# Patient Record
Sex: FEMALE | Race: WHITE | NOT HISPANIC OR LATINO | Employment: UNEMPLOYED | URBAN - NONMETROPOLITAN AREA
[De-identification: names, ages, dates, MRNs, and addresses within clinical notes are randomized per-mention and may not be internally consistent; named-entity substitution may affect disease eponyms.]

---

## 2023-08-01 ENCOUNTER — HOSPITAL ENCOUNTER (OUTPATIENT)
Facility: HOSPITAL | Age: 78
LOS: 1 days | Discharge: HOME HEALTH | End: 2023-08-03
Attending: STUDENT IN AN ORGANIZED HEALTH CARE EDUCATION/TRAINING PROGRAM | Admitting: INTERNAL MEDICINE
Payer: MEDICARE

## 2023-08-01 DIAGNOSIS — R47.81 SLURRED SPEECH: ICD-10-CM

## 2023-08-01 DIAGNOSIS — G45.9 TIA (TRANSIENT ISCHEMIC ATTACK): Primary | ICD-10-CM

## 2023-08-01 DIAGNOSIS — N18.4 STAGE 4 CHRONIC KIDNEY DISEASE: ICD-10-CM

## 2023-08-01 DIAGNOSIS — R29.810 FACIAL WEAKNESS: ICD-10-CM

## 2023-08-01 DIAGNOSIS — I27.0 PRIMARY PULMONARY HTN: ICD-10-CM

## 2023-08-01 PROCEDURE — 93005 ELECTROCARDIOGRAM TRACING: CPT | Performed by: STUDENT IN AN ORGANIZED HEALTH CARE EDUCATION/TRAINING PROGRAM

## 2023-08-01 PROCEDURE — 99285 EMERGENCY DEPT VISIT HI MDM: CPT

## 2023-08-01 PROCEDURE — 93010 ELECTROCARDIOGRAM REPORT: CPT | Performed by: SPECIALIST

## 2023-08-02 ENCOUNTER — APPOINTMENT (OUTPATIENT)
Dept: NUCLEAR MEDICINE | Facility: HOSPITAL | Age: 78
End: 2023-08-02
Payer: MEDICARE

## 2023-08-02 ENCOUNTER — APPOINTMENT (OUTPATIENT)
Dept: CARDIOLOGY | Facility: HOSPITAL | Age: 78
End: 2023-08-02
Payer: MEDICARE

## 2023-08-02 ENCOUNTER — APPOINTMENT (OUTPATIENT)
Dept: CT IMAGING | Facility: HOSPITAL | Age: 78
End: 2023-08-02
Payer: MEDICARE

## 2023-08-02 ENCOUNTER — APPOINTMENT (OUTPATIENT)
Dept: GENERAL RADIOLOGY | Facility: HOSPITAL | Age: 78
End: 2023-08-02
Payer: MEDICARE

## 2023-08-02 PROBLEM — R29.90 STROKE-LIKE SYMPTOMS: Status: ACTIVE | Noted: 2023-08-02

## 2023-08-02 LAB
ABO GROUP BLD: NORMAL
ABO GROUP BLD: NORMAL
ALBUMIN SERPL-MCNC: 4.3 G/DL (ref 3.5–5.2)
ALBUMIN SERPL-MCNC: 4.4 G/DL (ref 3.5–5.2)
ALBUMIN/GLOB SERPL: 1.5 G/DL
ALBUMIN/GLOB SERPL: 1.6 G/DL
ALP SERPL-CCNC: 64 U/L (ref 39–117)
ALP SERPL-CCNC: 67 U/L (ref 39–117)
ALT SERPL W P-5'-P-CCNC: 10 U/L (ref 1–33)
ALT SERPL W P-5'-P-CCNC: 10 U/L (ref 1–33)
AMPHET+METHAMPHET UR QL: NEGATIVE
AMPHETAMINES UR QL: NEGATIVE
ANION GAP SERPL CALCULATED.3IONS-SCNC: 10.9 MMOL/L (ref 5–15)
ANION GAP SERPL CALCULATED.3IONS-SCNC: 12.2 MMOL/L (ref 5–15)
APTT PPP: 33.3 SECONDS (ref 26.5–34.5)
AST SERPL-CCNC: 22 U/L (ref 1–32)
AST SERPL-CCNC: 22 U/L (ref 1–32)
BARBITURATES UR QL SCN: NEGATIVE
BASOPHILS # BLD AUTO: 0.03 10*3/MM3 (ref 0–0.2)
BASOPHILS # BLD AUTO: 0.03 10*3/MM3 (ref 0–0.2)
BASOPHILS NFR BLD AUTO: 0.5 % (ref 0–1.5)
BASOPHILS NFR BLD AUTO: 0.7 % (ref 0–1.5)
BENZODIAZ UR QL SCN: POSITIVE
BH CV ECHO MEAS - ACS: 1.5 CM
BH CV ECHO MEAS - AI P1/2T: 449.4 MSEC
BH CV ECHO MEAS - AO MAX PG: 22.8 MMHG
BH CV ECHO MEAS - AO MEAN PG: 13 MMHG
BH CV ECHO MEAS - AO ROOT DIAM: 2.6 CM
BH CV ECHO MEAS - AO V2 MAX: 239 CM/SEC
BH CV ECHO MEAS - AO V2 VTI: 51.5 CM
BH CV ECHO MEAS - AVA(I,D): 1.67 CM2
BH CV ECHO MEAS - EDV(CUBED): 118 ML
BH CV ECHO MEAS - EDV(MOD-SP4): 106 ML
BH CV ECHO MEAS - EF(MOD-BP): 68 %
BH CV ECHO MEAS - EF(MOD-SP4): 68.6 %
BH CV ECHO MEAS - ESV(CUBED): 43.4 ML
BH CV ECHO MEAS - ESV(MOD-SP4): 33.3 ML
BH CV ECHO MEAS - FS: 28.3 %
BH CV ECHO MEAS - IVS/LVPW: 1.01 CM
BH CV ECHO MEAS - IVSD: 1.06 CM
BH CV ECHO MEAS - LA DIMENSION: 3.7 CM
BH CV ECHO MEAS - LAT PEAK E' VEL: 7.4 CM/SEC
BH CV ECHO MEAS - LV DIASTOLIC VOL/BSA (35-75): 60.9 CM2
BH CV ECHO MEAS - LV MASS(C)D: 189.6 GRAMS
BH CV ECHO MEAS - LV MAX PG: 8.6 MMHG
BH CV ECHO MEAS - LV MEAN PG: 4 MMHG
BH CV ECHO MEAS - LV SYSTOLIC VOL/BSA (12-30): 19.1 CM2
BH CV ECHO MEAS - LV V1 MAX: 147 CM/SEC
BH CV ECHO MEAS - LV V1 VTI: 30.4 CM
BH CV ECHO MEAS - LVIDD: 4.9 CM
BH CV ECHO MEAS - LVIDS: 3.5 CM
BH CV ECHO MEAS - LVOT AREA: 2.8 CM2
BH CV ECHO MEAS - LVOT DIAM: 1.9 CM
BH CV ECHO MEAS - LVPWD: 1.05 CM
BH CV ECHO MEAS - MED PEAK E' VEL: 4.2 CM/SEC
BH CV ECHO MEAS - MV A MAX VEL: 65.1 CM/SEC
BH CV ECHO MEAS - MV E MAX VEL: 78.4 CM/SEC
BH CV ECHO MEAS - MV E/A: 1.2
BH CV ECHO MEAS - PA ACC TIME: 0.11 SEC
BH CV ECHO MEAS - RAP SYSTOLE: 10 MMHG
BH CV ECHO MEAS - RVSP: 32.1 MMHG
BH CV ECHO MEAS - SI(MOD-SP4): 41.8 ML/M2
BH CV ECHO MEAS - SV(LVOT): 86.2 ML
BH CV ECHO MEAS - SV(MOD-SP4): 72.7 ML
BH CV ECHO MEAS - TR MAX PG: 22.1 MMHG
BH CV ECHO MEAS - TR MAX VEL: 235 CM/SEC
BH CV ECHO MEASUREMENTS AVERAGE E/E' RATIO: 13.52
BH CV NUCLEAR PRIOR STUDY: 3
BH CV REST NUCLEAR ISOTOPE DOSE: 9.5 MCI
BH CV STRESS BP STAGE 1: NORMAL
BH CV STRESS COMMENTS STAGE 1: NORMAL
BH CV STRESS DOSE REGADENOSON STAGE 1: 0.4
BH CV STRESS DURATION MIN STAGE 1: 0
BH CV STRESS DURATION SEC STAGE 1: 10
BH CV STRESS HR STAGE 1: 65
BH CV STRESS NUCLEAR ISOTOPE DOSE: 30.1 MCI
BH CV STRESS PROTOCOL 1: NORMAL
BH CV STRESS RECOVERY BP: NORMAL MMHG
BH CV STRESS RECOVERY HR: 65 BPM
BH CV STRESS STAGE 1: 1
BILIRUB SERPL-MCNC: 0.3 MG/DL (ref 0–1.2)
BILIRUB SERPL-MCNC: 0.3 MG/DL (ref 0–1.2)
BLD GP AB SCN SERPL QL: NEGATIVE
BUN SERPL-MCNC: 30 MG/DL (ref 8–23)
BUN SERPL-MCNC: 34 MG/DL (ref 8–23)
BUN/CREAT SERPL: 16.9 (ref 7–25)
BUN/CREAT SERPL: 17.3 (ref 7–25)
BUPRENORPHINE SERPL-MCNC: NEGATIVE NG/ML
CALCIUM SPEC-SCNC: 9.1 MG/DL (ref 8.6–10.5)
CALCIUM SPEC-SCNC: 9.3 MG/DL (ref 8.6–10.5)
CANNABINOIDS SERPL QL: NEGATIVE
CHLORIDE SERPL-SCNC: 104 MMOL/L (ref 98–107)
CHLORIDE SERPL-SCNC: 106 MMOL/L (ref 98–107)
CHOLEST SERPL-MCNC: 143 MG/DL (ref 0–200)
CO2 SERPL-SCNC: 24.8 MMOL/L (ref 22–29)
CO2 SERPL-SCNC: 27.1 MMOL/L (ref 22–29)
COCAINE UR QL: NEGATIVE
CREAT SERPL-MCNC: 1.78 MG/DL (ref 0.57–1)
CREAT SERPL-MCNC: 1.96 MG/DL (ref 0.57–1)
DEPRECATED RDW RBC AUTO: 41.4 FL (ref 37–54)
DEPRECATED RDW RBC AUTO: 41.4 FL (ref 37–54)
EGFRCR SERPLBLD CKD-EPI 2021: 25.9 ML/MIN/1.73
EGFRCR SERPLBLD CKD-EPI 2021: 29.1 ML/MIN/1.73
EOSINOPHIL # BLD AUTO: 0.09 10*3/MM3 (ref 0–0.4)
EOSINOPHIL # BLD AUTO: 0.1 10*3/MM3 (ref 0–0.4)
EOSINOPHIL NFR BLD AUTO: 1.6 % (ref 0.3–6.2)
EOSINOPHIL NFR BLD AUTO: 2 % (ref 0.3–6.2)
ERYTHROCYTE [DISTWIDTH] IN BLOOD BY AUTOMATED COUNT: 12.7 % (ref 12.3–15.4)
ERYTHROCYTE [DISTWIDTH] IN BLOOD BY AUTOMATED COUNT: 12.8 % (ref 12.3–15.4)
FENTANYL UR-MCNC: NEGATIVE NG/ML
GEN 5 2HR TROPONIN T REFLEX: 44 NG/L
GLOBULIN UR ELPH-MCNC: 2.8 GM/DL
GLOBULIN UR ELPH-MCNC: 2.9 GM/DL
GLUCOSE BLDC GLUCOMTR-MCNC: 89 MG/DL (ref 70–130)
GLUCOSE SERPL-MCNC: 82 MG/DL (ref 65–99)
GLUCOSE SERPL-MCNC: 92 MG/DL (ref 65–99)
HCT VFR BLD AUTO: 28.7 % (ref 34–46.6)
HCT VFR BLD AUTO: 31.4 % (ref 34–46.6)
HDLC SERPL-MCNC: 31 MG/DL (ref 40–60)
HGB BLD-MCNC: 10 G/DL (ref 12–15.9)
HGB BLD-MCNC: 9.1 G/DL (ref 12–15.9)
HOLD SPECIMEN: NORMAL
IMM GRANULOCYTES # BLD AUTO: 0.01 10*3/MM3 (ref 0–0.05)
IMM GRANULOCYTES # BLD AUTO: 0.02 10*3/MM3 (ref 0–0.05)
IMM GRANULOCYTES NFR BLD AUTO: 0.2 % (ref 0–0.5)
IMM GRANULOCYTES NFR BLD AUTO: 0.3 % (ref 0–0.5)
INR PPP: 1.03 (ref 0.9–1.1)
INR PPP: 1.08 (ref 0.9–1.1)
LDLC SERPL CALC-MCNC: 72 MG/DL (ref 0–100)
LDLC/HDLC SERPL: 2.06 {RATIO}
LEFT ATRIUM VOLUME INDEX: 32.5 ML/M2
LV EF NUC BP: 63 %
LYMPHOCYTES # BLD AUTO: 1.39 10*3/MM3 (ref 0.7–3.1)
LYMPHOCYTES # BLD AUTO: 1.86 10*3/MM3 (ref 0.7–3.1)
LYMPHOCYTES NFR BLD AUTO: 29.2 % (ref 19.6–45.3)
LYMPHOCYTES NFR BLD AUTO: 31 % (ref 19.6–45.3)
MAXIMAL PREDICTED HEART RATE: 143 BPM
MCH RBC QN AUTO: 28.3 PG (ref 26.6–33)
MCH RBC QN AUTO: 28.4 PG (ref 26.6–33)
MCHC RBC AUTO-ENTMCNC: 31.7 G/DL (ref 31.5–35.7)
MCHC RBC AUTO-ENTMCNC: 31.8 G/DL (ref 31.5–35.7)
MCV RBC AUTO: 89 FL (ref 79–97)
MCV RBC AUTO: 89.7 FL (ref 79–97)
METHADONE UR QL SCN: NEGATIVE
MONOCYTES # BLD AUTO: 0.36 10*3/MM3 (ref 0.1–0.9)
MONOCYTES # BLD AUTO: 0.42 10*3/MM3 (ref 0.1–0.9)
MONOCYTES NFR BLD AUTO: 6.6 % (ref 5–12)
MONOCYTES NFR BLD AUTO: 8 % (ref 5–12)
NEUTROPHILS NFR BLD AUTO: 2.6 10*3/MM3 (ref 1.7–7)
NEUTROPHILS NFR BLD AUTO: 3.93 10*3/MM3 (ref 1.7–7)
NEUTROPHILS NFR BLD AUTO: 58.1 % (ref 42.7–76)
NEUTROPHILS NFR BLD AUTO: 61.8 % (ref 42.7–76)
NRBC BLD AUTO-RTO: 0 /100 WBC (ref 0–0.2)
NRBC BLD AUTO-RTO: 0 /100 WBC (ref 0–0.2)
NT-PROBNP SERPL-MCNC: 1344 PG/ML (ref 0–1800)
OPIATES UR QL: NEGATIVE
OXYCODONE UR QL SCN: NEGATIVE
PCP UR QL SCN: NEGATIVE
PERCENT MAX PREDICTED HR: 45.45 %
PLATELET # BLD AUTO: 80 10*3/MM3 (ref 140–450)
PLATELET # BLD AUTO: 85 10*3/MM3 (ref 140–450)
PMV BLD AUTO: 10.2 FL (ref 6–12)
PMV BLD AUTO: 10.7 FL (ref 6–12)
POTASSIUM SERPL-SCNC: 3.8 MMOL/L (ref 3.5–5.2)
POTASSIUM SERPL-SCNC: 4.3 MMOL/L (ref 3.5–5.2)
PROPOXYPH UR QL: NEGATIVE
PROT SERPL-MCNC: 7.2 G/DL (ref 6–8.5)
PROT SERPL-MCNC: 7.2 G/DL (ref 6–8.5)
PROTHROMBIN TIME: 14 SECONDS (ref 12.1–14.7)
PROTHROMBIN TIME: 14.5 SECONDS (ref 12.1–14.7)
RBC # BLD AUTO: 3.2 10*6/MM3 (ref 3.77–5.28)
RBC # BLD AUTO: 3.53 10*6/MM3 (ref 3.77–5.28)
RH BLD: POSITIVE
RH BLD: POSITIVE
SODIUM SERPL-SCNC: 141 MMOL/L (ref 136–145)
SODIUM SERPL-SCNC: 144 MMOL/L (ref 136–145)
STRESS BASELINE BP: NORMAL MMHG
STRESS BASELINE HR: 65 BPM
STRESS PERCENT HR: 53 %
STRESS POST PEAK BP: NORMAL MMHG
STRESS POST PEAK HR: 65 BPM
STRESS TARGET HR: 122 BPM
T&S EXPIRATION DATE: NORMAL
TRICYCLICS UR QL SCN: NEGATIVE
TRIGL SERPL-MCNC: 241 MG/DL (ref 0–150)
TROPONIN T DELTA: -5 NG/L
TROPONIN T SERPL HS-MCNC: 49 NG/L
VLDLC SERPL-MCNC: 40 MG/DL (ref 5–40)
WBC NRBC COR # BLD: 4.48 10*3/MM3 (ref 3.4–10.8)
WBC NRBC COR # BLD: 6.36 10*3/MM3 (ref 3.4–10.8)
WHOLE BLOOD HOLD COAG: NORMAL
WHOLE BLOOD HOLD COAG: NORMAL
WHOLE BLOOD HOLD SPECIMEN: NORMAL
WHOLE BLOOD HOLD SPECIMEN: NORMAL

## 2023-08-02 PROCEDURE — 86901 BLOOD TYPING SEROLOGIC RH(D): CPT | Performed by: STUDENT IN AN ORGANIZED HEALTH CARE EDUCATION/TRAINING PROGRAM

## 2023-08-02 PROCEDURE — 85730 THROMBOPLASTIN TIME PARTIAL: CPT | Performed by: STUDENT IN AN ORGANIZED HEALTH CARE EDUCATION/TRAINING PROGRAM

## 2023-08-02 PROCEDURE — 97162 PT EVAL MOD COMPLEX 30 MIN: CPT

## 2023-08-02 PROCEDURE — 92610 EVALUATE SWALLOWING FUNCTION: CPT

## 2023-08-02 PROCEDURE — 78452 HT MUSCLE IMAGE SPECT MULT: CPT | Performed by: INTERNAL MEDICINE

## 2023-08-02 PROCEDURE — 84484 ASSAY OF TROPONIN QUANT: CPT | Performed by: STUDENT IN AN ORGANIZED HEALTH CARE EDUCATION/TRAINING PROGRAM

## 2023-08-02 PROCEDURE — 93017 CV STRESS TEST TRACING ONLY: CPT

## 2023-08-02 PROCEDURE — 80307 DRUG TEST PRSMV CHEM ANLYZR: CPT | Performed by: PHYSICIAN ASSISTANT

## 2023-08-02 PROCEDURE — 36415 COLL VENOUS BLD VENIPUNCTURE: CPT

## 2023-08-02 PROCEDURE — 63710000001 ONDANSETRON PER 8 MG: Performed by: INTERNAL MEDICINE

## 2023-08-02 PROCEDURE — 96372 THER/PROPH/DIAG INJ SC/IM: CPT

## 2023-08-02 PROCEDURE — A9500 TC99M SESTAMIBI: HCPCS | Performed by: INTERNAL MEDICINE

## 2023-08-02 PROCEDURE — 92523 SPEECH SOUND LANG COMPREHEN: CPT

## 2023-08-02 PROCEDURE — 83880 ASSAY OF NATRIURETIC PEPTIDE: CPT | Performed by: STUDENT IN AN ORGANIZED HEALTH CARE EDUCATION/TRAINING PROGRAM

## 2023-08-02 PROCEDURE — 93306 TTE W/DOPPLER COMPLETE: CPT | Performed by: INTERNAL MEDICINE

## 2023-08-02 PROCEDURE — 80053 COMPREHEN METABOLIC PANEL: CPT | Performed by: STUDENT IN AN ORGANIZED HEALTH CARE EDUCATION/TRAINING PROGRAM

## 2023-08-02 PROCEDURE — 0042T CT CEREBRAL PERFUSION W WO CONTRAST: CPT | Performed by: RADIOLOGY

## 2023-08-02 PROCEDURE — 0042T HC CT CEREBRAL PERFUSION W/WO CONTRAST: CPT

## 2023-08-02 PROCEDURE — 99204 OFFICE O/P NEW MOD 45 MIN: CPT | Performed by: STUDENT IN AN ORGANIZED HEALTH CARE EDUCATION/TRAINING PROGRAM

## 2023-08-02 PROCEDURE — 70450 CT HEAD/BRAIN W/O DYE: CPT

## 2023-08-02 PROCEDURE — 93018 CV STRESS TEST I&R ONLY: CPT | Performed by: INTERNAL MEDICINE

## 2023-08-02 PROCEDURE — 96361 HYDRATE IV INFUSION ADD-ON: CPT

## 2023-08-02 PROCEDURE — 86901 BLOOD TYPING SEROLOGIC RH(D): CPT

## 2023-08-02 PROCEDURE — 86850 RBC ANTIBODY SCREEN: CPT | Performed by: STUDENT IN AN ORGANIZED HEALTH CARE EDUCATION/TRAINING PROGRAM

## 2023-08-02 PROCEDURE — 86900 BLOOD TYPING SEROLOGIC ABO: CPT | Performed by: STUDENT IN AN ORGANIZED HEALTH CARE EDUCATION/TRAINING PROGRAM

## 2023-08-02 PROCEDURE — 97166 OT EVAL MOD COMPLEX 45 MIN: CPT

## 2023-08-02 PROCEDURE — 85610 PROTHROMBIN TIME: CPT | Performed by: STUDENT IN AN ORGANIZED HEALTH CARE EDUCATION/TRAINING PROGRAM

## 2023-08-02 PROCEDURE — 70498 CT ANGIOGRAPHY NECK: CPT

## 2023-08-02 PROCEDURE — 93306 TTE W/DOPPLER COMPLETE: CPT

## 2023-08-02 PROCEDURE — 80053 COMPREHEN METABOLIC PANEL: CPT | Performed by: HOSPITALIST

## 2023-08-02 PROCEDURE — 78452 HT MUSCLE IMAGE SPECT MULT: CPT

## 2023-08-02 PROCEDURE — 99222 1ST HOSP IP/OBS MODERATE 55: CPT | Performed by: INTERNAL MEDICINE

## 2023-08-02 PROCEDURE — 71045 X-RAY EXAM CHEST 1 VIEW: CPT | Performed by: RADIOLOGY

## 2023-08-02 PROCEDURE — 82948 REAGENT STRIP/BLOOD GLUCOSE: CPT

## 2023-08-02 PROCEDURE — 70496 CT ANGIOGRAPHY HEAD: CPT | Performed by: RADIOLOGY

## 2023-08-02 PROCEDURE — 85025 COMPLETE CBC W/AUTO DIFF WBC: CPT | Performed by: HOSPITALIST

## 2023-08-02 PROCEDURE — 25510000001 IOPAMIDOL PER 1 ML: Performed by: STUDENT IN AN ORGANIZED HEALTH CARE EDUCATION/TRAINING PROGRAM

## 2023-08-02 PROCEDURE — 0 TECHNETIUM SESTAMIBI: Performed by: INTERNAL MEDICINE

## 2023-08-02 PROCEDURE — 86900 BLOOD TYPING SEROLOGIC ABO: CPT

## 2023-08-02 PROCEDURE — 71045 X-RAY EXAM CHEST 1 VIEW: CPT

## 2023-08-02 PROCEDURE — 25010000002 REGADENOSON 0.4 MG/5ML SOLUTION: Performed by: INTERNAL MEDICINE

## 2023-08-02 PROCEDURE — 80061 LIPID PANEL: CPT | Performed by: HOSPITALIST

## 2023-08-02 PROCEDURE — 70498 CT ANGIOGRAPHY NECK: CPT | Performed by: RADIOLOGY

## 2023-08-02 PROCEDURE — 70496 CT ANGIOGRAPHY HEAD: CPT

## 2023-08-02 PROCEDURE — 25010000002 HEPARIN (PORCINE) PER 1000 UNITS: Performed by: HOSPITALIST

## 2023-08-02 PROCEDURE — 85025 COMPLETE CBC W/AUTO DIFF WBC: CPT | Performed by: STUDENT IN AN ORGANIZED HEALTH CARE EDUCATION/TRAINING PROGRAM

## 2023-08-02 RX ORDER — GABAPENTIN 300 MG/1
300 CAPSULE ORAL 2 TIMES DAILY
COMMUNITY

## 2023-08-02 RX ORDER — NITROGLYCERIN 0.4 MG/1
0.4 TABLET SUBLINGUAL
COMMUNITY

## 2023-08-02 RX ORDER — ONDANSETRON 4 MG/1
4 TABLET, FILM COATED ORAL EVERY 8 HOURS PRN
Status: DISCONTINUED | OUTPATIENT
Start: 2023-08-02 | End: 2023-08-03 | Stop reason: HOSPADM

## 2023-08-02 RX ORDER — HYDRALAZINE HYDROCHLORIDE 20 MG/ML
10 INJECTION INTRAMUSCULAR; INTRAVENOUS EVERY 6 HOURS PRN
Status: DISCONTINUED | OUTPATIENT
Start: 2023-08-02 | End: 2023-08-03 | Stop reason: HOSPADM

## 2023-08-02 RX ORDER — BISACODYL 5 MG/1
5 TABLET, DELAYED RELEASE ORAL DAILY PRN
Status: DISCONTINUED | OUTPATIENT
Start: 2023-08-02 | End: 2023-08-03 | Stop reason: HOSPADM

## 2023-08-02 RX ORDER — MODAFINIL 200 MG/1
200 TABLET ORAL DAILY
COMMUNITY

## 2023-08-02 RX ORDER — SODIUM CHLORIDE 9 MG/ML
100 INJECTION, SOLUTION INTRAVENOUS CONTINUOUS
Status: DISCONTINUED | OUTPATIENT
Start: 2023-08-02 | End: 2023-08-03

## 2023-08-02 RX ORDER — LANOLIN ALCOHOL/MO/W.PET/CERES
1000 CREAM (GRAM) TOPICAL NIGHTLY
Status: DISCONTINUED | OUTPATIENT
Start: 2023-08-02 | End: 2023-08-03 | Stop reason: HOSPADM

## 2023-08-02 RX ORDER — FLUTICASONE PROPIONATE 50 MCG
2 SPRAY, SUSPENSION (ML) NASAL NIGHTLY
COMMUNITY

## 2023-08-02 RX ORDER — CLOPIDOGREL BISULFATE 75 MG/1
300 TABLET ORAL ONCE
Status: COMPLETED | OUTPATIENT
Start: 2023-08-02 | End: 2023-08-02

## 2023-08-02 RX ORDER — MODAFINIL 100 MG/1
200 TABLET ORAL DAILY
Status: CANCELLED | OUTPATIENT
Start: 2023-08-02 | End: 2023-08-09

## 2023-08-02 RX ORDER — AMOXICILLIN 250 MG
2 CAPSULE ORAL 2 TIMES DAILY
Status: DISCONTINUED | OUTPATIENT
Start: 2023-08-02 | End: 2023-08-03 | Stop reason: HOSPADM

## 2023-08-02 RX ORDER — SODIUM CHLORIDE 0.9 % (FLUSH) 0.9 %
10 SYRINGE (ML) INJECTION AS NEEDED
Status: DISCONTINUED | OUTPATIENT
Start: 2023-08-02 | End: 2023-08-03 | Stop reason: HOSPADM

## 2023-08-02 RX ORDER — CLOPIDOGREL BISULFATE 75 MG/1
75 TABLET ORAL DAILY
Status: DISCONTINUED | OUTPATIENT
Start: 2023-08-02 | End: 2023-08-03 | Stop reason: HOSPADM

## 2023-08-02 RX ORDER — MAGNESIUM CHLORIDE 64 MG
64 TABLET, DELAYED RELEASE (ENTERIC COATED) ORAL 2 TIMES DAILY
COMMUNITY

## 2023-08-02 RX ORDER — ASPIRIN 81 MG/1
81 TABLET, CHEWABLE ORAL DAILY
Status: DISCONTINUED | OUTPATIENT
Start: 2023-08-02 | End: 2023-08-03 | Stop reason: HOSPADM

## 2023-08-02 RX ORDER — BISACODYL 10 MG
10 SUPPOSITORY, RECTAL RECTAL DAILY PRN
Status: DISCONTINUED | OUTPATIENT
Start: 2023-08-02 | End: 2023-08-03 | Stop reason: HOSPADM

## 2023-08-02 RX ORDER — FLUTICASONE PROPIONATE 50 MCG
2 SPRAY, SUSPENSION (ML) NASAL NIGHTLY
Status: DISCONTINUED | OUTPATIENT
Start: 2023-08-02 | End: 2023-08-03 | Stop reason: HOSPADM

## 2023-08-02 RX ORDER — TORSEMIDE 20 MG/1
50 TABLET ORAL DAILY
Status: CANCELLED | OUTPATIENT
Start: 2023-08-02

## 2023-08-02 RX ORDER — REGADENOSON 0.08 MG/ML
0.4 INJECTION, SOLUTION INTRAVENOUS
Status: COMPLETED | OUTPATIENT
Start: 2023-08-02 | End: 2023-08-02

## 2023-08-02 RX ORDER — DONEPEZIL HYDROCHLORIDE 5 MG/1
5 TABLET, FILM COATED ORAL NIGHTLY
Status: DISCONTINUED | OUTPATIENT
Start: 2023-08-02 | End: 2023-08-03 | Stop reason: HOSPADM

## 2023-08-02 RX ORDER — NITROGLYCERIN 0.4 MG/1
0.4 TABLET SUBLINGUAL
Status: DISCONTINUED | OUTPATIENT
Start: 2023-08-02 | End: 2023-08-03 | Stop reason: HOSPADM

## 2023-08-02 RX ORDER — PANTOPRAZOLE SODIUM 40 MG/1
40 TABLET, DELAYED RELEASE ORAL NIGHTLY
Status: DISCONTINUED | OUTPATIENT
Start: 2023-08-02 | End: 2023-08-03 | Stop reason: HOSPADM

## 2023-08-02 RX ORDER — ISOSORBIDE MONONITRATE 30 MG/1
30 TABLET, EXTENDED RELEASE ORAL DAILY
Status: DISCONTINUED | OUTPATIENT
Start: 2023-08-02 | End: 2023-08-03 | Stop reason: HOSPADM

## 2023-08-02 RX ORDER — HYDRALAZINE HYDROCHLORIDE 25 MG/1
25 TABLET, FILM COATED ORAL 2 TIMES DAILY
Status: DISCONTINUED | OUTPATIENT
Start: 2023-08-02 | End: 2023-08-03

## 2023-08-02 RX ORDER — MULTIPLE VITAMINS W/ MINERALS TAB 9MG-400MCG
1 TAB ORAL DAILY
Status: DISCONTINUED | OUTPATIENT
Start: 2023-08-02 | End: 2023-08-03 | Stop reason: HOSPADM

## 2023-08-02 RX ORDER — LORAZEPAM 0.5 MG/1
0.5 TABLET ORAL 2 TIMES DAILY
Status: DISCONTINUED | OUTPATIENT
Start: 2023-08-02 | End: 2023-08-03 | Stop reason: HOSPADM

## 2023-08-02 RX ORDER — FERROUS SULFATE 325(65) MG
325 TABLET ORAL
Status: DISCONTINUED | OUTPATIENT
Start: 2023-08-02 | End: 2023-08-03 | Stop reason: HOSPADM

## 2023-08-02 RX ORDER — BEMPEDOIC ACID AND EZETIMIBE 180; 10 MG/1; MG/1
1 TABLET, FILM COATED ORAL NIGHTLY
COMMUNITY

## 2023-08-02 RX ORDER — GABAPENTIN 100 MG/1
100 CAPSULE ORAL 2 TIMES DAILY
Status: DISCONTINUED | OUTPATIENT
Start: 2023-08-02 | End: 2023-08-03 | Stop reason: HOSPADM

## 2023-08-02 RX ORDER — TORSEMIDE 100 MG/1
50 TABLET ORAL DAILY
COMMUNITY

## 2023-08-02 RX ORDER — SODIUM CHLORIDE 0.9 % (FLUSH) 0.9 %
10 SYRINGE (ML) INJECTION EVERY 12 HOURS SCHEDULED
Status: DISCONTINUED | OUTPATIENT
Start: 2023-08-02 | End: 2023-08-03 | Stop reason: HOSPADM

## 2023-08-02 RX ORDER — DOCUSATE SODIUM 250 MG
250 CAPSULE ORAL NIGHTLY
COMMUNITY

## 2023-08-02 RX ORDER — POLYETHYLENE GLYCOL 3350 17 G/17G
17 POWDER, FOR SOLUTION ORAL DAILY PRN
Status: DISCONTINUED | OUTPATIENT
Start: 2023-08-02 | End: 2023-08-03 | Stop reason: HOSPADM

## 2023-08-02 RX ORDER — CYCLOBENZAPRINE HCL 10 MG
5 TABLET ORAL 3 TIMES DAILY PRN
COMMUNITY
End: 2023-08-03 | Stop reason: HOSPADM

## 2023-08-02 RX ORDER — NITROGLYCERIN 0.4 MG/1
0.4 TABLET SUBLINGUAL
Status: CANCELLED | OUTPATIENT
Start: 2023-08-02

## 2023-08-02 RX ORDER — HYDRALAZINE HYDROCHLORIDE 25 MG/1
25 TABLET, FILM COATED ORAL 2 TIMES DAILY
Status: ON HOLD | COMMUNITY
End: 2023-08-03 | Stop reason: SDUPTHER

## 2023-08-02 RX ORDER — SODIUM CHLORIDE 9 MG/ML
40 INJECTION, SOLUTION INTRAVENOUS AS NEEDED
Status: DISCONTINUED | OUTPATIENT
Start: 2023-08-02 | End: 2023-08-03 | Stop reason: HOSPADM

## 2023-08-02 RX ORDER — LANOLIN ALCOHOL/MO/W.PET/CERES
1000 CREAM (GRAM) TOPICAL NIGHTLY
COMMUNITY

## 2023-08-02 RX ORDER — DONEPEZIL HYDROCHLORIDE 5 MG/1
5 TABLET, FILM COATED ORAL NIGHTLY
COMMUNITY

## 2023-08-02 RX ORDER — ASPIRIN 81 MG/1
324 TABLET, CHEWABLE ORAL ONCE
Status: COMPLETED | OUTPATIENT
Start: 2023-08-02 | End: 2023-08-02

## 2023-08-02 RX ORDER — LORAZEPAM 0.5 MG/1
0.5 TABLET ORAL 2 TIMES DAILY
COMMUNITY

## 2023-08-02 RX ORDER — CYCLOBENZAPRINE HCL 10 MG
5 TABLET ORAL 3 TIMES DAILY PRN
Status: CANCELLED | OUTPATIENT
Start: 2023-08-02

## 2023-08-02 RX ORDER — DOCUSATE SODIUM 100 MG/1
200 CAPSULE, LIQUID FILLED ORAL NIGHTLY
Status: CANCELLED | OUTPATIENT
Start: 2023-08-02

## 2023-08-02 RX ORDER — PROCHLORPERAZINE EDISYLATE 5 MG/ML
2.5 INJECTION INTRAMUSCULAR; INTRAVENOUS EVERY 6 HOURS PRN
Status: DISCONTINUED | OUTPATIENT
Start: 2023-08-02 | End: 2023-08-03 | Stop reason: HOSPADM

## 2023-08-02 RX ORDER — FERROUS SULFATE 325(65) MG
325 TABLET ORAL
COMMUNITY

## 2023-08-02 RX ORDER — MECLIZINE HYDROCHLORIDE 25 MG/1
25 TABLET ORAL 3 TIMES DAILY PRN
Status: CANCELLED | OUTPATIENT
Start: 2023-08-02

## 2023-08-02 RX ORDER — ONDANSETRON 4 MG/1
4 TABLET, FILM COATED ORAL EVERY 8 HOURS PRN
COMMUNITY

## 2023-08-02 RX ORDER — ISOSORBIDE MONONITRATE 30 MG/1
30 TABLET, EXTENDED RELEASE ORAL DAILY
COMMUNITY

## 2023-08-02 RX ORDER — PANTOPRAZOLE SODIUM 40 MG/1
40 TABLET, DELAYED RELEASE ORAL NIGHTLY
COMMUNITY

## 2023-08-02 RX ORDER — SERTRALINE HYDROCHLORIDE 100 MG/1
200 TABLET, FILM COATED ORAL NIGHTLY
COMMUNITY

## 2023-08-02 RX ORDER — GINGER ROOT/GINGER ROOT EXT 262.5 MG
1 CAPSULE ORAL 2 TIMES DAILY
Status: DISCONTINUED | OUTPATIENT
Start: 2023-08-02 | End: 2023-08-03 | Stop reason: HOSPADM

## 2023-08-02 RX ORDER — MODAFINIL 100 MG/1
200 TABLET ORAL DAILY
Status: DISCONTINUED | OUTPATIENT
Start: 2023-08-02 | End: 2023-08-03 | Stop reason: HOSPADM

## 2023-08-02 RX ORDER — HEPARIN SODIUM 5000 [USP'U]/ML
5000 INJECTION, SOLUTION INTRAVENOUS; SUBCUTANEOUS EVERY 12 HOURS SCHEDULED
Status: DISCONTINUED | OUTPATIENT
Start: 2023-08-02 | End: 2023-08-03 | Stop reason: HOSPADM

## 2023-08-02 RX ORDER — MECLIZINE HYDROCHLORIDE 25 MG/1
25 TABLET ORAL 3 TIMES DAILY PRN
COMMUNITY
End: 2023-08-03 | Stop reason: HOSPADM

## 2023-08-02 RX ORDER — ACETAMINOPHEN 500 MG
1000 TABLET ORAL EVERY 8 HOURS PRN
Status: DISCONTINUED | OUTPATIENT
Start: 2023-08-02 | End: 2023-08-03 | Stop reason: HOSPADM

## 2023-08-02 RX ADMIN — HEPARIN SODIUM 5000 UNITS: 5000 INJECTION INTRAVENOUS; SUBCUTANEOUS at 09:53

## 2023-08-02 RX ADMIN — DOCUSATE SODIUM 50 MG AND SENNOSIDES 8.6 MG 2 TABLET: 8.6; 5 TABLET, FILM COATED ORAL at 20:44

## 2023-08-02 RX ADMIN — CLOPIDOGREL BISULFATE 75 MG: 75 TABLET, FILM COATED ORAL at 09:53

## 2023-08-02 RX ADMIN — Medication 1000 MCG: at 20:43

## 2023-08-02 RX ADMIN — SODIUM CHLORIDE 1000 ML: 9 INJECTION, SOLUTION INTRAVENOUS at 01:36

## 2023-08-02 RX ADMIN — CLOPIDOGREL BISULFATE 300 MG: 75 TABLET, FILM COATED ORAL at 01:36

## 2023-08-02 RX ADMIN — DONEPEZIL HYDROCHLORIDE 5 MG: 5 TABLET, FILM COATED ORAL at 20:44

## 2023-08-02 RX ADMIN — IOPAMIDOL 140 ML: 755 INJECTION, SOLUTION INTRAVENOUS at 01:02

## 2023-08-02 RX ADMIN — Medication 10 ML: at 09:54

## 2023-08-02 RX ADMIN — ACETAMINOPHEN 1000 MG: 500 TABLET ORAL at 12:09

## 2023-08-02 RX ADMIN — ACETAMINOPHEN 1000 MG: 500 TABLET ORAL at 21:34

## 2023-08-02 RX ADMIN — Medication 1 TABLET: at 20:44

## 2023-08-02 RX ADMIN — ASPIRIN 81 MG: 81 TABLET, CHEWABLE ORAL at 09:53

## 2023-08-02 RX ADMIN — PANTOPRAZOLE SODIUM 40 MG: 40 TABLET, DELAYED RELEASE ORAL at 20:44

## 2023-08-02 RX ADMIN — ASPIRIN 324 MG: 81 TABLET, CHEWABLE ORAL at 01:37

## 2023-08-02 RX ADMIN — ISOSORBIDE MONONITRATE 30 MG: 30 TABLET, EXTENDED RELEASE ORAL at 09:53

## 2023-08-02 RX ADMIN — GABAPENTIN 100 MG: 100 CAPSULE ORAL at 20:43

## 2023-08-02 RX ADMIN — TECHNETIUM TC 99M SESTAMIBI 1 DOSE: 1 INJECTION INTRAVENOUS at 10:50

## 2023-08-02 RX ADMIN — TECHNETIUM TC 99M SESTAMIBI 1 DOSE: 1 INJECTION INTRAVENOUS at 13:19

## 2023-08-02 RX ADMIN — Medication 10 ML: at 20:43

## 2023-08-02 RX ADMIN — HYDRALAZINE HYDROCHLORIDE 25 MG: 25 TABLET, FILM COATED ORAL at 09:53

## 2023-08-02 RX ADMIN — SODIUM CHLORIDE 100 ML/HR: 9 INJECTION, SOLUTION INTRAVENOUS at 07:30

## 2023-08-02 RX ADMIN — GABAPENTIN 100 MG: 100 CAPSULE ORAL at 09:53

## 2023-08-02 RX ADMIN — REGADENOSON 0.4 MG: 0.08 INJECTION, SOLUTION INTRAVENOUS at 13:19

## 2023-08-02 RX ADMIN — ONDANSETRON HYDROCHLORIDE 4 MG: 4 TABLET, FILM COATED ORAL at 21:31

## 2023-08-02 RX ADMIN — LORAZEPAM 0.5 MG: 0.5 TABLET ORAL at 09:53

## 2023-08-02 RX ADMIN — SERTRALINE 200 MG: 50 TABLET, FILM COATED ORAL at 20:43

## 2023-08-02 RX ADMIN — MODAFINIL 200 MG: 100 TABLET ORAL at 09:53

## 2023-08-02 RX ADMIN — HEPARIN SODIUM 5000 UNITS: 5000 INJECTION INTRAVENOUS; SUBCUTANEOUS at 20:43

## 2023-08-02 RX ADMIN — LORAZEPAM 0.5 MG: 0.5 TABLET ORAL at 20:43

## 2023-08-02 NOTE — PROGRESS NOTES
This is a 78-year-old female who initially presented with chest pain, subsequently was noted to have left facial weakness and slurred speech.  Patient was out of the window for medical thrombolysis when the code stroke was called .     CT head shows no acute abnormality.  CTA head and neck showed left PCA stenosis.  No target vessel for endovascular therapy.      Likely minor ischemic stroke.     Recommend admission.  Stroke  Workup.  Load the patient with 300 Plavix, aspirin 325.  Treat only the systolic blood pressure is greater than 180.  Initiate ischemic stroke quadriceps.  Telestroke will continue to follow the patient.      Benefits outweighs risk of giving oral Plavix   , even if patient fails dysphagia screen.

## 2023-08-02 NOTE — CASE MANAGEMENT/SOCIAL WORK
Discharge Planning Assessment  [facility]     Patient Name: Susan Us  MRN: [id number]  Today's Date: 8/2/2023    Admit Date: 8/1/2023    Plan: SS received consult per Case Management for discharge planning.  SS spoke with pt and daughter Leslie at bedside.  Pt resides at home alone at [address] and plans to return home at discharge.  Pt currently utilizes [facility] at [phone].  [facility] will need new referral with face to face at discharge.  Pt currently utilizes rolling walker, cane and shower chair via unknown provider.  Pt's PCP is Trevor Garay.  Pt utilizes [facility].  Pt family stated no POA or Living Will.  Pt's family transport via private auto.  SS will follow and assist with discharge needs.   Discharge Needs Assessment       Row Name 08/02/23 1550       Living Environment    People in Home alone    Current Living Arrangements home    Potentially Unsafe Housing Conditions none    Primary Care Provided by self    Provides Primary Care For no one    Family Caregiver if Needed child(zoraida), adult    Quality of Family Relationships helpful;involved;supportive    Able to Return to Prior Arrangements yes       Resource/Environmental Concerns    Resource/Environmental Concerns none       Transition Planning    Patient/Family Anticipates Transition to home    Patient/Family Anticipated Services at Transition none    Transportation Anticipated family or friend will provide       Discharge Needs Assessment    Equipment Currently Used at Home walker, rolling;shower chair;cane, quad                   Discharge Plan       Row Name 08/02/23 1553       Plan    Plan SS received consult per Case Management for discharge planning.  SS spoke with pt and daughter Leslie at bedside.  Pt resides at home alone at [address] and plans to return home at discharge.  Pt currently utilizes [facility] at [phone].  [facility] will need new referral with face  to face at discharge.  Pt currently utilizes rolling walker, cane and shower chair via unknown provider.  Pt's PCP is Trevor Garay.  Pt utilizes [facility].  Pt family stated no POA or Living Will.  Pt's family transport via private auto.  SS will follow and assist with discharge needs.                  Continued Care and Services - Admitted Since 8/1/2023    Coordination has not been started for this encounter.       Expected Discharge Date and Time       Expected Discharge Date Expected Discharge Time    Aug 4, 2023            Demographic Summary       Row Name 08/02/23 6762       General Information    Admission Type inpatient    Referral Source nursing    Reason for Consult discharge planning    Preferred Language English                  Janae Ugalde, [id number]

## 2023-08-02 NOTE — PROGRESS NOTES
Patient was admitted early this morning due to slurred speech and chest pain radiation to left shoulder.   CT head, CT perfusion and CTA head and neck did not show any signs of acute infarct or perfusion defect. Has bilateral moderate 50-55% ICA stenosis and mild Vertebral artery stenosis. Therefore, no benefit from vascular surgery at this time. Slurred speech has completely resolved by this morning. Therefore, likely TIA. Unable to obtain MRI due to PPM. Daughter reports similar episode a few months ago that resolved within a few hours. At that time patient and daughter thought to be related to anxiety. However, also likely from TIA. She was loaded with plavix 300 mg and  mg last night. Continuing plavix 75 mg and ASA 81 mg daily for now until further instructions from neurology. She is allergic to statins due to Hives but on Nexlizet (LDL is well controlled at 72). Cont neuro checks for now. TTE ordered and pending.     Chest pain is resolved. Troponin slightly elevated and unchanged on repeat. Cardiology was consulted and recommended stress test. Daughter reports she follows up with [facility] in [place] and had seen Dr. Delgado Galvez.  She had a stress test done about a year ago or so and reportedly was negative. No h/o MI or PCI. Will try to get records.     Also noted on multiple sedating medications in setting of Narcolepsy (on flexeril, Lorazepam, Meclizine, gabapentin). May need to consider decreasing sedative burden.     - h/o bradycardia- s/p PPM 2017  - CKD 4 vs MAEGAN- IVF, baseline creatinine is unknown  - HTN- on Imdur, Hydralazine at home. Continued  - narcolepsy- Modafinil  - depression/anxiety- Sertraline, lorazepam  - chronic pain- gabapentin  - BARBARA- po iron  - dementia- Donepezil  - GERD- PPI  - B12 deficiency- on supplement

## 2023-08-02 NOTE — H&P
"Hospitalist History and Physical        Patient Identification  Name: Susan Us  Age/Sex: 78 y.o. female  :  1945        MRN: [id number]  Visit Number: [id number]  Admit Date: 2023   PCP: Trevor Garay MD          Chief complaint chest pain, slurred/mumbled speech    History of Present Illness:  Patient is a 78 y.o. female with history of bradycardia s/p pacemaker placement, CKD (baseline unknown), dementia, thrombocytopenia, HTN, and HLD, who presents with complaints of chest pain and slurred/mumbled speech that started around 8pm tonight. Her daughter notes initially she complained of chest pain radiating to her left shoulder. The patient adds that she felt nauseated at this time but not short of breath or diaphoretic. A few minutes later, she started to slur her words and became very difficult to understand according to her daughter. She also became very weak. Her daughter felt like she exhibited generalized weakness, but the patient states she felt more weak on her left side. She was brought to the ED where vitals revealed mildly elevated BP (163/82). Labs showed elevated troponin 49 with downward trend to 44 (delta -5), creatinine 1.96 and BUN 34 (again, baseline unknown), hemoglobin 9.1 and platelets 80. CT head showed no acute process, while CT angiogram head showed eccentric calcific plaques with intradural portion of both internal carotid arteries causing multifocal areas of moderate to high-grade stenosis, along with mild stenosis of right vertebral artery. CT angiogram neck showed eccentric calcific plaques within both carotid bulbs causing moderate stenosis. MRI was not pursued due to the fact the patient had an \"MRI-conditional pacemaker\", which reportedly means all conditions have to be met in order for MRI to be safe for this patient.      Review of Systems  Review of Systems   Constitutional:  Positive for activity change. Negative for appetite change, chills, diaphoresis, fatigue, " fever and unexpected weight change.   HENT:  Negative for congestion, postnasal drip, rhinorrhea, sinus pressure, sinus pain and sore throat.    Eyes:  Negative for photophobia, pain, discharge, redness, itching and visual disturbance.   Respiratory:  Negative for cough, shortness of breath and wheezing.    Cardiovascular:  Positive for chest pain. Negative for palpitations and leg swelling.   Gastrointestinal:  Positive for nausea. Negative for abdominal distention, abdominal pain, constipation, diarrhea and vomiting.   Endocrine: Negative for cold intolerance, heat intolerance, polydipsia, polyphagia and polyuria.   Genitourinary:  Negative for difficulty urinating, dysuria, flank pain, frequency and hematuria.   Musculoskeletal:  Negative for arthralgias, back pain, joint swelling, myalgias, neck pain and neck stiffness.   Skin:  Negative for color change, pallor, rash and wound.   Neurological:  Positive for speech difficulty (now back to baseline). Negative for dizziness, tremors, seizures, syncope, weakness, light-headedness, numbness and headaches.   Hematological:  Negative for adenopathy. Does not bruise/bleed easily.   Psychiatric/Behavioral:  Negative for agitation, behavioral problems and confusion.      History  Past Medical History:   Diagnosis Date    Bradycardia     s/p pacemaker placement    CKD (chronic kidney disease)     baseline renal function unknown    Dementia     Hyperlipidemia     Hypertension     Thrombocytopenia      Past Surgical History:   Procedure Laterality Date    CARDIAC PACEMAKER PLACEMENT      CATARACT EXTRACTION      CERVICAL FUSION      CHOLECYSTECTOMY      HIATAL HERNIA REPAIR      HYSTERECTOMY      REPLACEMENT TOTAL KNEE Right      Family History   Problem Relation Age of Onset    Stroke Mother     Stroke Father      Social History     Tobacco Use    Smoking status: Never    Tobacco comments:     Second hand smoke exposure ()   Substance Use Topics    Alcohol use:  Never    Drug use: Never     (Not in a hospital admission)    Allergies:  Statins    Objective     Vital Signs  Temp:  [98.7 øF (37.1 øC)] 98.7 øF (37.1 øC)  Heart Rate:  [65] 65  Resp:  [18] 18  BP: (163)/(82) 163/82  Body mass index is 24.96 kg/mý.    Physical Exam:  Physical Exam  Constitutional:       General: She is not in acute distress.     Appearance: Normal appearance. She is not ill-appearing.   HENT:      Head: Normocephalic and atraumatic.      Right Ear: External ear normal.      Left Ear: External ear normal.      Nose: Nose normal.      Mouth/Throat:      Mouth: Mucous membranes are moist.      Pharynx: Oropharynx is clear.   Eyes:      Extraocular Movements: Extraocular movements intact.      Conjunctiva/sclera: Conjunctivae normal.      Pupils: Pupils are equal, round, and reactive to light.   Cardiovascular:      Rate and Rhythm: Normal rate and regular rhythm.      Pulses: Normal pulses.      Heart sounds: Normal heart sounds. No murmur heard.  Pulmonary:      Effort: Pulmonary effort is normal. No respiratory distress.      Breath sounds: Normal breath sounds. No wheezing or rales.   Abdominal:      General: Abdomen is flat. Bowel sounds are normal. There is no distension.      Palpations: Abdomen is soft.      Tenderness: There is no abdominal tenderness.   Musculoskeletal:         General: Normal range of motion.      Cervical back: Normal range of motion and neck supple. No tenderness.      Right lower leg: No edema.      Left lower leg: No edema.   Lymphadenopathy:      Cervical: No cervical adenopathy.   Skin:     General: Skin is warm and dry.      Capillary Refill: Capillary refill takes less than 2 seconds.      Coloration: Skin is not jaundiced.      Findings: No bruising or lesion.   Neurological:      Mental Status: She is alert.      Comments: Alert, verbal, oriented to self, place but not year. Hand  is symmetric. Left leg raise 3/5 compared to 4-5/5 strength on the right side.     Psychiatric:         Mood and Affect: Mood normal.         Behavior: Behavior normal.         Results Review:       Lab Results:  Results from last 7 days   Lab Units 08/02/23  0014   WBC 10*3/mm3 4.48   HEMOGLOBIN g/dL 9.1*   PLATELETS 10*3/mm3 80*         Results from last 7 days   Lab Units 08/02/23  0014   SODIUM mmol/L 144   POTASSIUM mmol/L 4.3   CHLORIDE mmol/L 106   CO2 mmol/L 27.1   BUN mg/dL 34*   CREATININE mg/dL 1.96*   CALCIUM mg/dL 9.3   GLUCOSE mg/dL 92         No results found for: HGBA1C  Results from last 7 days   Lab Units 08/02/23  0014   BILIRUBIN mg/dL 0.3   ALK PHOS U/L 64   AST (SGOT) U/L 22   ALT (SGPT) U/L 10     Results from last 7 days   Lab Units 08/02/23  0247 08/02/23 0014   HSTROP T ng/L 44* 49*         Results from last 7 days   Lab Units 08/02/23 0247 08/02/23 0014   INR  1.08 1.03           I have reviewed the patient's laboratory results.    Imaging:  Imaging Results (Last 72 Hours)       Procedure Component Value Units Date/Time    CT Angiogram Head w AI Analysis of LVO [[id number]] Collected: 08/02/23 0152     Updated: 08/02/23 0158    Narrative:      CLINICAL HISTORY: Weakness.     COMPARISON: None.     TECHNIQUE: Sequential trans-axial images were obtained with a  multi-detector helical CT after iodinated contrast administration.  Coronal and sagittal reconstructions and 3-D reconstructions were  obtained. Limited exposure control, adjustment of the MA and/or KV  according to patient's size or use of iterative reconstruction technique  was utilized.     FINDINGS:      Hypoplastic intradural right vertebral artery with a dominant left  vertebral artery-anatomical variation.  There is small eccentric  calcific plaque within the intradural portion of right vertebral artery  causing mild stenosis.  The posterior inferior cerebellar arteries are  unremarkable.     There are eccentric calcific plaques within the intradural portion of  both intracranial carotid arteries causing  multifocal areas of moderate  to high-grade stenosis. The A1 and A2 segments of the anterior cerebral  arteries, middle cerebral arteries and branches are unremarkable. The  anterior communicating artery is unremarkable. The posterior  communicating arteries are also unremarkable.     There are no aneurysms. There are no arteriovenous malformations seen.      If there is further concern, recommend conventional angiography for  complete assessment.       Impression:         1.  Eccentric calcific plaques within intradural portion of both  internal carotid arteries causing multifocal areas of moderate to  high-grade stenosis.  2.  Eccentric calcific plaque within intradural portion of right  vertebral artery causing mild stenosis.     This report was finalized on 8/2/2023 1:56 AM by Fuentes Galo MD.       CT Angiogram Neck [[id number]] Collected: 08/02/23 0145     Updated: 08/02/23 0154    Narrative:      CLINICAL HISTORY: Weakness.     COMPARISON: None available.     TECHNIQUE: Sequential transaxial images of the neck were obtained with a  multidetector helical CT after iodinated contrast administration.   Coronal, sagittal reconstructions as well as maximum intensity  projection images were generated. Limited exposure control, adjustment  of the MA and/or KV according to patient's size or use of iterative  reconstruction technique was utilized.     FINDINGS:     The visualized aortic arch shows mild atherosclerotic calcification.     RIGHT: The common carotid artery and cervical segment of internal  carotid artery is widely patent.  There eccentric calcific plaques  within right carotid bulb causing moderate stenosis (50-55% by NASCET  criteria).  The right external carotid artery is unremarkable.     LEFT: The common carotid artery and cervical segment of internal carotid  artery is widely patent.  There eccentric calcific plaques within right  carotid bulb causing moderate stenosis (50-55% by NASCET criteria).   The  left external carotid artery is unremarkable.     The origin and cervical segments of both vertebral arteries are widely  patent.  Mildly hypoplastic right vertebral artery with a dominant left  vertebral artery anatomical variation.  No evidence of aneurysm or  dissection.     If there is further clinical concern, recommend conventional angiography  for complete assessment. Any reported ICA stenosis directly references  from the distal internal carotid artery diameter as the denominator for  stenosis measurement.     NONVASCULAR STRUCTURES: Left chest wall pacing device with wires.   Partially calcified nodules within the posterior right upper lobe,  measuring up to 1.1 cm.  Multilevel cervical degenerative changes.   Anterior cervical fusion from C3-T1.       Impression:         1.  Eccentric calcific plaques within both carotid bulbs causing  moderate stenosis (50-55% by NASCET criteria).     This report was finalized on 8/2/2023 1:52 AM by Fuentes Galo MD.       CT CEREBRAL PERFUSION WITH & WITHOUT CONTRAST [[id number]] Collected: 08/02/23 0143     Updated: 08/02/23 0147    Narrative:      CLINICAL HISTORY: Weakness.     COMPARISON: None available.     TECHNIQUE: CT perfusion imaging of brain was performed.  Color maps were  generated.   Limited exposure control, adjustment of the MA and/or KV  according to patient's size or use of iterative reconstruction technique  was utilized.     FINDINGS:     The visualized brain parenchyma shows normal MTT, CBV and CBF values.     CBF < 30% volume: 0 mL  Tmax >6s volume: 0 mL  Tmax > 10s volume: 0 mL  Mismatch volume: 0 mL  Mismatch ratio: None.       Impression:         1. No evidence of acute infarction/ischemia on present perfusion study.     Please note that small infarcts are difficult to appreciate on perfusion  imaging.     This report was finalized on 8/2/2023 1:45 AM by Fuentes Galo MD.       CT Head Without Contrast Stroke Protocol [[id number]] Collected:  "08/02/23 0119     Updated: 08/02/23 0124    Narrative:      INDICATION: Weakness.     COMPARISON: No relevant priors available     TECHNIQUE: Axial CT images of the head were obtained without IV  contrast. Coronal and sagittal reformations were reviewed.     FINDINGS:  Gray-white differentiation is relatively preserved. No mass, mass effect  or midline shift. No evidence of acute large territorial infarction or  acute intracranial hemorrhage. Ventricles appear normal in size. Basal  cisterns are patent. No depressed calvarial fracture.       Impression:      No acute intracranial process.     Report called to Dr. Bustamante at 120AM EST 8/2/2023.     This report was finalized on 8/2/2023 1:22 AM by Alex Pallas, DO.       XR Chest 1 View [[id number]] Collected: 08/02/23 0052     Updated: 08/02/23 0054    Narrative:      INDICATION: Chest pain.     TECHNIQUE: Frontal radiograph of the chest.     COMPARISON: None.     FINDINGS:   Cardiomegaly. Left-sided pacemaker. Pulmonary vasculature appears within  normal limits. No infiltrate, pleural effusion or pneumothorax. No acute  osseous abnormality evident.       Impression:      No acute cardiopulmonary process.     This report was finalized on 8/2/2023 12:52 AM by Alex Pallas, DO.               I have personally reviewed the patient's radiologic imaging.        EKG:   Atrial-paced rhythm with prolonged AV conduction, HR 65, QTc 436  Left axis deviation  Cannot rule out Anterior infarct , age undetermined  Abnormal ECG  No previous ECGs available    I have personally reviewed the patient's EKG. No overt ST changes appreciated.         Assessment & Plan     - Stroke-like symptoms: CT angiograms showed multifocal areas of moderate to high grade stenosis in both internal carotid arteries. CT perfusion study and CT head without contrast unremarkable. Unfortunately, cannot pursue MRI brain at this time due to her \"MRI-conditional\" pacemaker. Will need to speak with radiology in the " morning whether we can meet all conditions here to allow patient to undergo MRI brain. In the mean time, neurology recommends loading with ASA and plavix. Will continue low dose ASA and plavix starting tomorrow. Obtain ECHO in the morning for further workup. Consult PT, OT and SLP to evaluate further.   - Chest pain with troponin elevation concerning for NSTEMI: troponin trending down on repeat. Suspect possibly type II from above stroke and renal insufficiency. Receiving ASA and plavix as noted above and getting ECHO in the morning. Consult cardiology for further assistance in workup and management.   - CKD, baseline unknown: not clear whether patient's renal function is at baseline or worsen than baseline. Monitor response to IV fluid hydration mixed with contrast received for contrasted CT scans this morning.   - Thrombocytopenia: also chronic per daughter. Continue to monitor.  - Dementia: supportive care.  - Hypertension: home med list unknown. Make IV hydralazine available PRN for SBP>180.     DVT Prophylaxis: SQ heparin    Code Status: DNR/DNI (confirmed with patient and daughter at bedside)    Estimated Length of Stay >2 midnights    I discussed the patient's findings, assessment and plan with the patient, her daughter and granddaughter at bedside, and nursing staff in the ED.    Patient is high risk due to stroke-like symptoms, NSTEMI type 1 vs 2, CKD, baseline unknown    Albert Rangel MD  08/02/23  03:30 EDT

## 2023-08-02 NOTE — CONSULTS
[facility]   Teleneurology Note    Patient Name: Susan Us  : 1945  MRN: [id number]  Primary Care Physician: Trevor Garay MD  Referring Site: [place]  Location of Neurologist: [place]    Subjective   Teleneurology Initial Data     Arrival Date Telestroke Site: 23 Arrival Time Telestroke Site: 231   Neurologist Evaluation Date: 23 Neurologist Evaluation Time: 122   Date Last Known Well: 23 Time Last Known Well:      History     Chief Complaint: left sided weakness  HPI: This is a 78-year-old female with vascular risk factors who initially presented with chest pain, subsequently was noted to have left facial weakness and slurred speech and left sided weakness . Patient was out of the window for medical thrombolysis when the code stroke was called .    Stroke Risk Factors/ Pertinent Data         HTN    Scoring Scales       Intracerebral Hemmorhage (ICH) Score  Age>=80: no     NIH Stroke Scale           Interval: baseline  1a. Level of Consciousness: 0-->Alert, keenly responsive  1b. LOC Questions: 0-->Answers both questions correctly  1c. LOC Commands: 0-->Performs both tasks correctly  2. Best Gaze: 0-->Normal  3. Visual: 0-->No visual loss  4. Facial Palsy: 1-->Minor paralysis (flattened nasolabial fold, asymmetry on smiling)  5a. Motor Arm, Left: 1-->Drift, limb holds 90 (or 45) degrees, but drifts down before full 10 seconds, does not hit bed or other support  5b. Motor Arm, Right: 0-->No drift, limb holds 90 (or 45) degrees for full 10 secs  6a. Motor Leg, Left: 0-->No drift, leg holds 30 degree position for full 5 secs  6b. Motor Leg, Right: 0-->No drift, leg holds 30 degree position for full 5 secs  7. Limb Ataxia: 0-->Absent  8. Sensory: 0-->Normal, no sensory loss  9. Best Language: 0-->No aphasia, normal  10. Dysarthria: 1-->Mild-to-moderate dysarthria, patient slurs at least some words and, at worst, can be understood with some difficulty  11. Extinction and  Inattention (formerly Neglect): 0-->No abnormality  Total (NIH Stroke Scale): 3     Review of Systems     Review of Systems  Chest pain   Objective            Personal review of CNS imaging:(Official report by radiologist pending)  Imaging  CT Imaging Review: CT Imaging reviewed, NO acute infarct/ hemorrhage seen  CTA Imaging Review: CTA Imaging reviewed, NO large vessel occlusion or severe stenosis seen  CT Perfusion Review: CT perfusion reviewed, NORMAL    Thrombolytic   Thrombolytics: thrombolytic not given     Assessment & Plan   Assessment/ Plan     Assessment:  Acute Stroke Evaluation: Suspected ACUTE ischemic stroke     This is a 78-year-old female who initially presented with chest pain, subsequently was noted to have left facial weakness and slurred speech and left side weakness.  Patient was out of the window for medical thrombolysis when the code stroke was called .      CT head shows no acute abnormality.  CTA head and neck showed  mild - moderate intra and extracranial atherosclerosis.  No target vessel for endovascular therapy.       Likely minor ischemic stroke      Recommend admission.  Stroke  Workup.  Load the patient with 300 Plavix, aspirin 325.  Treat only the systolic blood pressure is greater than 180.  Initiate ischemic stroke order set.  Telestroke will continue to follow the patient. Give 1 lit normal saline bolus. Patient cannot get an MRI brain due to incompatible pacemaker.Repeat CT head after 24 hours. PT/OT/Speech can work with the patient. Continue DAPT and high dose statin for now.         Benefits outweighs risk of giving oral Plavix   , even if patient fails dysphagia screen.                      Disposition     Disposition: The patient will remain at the referring institution for further evaluation and management    Medical Decision Making  Medical Data Reviewed: Data reviewed including: clinical labs, radiology and/or medical tests, Independent review of CNS images    Trevon WEEMS  MD Noel, saw the patient on 08/02/23 at 0122 for an initial in-patient or emergency room telememedicine face to face consult using interactive technology for  . The location of the patient was [facility] was located at [place].                        Trevon Cohen MD

## 2023-08-02 NOTE — THERAPY EVALUATION
Acute Care - Physical Therapy Initial Evaluation   [place]     Patient Name: Susan Us  : 1945  MRN: [id number]  Today's Date: 2023      Visit Dx:     ICD-10-CM ICD-9-CM   1. TIA (transient ischemic attack)  G45.9 435.9   2. Slurred speech  R47.81 784.59   3. Facial weakness  R29.810 781.94   4. Stage 4 chronic kidney disease  N18.4 585.4   5. Primary pulmonary HTN  I27.0 416.0     Patient Active Problem List   Diagnosis    Stroke-like symptoms     Past Medical History:   Diagnosis Date    Bradycardia     s/p pacemaker placement    CKD (chronic kidney disease)     baseline renal function unknown    Dementia     Hyperlipidemia     Hypertension     Thrombocytopenia      Past Surgical History:   Procedure Laterality Date    CARDIAC PACEMAKER PLACEMENT      CATARACT EXTRACTION      CERVICAL FUSION      CHOLECYSTECTOMY      HIATAL HERNIA REPAIR      HYSTERECTOMY      REPLACEMENT TOTAL KNEE Right      PT Assessment (last 12 hours)       PT Evaluation and Treatment       Row Name 23 1437          Physical Therapy Time and Intention    Subjective Information complains of;weakness;fatigue  -KM     Document Type evaluation  -KM     Mode of Treatment individual therapy;physical therapy  -KM     Patient Effort adequate  -KM     Symptoms Noted During/After Treatment fatigue  -KM       Row Name 23 1437          General Information    Patient Profile Reviewed yes  -KM     Patient Observations alert;cooperative;agree to therapy  -KM     Prior Level of Function independent:;all household mobility;ADL's  -KM     Existing Precautions/Restrictions fall  -KM     Risks Reviewed patient:;LOB;nausea/vomiting;dizziness;increased discomfort  -KM     Benefits Reviewed patient:;improve function;increase independence;increase strength;increase balance  -KM       Row Name 23 1437          Living Environment    Current Living Arrangements home  -KM     People in Home alone  -KM     Primary Care Provided by self   -[facility] Name 08/02/23 1437          Home Use of Assistive/Adaptive Equipment    Equipment Currently Used at Home walker, rolling;shower chair;cane, quad;bp cuff  -[facility] Name 08/02/23 1437          Cognition    Affect/Mental Status (Cognition) [facility]     Orientation Status (Cognition) oriented x 4  -KM     Follows Commands (Cognition) [facility] Name 08/02/23 1437          Range of Motion (ROM)    Range of Motion bilateral lower extremities;ROM is WFL  -KM       Row Name 08/02/23 1437          Strength (Manual Muscle Testing)    Strength (Manual Muscle Testing) bilateral lower extremities;strength is WFL  -KM       Row Name 08/02/23 1437          Bed Mobility    Bed Mobility supine-sit  -     Supine-Sit Dewey (Bed Mobility) minimum assist (75% patient effort);verbal cues  [facility]     Bed Mobility, Safety Issues decreased use of arms for pushing/pulling  -     Assistive Device (Bed Mobility) bed rails;draw sheet;head of bed elevated  -KM       Row Name 08/02/23 1437          Transfers    Transfers sit-stand transfer;stand-sit transfer;bed-chair transfer  -KM       Row Name 08/02/23 1437          Bed-Chair Transfer    Bed-Chair Dewey (Transfers) minimum assist (75% patient effort);verbal cues  -KM       Row Name 08/02/23 1437          Sit-Stand Transfer    Sit-Stand Dewey (Transfers) contact guard;minimum assist (75% patient effort)  -KM       Row Name 08/02/23 1437          Stand-Sit Transfer    Stand-Sit Dewey (Transfers) contact guard;minimum assist (75% patient effort);verbal cues  -KM       Row Name 08/02/23 1437          Gait/Stairs (Locomotion)    Comment, (Gait/Stairs) not attempted d/t fatigue  -KM       Row Name 08/02/23 1437          Safety Issues, Functional Mobility    Safety Issues Affecting Function (Mobility) awareness of need for assistance;friction/shear risk;insight into deficits/self-awareness  [facility]     Impairments Affecting Function (Mobility)  balance;endurance/activity tolerance;postural/trunk control  [facility]       Row Name 08/02/23 1437          Balance    Balance Assessment sitting static balance;standing dynamic balance  -     Static Sitting Balance supervision  -     Position, Sitting Balance sitting edge of bed;sitting in chair  -     Dynamic Standing Balance contact guard;minimal assist;verbal cues  [facility]       Row Name 08/02/23 1437          Plan of Care Review    Plan of Care Reviewed With patient;daughter  -     Outcome Evaluation Pt. evaluation completed during PT session. She was able to perform functional mobility skills w/ Anat. She deferred ambulation at time of evaluation d/t increased fatigue. Pt. tolerated session well. Pt. would benefit from skilled PT services.  [facility]       Row Name 08/02/23 1437          Therapy Assessment/Plan (PT)    Patient/Family Therapy Goals Statement (PT) return to OF  -KM     Functional Level at Time of Evaluation (PT) Anat  -KM     PT Diagnosis (PT) decreased mobility  -     Rehab Potential (PT) good, to achieve stated therapy goals  -     Criteria for Skilled Interventions Met (PT) yes;skilled treatment is necessary  [facility]     Therapy Frequency (PT) 2 times/wk  2-5x/wk  -     Predicted Duration of Therapy Intervention (PT) until discharge  [facility]     Problem List (PT) problems related to;balance;mobility  -     Activity Limitations Related to Problem List (PT) unable to ambulate safely;unable to transfer safely  [facility]       Row Name 08/02/23 1437          Therapy Plan Review/Discharge Plan (PT)    Therapy Plan Review (PT) evaluation/treatment results reviewed;care plan/treatment goals reviewed;risks/benefits reviewed;patient;daughter  -       Row Name 08/02/23 1437          Physical Therapy Goals    Bed Mobility Goal Selection (PT) bed mobility, PT goal 1  -KM     Transfer Goal Selection (PT) transfer, PT goal 1  -KM     Gait Training Goal Selection (PT) gait training, PT goal 1  -KM       Row Name  08/02/23 1437          Bed Mobility Goal 1 (PT)    Activity/Assistive Device (Bed Mobility Goal 1, PT) bed mobility activities, all  -KM     Cobb Level/Cues Needed (Bed Mobility Goal 1, PT) modified independence  -KM     Time Frame (Bed Mobility Goal 1, PT) by discharge  -       Row Name 08/02/23 1437          Transfer Goal 1 (PT)    Activity/Assistive Device (Transfer Goal 1, PT) sit-to-stand/stand-to-sit;bed-to-chair/chair-to-bed;walker, rolling  -KM     Cobb Level/Cues Needed (Transfer Goal 1, PT) modified independence  -KM     Time Frame (Transfer Goal 1, PT) by discharge  -       Row Name 08/02/23 1437          Gait Training Goal 1 (PT)    Activity/Assistive Device (Gait Training Goal 1, PT) gait (walking locomotion);assistive device use;walker, rolling  -KM     Cobb Level (Gait Training Goal 1, PT) modified independence  -KM     Distance (Gait Training Goal 1, PT) 50'  -KM     Time Frame (Gait Training Goal 1, PT) by discharge  -               User Key  (r) = Recorded By, (t) = Taken By, (c) = Cosigned By      Initials Name Provider Type    Mil Yang, PT Physical Therapist                    Physical Therapy Education       Title: PT OT SLP Therapies (Done)       Topic: Physical Therapy (Done)       Point: Mobility training (Done)       Learning Progress Summary             Patient Acceptance, E,TB, VU by KM at 8/2/2023 1505                         Point: Home exercise program (Done)       Learning Progress Summary             Patient Acceptance, E,TB, VU by KM at 8/2/2023 1505                         Point: Body mechanics (Done)       Learning Progress Summary             Patient Acceptance, E,TB, VU by KM at 8/2/2023 1505                         Point: Precautions (Done)       Learning Progress Summary             Patient Acceptance, E,TB, VU by  at 8/2/2023 1505                                         User Key       Initials Effective Dates Name Provider Type  Discipline     05/24/22 -  Mil Costa, PT Physical Therapist PT                  PT Recommendation and Plan  Anticipated Discharge Disposition (PT): home with assist, home with home health  Planned Therapy Interventions (PT): balance training, bed mobility training, gait training, home exercise program, patient/family education, postural re-education, ROM (range of motion), stair training, strengthening, stretching, transfer training  Therapy Frequency (PT): 2 times/wk (2-5x/wk)  Plan of Care Reviewed With: patient, daughter  Outcome Evaluation: Pt. evaluation completed during PT session. She was able to perform functional mobility skills w/ Anat. She deferred ambulation at time of evaluation d/t increased fatigue. Pt. tolerated session well. Pt. would benefit from skilled PT services.       Time Calculation:    PT Charges       Row Name 08/02/23 1436             Time Calculation    PT Received On 08/02/23  -KM      PT Goal Re-Cert Due Date 08/16/23  -KM                User Key  (r) = Recorded By, (t) = Taken By, (c) = Cosigned By      Initials Name Provider Type    Mil Yang, PT Physical Therapist                  Therapy Charges for Today       Code Description Service Date Service Provider Modifiers Qty    38709522463 HC PT EVAL MOD COMPLEXITY 4 8/2/2023 Mil Costa, PT GP 1            PT G-Codes  AM-PAC 6 Clicks Score (PT): 16    Mil Costa PT  8/2/2023

## 2023-08-02 NOTE — THERAPY EVALUATION
Acute Care - Speech Language Pathology Initial Evaluation  [facility]  SPEECH LANGUAGE COGNITIVE ASSESSMENT     Patient Name: Susan Us  : 1945  MRN: [id number]  Today's Date: 2023             Admit Date: 2023     Visit Dx:    ICD-10-CM ICD-9-CM   1. TIA (transient ischemic attack)  G45.9 435.9   2. Slurred speech  R47.81 784.59   3. Facial weakness  R29.810 781.94   4. Stage 4 chronic kidney disease  N18.4 585.4   5. Primary pulmonary HTN  I27.0 416.0     Patient Active Problem List   Diagnosis    Stroke-like symptoms     Past Medical History:   Diagnosis Date    Bradycardia     s/p pacemaker placement    CKD (chronic kidney disease)     baseline renal function unknown    Dementia     Hyperlipidemia     Hypertension     Thrombocytopenia      Past Surgical History:   Procedure Laterality Date    CARDIAC PACEMAKER PLACEMENT      CATARACT EXTRACTION      CERVICAL FUSION      CHOLECYSTECTOMY      HIATAL HERNIA REPAIR      HYSTERECTOMY      REPLACEMENT TOTAL KNEE Right      Susan Us is seen at bedside this am on 3S to participate in a formal s/l and cognitive evaluation to assess safety/function in adls. Pt is positioned upright and centered in bed to cooperatively participate. All results and observations of this evaluation are felt to be representative of pt's current functional status. Her daughter, and her RN, are present during part of this assessment.     Ms. Us presented to [facility] ED w/ chest pain, L arm pain, episode of drooling and slurred speech. She is referred per stroke protocol. Ms. Us reports premorbid baseline mild STM deficits, otherwise wfl s/l cognitive skills. She states she is currently at her premorbid baseline level of fnx for s/l cognitive status.     Social History     Socioeconomic History    Marital status:    Tobacco Use    Smoking status: Never    Smokeless tobacco: Never    Tobacco comments:     Second hand smoke exposure ()   Vaping Use     "Vaping Use: Never used   Substance and Sexual Activity    Alcohol use: Never    Drug use: Never    Sexual activity: Defer      INDICATION: Weakness.     COMPARISON: No relevant priors available     TECHNIQUE: Axial CT images of the head were obtained without IV  contrast. Coronal and sagittal reformations were reviewed.     FINDINGS:  Gray-white differentiation is relatively preserved. No mass, mass effect  or midline shift. No evidence of acute large territorial infarction or  acute intracranial hemorrhage. Ventricles appear normal in size. Basal  cisterns are patent. No depressed calvarial fracture.     IMPRESSION:  No acute intracranial process.     Report called to Dr. Bustamante at 120AM EST 8/2/2023.     This report was finalized on 8/2/2023 1:22 AM by Alex Pallas, DO.    Diet Orders (active) (From admission, onward)       Start     Ordered    08/02/23 0803  NPO Diet NPO Type: Sips with Meds  Diet Effective Now         08/02/23 0802                  She is observed on ra w/o complications.     Receptive language skills are intact. Pt is able to id common objects and personal body parts, follow simple and multi-step directives, understand complex \"wh\" questions and participate in conversational exchange w/o difficulties.    Expressive language skills are intact. Pt is able to complete automatic speech tasks, confrontation naming tasks, complete complex oe sentences, respond to complex oe \"wh\" questions, repeat at word/sentence and multiple digit levels, as well as participate in complex conversational exchange. No aphasia, anomia or verbal apraxia evidenced.    Pt is independently oriented to person and place. She does require extra time to respond to current year. Immediate, STM and LTM appear intact for assessment stimuli as she is able to recall recent adls and provide personal information w/o significant delays. She does report premorbid baseline STME deficits in adls. Thought organization, processing and problem " solving are wfl w/ pt demonstrating appropriate comparing/contrasting, understanding of idiomatic language, convergent and divergent thinking skills.    Facial/oral structures are symmetrical upon observation. Oral mucosa are moist, pink and clean. Secretions are clear, thin and well controlled. OROM/MINA is wfl w/o lingual deviation upon protrusion. She is edentulous. Speech intensity, clarity and intelligibility are wfl w/o dysarthria or oral apraxia noted.    Graphic and reading skills are intact, premorbid baseline status. Pt is able to id isolated letters, simple and moderate words, as well as sentences.    Pragmatic skills are wfl w/ appropriate eye gaze patterns and visual tracking. Language is appropriate in context w/ topic initiation and maintenance independently. No left neglect evidenced. Humor response is intact w/ appropriate affect.    Impression: Per this assessment, Ms. Us is felt to present at her premorbid baseline s/l cognitive status, WFL.     SLP Recommendation and Plan  No further formal SLP f/u recommended for s/l and cognitive skills. Pt will participate in further dysphagia assessment. Please see full dysphagia note below for details.    D/w pt results and recommendations w/ verbal understanding and agreement.    D/w RN results and recommendations w/ verbal understanding and agreement.     Thank you for allowing me to participate in the care of your patient-  Leah Schroeder M.A., CCC-SLP    EDUCATION  The patient has been educated in the following areas:   Cognitive Impairment Communication Impairment.    Time Calculation:     Therapy Charges for Today       Code Description Service Date Service Provider Modifiers Qty    55289893517 HC ST EVAL SPEECH AND PROD W LANG  4 8/2/2023 Leah Schroeder MA,CCC-SLP GN 1    38966031289 HC ST EVAL ORAL PHARYNG SWALLOW 4 8/2/2023 Leah Schroeder MA,CCC-SLP GN 1          Leah Schroeder MA,CCC-SLP  8/2/2023   and Acute Care -  Speech Language Pathology   Swallow Initial Evaluation [facility]  CLINICAL DYSPHAGIA ASSESSMENT     Patient Name: Susan Us  : 1945  MRN: [id number]  Today's Date: 2023             Admit Date: 2023    Visit Dx:     ICD-10-CM ICD-9-CM   1. TIA (transient ischemic attack)  G45.9 435.9   2. Slurred speech  R47.81 784.59   3. Facial weakness  R29.810 781.94   4. Stage 4 chronic kidney disease  N18.4 585.4   5. Primary pulmonary HTN  I27.0 416.0     Patient Active Problem List   Diagnosis    Stroke-like symptoms     Past Medical History:   Diagnosis Date    Bradycardia     s/p pacemaker placement    CKD (chronic kidney disease)     baseline renal function unknown    Dementia     Hyperlipidemia     Hypertension     Thrombocytopenia      Past Surgical History:   Procedure Laterality Date    CARDIAC PACEMAKER PLACEMENT      CATARACT EXTRACTION      CERVICAL FUSION      CHOLECYSTECTOMY      HIATAL HERNIA REPAIR      HYSTERECTOMY      REPLACEMENT TOTAL KNEE Right      Susan Us is seen at bedside this am on 3S to assess safety/efficacy of swallowing fnx, determine safest/least restrictive diet tolerance. Her daughter, and her RN, are present during part of this assessment.     Ms. Us presented to [facility] ED w/ chest pain, L arm pain, episode of drooling and slurred speech. She is referred per stroke protocol. She denies any overt s/s aspiration or dysphagia w/ po intake. She denies any recent h/o PNA or bronchitis. She does report she is lactose intolerant.     Social History     Socioeconomic History    Marital status:    Tobacco Use    Smoking status: Never    Smokeless tobacco: Never    Tobacco comments:     Second hand smoke exposure ()   Vaping Use    Vaping Use: Never used   Substance and Sexual Activity    Alcohol use: Never    Drug use: Never    Sexual activity: Defer      INDICATION: Chest pain.     TECHNIQUE: Frontal radiograph of the chest.     COMPARISON: None.      FINDINGS:   Cardiomegaly. Left-sided pacemaker. Pulmonary vasculature appears within  normal limits. No infiltrate, pleural effusion or pneumothorax. No acute  osseous abnormality evident.     IMPRESSION:  No acute cardiopulmonary process.     This report was finalized on 8/2/2023 12:52 AM by Alex Pallas, DO.    Diet Orders (active) (From admission, onward)       Start     Ordered    08/02/23 0803  NPO Diet NPO Type: Sips with Meds  Diet Effective Now         08/02/23 0802                  She is observed on ra w/o complications.     Pt is positioned upright and centered in bed to accept multiple po presentations of ice chips, solid cracker, puree and thin liquids via spoon, cup and straw. RN provides po meds whole w/ thin liquids. She is able to self feed.     Facial/oral structures are symmetrical upon observation. Lingual protrusion reveals no deviation. Oral mucosa are moist, pink, and clean. Secretions are clear, thin, and well controlled. OROM/MINA is wfl to imitate oral postures. She is edentulous. Gag is not assessed. Volitional cough is intact w/ adequate  intensity, clear in quality, non-productive. Voice is adequate in intensity, clear in quality w/ intelligible speech.    Upon po presentations, adequate bolus anticipation and acceptance w/ good labial seal for bolus clearance via spoon bowl, cup rim stability and suction via straw. Bolus formation, manipulation and control are wfl w/ rotary mastication pattern. A-p transit is timely w/o oral residue. No overt s/s aspiration evidenced before the swallow.     Pharyngeal swallow is timely w/ adequate hyolaryngeal elevation per palpation. No overt s/s aspiration evidenced across this assessment. No silent aspiration suspected as pt is w/o changes in vocal quality, respirations or secretions post po presentations. Pt denies odynophagia.    Impression: Per this assessment, Ms. Us presents w/ wfl oropharyngeal swallow w/o s/s aspiration. No s/s indicative  of silent aspiration. No odynophagia reported.  Per RN, pt to remain NPO at this time pending stress test today.     SLP Recommendation and Plan  1. Regular consistency, thin liquids (Lactose free per pt report).   2. Meds whole in puree/thins.   3. Upright and centered for all po intake.  4. CARLOS precautions.  5. Oral care protocol.  No further formal SLP f/u warranted at this time.    D/w pt results and recommendations w/ verbal agreement.    D/w RN results and recommendations w/ verbal agreement.    Thank you for allowing me to participate in the care of your patient-  Leah Schroeder M.A., CCC-SLP      EDUCATION  The patient has been educated in the following areas:   Dysphagia (Swallowing Impairment).      Time Calculation:     Therapy Charges for Today       Code Description Service Date Service Provider Modifiers Qty    [id number]  ST EVAL SPEECH AND PROD W LANG  4 8/2/2023 Leah Schroeder MA,CCC-SLP GN 1    [id number]  ST EVAL ORAL PHARYNG SWALLOW 4 8/2/2023 Leah Schroeder MA,CCC-SLP GN 1               Leah Schroeder MA,CCC-SLP  8/2/2023

## 2023-08-02 NOTE — ED PROVIDER NOTES
Subjective   History of Present Illness  78-year-old female that presents by POV accompanied by her daughter for evaluation of reported chest pain and left arm pain that started around 8:00.  Daughter states that she prepare to dinner for the patient and at the time of beginning to eat she started having some drooling and slurring of speech but as well as complaining of chest pain and arm pain.  Daughter gave her a nitroglycerin and symptoms started to resolve.  Over the next several hours symptoms have waxed and waned and a total of 4 nitroglycerin have been provided to the patient prior to coming to the ER.  Last dose of nitroglycerin was roughly 25 minutes prior to ER arrival.    Review of Systems   Cardiovascular:  Positive for chest pain.   Neurological:  Positive for speech difficulty.     Past Medical History:   Diagnosis Date    Dementia     Hypertension        Allergies   Allergen Reactions    Statins Hives       No past surgical history on file.    No family history on file.    Social History     Socioeconomic History    Marital status:            Objective   Physical Exam  Vitals and nursing note reviewed.   Constitutional:       General: She is not in acute distress.     Appearance: She is well-developed. She is not diaphoretic.   HENT:      Head: Normocephalic and atraumatic.      Right Ear: External ear normal.      Left Ear: External ear normal.      Nose: Nose normal.   Eyes:      Conjunctiva/sclera: Conjunctivae normal.      Pupils: Pupils are equal, round, and reactive to light.   Neck:      Vascular: No JVD.      Trachea: No tracheal deviation.   Cardiovascular:      Rate and Rhythm: Normal rate and regular rhythm.      Heart sounds: Normal heart sounds. No murmur heard.  Pulmonary:      Effort: Pulmonary effort is normal. No respiratory distress.      Breath sounds: Normal breath sounds. No wheezing.   Abdominal:      General: Bowel sounds are normal.      Palpations: Abdomen is soft.       Tenderness: There is no abdominal tenderness.   Musculoskeletal:         General: No deformity. Normal range of motion.      Cervical back: Normal range of motion and neck supple.   Skin:     General: Skin is warm and dry.      Coloration: Skin is not pale.      Findings: No erythema or rash.   Neurological:      Mental Status: She is alert and oriented to person, place, and time.      Cranial Nerves: Dysarthria and facial asymmetry present. No cranial nerve deficit.      Motor: Weakness present.   Psychiatric:         Thought Content: Thought content normal.       Procedures  Results for orders placed or performed during the hospital encounter of 08/01/23   Comprehensive Metabolic Panel    Specimen: Arm, Right; Blood   Result Value Ref Range    Glucose 92 65 - 99 mg/dL    BUN 34 (H) 8 - 23 mg/dL    Creatinine 1.96 (H) 0.57 - 1.00 mg/dL    Sodium 144 136 - 145 mmol/L    Potassium 4.3 3.5 - 5.2 mmol/L    Chloride 106 98 - 107 mmol/L    CO2 27.1 22.0 - 29.0 mmol/L    Calcium 9.3 8.6 - 10.5 mg/dL    Total Protein 7.2 6.0 - 8.5 g/dL    Albumin 4.4 3.5 - 5.2 g/dL    ALT (SGPT) 10 1 - 33 U/L    AST (SGOT) 22 1 - 32 U/L    Alkaline Phosphatase 64 39 - 117 U/L    Total Bilirubin 0.3 0.0 - 1.2 mg/dL    Globulin 2.8 gm/dL    A/G Ratio 1.6 g/dL    BUN/Creatinine Ratio 17.3 7.0 - 25.0    Anion Gap 10.9 5.0 - 15.0 mmol/L    eGFR 25.9 (L) >60.0 mL/min/1.73   High Sensitivity Troponin T    Specimen: Arm, Right; Blood   Result Value Ref Range    HS Troponin T 49 (H) <10 ng/L   Protime-INR    Specimen: Arm, Right; Blood   Result Value Ref Range    Protime 14.0 12.1 - 14.7 Seconds    INR 1.03 0.90 - 1.10   CBC Auto Differential    Specimen: Arm, Right; Blood   Result Value Ref Range    WBC 4.48 3.40 - 10.80 10*3/mm3    RBC 3.20 (L) 3.77 - 5.28 10*6/mm3    Hemoglobin 9.1 (L) 12.0 - 15.9 g/dL    Hematocrit 28.7 (L) 34.0 - 46.6 %    MCV 89.7 79.0 - 97.0 fL    MCH 28.4 26.6 - 33.0 pg    MCHC 31.7 31.5 - 35.7 g/dL    RDW 12.7 12.3 - 15.4  %    RDW-SD 41.4 37.0 - 54.0 fl    MPV 10.2 6.0 - 12.0 fL    Platelets 80 (L) 140 - 450 10*3/mm3    Neutrophil % 58.1 42.7 - 76.0 %    Lymphocyte % 31.0 19.6 - 45.3 %    Monocyte % 8.0 5.0 - 12.0 %    Eosinophil % 2.0 0.3 - 6.2 %    Basophil % 0.7 0.0 - 1.5 %    Immature Grans % 0.2 0.0 - 0.5 %    Neutrophils, Absolute 2.60 1.70 - 7.00 10*3/mm3    Lymphocytes, Absolute 1.39 0.70 - 3.10 10*3/mm3    Monocytes, Absolute 0.36 0.10 - 0.90 10*3/mm3    Eosinophils, Absolute 0.09 0.00 - 0.40 10*3/mm3    Basophils, Absolute 0.03 0.00 - 0.20 10*3/mm3    Immature Grans, Absolute 0.01 0.00 - 0.05 10*3/mm3    nRBC 0.0 0.0 - 0.2 /100 WBC   BNP    Specimen: Arm, Right; Blood   Result Value Ref Range    proBNP 1,344.0 0.0 - 1,800.0 pg/mL   Protime-INR    Specimen: Arm, Right; Blood   Result Value Ref Range    Protime 14.5 12.1 - 14.7 Seconds    INR 1.08 0.90 - 1.10   aPTT    Specimen: Arm, Right; Blood   Result Value Ref Range    PTT 33.3 26.5 - 34.5 seconds   Urine Drug Screen - Urine, Clean Catch    Specimen: Urine, Clean Catch   Result Value Ref Range    THC, Screen, Urine Negative Negative    Phencyclidine (PCP), Urine Negative Negative    Cocaine Screen, Urine Negative Negative    Methamphetamine, Ur Negative Negative    Opiate Screen Negative Negative    Amphetamine Screen, Urine Negative Negative    Benzodiazepine Screen, Urine Positive (A) Negative    Tricyclic Antidepressants Screen Negative Negative    Methadone Screen, Urine Negative Negative    Barbiturates Screen, Urine Negative Negative    Oxycodone Screen, Urine Negative Negative    Propoxyphene Screen Negative Negative    Buprenorphine, Screen, Urine Negative Negative   Fentanyl, Urine - Urine, Clean Catch    Specimen: Urine, Clean Catch   Result Value Ref Range    Fentanyl, Urine Negative Negative   POC Glucose Once    Specimen: Blood   Result Value Ref Range    Glucose 89 70 - 130 mg/dL   ECG 12 Lead Chest Pain   Result Value Ref Range    QT Interval 420 ms    QTC  Interval 436 ms   Type & Screen    Specimen: Arm, Left; Blood   Result Value Ref Range    ABO Type O     RH type Positive     Antibody Screen Negative     T&S Expiration Date 8/5/2023 11:59:59 PM    ABO RH Specimen Verification    Specimen: Blood   Result Value Ref Range    ABO Type O     RH type Positive    Green Top (Gel)   Result Value Ref Range    Extra Tube Hold for add-ons.    Lavender Top   Result Value Ref Range    Extra Tube hold for add-on    Gold Top - SST   Result Value Ref Range    Extra Tube Hold for add-ons.    Light Blue Top   Result Value Ref Range    Extra Tube Hold for add-ons.               ED Course  ED Course as of 08/02/23 0304   Tue Aug 01, 2023   2324 ECG 12 Lead Chest Pain  Atrial paced rhythm with left axis deviation.  No acute ST elevation.  Rate 65  QRS 88 QTc 436.  Electronically signed by Joan Bustamante DO, 08/02/23, 2324   [LK]   Wed Aug 02, 2023   0040 Evaluation of patient at bedside now presents concerned with patient potentially have having stroke like symptoms that began around 8:00.  Daughter is at bedside and states that she was extremely mumbled was having difficulty in handling her oral secretions.     [LK]   0044 Spoke with Dr. Davis-   At the time of assessment bedside patient is not a tPA candidate.  Patient expressed no signs of slurred speech or difficulty with her words during triage assessment. [LK]   0052 Patient's calculated creatinine is below the threshold for contrast at this time a GFR is 25 with a creatinine of 1.9.     [LK]   0054 Spoke with Dr. Davis regarding concerning for persistent symptoms.  If patient having a blood pressure of 184/96 despite 4 tablets of nitroglycerin prior to arrival in the ER.  Recommended proceeding with CT angio and perfusion and bolusing patient fluid.  Patient has a pacemaker and MRI is contraindicated [LK]   0116 Dr. Davis, reviewed perfusion which reveals possible area of stenosis but no evidence of thrombus at  this time.  Recommended loading with Plavix and oral aspirin and admitting to hospital service for work-up and he will see the patient in the morning.     Patient being evaluated at bedside patient further elaborating on her symptoms she was outside of tPA window.    Pacemaker present-not a candidate for MRI [LK]   0131 Radha Johnston in [place], [place]  -Patient does have evidence of chronic kidney disease and family has been told that her kidneys are progressively worsening    Blood pressure regiment is hydralazine 25 mg twice daily    Torsemide 25 mg in the morning    Isosorbide mononitrate 30 mg in the morning    Patient was accepted for admission by Dr. Rangel [LK]   0138 At this time we will allow transient hypertension.  Neurology recommended to treat blood pressure greater than 180/110.     [LK]   0152 Patient had a card in the wallet that said MRI conditional device, [facility]    Placed in [facility], [place]  Physician: [phone]  Dr Hand  [LK]      ED Course User Index  [LK] Joan Bustamante,                                            Medical Decision Making  Problems Addressed:  Facial weakness: complicated acute illness or injury  Primary pulmonary HTN: complicated acute illness or injury  Slurred speech: complicated acute illness or injury  Stage 4 chronic kidney disease: complicated acute illness or injury  TIA (transient ischemic attack): complicated acute illness or injury    Amount and/or Complexity of Data Reviewed  Labs: ordered.  Radiology: ordered.  ECG/medicine tests: ordered. Decision-making details documented in ED Course.    Risk  OTC drugs.  Prescription drug management.  Decision regarding hospitalization.        Final diagnoses:   TIA (transient ischemic attack)   Slurred speech   Facial weakness   Stage 4 chronic kidney disease   Primary pulmonary HTN       ED Disposition  ED Disposition       ED Disposition   Decision to Admit    Condition   --    Comment   Level  of Care: Telemetry [5]   Diagnosis: Stroke-like symptoms [099606]   Admitting Physician: JASON LONG [[id number]]   Attending Physician: JASON LONG [[id number]]   Certification: I Certify That Inpatient Hospital Services Are Medically Necessary For Greater Than 2 Midnights                 No follow-up provider specified.       Medication List      No changes were made to your prescriptions during this visit.            Joan Bustamante DO  08/02/23 0304

## 2023-08-02 NOTE — PLAN OF CARE
Goal Outcome Evaluation:      NI[facility]SS completed this shift. Patient had stress test this shift, see results. Patient is lying in bed with no S&S of distress. No complaints at this time. Will continue to follow plan of care.

## 2023-08-02 NOTE — CONSULTS
Inpatient Cardiology Consult  Consult performed by: Kathia Canchola APRN  Consult ordered by: Albert Rangel MD      Date of Admit: 8/1/2023  Date of Consult: 08/02/23  No ref. provider found  Susan Us  1945    Consulting Physician: Dl Zacarias MD    Cardiology consultation    Reason for consultation:  chest pain, troponin elevation        History of Present Illness    Subjective     Chief Complaint   Patient presents with    Chest Pain       Susan Us is a 78 y.o. female with past medical history significant for bradycardia status post pacemaker placement, hypertension, hyperlipidemia, chronic kidney disease, and thrombocytopenia.  Patient presented to the ED on 8/1/2023 with complaint of chest pain radiating to her shoulder and slurred/mumbled speech.  Patient also reportedly became very weak.  In the ED patient had a mildly elevated blood pressure of 163/82, her high-sensitivity troponin was initially 49 and trended down to 44, creatinine was 1.96, hemoglobin 9.1 and platelets 80.  Initial EKG showed atrial paced rhythm with no acute ischemic changes.  CT of the head showed no acute process and CT angiogram of the head showed eccentric calcific plaques with intradural portion of both internal carotid arteries causing multifocal areas of moderate to high-grade stenosis, along with mild stenosis of the right vertebral artery.  CT angiogram of the neck showed eccentric calcific plaques within both carotid bulbs causing moderate stenosis.  MRI was not pursued due to patient's pacemaker.  Patient was admitted for further evaluation and management.  Cardiology was consulted due to patient's initial complaint of chest pain and troponin elevation.    The patient was seen and examined.  Dr. Behbahani at bedside when we entered the room.  Patient reported chest pain at home which radiated to her left shoulder with associated slurred speech.  She denied shortness of breath.  She denied any  associated nausea or vomiting.  She denied diaphoresis.  She reported that she was not in any chest pain currently.      Cardiac risk factors:diabetes mellitus, hypercholesterolemia, and hypertension    Last Echo: Echo pending    Last Stress: N/A    Last Cath: N/A      Past Medical History:   Diagnosis Date    Bradycardia     s/p pacemaker placement    CKD (chronic kidney disease)     baseline renal function unknown    Dementia     Hyperlipidemia     Hypertension     Thrombocytopenia      Past Surgical History:   Procedure Laterality Date    CARDIAC PACEMAKER PLACEMENT      CATARACT EXTRACTION      CERVICAL FUSION      CHOLECYSTECTOMY      HIATAL HERNIA REPAIR      HYSTERECTOMY      REPLACEMENT TOTAL KNEE Right      Family History   Problem Relation Age of Onset    Stroke Mother     Stroke Father      Social History     Tobacco Use    Smoking status: Never    Smokeless tobacco: Never    Tobacco comments:     Second hand smoke exposure ()   Vaping Use    Vaping Use: Never used   Substance Use Topics    Alcohol use: Never    Drug use: Never     Medications Prior to Admission   Medication Sig Dispense Refill Last Dose    Bempedoic Acid-Ezetimibe (Nexlizet) 180-10 MG tablet Take 1 tablet by mouth Every Night.   7/31/2023    Calcium Carbonate-Vitamin D (calcium 500 mg vitamin D 5 mcg, 200 UT,) 500-5 MG-MCG tablet per tablet Take 1 tablet by mouth 2 (Two) Times a Day.   8/1/2023 at 0800    cyclobenzaprine (FLEXERIL) 10 MG tablet Take 0.5 tablets by mouth 3 (Three) Times a Day As Needed for Muscle Spasms.   Past Month    Diclofenac Sodium (VOLTAREN) 1 % gel gel Apply 4 g topically to the appropriate area as directed 4 (Four) Times a Day As Needed (joint pain).   Past Week    donepezil (ARICEPT) 5 MG tablet Take 1 tablet by mouth Every Night.   8/1/2023 at 2100    ferrous sulfate 325 (65 FE) MG tablet Take 1 tablet by mouth Daily With Breakfast.   8/1/2023 at 0800    gabapentin (NEURONTIN) 300 MG capsule Take 1  capsule by mouth 2 (Two) Times a Day.   8/1/2023 at 0800    hydrALAZINE (APRESOLINE) 25 MG tablet Take 1 tablet by mouth 2 (Two) Times a Day.   8/1/2023 at 2100    isosorbide mononitrate (IMDUR) 30 MG 24 hr tablet Take 1 tablet by mouth Daily.   8/1/2023 at 0800    LORazepam (ATIVAN) 0.5 MG tablet Take 1 tablet by mouth 2 (Two) Times a Day.   8/1/2023 at 2100    magnesium chloride ER 64 MG DR tablet Take 1 tablet by mouth 2 (Two) Times a Day.   8/1/2023 at 0800    meclizine (ANTIVERT) 25 MG tablet Take 1 tablet by mouth 3 (Three) Times a Day As Needed for Dizziness or Nausea.   Past Week    modafinil (PROVIGIL) 200 MG tablet Take 1 tablet by mouth Daily.   8/1/2023 at 0800    Multiple Vitamins-Minerals (ICAPS AREDS 2 PO) Take 1 tablet by mouth 2 (Two) Times a Day.   8/1/2023 at 0800    ondansetron (ZOFRAN) 4 MG tablet Take 1 tablet by mouth Every 8 (Eight) Hours As Needed for Nausea or Vomiting.   Past Week    pantoprazole (PROTONIX) 40 MG EC tablet Take 1 tablet by mouth Every Night.   8/1/2023 at 2100    torsemide (DEMADEX) 100 MG tablet Take 0.5 tablets by mouth Daily.   8/1/2023 at 0800    docusate sodium (COLACE) 250 MG capsule Take 1 capsule by mouth Every Night.   7/31/2023    fluticasone (FLONASE) 50 MCG/ACT nasal spray 2 sprays into the nostril(s) as directed by provider Every Night.   7/31/2023    nitroglycerin (NITROSTAT) 0.4 MG SL tablet Place 1 tablet under the tongue Every 5 (Five) Minutes As Needed for Chest Pain. Take no more than 3 doses in 15 minutes.   Unknown    sertraline (ZOLOFT) 100 MG tablet Take 2 tablets by mouth Every Night.   7/31/2023    vitamin B-12 (CYANOCOBALAMIN) 1000 MCG tablet Take 1 tablet by mouth Every Night.   7/31/2023     Allergies:  Statins    Review of Systems   Constitutional:  Negative for fatigue.   Respiratory:  Negative for shortness of breath.    Cardiovascular:  Positive for chest pain. Negative for palpitations and leg swelling.   Gastrointestinal:  Negative for  blood in stool.   Neurological:  Positive for speech difficulty. Negative for dizziness, syncope, weakness and light-headedness.   Hematological:  Does not bruise/bleed easily.   All other systems reviewed and are negative.      Objective      Vital Signs  Temp:  [98 øF (36.7 øC)-98.7 øF (37.1 øC)] 98 øF (36.7 øC)  Heart Rate:  [64-66] 65  Resp:  [18] 18  BP: (138-186)/(69-98) 173/92  Body mass index is 24.65 kg/mý.    Intake/Output Summary (Last 24 hours) at 8/2/2023 1135  Last data filed at 8/2/2023 0350  Gross per 24 hour   Intake 1000 ml   Output --   Net 1000 ml       Physical Exam  Vitals reviewed.   Constitutional:       Appearance: Normal appearance. She is well-developed.   HENT:      Head: Normocephalic and atraumatic.   Eyes:      Pupils: Pupils are equal, round, and reactive to light.   Neck:      Vascular: No JVD.   Cardiovascular:      Rate and Rhythm: Normal rate and regular rhythm.      Heart sounds: No murmur heard.    No friction rub. No gallop.   Pulmonary:      Effort: Pulmonary effort is normal. No respiratory distress.      Breath sounds: Normal breath sounds. No wheezing or rales.   Abdominal:      Palpations: Abdomen is soft. There is no mass.      Tenderness: There is no abdominal tenderness.      Hernia: No hernia is present.   Skin:     General: Skin is warm and dry.   Neurological:      Mental Status: She is alert and oriented to person, place, and time.   Psychiatric:         Mood and Affect: Mood normal.         Behavior: Behavior normal.   Paced    Telemetry:  Paced 65    Results Review:   I reviewed the patient's new clinical results.  Results from last 7 days   Lab Units 08/02/23  0247 08/02/23  0014   HSTROP T ng/L 44* 49*     Results from last 7 days   Lab Units 08/02/23  0546 08/02/23  0014   WBC 10*3/mm3 6.36 4.48   HEMOGLOBIN g/dL 10.0* 9.1*   PLATELETS 10*3/mm3 85* 80*     Results from last 7 days   Lab Units 08/02/23  0545 08/02/23  0014   SODIUM mmol/L 141 144   POTASSIUM  mmol/L 3.8 4.3   CHLORIDE mmol/L 104 106   CO2 mmol/L 24.8 27.1   BUN mg/dL 30* 34*   CREATININE mg/dL 1.78* 1.96*   CALCIUM mg/dL 9.1 9.3   GLUCOSE mg/dL 82 92   ALT (SGPT) U/L 10 10   AST (SGOT) U/L 22 22     Lab Results   Component Value Date    INR 1.08 08/02/2023    INR 1.03 08/02/2023     No results found for: MG  Lab Results   Component Value Date    TRIG 241 (H) 08/02/2023    HDL 31 (L) 08/02/2023    LDL 72 08/02/2023      No results found for: BNP     EKG:       Imaging Results (Last 72 Hours)       Procedure Component Value Units Date/Time    CT Angiogram Head w AI Analysis of LVO [[id number]] Collected: 08/02/23 0152     Updated: 08/02/23 0158    Narrative:      CLINICAL HISTORY: Weakness.     COMPARISON: None.     TECHNIQUE: Sequential trans-axial images were obtained with a  multi-detector helical CT after iodinated contrast administration.  Coronal and sagittal reconstructions and 3-D reconstructions were  obtained. Limited exposure control, adjustment of the MA and/or KV  according to patient's size or use of iterative reconstruction technique  was utilized.     FINDINGS:      Hypoplastic intradural right vertebral artery with a dominant left  vertebral artery-anatomical variation.  There is small eccentric  calcific plaque within the intradural portion of right vertebral artery  causing mild stenosis.  The posterior inferior cerebellar arteries are  unremarkable.     There are eccentric calcific plaques within the intradural portion of  both intracranial carotid arteries causing multifocal areas of moderate  to high-grade stenosis. The A1 and A2 segments of the anterior cerebral  arteries, middle cerebral arteries and branches are unremarkable. The  anterior communicating artery is unremarkable. The posterior  communicating arteries are also unremarkable.     There are no aneurysms. There are no arteriovenous malformations seen.      If there is further concern, recommend conventional angiography  for  complete assessment.       Impression:         1.  Eccentric calcific plaques within intradural portion of both  internal carotid arteries causing multifocal areas of moderate to  high-grade stenosis.  2.  Eccentric calcific plaque within intradural portion of right  vertebral artery causing mild stenosis.     This report was finalized on 8/2/2023 1:56 AM by Fuentes Galo MD.       CT Angiogram Neck [[id number]] Collected: 08/02/23 0145     Updated: 08/02/23 0154    Narrative:      CLINICAL HISTORY: Weakness.     COMPARISON: None available.     TECHNIQUE: Sequential transaxial images of the neck were obtained with a  multidetector helical CT after iodinated contrast administration.   Coronal, sagittal reconstructions as well as maximum intensity  projection images were generated. Limited exposure control, adjustment  of the MA and/or KV according to patient's size or use of iterative  reconstruction technique was utilized.     FINDINGS:     The visualized aortic arch shows mild atherosclerotic calcification.     RIGHT: The common carotid artery and cervical segment of internal  carotid artery is widely patent.  There eccentric calcific plaques  within right carotid bulb causing moderate stenosis (50-55% by NASCET  criteria).  The right external carotid artery is unremarkable.     LEFT: The common carotid artery and cervical segment of internal carotid  artery is widely patent.  There eccentric calcific plaques within right  carotid bulb causing moderate stenosis (50-55% by NASCET criteria).  The  left external carotid artery is unremarkable.     The origin and cervical segments of both vertebral arteries are widely  patent.  Mildly hypoplastic right vertebral artery with a dominant left  vertebral artery anatomical variation.  No evidence of aneurysm or  dissection.     If there is further clinical concern, recommend conventional angiography  for complete assessment. Any reported ICA stenosis directly  references  from the distal internal carotid artery diameter as the denominator for  stenosis measurement.     NONVASCULAR STRUCTURES: Left chest wall pacing device with wires.   Partially calcified nodules within the posterior right upper lobe,  measuring up to 1.1 cm.  Multilevel cervical degenerative changes.   Anterior cervical fusion from C3-T1.       Impression:         1.  Eccentric calcific plaques within both carotid bulbs causing  moderate stenosis (50-55% by NASCET criteria).     This report was finalized on 8/2/2023 1:52 AM by Fuentes Galo MD.       CT CEREBRAL PERFUSION WITH & WITHOUT CONTRAST [[id number]] Collected: 08/02/23 0143     Updated: 08/02/23 0147    Narrative:      CLINICAL HISTORY: Weakness.     COMPARISON: None available.     TECHNIQUE: CT perfusion imaging of brain was performed.  Color maps were  generated.   Limited exposure control, adjustment of the MA and/or KV  according to patient's size or use of iterative reconstruction technique  was utilized.     FINDINGS:     The visualized brain parenchyma shows normal MTT, CBV and CBF values.     CBF < 30% volume: 0 mL  Tmax >6s volume: 0 mL  Tmax > 10s volume: 0 mL  Mismatch volume: 0 mL  Mismatch ratio: None.       Impression:         1. No evidence of acute infarction/ischemia on present perfusion study.     Please note that small infarcts are difficult to appreciate on perfusion  imaging.     This report was finalized on 8/2/2023 1:45 AM by Fuentes Galo MD.       CT Head Without Contrast Stroke Protocol [[id number]] Collected: 08/02/23 0119     Updated: 08/02/23 0124    Narrative:      INDICATION: Weakness.     COMPARISON: No relevant priors available     TECHNIQUE: Axial CT images of the head were obtained without IV  contrast. Coronal and sagittal reformations were reviewed.     FINDINGS:  Gray-white differentiation is relatively preserved. No mass, mass effect  or midline shift. No evidence of acute large territorial infarction  or  acute intracranial hemorrhage. Ventricles appear normal in size. Basal  cisterns are patent. No depressed calvarial fracture.       Impression:      No acute intracranial process.     Report called to Dr. Bustamante at 120AM EST 8/2/2023.     This report was finalized on 8/2/2023 1:22 AM by Alex Pallas, DO.       XR Chest 1 View [[id number]] Collected: 08/02/23 0052     Updated: 08/02/23 0054    Narrative:      INDICATION: Chest pain.     TECHNIQUE: Frontal radiograph of the chest.     COMPARISON: None.     FINDINGS:   Cardiomegaly. Left-sided pacemaker. Pulmonary vasculature appears within  normal limits. No infiltrate, pleural effusion or pneumothorax. No acute  osseous abnormality evident.       Impression:      No acute cardiopulmonary process.     This report was finalized on 8/2/2023 12:52 AM by Alex Pallas, DO.                Assessment:  Elevated high-sensitivity troponin  Chronic renal disease  Strokelike symptoms  Still permanent pacemaker in place  Hypertension      Recommendations:  Cardiac patient with history of hypertension hyperlipidemia, thrombocytopenia came in with chest pains and had slurred speech.  Patient had high blood pressure on admission she did bump her cardiac enzymes higher than normal range.  Type I versus type II MI.  Patient does have a pacemaker in place.  CTA scan did not show acute stroke and MRI could not be done due to pacemaker not being compatible.  We will get a pharmacological stress test in a.m.  Echo cardiogram will be requested.    Thank you very much for asking us to be involved in this patient's care.  We will follow along with you.    This note was scribed by TRACE Huerta   Electronically signed by TRACE Man, 08/02/23, 11:53 AM EDT.  .Electronically signed by Dl Zacarias MD, 08/02/23, 5:48 PM EDT.

## 2023-08-02 NOTE — PLAN OF CARE
Goal Outcome Evaluation:  Patient is a new admit from the ED with daughter Leslie at bedside. A&Ox4. VSS. No visible signs symptoms of acute distress noted at this time. No complaints or requests at this time. Will continue with the plan of care.

## 2023-08-02 NOTE — THERAPY EVALUATION
Acute Care - Occupational Therapy Initial Evaluation   Chris     Patient Name: Susan Us  : 1945  MRN: [id number]  Today's Date: 2023             Admit Date: 2023       ICD-10-CM ICD-9-CM   1. TIA (transient ischemic attack)  G45.9 435.9   2. Slurred speech  R47.81 784.59   3. Facial weakness  R29.810 781.94   4. Stage 4 chronic kidney disease  N18.4 585.4   5. Primary pulmonary HTN  I27.0 416.0     Patient Active Problem List   Diagnosis    Stroke-like symptoms     Past Medical History:   Diagnosis Date    Bradycardia     s/p pacemaker placement    CKD (chronic kidney disease)     baseline renal function unknown    Dementia     Hyperlipidemia     Hypertension     Thrombocytopenia      Past Surgical History:   Procedure Laterality Date    CARDIAC PACEMAKER PLACEMENT      CATARACT EXTRACTION      CERVICAL FUSION      CHOLECYSTECTOMY      HIATAL HERNIA REPAIR      HYSTERECTOMY      REPLACEMENT TOTAL KNEE Right          OT ASSESSMENT FLOWSHEET (last 12 hours)       OT Evaluation and Treatment       Row Name 23 1651                   OT Time and Intention    Document Type evaluation  -KR        Mode of Treatment occupational therapy  -KR        Patient Effort good  -KR           Living Environment    Current Living Arrangements home  -KR        People in Home alone  -KR        Primary Care Provided by self  -KR           Cognition    Affect/Mental Status (Cognition) WFL  -KR        Orientation Status (Cognition) oriented x 3  -KR        Follows Commands (Cognition) WFL  -KR        Cognitive Function memory deficit  unable to recall events surrounding onset of symptoms  -KR           Range of Motion Comprehensive    Comment, General Range of Motion BUE WFL/equal  -KR           Strength Comprehensive (MMT)    Comment, General Manual Muscle Testing (MMT) Assessment BUE WFL/equal  -KR           Sensory    Additional Documentation --  mild numbness noted/reported distal LUE. Blurred vision  reported initially, since resolved, per pt report  -KR           Activities of Daily Living    BADL Assessment/Intervention bathing;upper body dressing;lower body dressing;grooming;toileting  -KR           Bathing Assessment/Intervention    [place] Level (Bathing) bathing skills;minimum assist (75% patient effort)  -KR           Upper Body Dressing Assessment/Training    [place] Level (Upper Body Dressing) upper body dressing skills;set up  -KR           Lower Body Dressing Assessment/Training    [place] Level (Lower Body Dressing) lower body dressing skills;minimum assist (75% patient effort)  -KR           Grooming Assessment/Training    [place] Level (Grooming) grooming skills;set up  -KR           Toileting Assessment/Training    [place] Level (Toileting) toileting skills;minimum assist (75% patient effort)  -KR           Motor Skills    Motor Skills coordination  -KR        Coordination --  mild FMC deficit LUE, but remains functional for self care performance  -KR           Balance    Balance Assessment sitting static balance;standing static balance  -KR        Static Sitting Balance standby assist  -KR        Position, Sitting Balance sitting edge of bed  -KR        Static Standing Balance contact guard  -KR           Plan of Care Review    Plan of Care Reviewed With patient;daughter;grandchild(zoraida)  -KR           Therapy Assessment/Plan (OT)    Rehab Potential (OT) good, to achieve stated therapy goals  -KR        Planned Therapy Interventions (OT) neuromuscular control/coordination retraining;BADL retraining;adaptive equipment training  -KR           Therapy Plan Review/Discharge Plan (OT)    Anticipated Discharge Disposition (OT) home  -KR           OT Goals    Dressing Goal Selection (OT) dressing, OT goal 1  -KR        Coordination Goal Selection (OT) coordination, OT goal 1  -KR           Dressing Goal 1 (OT)    Activity/Device (Dressing Goal 1, OT) dressing skills, all  -KR         [place]/Cues Needed (Dressing Goal 1, OT) independent  -KR        Time Frame (Dressing Goal 1, OT) by discharge  -KR           Coordination Goal 1 (OT)    Activity/Assistive Device (Coordination Goal 1, OT) FM task  -KR        [place] Level/Cues Needed (Coordination Goal 1, OT) independent  -KR        Time Frame (Coordination Goal 1, OT) by discharge  -KR           Patient Education Goal (OT)    Activity (Patient Education Goal, OT) AE/DME training as needed to enhance safety/independence in home environment  -KR        [place]/Cues/Accuracy (Memory Goal 2, OT) verbalizes understanding  -KR        Time Frame (Patient Education Goal, OT) by discharge  -KR                  User Key  (r) = Recorded By, (t) = Taken By, (c) = Cosigned By      Initials Name Effective Dates    Gordo Hector OT 06/16/21 -                            OT Recommendation and Plan  Planned Therapy Interventions (OT): neuromuscular control/coordination retraining, BADL retraining, adaptive equipment training  Plan of Care Review  Plan of Care Reviewed With: patient, daughter, grandchild(zoraida)  Plan of Care Reviewed With: patient, daughter, grandchild(zoraida)     Outcome Measures       Row Name 08/02/23 1703 08/02/23 1700          Modified [place] Scale    Pre-Stroke Modified [place] Scale 0 - No Symptoms at all.  -KR --     Modified [place] Scale 4 - Moderately severe disability.  Unable to walk without assistance, and unable to attend to own bodily needs without assistance.  -KR --        Functional Assessment    Outcome Measure Options -- Modified [place]  -KR               User Key  (r) = Recorded By, (t) = Taken By, (c) = Cosigned By      Initials Name Provider Type    Gordo Hector OT Occupational Therapist                    Time Calculation:     Therapy Charges for Today       Code Description Service Date Service Provider Modifiers Qty    [id number]  OT EVAL MOD COMPLEXITY 4 8/2/2023 Gordo Kilpatrick OT GO 1                  Gordo Kilpatrick, OT  8/2/2023

## 2023-08-03 ENCOUNTER — TELEPHONE (OUTPATIENT)
Dept: NEUROLOGY | Facility: CLINIC | Age: 78
End: 2023-08-03
Payer: MEDICARE

## 2023-08-03 ENCOUNTER — READMISSION MANAGEMENT (OUTPATIENT)
Dept: CALL CENTER | Facility: HOSPITAL | Age: 78
End: 2023-08-03
Payer: MEDICARE

## 2023-08-03 ENCOUNTER — APPOINTMENT (OUTPATIENT)
Dept: CT IMAGING | Facility: HOSPITAL | Age: 78
End: 2023-08-03
Payer: MEDICARE

## 2023-08-03 VITALS
RESPIRATION RATE: 18 BRPM | OXYGEN SATURATION: 98 % | BODY MASS INDEX: 25.37 KG/M2 | SYSTOLIC BLOOD PRESSURE: 161 MMHG | DIASTOLIC BLOOD PRESSURE: 89 MMHG | HEART RATE: 65 BPM | WEIGHT: 152.3 LBS | TEMPERATURE: 98.2 F | HEIGHT: 65 IN

## 2023-08-03 PROBLEM — G45.9 TIA (TRANSIENT ISCHEMIC ATTACK): Status: ACTIVE | Noted: 2023-08-03

## 2023-08-03 LAB
QT INTERVAL: 420 MS
QTC INTERVAL: 436 MS

## 2023-08-03 PROCEDURE — 25010000002 HEPARIN (PORCINE) PER 1000 UNITS: Performed by: HOSPITALIST

## 2023-08-03 PROCEDURE — 70450 CT HEAD/BRAIN W/O DYE: CPT

## 2023-08-03 PROCEDURE — 99232 SBSQ HOSP IP/OBS MODERATE 35: CPT | Performed by: NURSE PRACTITIONER

## 2023-08-03 PROCEDURE — 70450 CT HEAD/BRAIN W/O DYE: CPT | Performed by: RADIOLOGY

## 2023-08-03 PROCEDURE — G0378 HOSPITAL OBSERVATION PER HR: HCPCS

## 2023-08-03 PROCEDURE — 96374 THER/PROPH/DIAG INJ IV PUSH: CPT

## 2023-08-03 PROCEDURE — 25010000002 PROCHLORPERAZINE 10 MG/2ML SOLUTION

## 2023-08-03 PROCEDURE — 96372 THER/PROPH/DIAG INJ SC/IM: CPT

## 2023-08-03 RX ORDER — HYDRALAZINE HYDROCHLORIDE 25 MG/1
50 TABLET, FILM COATED ORAL 2 TIMES DAILY
Qty: 120 TABLET | Refills: 0 | Status: SHIPPED | OUTPATIENT
Start: 2023-08-03 | End: 2023-09-02

## 2023-08-03 RX ORDER — AMLODIPINE BESYLATE 5 MG/1
5 TABLET ORAL
Status: DISCONTINUED | OUTPATIENT
Start: 2023-08-03 | End: 2023-08-03

## 2023-08-03 RX ORDER — HYDRALAZINE HYDROCHLORIDE 25 MG/1
50 TABLET, FILM COATED ORAL 2 TIMES DAILY
Qty: 120 TABLET | Refills: 0 | Status: SHIPPED | OUTPATIENT
Start: 2023-08-03 | End: 2023-08-03 | Stop reason: SDUPTHER

## 2023-08-03 RX ORDER — HYDRALAZINE HYDROCHLORIDE 50 MG/1
50 TABLET, FILM COATED ORAL 2 TIMES DAILY
Status: DISCONTINUED | OUTPATIENT
Start: 2023-08-03 | End: 2023-08-03 | Stop reason: HOSPADM

## 2023-08-03 RX ORDER — CLOPIDOGREL BISULFATE 75 MG/1
75 TABLET ORAL DAILY
Qty: 30 TABLET | Refills: 0 | Status: SHIPPED | OUTPATIENT
Start: 2023-08-04 | End: 2023-09-02

## 2023-08-03 RX ORDER — ASPIRIN 81 MG/1
81 TABLET, CHEWABLE ORAL DAILY
Qty: 30 TABLET | Refills: 0 | Status: SHIPPED | OUTPATIENT
Start: 2023-08-04 | End: 2023-09-03

## 2023-08-03 RX ADMIN — CLOPIDOGREL BISULFATE 75 MG: 75 TABLET, FILM COATED ORAL at 10:07

## 2023-08-03 RX ADMIN — SODIUM CHLORIDE 100 ML/HR: 9 INJECTION, SOLUTION INTRAVENOUS at 00:12

## 2023-08-03 RX ADMIN — FERROUS SULFATE TAB 325 MG (65 MG ELEMENTAL FE) 325 MG: 325 (65 FE) TAB at 10:07

## 2023-08-03 RX ADMIN — Medication 10 ML: at 10:08

## 2023-08-03 RX ADMIN — ISOSORBIDE MONONITRATE 30 MG: 30 TABLET, EXTENDED RELEASE ORAL at 10:07

## 2023-08-03 RX ADMIN — DOCUSATE SODIUM 50 MG AND SENNOSIDES 8.6 MG 2 TABLET: 8.6; 5 TABLET, FILM COATED ORAL at 10:08

## 2023-08-03 RX ADMIN — HYDRALAZINE HYDROCHLORIDE 50 MG: 25 TABLET, FILM COATED ORAL at 10:07

## 2023-08-03 RX ADMIN — MODAFINIL 200 MG: 100 TABLET ORAL at 10:07

## 2023-08-03 RX ADMIN — ASPIRIN 81 MG: 81 TABLET, CHEWABLE ORAL at 10:07

## 2023-08-03 RX ADMIN — GABAPENTIN 100 MG: 100 CAPSULE ORAL at 10:07

## 2023-08-03 RX ADMIN — HEPARIN SODIUM 5000 UNITS: 5000 INJECTION INTRAVENOUS; SUBCUTANEOUS at 10:08

## 2023-08-03 RX ADMIN — LORAZEPAM 0.5 MG: 0.5 TABLET ORAL at 10:08

## 2023-08-03 RX ADMIN — PROCHLORPERAZINE EDISYLATE 2.5 MG: 5 INJECTION INTRAMUSCULAR; INTRAVENOUS at 00:12

## 2023-08-03 RX ADMIN — Medication 1 TABLET: at 10:08

## 2023-08-03 NOTE — DISCHARGE PLACEMENT REQUEST
"Rose Mary Us (78 y.o. Female)       Date of Birth   1945    Social Security Number       Address   [address] [facility] [address]    Home Phone   [phone]    MRN   [id number]       Congregational   None    Marital Status                               Admission Date   8/1/23    Admission Type   Emergency    Admitting Provider   Albert Rangel MD    Attending Provider   Behbahani, Katayoun, MD    Department, Room/Bed   [address], 3315/1S       Discharge Date       [place] [facility] [place]                                 Attending Provider: Behbahani, Katayoun, MD    Allergies: Statins    Isolation: None   Infection: None   Code Status: No CPR    Ht: 165.1 cm (65\")   Wt: 69.1 kg (152 lb 4.8 oz)    Admission Cmt: None   Principal Problem: Stroke-like symptoms [R29.90]                   Active Insurance as of 8/1/2023       Primary Coverage       Payor Plan Insurance Group Employer/Plan Group    [facility] MEDICARE REPLACEMENT [facility] MEDICARE REPLACEMENT [id number]       Payor Plan Address Payor Plan Phone Number Payor Plan Fax Number Effective Dates    [address] [address]   3/1/2023 - None Entered    [facility] [id number]         Subscriber Name Subscriber Birth Date Member ID       ROSE MARY US 1945 [id number]               Secondary Coverage       Payor Plan Insurance Group Employer/Plan Group    [employer]        Payor Plan Address Payor Plan Phone Number Payor Plan Fax Number Effective Dates    [address] [phone]  8/1/2023 - None Entered    [facility] [id number]         Subscriber Name Subscriber Birth Date Member ID       ROSE MARY US 1945 [id number]                     Emergency Contacts        (Rel.) Home Phone Work Phone Mobile Phone    SCOTT HINDS (Daughter) [phone] -- --          [address]  [address] [place] [address]  Phone:  [phone]  Fax:  [phone] Date: Aug 3, 2023      Ambulatory Referral to Home Health     Patient:  Susan Us MRN:  [id number]   [address] [place] [address] :  1945  SSN:    Phone: [phone] Sex:  F      INSURANCE PAYOR PLAN GROUP # SUBSCRIBER ID   Primary:  Secondary:    [employer] MEDICARE REPLACEMENT  [facility] [id number]  [id number]    [id number]  [id number]      Referring Provider Information:  BEHBAHANI, KATAYOUN Phone: [phone] Fax: [phone]       Referral Information:   # Visits:  999 Referral Type: Home Health [42]   Urgency:  Routine Referral Reason: Specialty Services Required   Start Date: Aug 3, 2023 End Date:  To be determined by Insurer   Diagnosis: TIA (transient ischemic attack) (G45.9 [ICD-10-CM] 435.9 [ICD-9-CM])      Refer to Dept:   Refer to Provider:   Refer to Provider Phone:   Refer to Facility:       Face to Face Visit Date: 8/3/2023  Follow-up provider for Plan of Care? I treated the patient in an acute care facility and will not continue treatment after discharge.  Follow-up provider: NADINE DE LEON [[id number]]  Reason/Clinical Findings: slight weakness due to TIA  Describe mobility limitations that make leaving home difficult: unsteady gait  Nursing/Therapeutic Services Requested: Physical Therapy  Nursing/Therapeutic Services Requested: Occupational Therapy  PT orders: Therapeutic exercise  PT orders: Gait Training  PT orders: Transfer training  PT orders: Strengthening  PT orders: Home safety assessment  Weight Bearing Status: As Tolerated  Occupational orders: Activities of daily living  Occupational orders: Strengthening  Occupational orders: Cognition  Occupational orders: Fine motor  Occupational orders: Home safety assessment  Frequency: 1 Week 1     This document serves as a request of services and does not constitute Insurance authorization or approval of services.  To determine  eligibility, please contact the members Insurance carrier to verify and review coverage.     If you have medical questions regarding this request for services. Please contact [address] at [phone] during normal business hours.        Authorizing Provider:Behbahani, Katayoun, MD  Authorizing Provider's NPI: [id number]  Order Entered By: Behbahani, Katayoun, MD 8/3/2023 11:03 AM     Electronically signed by: Behbahani, Katayoun, MD 8/3/2023 11:03 AM          History & Physical        Albert Rangel MD at 23 0302          Hospitalist History and Physical        Patient Identification  Name: Susan Us  Age/Sex: 78 y.o. female  :  1945        MRN: [id number]  Visit Number: [id number]  Admit Date: 2023   PCP: Trevor Garay MD          Chief complaint chest pain, slurred/mumbled speech    History of Present Illness:  Patient is a 78 y.o. female with history of bradycardia s/p pacemaker placement, CKD (baseline unknown), dementia, thrombocytopenia, HTN, and HLD, who presents with complaints of chest pain and slurred/mumbled speech that started around 8pm tonight. Her daughter notes initially she complained of chest pain radiating to her left shoulder. The patient adds that she felt nauseated at this time but not short of breath or diaphoretic. A few minutes later, she started to slur her words and became very difficult to understand according to her daughter. She also became very weak. Her daughter felt like she exhibited generalized weakness, but the patient states she felt more weak on her left side. She was brought to the ED where vitals revealed mildly elevated BP (163/82). Labs showed elevated troponin 49 with downward trend to 44 (delta -5), creatinine 1.96 and BUN 34 (again, baseline unknown), hemoglobin 9.1 and platelets 80. CT head showed no acute process, while CT angiogram head showed eccentric calcific plaques with intradural portion of both internal  "carotid arteries causing multifocal areas of moderate to high-grade stenosis, along with mild stenosis of right vertebral artery. CT angiogram neck showed eccentric calcific plaques within both carotid bulbs causing moderate stenosis. MRI was not pursued due to the fact the patient had an \"MRI-conditional pacemaker\", which reportedly means all conditions have to be met in order for MRI to be safe for this patient.      Review of Systems  Review of Systems   Constitutional:  Positive for activity change. Negative for appetite change, chills, diaphoresis, fatigue, fever and unexpected weight change.   HENT:  Negative for congestion, postnasal drip, rhinorrhea, sinus pressure, sinus pain and sore throat.    Eyes:  Negative for photophobia, pain, discharge, redness, itching and visual disturbance.   Respiratory:  Negative for cough, shortness of breath and wheezing.    Cardiovascular:  Positive for chest pain. Negative for palpitations and leg swelling.   Gastrointestinal:  Positive for nausea. Negative for abdominal distention, abdominal pain, constipation, diarrhea and vomiting.   Endocrine: Negative for cold intolerance, heat intolerance, polydipsia, polyphagia and polyuria.   Genitourinary:  Negative for difficulty urinating, dysuria, flank pain, frequency and hematuria.   Musculoskeletal:  Negative for arthralgias, back pain, joint swelling, myalgias, neck pain and neck stiffness.   Skin:  Negative for color change, pallor, rash and wound.   Neurological:  Positive for speech difficulty (now back to baseline). Negative for dizziness, tremors, seizures, syncope, weakness, light-headedness, numbness and headaches.   Hematological:  Negative for adenopathy. Does not bruise/bleed easily.   Psychiatric/Behavioral:  Negative for agitation, behavioral problems and confusion.      History  Past Medical History:   Diagnosis Date    Bradycardia     s/p pacemaker placement    CKD (chronic kidney disease)     baseline renal " function unknown    Dementia     Hyperlipidemia     Hypertension     Thrombocytopenia      Past Surgical History:   Procedure Laterality Date    CARDIAC PACEMAKER PLACEMENT      CATARACT EXTRACTION      CERVICAL FUSION      CHOLECYSTECTOMY      HIATAL HERNIA REPAIR      HYSTERECTOMY      REPLACEMENT TOTAL KNEE Right      Family History   Problem Relation Age of Onset    Stroke Mother     Stroke Father      Social History     Tobacco Use    Smoking status: Never    Tobacco comments:     Second hand smoke exposure ()   Substance Use Topics    Alcohol use: Never    Drug use: Never     (Not in a hospital admission)    Allergies:  Statins    Objective    Vital Signs  Temp:  [98.7 øF (37.1 øC)] 98.7 øF (37.1 øC)  Heart Rate:  [65] 65  Resp:  [18] 18  BP: (163)/(82) 163/82  Body mass index is 24.96 kg/mý.    Physical Exam:  Physical Exam  Constitutional:       General: She is not in acute distress.     Appearance: Normal appearance. She is not ill-appearing.   HENT:      Head: Normocephalic and atraumatic.      Right Ear: External ear normal.      Left Ear: External ear normal.      Nose: Nose normal.      Mouth/Throat:      Mouth: Mucous membranes are moist.      Pharynx: Oropharynx is clear.   Eyes:      Extraocular Movements: Extraocular movements intact.      Conjunctiva/sclera: Conjunctivae normal.      Pupils: Pupils are equal, round, and reactive to light.   Cardiovascular:      Rate and Rhythm: Normal rate and regular rhythm.      Pulses: Normal pulses.      Heart sounds: Normal heart sounds. No murmur heard.  Pulmonary:      Effort: Pulmonary effort is normal. No respiratory distress.      Breath sounds: Normal breath sounds. No wheezing or rales.   Abdominal:      General: Abdomen is flat. Bowel sounds are normal. There is no distension.      Palpations: Abdomen is soft.      Tenderness: There is no abdominal tenderness.   Musculoskeletal:         General: Normal range of motion.      Cervical back: Normal  range of motion and neck supple. No tenderness.      Right lower leg: No edema.      Left lower leg: No edema.   Lymphadenopathy:      Cervical: No cervical adenopathy.   Skin:     General: Skin is warm and dry.      Capillary Refill: Capillary refill takes less than 2 seconds.      Coloration: Skin is not jaundiced.      Findings: No bruising or lesion.   Neurological:      Mental Status: She is alert.      Comments: Alert, verbal, oriented to self, place but not year. Hand  is symmetric. Left leg raise 3/5 compared to 4-5/5 strength on the right side.    Psychiatric:         Mood and Affect: Mood normal.         Behavior: Behavior normal.         Results Review:       Lab Results:  Results from last 7 days   Lab Units 08/02/23  0014   WBC 10*3/mm3 4.48   HEMOGLOBIN g/dL 9.1*   PLATELETS 10*3/mm3 80*         Results from last 7 days   Lab Units 08/02/23  0014   SODIUM mmol/L 144   POTASSIUM mmol/L 4.3   CHLORIDE mmol/L 106   CO2 mmol/L 27.1   BUN mg/dL 34*   CREATININE mg/dL 1.96*   CALCIUM mg/dL 9.3   GLUCOSE mg/dL 92         No results found for: HGBA1C  Results from last 7 days   Lab Units 08/02/23  0014   BILIRUBIN mg/dL 0.3   ALK PHOS U/L 64   AST (SGOT) U/L 22   ALT (SGPT) U/L 10     Results from last 7 days   Lab Units 08/02/23  0247 08/02/23  0014   HSTROP T ng/L 44* 49*         Results from last 7 days   Lab Units 08/02/23  0247 08/02/23  0014   INR  1.08 1.03           I have reviewed the patient's laboratory results.    Imaging:  Imaging Results (Last 72 Hours)       Procedure Component Value Units Date/Time    CT Angiogram Head w AI Analysis of LVO [[id number]] Collected: 08/02/23 0152     Updated: 08/02/23 0158    Narrative:      CLINICAL HISTORY: Weakness.     COMPARISON: None.     TECHNIQUE: Sequential trans-axial images were obtained with a  multi-detector helical CT after iodinated contrast administration.  Coronal and sagittal reconstructions and 3-D reconstructions were  obtained. Limited  exposure control, adjustment of the MA and/or KV  according to patient's size or use of iterative reconstruction technique  was utilized.     FINDINGS:      Hypoplastic intradural right vertebral artery with a dominant left  vertebral artery-anatomical variation.  There is small eccentric  calcific plaque within the intradural portion of right vertebral artery  causing mild stenosis.  The posterior inferior cerebellar arteries are  unremarkable.     There are eccentric calcific plaques within the intradural portion of  both intracranial carotid arteries causing multifocal areas of moderate  to high-grade stenosis. The A1 and A2 segments of the anterior cerebral  arteries, middle cerebral arteries and branches are unremarkable. The  anterior communicating artery is unremarkable. The posterior  communicating arteries are also unremarkable.     There are no aneurysms. There are no arteriovenous malformations seen.      If there is further concern, recommend conventional angiography for  complete assessment.       Impression:         1.  Eccentric calcific plaques within intradural portion of both  internal carotid arteries causing multifocal areas of moderate to  high-grade stenosis.  2.  Eccentric calcific plaque within intradural portion of right  vertebral artery causing mild stenosis.     This report was finalized on 8/2/2023 1:56 AM by Fuentes Galo MD.       CT Angiogram Neck [[id number]] Collected: 08/02/23 0145     Updated: 08/02/23 0154    Narrative:      CLINICAL HISTORY: Weakness.     COMPARISON: None available.     TECHNIQUE: Sequential transaxial images of the neck were obtained with a  multidetector helical CT after iodinated contrast administration.   Coronal, sagittal reconstructions as well as maximum intensity  projection images were generated. Limited exposure control, adjustment  of the MA and/or KV according to patient's size or use of iterative  reconstruction technique was utilized.      FINDINGS:     The visualized aortic arch shows mild atherosclerotic calcification.     RIGHT: The common carotid artery and cervical segment of internal  carotid artery is widely patent.  There eccentric calcific plaques  within right carotid bulb causing moderate stenosis (50-55% by NASCET  criteria).  The right external carotid artery is unremarkable.     LEFT: The common carotid artery and cervical segment of internal carotid  artery is widely patent.  There eccentric calcific plaques within right  carotid bulb causing moderate stenosis (50-55% by NASCET criteria).  The  left external carotid artery is unremarkable.     The origin and cervical segments of both vertebral arteries are widely  patent.  Mildly hypoplastic right vertebral artery with a dominant left  vertebral artery anatomical variation.  No evidence of aneurysm or  dissection.     If there is further clinical concern, recommend conventional angiography  for complete assessment. Any reported ICA stenosis directly references  from the distal internal carotid artery diameter as the denominator for  stenosis measurement.     NONVASCULAR STRUCTURES: Left chest wall pacing device with wires.   Partially calcified nodules within the posterior right upper lobe,  measuring up to 1.1 cm.  Multilevel cervical degenerative changes.   Anterior cervical fusion from C3-T1.       Impression:         1.  Eccentric calcific plaques within both carotid bulbs causing  moderate stenosis (50-55% by NASCET criteria).     This report was finalized on 8/2/2023 1:52 AM by Fuentes Galo MD.       CT CEREBRAL PERFUSION WITH & WITHOUT CONTRAST [[id number]] Collected: 08/02/23 0143     Updated: 08/02/23 0147    Narrative:      CLINICAL HISTORY: Weakness.     COMPARISON: None available.     TECHNIQUE: CT perfusion imaging of brain was performed.  Color maps were  generated.   Limited exposure control, adjustment of the MA and/or KV  according to patient's size or use of  iterative reconstruction technique  was utilized.     FINDINGS:     The visualized brain parenchyma shows normal MTT, CBV and CBF values.     CBF < 30% volume: 0 mL  Tmax >6s volume: 0 mL  Tmax > 10s volume: 0 mL  Mismatch volume: 0 mL  Mismatch ratio: None.       Impression:         1. No evidence of acute infarction/ischemia on present perfusion study.     Please note that small infarcts are difficult to appreciate on perfusion  imaging.     This report was finalized on 8/2/2023 1:45 AM by Fuentes Galo MD.       CT Head Without Contrast Stroke Protocol [[id number]] Collected: 08/02/23 0119     Updated: 08/02/23 0124    Narrative:      INDICATION: Weakness.     COMPARISON: No relevant priors available     TECHNIQUE: Axial CT images of the head were obtained without IV  contrast. Coronal and sagittal reformations were reviewed.     FINDINGS:  Gray-white differentiation is relatively preserved. No mass, mass effect  or midline shift. No evidence of acute large territorial infarction or  acute intracranial hemorrhage. Ventricles appear normal in size. Basal  cisterns are patent. No depressed calvarial fracture.       Impression:      No acute intracranial process.     Report called to Dr. Bustamante at 120AM EST 8/2/2023.     This report was finalized on 8/2/2023 1:22 AM by Alex Pallas, DO.       XR Chest 1 View [[id number]] Collected: 08/02/23 0052     Updated: 08/02/23 0054    Narrative:      INDICATION: Chest pain.     TECHNIQUE: Frontal radiograph of the chest.     COMPARISON: None.     FINDINGS:   Cardiomegaly. Left-sided pacemaker. Pulmonary vasculature appears within  normal limits. No infiltrate, pleural effusion or pneumothorax. No acute  osseous abnormality evident.       Impression:      No acute cardiopulmonary process.     This report was finalized on 8/2/2023 12:52 AM by Alex Pallas, DO.               I have personally reviewed the patient's radiologic imaging.        EKG:   Atrial-paced rhythm with prolonged  "AV conduction, HR 65, QTc 436  Left axis deviation  Cannot rule out Anterior infarct , age undetermined  Abnormal ECG  No previous ECGs available    I have personally reviewed the patient's EKG. No overt ST changes appreciated.         Assessment & Plan    - Stroke-like symptoms: CT angiograms showed multifocal areas of moderate to high grade stenosis in both internal carotid arteries. CT perfusion study and CT head without contrast unremarkable. Unfortunately, cannot pursue MRI brain at this time due to her \"MRI-conditional\" pacemaker. Will need to speak with radiology in the morning whether we can meet all conditions here to allow patient to undergo MRI brain. In the mean time, neurology recommends loading with ASA and plavix. Will continue low dose ASA and plavix starting tomorrow. Obtain ECHO in the morning for further workup. Consult PT, OT and SLP to evaluate further.   - Chest pain with troponin elevation concerning for NSTEMI: troponin trending down on repeat. Suspect possibly type II from above stroke and renal insufficiency. Receiving ASA and plavix as noted above and getting ECHO in the morning. Consult cardiology for further assistance in workup and management.   - CKD, baseline unknown: not clear whether patient's renal function is at baseline or worsen than baseline. Monitor response to IV fluid hydration mixed with contrast received for contrasted CT scans this morning.   - Thrombocytopenia: also chronic per daughter. Continue to monitor.  - Dementia: supportive care.  - Hypertension: home med list unknown. Make IV hydralazine available PRN for SBP>180.     DVT Prophylaxis: SQ heparin    Code Status: DNR/DNI (confirmed with patient and daughter at bedside)    Estimated Length of Stay >2 midnights    I discussed the patient's findings, assessment and plan with the patient, her daughter and granddaughter at bedside, and nursing staff in the ED.    Patient is high risk due to stroke-like symptoms, NSTEMI " type 1 vs 2, CKD, baseline unknown    Albert Rangel MD  08/02/23  03:30 EDT      Electronically signed by Albert Rangel MD at 08/02/23 0332       Vital Signs (last day)       Date/Time Temp Temp src Pulse Resp BP Patient Position SpO2    08/03/23 1037 98.2 (36.8) Oral 65 18 161/89 Sitting 98    08/03/23 0700 98 (36.7) Oral 65 18 160/79 Lying 96    08/03/23 0319 98.5 (36.9) Oral 65 18 153/71 Lying 98    08/02/23 2319 97.8 (36.6) Oral 65 18 133/56 Lying 99    08/02/23 1959 97.5 (36.4) Oral 68 18 107/54 Lying 98    08/02/23 1657 97.4 (36.3) Oral 65 18 177/88 Lying 94    08/02/23 1144 98.2 (36.8) Oral 65 18 117/57 Lying 92    08/02/23 0624 98 (36.7) Oral 65 18 173/92 Lying 96    08/02/23 0530 98.3 (36.8) Oral 65 18 185/92 Lying 97    08/02/23 0501 -- -- 65 -- 160/89 -- 95    08/02/23 0456 -- -- 66 -- -- -- 98    08/02/23 0441 -- -- 65 -- -- -- 94    08/02/23 0431 -- -- 65 -- 138/76 -- 94    08/02/23 0411 -- -- 65 -- -- -- 95    08/02/23 0401 -- -- 65 -- 149/69 -- 95    08/02/23 0341 -- -- 65 -- -- -- 92    08/02/23 0331 -- -- 65 -- 175/93 -- 94    08/02/23 0301 -- -- 65 -- 179/90 -- 96    08/02/23 0256 -- -- 65 -- -- -- 94    08/02/23 0231 -- -- 65 -- 175/88 -- 91    08/02/23 0201 -- -- 64 -- 179/94 -- 98    08/02/23 0131 -- -- 65 -- 186/98 -- 97          Intake & Output (last day)         08/02 0701 08/03 0700 08/03 0701 08/04 0700    P.O. 360 480    IV Piggyback      Total Intake(mL/kg) 360 (5.2) 480 (6.9)    Urine (mL/kg/hr) 1150 (0.7) 550 (1.6)    Total Output 1150 550    Net -790 -70          Urine Unmeasured Occurrence 3 x           Lines, Drains & Airways       Active LDAs       Name Placement date Placement time Site Days    Peripheral IV 08/02/23 0035 Right Antecubital 08/02/23  0035  Antecubital  1    External Urinary Catheter 08/02/23  1900  --  less than 1                  Current Facility-Administered Medications   Medication Dose Route Frequency Provider Last Rate Last Admin     acetaminophen (TYLENOL) tablet 1,000 mg  1,000 mg Oral Q8H PRN Behbahani, Katayoun, MD   1,000 mg at 08/02/23 2134    aspirin chewable tablet 81 mg  81 mg Oral Daily Albert Rangel MD   81 mg at 08/03/23 1007    Bempedoic Acid-Ezetimibe 180-10 MG tablet 1 tablet  1 tablet Oral Nightly Behbahani, Katayoun, MD        sennosides-docusate (PERICOLACE) 8.6-50 MG per tablet 2 tablet  2 tablet Oral BID Albert Rangel MD   2 tablet at 08/03/23 1008    And    polyethylene glycol (MIRALAX) packet 17 g  17 g Oral Daily PRN Albert Rangel MD        And    bisacodyl (DULCOLAX) EC tablet 5 mg  5 mg Oral Daily PRN Albert Rangel MD        And    bisacodyl (DULCOLAX) suppository 10 mg  10 mg Rectal Daily PRN Albert Rangel MD        Calcium Carb-Cholecalciferol 600-20 MG-MCG tablet 1 tablet  1 tablet Oral BID Behbahani, Katayoun, MD   1 tablet at 08/03/23 1008    clopidogrel (PLAVIX) tablet 75 mg  75 mg Oral Daily Albert Rangel MD   75 mg at 08/03/23 1007    Diclofenac Sodium (VOLTAREN) 1 % gel 4 g  4 g Topical 4x Daily PRN Behbahani, Katayoun, MD        donepezil (ARICEPT) tablet 5 mg  5 mg Oral Nightly Behbahani, Katayoun, MD   5 mg at 08/02/23 2044    ferrous sulfate tablet 325 mg  325 mg Oral Daily With Breakfast Behbahani, Katayoun, MD   325 mg at 08/03/23 1007    fluticasone (FLONASE) 50 MCG/ACT nasal spray 2 spray  2 spray Nasal Nightly Behbahani, Katayoun, MD        gabapentin (NEURONTIN) capsule 100 mg  100 mg Oral BID Behbahani, Katayoun, MD   100 mg at 08/03/23 1007    heparin (porcine) 5000 UNIT/ML injection 5,000 Units  5,000 Units Subcutaneous Q12H Albert Rangel MD   5,000 Units at 08/03/23 1008    hydrALAZINE (APRESOLINE) injection 10 mg  10 mg Intravenous Q6H PRN Albert Rangel MD        hydrALAZINE (APRESOLINE) tablet 50 mg  50 mg Oral BID Behbahani, Katayoun, MD   50 mg at 08/03/23 1007    isosorbide mononitrate (IMDUR) 24 hr tablet 30 mg  30 mg  Oral Daily Behbahani, Katayoun, MD   30 mg at 08/03/23 1007    LORazepam (ATIVAN) tablet 0.5 mg  0.5 mg Oral BID Behbahani, Katayoun, MD   0.5 mg at 08/03/23 1008    modafinil (PROVIGIL) tablet 200 mg  200 mg Oral Daily Behbahani, Katayoun, MD   200 mg at 08/03/23 1007    multivitamin with minerals 1 tablet  1 tablet Oral Daily Behbahani, Katayoun, MD   1 tablet at 08/03/23 1008    nitroglycerin (NITROSTAT) SL tablet 0.4 mg  0.4 mg Sublingual Q5 Min PRN Albert Rangel MD        ondansetron (ZOFRAN) tablet 4 mg  4 mg Oral Q8H PRN Behbahani, Katayoun, MD   4 mg at 08/02/23 2131    pantoprazole (PROTONIX) EC tablet 40 mg  40 mg Oral Nightly Behbahani, Katayoun, MD   40 mg at 08/02/23 2044    prochlorperazine (COMPAZINE) injection 2.5 mg  2.5 mg Intravenous Q6H PRN Felicia Pope PA-C   2.5 mg at 08/03/23 0012    sertraline (ZOLOFT) tablet 200 mg  200 mg Oral Nightly Behbahani, Katayoun, MD   200 mg at 08/02/23 2043    sodium chloride 0.9 % flush 10 mL  10 mL Intravenous PRN Albert Rangel MD        sodium chloride 0.9 % flush 10 mL  10 mL Intravenous PRN Albert Rangel MD        sodium chloride 0.9 % flush 10 mL  10 mL Intravenous Q12H Albert Rangel MD   10 mL at 08/03/23 1008    sodium chloride 0.9 % flush 10 mL  10 mL Intravenous PRN Albert Rangel MD        sodium chloride 0.9 % infusion 40 mL  40 mL Intravenous PRN Albert Rangel MD        vitamin B-12 (CYANOCOBALAMIN) tablet 1,000 mcg  1,000 mcg Oral Nightly Behbahani, Katayoun, MD   1,000 mcg at 08/02/23 2043     Operative/Procedure Notes (most recent note)    No notes of this type exist for this encounter.          Physician Progress Notes (most recent note)        Gordo Vela APRN at 08/03/23 1020          Stroke Progress Note       Chief Complaint: Left facial weakness/slurred speech, left-sided weakness    Subjective    Subjective     Subjective:  No acute events overnight.  Patient reports feeling  "significantly better today.  She is awake and alert, oriented to person place and situation, after moment she was eventually able to tell me the month.  She tells us that her memory is \"shot\".    Review of Systems   Constitutional:  Negative for chills and fever.   Respiratory: Negative.     Musculoskeletal:  Positive for arthralgias.   Skin: Negative.    Neurological:  Positive for weakness. Negative for facial asymmetry and numbness.   Psychiatric/Behavioral:  Positive for confusion.          Objective    Objective      Temp:  [97.4 øF (36.3 øC)-98.5 øF (36.9 øC)] 98.2 øF (36.8 øC)  Heart Rate:  [65-68] 65  Resp:  [18] 18  BP: (107-177)/(54-89) 161/89        Neurological Exam  Mental Status  Awake and alert. Oriented to person, place and time. Speech is normal. Language is fluent with no aphasia. Attention and concentration are normal.    Cranial Nerves  CN III, IV, VI: Extraocular movements intact bilaterally. Normal lids and orbits bilaterally.  CN VII: Full and symmetric facial movement.  CN IX, X: Palate elevates symmetrically  CN XI: Shoulder shrug strength is normal.  CN XII: Tongue midline without atrophy or fasciculations.    Motor  Normal muscle bulk throughout. Normal muscle tone. No abnormal involuntary movements.  Mild generalized weakness, no focal motor deficit.    Coordination  Right: Finger-to-nose normal.Left: Finger-to-nose normal.    Gait    Patient was able to assist stand from bed unassisted.    Physical Exam  Vitals and nursing note reviewed.   Constitutional:       General: She is awake. She is not in acute distress.     Appearance: Normal appearance.   HENT:      Head: Normocephalic.      Mouth/Throat:      Mouth: Mucous membranes are moist.      Pharynx: Oropharynx is clear.   Eyes:      General: Lids are normal.      Extraocular Movements: Extraocular movements intact.   Cardiovascular:      Rate and Rhythm: Normal rate and regular rhythm.   Pulmonary:      Effort: Pulmonary effort is " normal. No respiratory distress.   Skin:     General: Skin is warm and dry.   Neurological:      Mental Status: She is alert.   Psychiatric:         Mood and Affect: Mood normal.         Speech: Speech normal.         Behavior: Behavior normal.       Hospital Meds:  Scheduled- aspirin, 81 mg, Oral, Daily  Bempedoic Acid-Ezetimibe, 1 tablet, Oral, Nightly  Calcium Carb-Cholecalciferol, 1 tablet, Oral, BID  clopidogrel, 75 mg, Oral, Daily  donepezil, 5 mg, Oral, Nightly  ferrous sulfate, 325 mg, Oral, Daily With Breakfast  fluticasone, 2 spray, Nasal, Nightly  gabapentin, 100 mg, Oral, BID  heparin (porcine), 5,000 Units, Subcutaneous, Q12H  hydrALAZINE, 50 mg, Oral, BID  isosorbide mononitrate, 30 mg, Oral, Daily  LORazepam, 0.5 mg, Oral, BID  modafinil, 200 mg, Oral, Daily  multivitamin with minerals, 1 tablet, Oral, Daily  pantoprazole, 40 mg, Oral, Nightly  senna-docusate sodium, 2 tablet, Oral, BID  sertraline, 200 mg, Oral, Nightly  sodium chloride, 10 mL, Intravenous, Q12H  vitamin B-12, 1,000 mcg, Oral, Nightly      Infusions-     PRNs-   acetaminophen    senna-docusate sodium **AND** polyethylene glycol **AND** bisacodyl **AND** bisacodyl    Diclofenac Sodium    hydrALAZINE    nitroglycerin    ondansetron    prochlorperazine    sodium chloride    sodium chloride    sodium chloride    sodium chloride    Results Review:    I reviewed the patient's new clinical results.    Imaging Results (Last 24 Hours)       Procedure Component Value Units Date/Time    CT Head Without Contrast [[id number]] Collected: 08/03/23 0950     Updated: 08/03/23 0953    Narrative:      CT HEAD WO CONTRAST-     CLINICAL INDICATION: Stroke, follow up; G45.9-Transient cerebral  ischemic attack, unspecified; R47.81-Slurred speech; R29.810-Facial  weakness; N18.4-Chronic kidney disease, stage 4 (severe); I27.0-Primary  pulmonary hypertension        COMPARISON: 08/02/2023      TECHNIQUE: Axial images of the brain were obtained with out  intravenous  contrast.  Reformatted images were created in the sagittal and coronal  planes.     DOSE:     Radiation dose reduction techniques were utilized per ALARA protocol.  Automated exposure control was initiated through either or [employer] or  D[employer] software packages by  protocol.        FINDINGS:    BRAIN:  Unremarkable.  No hemorrhage.  No significant white matter  disease.  No edema.       VENTRICLES:  Unremarkable.  No ventriculomegaly.       BONES/JOINTS:  Unremarkable.  No acute fracture.       SOFT TISSUES:  Unremarkable.       SINUSES:  no air fluid levels       MASTOID AIR CELLS:  Unremarkable as visualized.  No mastoid effusion.          Impression:          1. No evidence of an acute ischemic event  2. No parenchymal mass, hemorrhage, or midline shift     This report was finalized on 8/3/2023 9:51 AM by Dr. Nghia Sorenson MD.             Results for orders placed during the hospital encounter of 08/01/23    Adult Transthoracic Echo Complete w/ Color, Spectral and Contrast if necessary per protocol    Interpretation Summary    Left ventricular systolic function is normal. Calculated left ventricular EF = 68% Left ventricular ejection fraction appears to be 66 - 70%.    Left ventricular diastolic function was normal.    Estimated right ventricular systolic pressure from tricuspid regurgitation is normal (<35 mmHg).    CT Head Without Contrast    Result Date: 8/3/2023    1. No evidence of an acute ischemic event 2. No parenchymal mass, hemorrhage, or midline shift  This report was finalized on 8/3/2023 9:51 AM by Dr. Nghia Sorenson MD.      CT Angiogram Neck    Result Date: 8/2/2023   1.  Eccentric calcific plaques within both carotid bulbs causing moderate stenosis (50-55% by NASCET criteria).  This report was finalized on 8/2/2023 1:52 AM by Fuentes Galo MD.      XR Chest 1 View    Result Date: 8/2/2023  No acute cardiopulmonary process.  This report was finalized on 8/2/2023 12:52 AM  by Alex Pallas, DO.      CT Head Without Contrast Stroke Protocol    Result Date: 8/2/2023  No acute intracranial process.  Report called to Dr. Bustamante at 120AM EST 8/2/2023.  This report was finalized on 8/2/2023 1:22 AM by Alex Pallas, DO.      CT Angiogram Head w AI Analysis of LVO    Result Date: 8/2/2023   1.  Eccentric calcific plaques within intradural portion of both internal carotid arteries causing multifocal areas of moderate to high-grade stenosis. 2.  Eccentric calcific plaque within intradural portion of right vertebral artery causing mild stenosis.  This report was finalized on 8/2/2023 1:56 AM by Fuentes Galo MD.      CT CEREBRAL PERFUSION WITH & WITHOUT CONTRAST    Result Date: 8/2/2023   1. No evidence of acute infarction/ischemia on present perfusion study.  Please note that small infarcts are difficult to appreciate on perfusion imaging.  This report was finalized on 8/2/2023 1:45 AM by Fuentes Galo MD.        No results found for: HGBA1C   Lab Results   Component Value Date    CHOL 143 08/02/2023    TRIG 241 (H) 08/02/2023    HDL 31 (L) 08/02/2023    LDL 72 08/02/2023      Lab Results   Component Value Date    WBC 6.36 08/02/2023    HGB 10.0 (L) 08/02/2023    HCT 31.4 (L) 08/02/2023    MCV 89.0 08/02/2023    PLT 85 (L) 08/02/2023      Lab Results   Component Value Date    GLUCOSE 82 08/02/2023    BUN 30 (H) 08/02/2023    CREATININE 1.78 (H) 08/02/2023    BCR 16.9 08/02/2023    K 3.8 08/02/2023    CO2 24.8 08/02/2023    CALCIUM 9.1 08/02/2023    ALBUMIN 4.3 08/02/2023    AST 22 08/02/2023    ALT 10 08/02/2023      No results found for: TSH  No results found for: MTBFQXNB15  No results found for: FOLATE         Assessment/Plan     Assessment/Plan:  This is a 78-year-old female who initially presented with chest pain, subsequently was noted to have left facial weakness and slurred speech and left side weakness.  Patient was out of the window for medical thrombolysis when the code stroke was  called .  Symptoms slowly improved.  She received a loading dose of Plavix in the ED.     CT head shows no acute abnormality.  CTA head and neck showed  mild - moderate intra and extracranial atherosclerosis.  No target vessel for endovascular therapy.       Likely TIA.,  Possibly minor ischemic stroke not seen on head CT.  Unable to have MRI due to presence of PPM.  Repeat CT head after 24 hours was negative for any acute changes.      -DAPT with Plavix 75 mg daily and aspirin 81 mg daily x21 days, to be followed by aspirin monotherapy  -Patient has allergic reaction to statins, continue home Nexlizet  -Normal blood pressure goals  -TTE with EF 66 to 70%  -We will consider outpatient MRI in [place] if pacemaker is found to be compatible, PPM info not available at present  -Follow-up outpatient Stroke-like in 4 to 6 weeks  -Okay to discharge home from neurology standpoint  -Stroke education to patient and family      The case was discussed with the patient, and Dr. Behbahani.  Tele-Neurology will sign off for now, please feel free to call with any questions/concerns.  Thank you again for the consult.    TRACE Sharp  08/03/23  11:37 EDT    This was an audio and video enabled telemedicine encounter.  Dr. Florentino was present for encounter via telemedicine.  Verbal consent taken.      Electronically signed by Gordo Vela APRN at 08/03/23 1146          Consult Notes (most recent note)        Dl Zacarias MD at 08/02/23 1134        Consult Orders    1. Inpatient Cardiology Consult [[id number]] ordered by Albert Rangel MD                 Inpatient Cardiology Consult  Consult performed by: Kathia Canchola APRN  Consult ordered by: Albert Rangel MD      Date of Admit: 8/1/2023  Date of Consult: 08/02/23  No ref. provider found  Susan Us  1945    Consulting Physician: Dl Zacarias MD    Cardiology consultation    Reason for consultation:  chest pain, troponin  elevation        History of Present Illness    Subjective     Chief Complaint   Patient presents with    Chest Pain       Susan Us is a 78 y.o. female with past medical history significant for bradycardia status post pacemaker placement, hypertension, hyperlipidemia, chronic kidney disease, and thrombocytopenia.  Patient presented to the ED on 8/1/2023 with complaint of chest pain radiating to her shoulder and slurred/mumbled speech.  Patient also reportedly became very weak.  In the ED patient had a mildly elevated blood pressure of 163/82, her high-sensitivity troponin was initially 49 and trended down to 44, creatinine was 1.96, hemoglobin 9.1 and platelets 80.  Initial EKG showed atrial paced rhythm with no acute ischemic changes.  CT of the head showed no acute process and CT angiogram of the head showed eccentric calcific plaques with intradural portion of both internal carotid arteries causing multifocal areas of moderate to high-grade stenosis, along with mild stenosis of the right vertebral artery.  CT angiogram of the neck showed eccentric calcific plaques within both carotid bulbs causing moderate stenosis.  MRI was not pursued due to patient's pacemaker.  Patient was admitted for further evaluation and management.  Cardiology was consulted due to patient's initial complaint of chest pain and troponin elevation.    The patient was seen and examined.  Dr. Behbahani at bedside when we entered the room.  Patient reported chest pain at home which radiated to her left shoulder with associated slurred speech.  She denied shortness of breath.  She denied any associated nausea or vomiting.  She denied diaphoresis.  She reported that she was not in any chest pain currently.      Cardiac risk factors:diabetes mellitus, hypercholesterolemia, and hypertension    Last Echo: Echo pending    Last Stress: N/A    Last Cath: N/A      Past Medical History:   Diagnosis Date    Bradycardia     s/p pacemaker placement     CKD (chronic kidney disease)     baseline renal function unknown    Dementia     Hyperlipidemia     Hypertension     Thrombocytopenia      Past Surgical History:   Procedure Laterality Date    CARDIAC PACEMAKER PLACEMENT      CATARACT EXTRACTION      CERVICAL FUSION      CHOLECYSTECTOMY      HIATAL HERNIA REPAIR      HYSTERECTOMY      REPLACEMENT TOTAL KNEE Right      Family History   Problem Relation Age of Onset    Stroke Mother     Stroke Father      Social History     Tobacco Use    Smoking status: Never    Smokeless tobacco: Never    Tobacco comments:     Second hand smoke exposure ()   Vaping Use    Vaping Use: Never used   Substance Use Topics    Alcohol use: Never    Drug use: Never     Medications Prior to Admission   Medication Sig Dispense Refill Last Dose    Bempedoic Acid-Ezetimibe (Nexlizet) 180-10 MG tablet Take 1 tablet by mouth Every Night.   7/31/2023    Calcium Carbonate-Vitamin D (calcium 500 mg vitamin D 5 mcg, 200 UT,) 500-5 MG-MCG tablet per tablet Take 1 tablet by mouth 2 (Two) Times a Day.   8/1/2023 at 0800    cyclobenzaprine (FLEXERIL) 10 MG tablet Take 0.5 tablets by mouth 3 (Three) Times a Day As Needed for Muscle Spasms.   Past Month    Diclofenac Sodium (VOLTAREN) 1 % gel gel Apply 4 g topically to the appropriate area as directed 4 (Four) Times a Day As Needed (joint pain).   Past Week    donepezil (ARICEPT) 5 MG tablet Take 1 tablet by mouth Every Night.   8/1/2023 at 2100    ferrous sulfate 325 (65 FE) MG tablet Take 1 tablet by mouth Daily With Breakfast.   8/1/2023 at 0800    gabapentin (NEURONTIN) 300 MG capsule Take 1 capsule by mouth 2 (Two) Times a Day.   8/1/2023 at 0800    hydrALAZINE (APRESOLINE) 25 MG tablet Take 1 tablet by mouth 2 (Two) Times a Day.   8/1/2023 at 2100    isosorbide mononitrate (IMDUR) 30 MG 24 hr tablet Take 1 tablet by mouth Daily.   8/1/2023 at 0800    LORazepam (ATIVAN) 0.5 MG tablet Take 1 tablet by mouth 2 (Two) Times a Day.   8/1/2023 at  2100    magnesium chloride ER 64 MG DR tablet Take 1 tablet by mouth 2 (Two) Times a Day.   8/1/2023 at 0800    meclizine (ANTIVERT) 25 MG tablet Take 1 tablet by mouth 3 (Three) Times a Day As Needed for Dizziness or Nausea.   Past Week    modafinil (PROVIGIL) 200 MG tablet Take 1 tablet by mouth Daily.   8/1/2023 at 0800    Multiple Vitamins-Minerals (ICAPS AREDS 2 PO) Take 1 tablet by mouth 2 (Two) Times a Day.   8/1/2023 at 0800    ondansetron (ZOFRAN) 4 MG tablet Take 1 tablet by mouth Every 8 (Eight) Hours As Needed for Nausea or Vomiting.   Past Week    pantoprazole (PROTONIX) 40 MG EC tablet Take 1 tablet by mouth Every Night.   8/1/2023 at 2100    torsemide (DEMADEX) 100 MG tablet Take 0.5 tablets by mouth Daily.   8/1/2023 at 0800    docusate sodium (COLACE) 250 MG capsule Take 1 capsule by mouth Every Night.   7/31/2023    fluticasone (FLONASE) 50 MCG/ACT nasal spray 2 sprays into the nostril(s) as directed by provider Every Night.   7/31/2023    nitroglycerin (NITROSTAT) 0.4 MG SL tablet Place 1 tablet under the tongue Every 5 (Five) Minutes As Needed for Chest Pain. Take no more than 3 doses in 15 minutes.   Unknown    sertraline (ZOLOFT) 100 MG tablet Take 2 tablets by mouth Every Night.   7/31/2023    vitamin B-12 (CYANOCOBALAMIN) 1000 MCG tablet Take 1 tablet by mouth Every Night.   7/31/2023     Allergies:  Statins    Review of Systems   Constitutional:  Negative for fatigue.   Respiratory:  Negative for shortness of breath.    Cardiovascular:  Positive for chest pain. Negative for palpitations and leg swelling.   Gastrointestinal:  Negative for blood in stool.   Neurological:  Positive for speech difficulty. Negative for dizziness, syncope, weakness and light-headedness.   Hematological:  Does not bruise/bleed easily.   All other systems reviewed and are negative.      Objective      Vital Signs  Temp:  [98 øF (36.7 øC)-98.7 øF (37.1 øC)] 98 øF (36.7 øC)  Heart Rate:  [64-66] 65  Resp:  [18]  18  BP: (138-186)/(69-98) 173/92  Body mass index is 24.65 kg/mý.    Intake/Output Summary (Last 24 hours) at 8/2/2023 1135  Last data filed at 8/2/2023 0350  Gross per 24 hour   Intake 1000 ml   Output --   Net 1000 ml       Physical Exam  Vitals reviewed.   Constitutional:       Appearance: Normal appearance. She is well-developed.   HENT:      Head: Normocephalic and atraumatic.   Eyes:      Pupils: Pupils are equal, round, and reactive to light.   Neck:      Vascular: No JVD.   Cardiovascular:      Rate and Rhythm: Normal rate and regular rhythm.      Heart sounds: No murmur heard.    No friction rub. No gallop.   Pulmonary:      Effort: Pulmonary effort is normal. No respiratory distress.      Breath sounds: Normal breath sounds. No wheezing or rales.   Abdominal:      Palpations: Abdomen is soft. There is no mass.      Tenderness: There is no abdominal tenderness.      Hernia: No hernia is present.   Skin:     General: Skin is warm and dry.   Neurological:      Mental Status: She is alert and oriented to person, place, and time.   Psychiatric:         Mood and Affect: Mood normal.         Behavior: Behavior normal.   Paced    Telemetry:  Paced 65    Results Review:   I reviewed the patient's new clinical results.  Results from last 7 days   Lab Units 08/02/23  0247 08/02/23  0014   HSTROP T ng/L 44* 49*     Results from last 7 days   Lab Units 08/02/23  0546 08/02/23  0014   WBC 10*3/mm3 6.36 4.48   HEMOGLOBIN g/dL 10.0* 9.1*   PLATELETS 10*3/mm3 85* 80*     Results from last 7 days   Lab Units 08/02/23  0545 08/02/23  0014   SODIUM mmol/L 141 144   POTASSIUM mmol/L 3.8 4.3   CHLORIDE mmol/L 104 106   CO2 mmol/L 24.8 27.1   BUN mg/dL 30* 34*   CREATININE mg/dL 1.78* 1.96*   CALCIUM mg/dL 9.1 9.3   GLUCOSE mg/dL 82 92   ALT (SGPT) U/L 10 10   AST (SGOT) U/L 22 22     Lab Results   Component Value Date    INR 1.08 08/02/2023    INR 1.03 08/02/2023     No results found for: MG  Lab Results   Component Value Date     TRIG 241 (H) 08/02/2023    HDL 31 (L) 08/02/2023    LDL 72 08/02/2023      No results found for: BNP     EKG:       Imaging Results (Last 72 Hours)       Procedure Component Value Units Date/Time    CT Angiogram Head w AI Analysis of LVO [[id number]] Collected: 08/02/23 0152     Updated: 08/02/23 0158    Narrative:      CLINICAL HISTORY: Weakness.     COMPARISON: None.     TECHNIQUE: Sequential trans-axial images were obtained with a  multi-detector helical CT after iodinated contrast administration.  Coronal and sagittal reconstructions and 3-D reconstructions were  obtained. Limited exposure control, adjustment of the MA and/or KV  according to patient's size or use of iterative reconstruction technique  was utilized.     FINDINGS:      Hypoplastic intradural right vertebral artery with a dominant left  vertebral artery-anatomical variation.  There is small eccentric  calcific plaque within the intradural portion of right vertebral artery  causing mild stenosis.  The posterior inferior cerebellar arteries are  unremarkable.     There are eccentric calcific plaques within the intradural portion of  both intracranial carotid arteries causing multifocal areas of moderate  to high-grade stenosis. The A1 and A2 segments of the anterior cerebral  arteries, middle cerebral arteries and branches are unremarkable. The  anterior communicating artery is unremarkable. The posterior  communicating arteries are also unremarkable.     There are no aneurysms. There are no arteriovenous malformations seen.      If there is further concern, recommend conventional angiography for  complete assessment.       Impression:         1.  Eccentric calcific plaques within intradural portion of both  internal carotid arteries causing multifocal areas of moderate to  high-grade stenosis.  2.  Eccentric calcific plaque within intradural portion of right  vertebral artery causing mild stenosis.     This report was finalized on 8/2/2023 1:56  AM by Fuentes Galo MD.       CT Angiogram Neck [[id number]] Collected: 08/02/23 0145     Updated: 08/02/23 0154    Narrative:      CLINICAL HISTORY: Weakness.     COMPARISON: None available.     TECHNIQUE: Sequential transaxial images of the neck were obtained with a  multidetector helical CT after iodinated contrast administration.   Coronal, sagittal reconstructions as well as maximum intensity  projection images were generated. Limited exposure control, adjustment  of the MA and/or KV according to patient's size or use of iterative  reconstruction technique was utilized.     FINDINGS:     The visualized aortic arch shows mild atherosclerotic calcification.     RIGHT: The common carotid artery and cervical segment of internal  carotid artery is widely patent.  There eccentric calcific plaques  within right carotid bulb causing moderate stenosis (50-55% by NASCET  criteria).  The right external carotid artery is unremarkable.     LEFT: The common carotid artery and cervical segment of internal carotid  artery is widely patent.  There eccentric calcific plaques within right  carotid bulb causing moderate stenosis (50-55% by NASCET criteria).  The  left external carotid artery is unremarkable.     The origin and cervical segments of both vertebral arteries are widely  patent.  Mildly hypoplastic right vertebral artery with a dominant left  vertebral artery anatomical variation.  No evidence of aneurysm or  dissection.     If there is further clinical concern, recommend conventional angiography  for complete assessment. Any reported ICA stenosis directly references  from the distal internal carotid artery diameter as the denominator for  stenosis measurement.     NONVASCULAR STRUCTURES: Left chest wall pacing device with wires.   Partially calcified nodules within the posterior right upper lobe,  measuring up to 1.1 cm.  Multilevel cervical degenerative changes.   Anterior cervical fusion from C3-T1.        Impression:         1.  Eccentric calcific plaques within both carotid bulbs causing  moderate stenosis (50-55% by NASCET criteria).     This report was finalized on 8/2/2023 1:52 AM by Fuentes Galo MD.       CT CEREBRAL PERFUSION WITH & WITHOUT CONTRAST [[id number]] Collected: 08/02/23 0143     Updated: 08/02/23 0147    Narrative:      CLINICAL HISTORY: Weakness.     COMPARISON: None available.     TECHNIQUE: CT perfusion imaging of brain was performed.  Color maps were  generated.   Limited exposure control, adjustment of the MA and/or KV  according to patient's size or use of iterative reconstruction technique  was utilized.     FINDINGS:     The visualized brain parenchyma shows normal MTT, CBV and CBF values.     CBF < 30% volume: 0 mL  Tmax >6s volume: 0 mL  Tmax > 10s volume: 0 mL  Mismatch volume: 0 mL  Mismatch ratio: None.       Impression:         1. No evidence of acute infarction/ischemia on present perfusion study.     Please note that small infarcts are difficult to appreciate on perfusion  imaging.     This report was finalized on 8/2/2023 1:45 AM by Fuentes Galo MD.       CT Head Without Contrast Stroke Protocol [[id number]] Collected: 08/02/23 0119     Updated: 08/02/23 0124    Narrative:      INDICATION: Weakness.     COMPARISON: No relevant priors available     TECHNIQUE: Axial CT images of the head were obtained without IV  contrast. Coronal and sagittal reformations were reviewed.     FINDINGS:  Gray-white differentiation is relatively preserved. No mass, mass effect  or midline shift. No evidence of acute large territorial infarction or  acute intracranial hemorrhage. Ventricles appear normal in size. Basal  cisterns are patent. No depressed calvarial fracture.       Impression:      No acute intracranial process.     Report called to Dr. Bustamante at 120AM EST 8/2/2023.     This report was finalized on 8/2/2023 1:22 AM by Alex Pallas, DO.       XR Chest 1 View [[id number]] Collected: 08/02/23  0052     Updated: 234    Narrative:      INDICATION: Chest pain.     TECHNIQUE: Frontal radiograph of the chest.     COMPARISON: None.     FINDINGS:   Cardiomegaly. Left-sided pacemaker. Pulmonary vasculature appears within  normal limits. No infiltrate, pleural effusion or pneumothorax. No acute  osseous abnormality evident.       Impression:      No acute cardiopulmonary process.     This report was finalized on 2023 12:52 AM by Alex Pallas, DO.                Assessment:  Elevated high-sensitivity troponin  Chronic renal disease  Strokelike symptoms  Still permanent pacemaker in place  Hypertension      Recommendations:  Cardiac patient with history of hypertension hyperlipidemia, thrombocytopenia came in with chest pains and had slurred speech.  Patient had high blood pressure on admission she did bump her cardiac enzymes higher than normal range.  Type I versus type II MI.  Patient does have a pacemaker in place.  CTA scan did not show acute stroke and MRI could not be done due to pacemaker not being compatible.  We will get a pharmacological stress test in a.m.  Echo cardiogram will be requested.    Thank you very much for asking us to be involved in this patient's care.  We will follow along with you.    This note was scribed by TRACE Huerta   Electronically signed by TRACE Man, 23, 11:53 AM EDT.  .Electronically signed by Dl Zacarias MD, 23, 5:48 PM EDT.          Electronically signed by Dl Zacarias MD at 23 [phone]       Nutrition Notes (most recent note)    No notes exist for this encounter.          Physical Therapy Notes (most recent note)        Mil Costa, PT at 23 1506  Version 1 of 1         Acute Care - Physical Therapy Initial Evaluation  LINDA Perez     Patient Name: Susan Us  : 1945  MRN: [id number]  Today's Date: 2023      Visit Dx:     ICD-10-CM ICD-9-CM   1. TIA (transient ischemic attack)  G45.9 435.9   2. Slurred  speech  R47.81 784.59   3. Facial weakness  R29.810 781.94   4. Stage 4 chronic kidney disease  N18.4 585.4   5. Primary pulmonary HTN  I27.0 416.0     Patient Active Problem List   Diagnosis    Stroke-like symptoms     Past Medical History:   Diagnosis Date    Bradycardia     s/p pacemaker placement    CKD (chronic kidney disease)     baseline renal function unknown    Dementia     Hyperlipidemia     Hypertension     Thrombocytopenia      Past Surgical History:   Procedure Laterality Date    CARDIAC PACEMAKER PLACEMENT      CATARACT EXTRACTION      CERVICAL FUSION      CHOLECYSTECTOMY      HIATAL HERNIA REPAIR      HYSTERECTOMY      REPLACEMENT TOTAL KNEE Right      PT Assessment (last 12 hours)       PT Evaluation and Treatment       Row Name 08/02/23 1437          Physical Therapy Time and Intention    Subjective Information complains of;weakness;fatigue  -KM     Document Type evaluation  -KM     Mode of Treatment individual therapy;physical therapy  -KM     Patient Effort adequate  -KM     Symptoms Noted During/After Treatment fatigue  -KM       Row Name 08/02/23 1437          General Information    Patient Profile Reviewed yes  -KM     Patient Observations alert;cooperative;agree to therapy  -KM     Prior Level of Function independent:;all household mobility;ADL's  -KM     Existing Precautions/Restrictions fall  -KM     Risks Reviewed patient:;LOB;nausea/vomiting;dizziness;increased discomfort  -KM     Benefits Reviewed patient:;improve function;increase independence;increase strength;increase balance  -KM       Row Name 08/02/23 1437          Living Environment    Current Living Arrangements home  -KM     People in Home alone  -KM     Primary Care Provided by self  -KM       Row Name 08/02/23 1437          Home Use of Assistive/Adaptive Equipment    Equipment Currently Used at Home walker, rolling;shower chair;cane, quad;bp cuff  -KM       Row Name 08/02/23 1437          Cognition    Affect/Mental Status  (Cognition) [facility]     Orientation Status (Cognition) oriented x 4  -KM     Follows Commands (Cognition) [facility]       Row Name 08/02/23 1437          Range of Motion (ROM)    Range of Motion bilateral lower extremities;ROM is [facility] Name 08/02/23 1437          Strength (Manual Muscle Testing)    Strength (Manual Muscle Testing) bilateral lower extremities;strength is [facility] Name 08/02/23 1437          Bed Mobility    Bed Mobility supine-sit  -     Supine-Sit [place] (Bed Mobility) minimum assist (75% patient effort);verbal cues  -     Bed Mobility, Safety Issues decreased use of arms for pushing/pulling  -     Assistive Device (Bed Mobility) bed rails;draw sheet;head of bed elevated  -[facility] Name 08/02/23 1437          Transfers    Transfers sit-stand transfer;stand-sit transfer;bed-chair transfer  -[facility] Name 08/02/23 1437          Bed-Chair Transfer    Bed-Chair [place] (Transfers) minimum assist (75% patient effort);verbal cues  -[facility] Name 08/02/23 1437          Sit-Stand Transfer    Sit-Stand [place] (Transfers) contact guard;minimum assist (75% patient effort)  -[facility] Name 08/02/23 1437          Stand-Sit Transfer    Stand-Sit [place] (Transfers) contact guard;minimum assist (75% patient effort);verbal cues  -[facility] Name 08/02/23 1437          Gait/Stairs (Locomotion)    Comment, (Gait/Stairs) not attempted d/t fatigue  -[facility] Name 08/02/23 1437          Safety Issues, Functional Mobility    Safety Issues Affecting Function (Mobility) awareness of need for assistance;friction/shear risk;insight into deficits/self-awareness  [facility]     Impairments Affecting Function (Mobility) balance;endurance/activity tolerance;postural/trunk control  -       Row Name 08/02/23 1437          Balance    Balance Assessment sitting static balance;standing dynamic balance  -     Static Sitting Balance supervision  -     Position,  Sitting Balance sitting edge of bed;sitting in chair  -KM     Dynamic Standing Balance contact guard;minimal assist;verbal cues  [facility]       Row Name 08/02/23 1437          Plan of Care Review    Plan of Care Reviewed With patient;daughter  -     Outcome Evaluation Pt. evaluation completed during PT session. She was able to perform functional mobility skills w/ Anat. She deferred ambulation at time of evaluation d/t increased fatigue. Pt. tolerated session well. Pt. would benefit from skilled PT services.  -       Row Name 08/02/23 1437          Therapy Assessment/Plan (PT)    Patient/Family Therapy Goals Statement (PT) return to PLOF  -KM     Functional Level at Time of Evaluation (PT) Anat  -KM     PT Diagnosis (PT) decreased mobility  -KM     Rehab Potential (PT) good, to achieve stated therapy goals  -     Criteria for Skilled Interventions Met (PT) yes;skilled treatment is necessary  [facility]     Therapy Frequency (PT) 2 times/wk  2-5x/wk  -     Predicted Duration of Therapy Intervention (PT) until discharge  [facility]     Problem List (PT) problems related to;balance;mobility  -KM     Activity Limitations Related to Problem List (PT) unable to ambulate safely;unable to transfer safely  -       Row Name 08/02/23 1437          Therapy Plan Review/Discharge Plan (PT)    Therapy Plan Review (PT) evaluation/treatment results reviewed;care plan/treatment goals reviewed;risks/benefits reviewed;patient;daughter  -       Row Name 08/02/23 1437          Physical Therapy Goals    Bed Mobility Goal Selection (PT) bed mobility, PT goal 1  -KM     Transfer Goal Selection (PT) transfer, PT goal 1  -KM     Gait Training Goal Selection (PT) gait training, PT goal 1  -KM       [facility] Name 08/02/23 1437          Bed Mobility Goal 1 (PT)    Activity/Assistive Device (Bed Mobility Goal 1, PT) bed mobility activities, all  -KM     Price Level/Cues Needed (Bed Mobility Goal 1, PT) modified independence  -KM     Time Frame (Bed  Mobility Goal 1, PT) by discharge  -       Row Name 08/02/23 1437          Transfer Goal 1 (PT)    Activity/Assistive Device (Transfer Goal 1, PT) sit-to-stand/stand-to-sit;bed-to-chair/chair-to-bed;walker, rolling  -KM     [place] Level/Cues Needed (Transfer Goal 1, PT) modified independence  -KM     Time Frame (Transfer Goal 1, PT) by discharge  -       Row Name 08/02/23 1437          Gait Training Goal 1 (PT)    Activity/Assistive Device (Gait Training Goal 1, PT) gait (walking locomotion);assistive device use;walker, rolling  -KM     [place] Level (Gait Training Goal 1, PT) modified independence  -KM     Distance (Gait Training Goal 1, PT) 50'  -KM     Time Frame (Gait Training Goal 1, PT) by discharge  -               User Key  (r) = Recorded By, (t) = Taken By, (c) = Cosigned By      Initials Name Provider Type    Mil Yang, JENNI Physical Therapist                    Physical Therapy Education       Title: PT OT SLP Therapies (Done)       Topic: Physical Therapy (Done)       Point: Mobility training (Done)       Learning Progress Summary             Patient Acceptance, E,TB, VU by  at 8/2/2023 1505                         Point: Home exercise program (Done)       Learning Progress Summary             Patient Acceptance, E,TB, VU by  at 8/2/2023 1505                         Point: Body mechanics (Done)       Learning Progress Summary             Patient Acceptance, E,TB, VU by  at 8/2/2023 1505                         Point: Precautions (Done)       Learning Progress Summary             Patient Acceptance, E,TB, VU by  at 8/2/2023 1505                                         User Key       Initials Effective Dates Name Provider Type [facility] 05/24/22 -  Mil Costa, JENNI Physical Therapist PT                  PT Recommendation and Plan  Anticipated Discharge Disposition (PT): home with assist, home with home health  Planned Therapy Interventions (PT): balance training, bed  mobility training, gait training, home exercise program, patient/family education, postural re-education, ROM (range of motion), stair training, strengthening, stretching, transfer training  Therapy Frequency (PT): 2 times/wk (2-5x/wk)  Plan of Care Reviewed With: patient, daughter  Outcome Evaluation: Pt. evaluation completed during PT session. She was able to perform functional mobility skills w/ Anat. She deferred ambulation at time of evaluation d/t increased fatigue. Pt. tolerated session well. Pt. would benefit from skilled PT services.       Time Calculation:    PT Charges       Row Name 23 1436             Time Calculation    PT Received On 23  -KM      PT Goal Re-Cert Due Date 23  -KM                User Key  (r) = Recorded By, (t) = Taken By, (c) = Cosigned By      Initials Name Provider Type    Mil Yang, PT Physical Therapist                  Therapy Charges for Today       Code Description Service Date Service Provider Modifiers Qty    54755498512 HC PT EVAL MOD COMPLEXITY 4 2023 Mil Costa, PT GP 1            PT G-Codes  AM-PAC 6 Clicks Score (PT): 16    Mil Costa PT  2023      Electronically signed by Mil Costa, PT at 23 1506          Occupational Therapy Notes (most recent note)        Gordo Kilpatrick, OT at 23 1704          Acute Care - Occupational Therapy Initial Evaluation   Chris     Patient Name: Susan Us  : 1945  MRN: [id number]  Today's Date: 2023             Admit Date: 2023       ICD-10-CM ICD-9-CM   1. TIA (transient ischemic attack)  G45.9 435.9   2. Slurred speech  R47.81 784.59   3. Facial weakness  R29.810 781.94   4. Stage 4 chronic kidney disease  N18.4 585.4   5. Primary pulmonary HTN  I27.0 416.0     Patient Active Problem List   Diagnosis    Stroke-like symptoms     Past Medical History:   Diagnosis Date    Bradycardia     s/p pacemaker placement    CKD (chronic kidney disease)     baseline renal  function unknown    Dementia     Hyperlipidemia     Hypertension     Thrombocytopenia      Past Surgical History:   Procedure Laterality Date    CARDIAC PACEMAKER PLACEMENT      CATARACT EXTRACTION      CERVICAL FUSION      CHOLECYSTECTOMY      HIATAL HERNIA REPAIR      HYSTERECTOMY      REPLACEMENT TOTAL KNEE Right          OT ASSESSMENT FLOWSHEET (last 12 hours)       OT Evaluation and Treatment       Row Name 08/02/23 1254                   OT Time and Intention    Document Type evaluation  -KR        Mode of Treatment occupational therapy  -KR        Patient Effort good  -KR           Living Environment    Current Living Arrangements home  -KR        People in Home alone  -KR        Primary Care Provided by self  -KR           Cognition    Affect/Mental Status (Cognition) WFL  -KR        Orientation Status (Cognition) oriented x 3  -KR        Follows Commands (Cognition) WFL  -KR        Cognitive Function memory deficit  unable to recall events surrounding onset of symptoms  -KR           Range of Motion Comprehensive    Comment, General Range of Motion BUE WFL/equal  -KR           Strength Comprehensive (MMT)    Comment, General Manual Muscle Testing (MMT) Assessment BUE WFL/equal  -KR           Sensory    Additional Documentation --  mild numbness noted/reported distal LUE. Blurred vision reported initially, since resolved, per pt report  -KR           Activities of Daily Living    BADL Assessment/Intervention bathing;upper body dressing;lower body dressing;grooming;toileting  -KR           Bathing Assessment/Intervention    [place] Level (Bathing) bathing skills;minimum assist (75% patient effort)  -KR           Upper Body Dressing Assessment/Training    [place] Level (Upper Body Dressing) upper body dressing skills;set up  -KR           Lower Body Dressing Assessment/Training    [place] Level (Lower Body Dressing) lower body dressing skills;minimum assist (75% patient effort)  -KR            Grooming Assessment/Training    [place] Level (Grooming) grooming skills;set up  -KR           Toileting Assessment/Training    [place] Level (Toileting) toileting skills;minimum assist (75% patient effort)  -KR           Motor Skills    Motor Skills coordination  -KR        Coordination --  mild FMC deficit LUE, but remains functional for self care performance  -KR           Balance    Balance Assessment sitting static balance;standing static balance  -KR        Static Sitting Balance standby assist  -KR        Position, Sitting Balance sitting edge of bed  -KR        Static Standing Balance contact guard  -KR           Plan of Care Review    Plan of Care Reviewed With patient;daughter;grandchild(zoraida)  -KR           Therapy Assessment/Plan (OT)    Rehab Potential (OT) good, to achieve stated therapy goals  -KR        Planned Therapy Interventions (OT) neuromuscular control/coordination retraining;BADL retraining;adaptive equipment training  -KR           Therapy Plan Review/Discharge Plan (OT)    Anticipated Discharge Disposition (OT) home  -KR           OT Goals    Dressing Goal Selection (OT) dressing, OT goal 1  -KR        Coordination Goal Selection (OT) coordination, OT goal 1  -KR           Dressing Goal 1 (OT)    Activity/Device (Dressing Goal 1, OT) dressing skills, all  -KR        [place]/Cues Needed (Dressing Goal 1, OT) independent  -KR        Time Frame (Dressing Goal 1, OT) by discharge  -KR           Coordination Goal 1 (OT)    Activity/Assistive Device (Coordination Goal 1, OT) FM task  -KR        [place] Level/Cues Needed (Coordination Goal 1, OT) independent  -KR        Time Frame (Coordination Goal 1, OT) by discharge  -KR           Patient Education Goal (OT)    Activity (Patient Education Goal, OT) AE/DME training as needed to enhance safety/independence in home environment  -KR        [place]/Cues/Accuracy (Memory Goal 2, OT) verbalizes understanding  -KR        Time  Frame (Patient Education Goal, OT) by discharge  -[id number]                  User Key  (r) = Recorded By, (t) = Taken By, (c) = Cosigned By      Initials Name Effective Dates    Gordo Hector OT 21 -                            OT Recommendation and Plan  Planned Therapy Interventions (OT): neuromuscular control/coordination retraining, BADL retraining, adaptive equipment training  Plan of Care Review  Plan of Care Reviewed With: patient, daughter, grandchild(zoraida)  Plan of Care Reviewed With: patient, daughter, grandchild(zoraida)     Outcome Measures       Row Name 233 23 170          Modified [place] Scale    Pre-Stroke Modified [place] Scale 0 - No Symptoms at all.  -KR --     Modified [place] Scale 4 - Moderately severe disability.  Unable to walk without assistance, and unable to attend to own bodily needs without assistance.  -KR --        Functional Assessment    Outcome Measure Options -- Modified [place]  -KR               User Key  (r) = Recorded By, (t) = Taken By, (c) = Cosigned By      Initials Name Provider Type    Gordo Hector OT Occupational Therapist                    Time Calculation:     Therapy Charges for Today       Code Description Service Date Service Provider Modifiers Qty    05486449806  OT EVAL MOD COMPLEXITY 4 2023 Gordo Kilpatrick OT GO 1                 Gordo Kilpatrick OT  2023    Electronically signed by Gordo Kilpatrick OT at 23 1704          Speech Language Pathology Notes (most recent note)        Leah Schroeder MA,CCC-SLP at 23 1029          Acute Care - Speech Language Pathology Initial Evaluation  [facility]  SPEECH LANGUAGE COGNITIVE ASSESSMENT     Patient Name: Susan Us  : 1945  MRN: [id number]  Today's Date: 2023             Admit Date: 2023     Visit Dx:    ICD-10-CM ICD-9-CM   1. TIA (transient ischemic attack)  G45.9 435.9   2. Slurred speech  R47.81 784.59   3. Facial weakness  R29.810 781.94   4. Stage 4  chronic kidney disease  N18.4 585.4   5. Primary pulmonary HTN  I27.0 416.0     Patient Active Problem List   Diagnosis    Stroke-like symptoms     Past Medical History:   Diagnosis Date    Bradycardia     s/p pacemaker placement    CKD (chronic kidney disease)     baseline renal function unknown    Dementia     Hyperlipidemia     Hypertension     Thrombocytopenia      Past Surgical History:   Procedure Laterality Date    CARDIAC PACEMAKER PLACEMENT      CATARACT EXTRACTION      CERVICAL FUSION      CHOLECYSTECTOMY      HIATAL HERNIA REPAIR      HYSTERECTOMY      REPLACEMENT TOTAL KNEE Right      Susan Us is seen at bedside this am on 3S to participate in a formal s/l and cognitive evaluation to assess safety/function in adls. Pt is positioned upright and centered in bed to cooperatively participate. All results and observations of this evaluation are felt to be representative of pt's current functional status. Her daughter, and her RN, are present during part of this assessment.     Ms. Us presented to [facility] ED w/ chest pain, L arm pain, episode of drooling and slurred speech. She is referred per stroke protocol. Ms. Us reports premorbid baseline mild STM deficits, otherwise wfl s/l cognitive skills. She states she is currently at her premorbid baseline level of fnx for s/l cognitive status.     Social History     Socioeconomic History    Marital status:    Tobacco Use    Smoking status: Never    Smokeless tobacco: Never    Tobacco comments:     Second hand smoke exposure ()   Vaping Use    Vaping Use: Never used   Substance and Sexual Activity    Alcohol use: Never    Drug use: Never    Sexual activity: Defer      INDICATION: Weakness.     COMPARISON: No relevant priors available     TECHNIQUE: Axial CT images of the head were obtained without IV  contrast. Coronal and sagittal reformations were reviewed.     FINDINGS:  Gray-white differentiation is relatively preserved. No mass, mass  "effect  or midline shift. No evidence of acute large territorial infarction or  acute intracranial hemorrhage. Ventricles appear normal in size. Basal  cisterns are patent. No depressed calvarial fracture.     IMPRESSION:  No acute intracranial process.     Report called to Dr. Bustamante at 120AM EST 8/2/2023.     This report was finalized on 8/2/2023 1:22 AM by Alex Pallas, DO.    Diet Orders (active) (From admission, onward)       Start     Ordered    08/02/23 0803  NPO Diet NPO Type: Sips with Meds  Diet Effective Now         08/02/23 0802                  She is observed on ra w/o complications.     Receptive language skills are intact. Pt is able to id common objects and personal body parts, follow simple and multi-step directives, understand complex \"wh\" questions and participate in conversational exchange w/o difficulties.    Expressive language skills are intact. Pt is able to complete automatic speech tasks, confrontation naming tasks, complete complex oe sentences, respond to complex oe \"wh\" questions, repeat at word/sentence and multiple digit levels, as well as participate in complex conversational exchange. No aphasia, anomia or verbal apraxia evidenced.    Pt is independently oriented to person and place. She does require extra time to respond to current year. Immediate, STM and LTM appear intact for assessment stimuli as she is able to recall recent adls and provide personal information w/o significant delays. She does report premorbid baseline STME deficits in adls. Thought organization, processing and problem solving are wfl w/ pt demonstrating appropriate comparing/contrasting, understanding of idiomatic language, convergent and divergent thinking skills.    Facial/oral structures are symmetrical upon observation. Oral mucosa are moist, pink and clean. Secretions are clear, thin and well controlled. OROM/MINA is wfl w/o lingual deviation upon protrusion. She is edentulous. Speech intensity, clarity and " intelligibility are wfl w/o dysarthria or oral apraxia noted.    Graphic and reading skills are intact, premorbid baseline status. Pt is able to id isolated letters, simple and moderate words, as well as sentences.    Pragmatic skills are wfl w/ appropriate eye gaze patterns and visual tracking. Language is appropriate in context w/ topic initiation and maintenance independently. No left neglect evidenced. Humor response is intact w/ appropriate affect.    Impression: Per this assessment, Ms. Us is felt to present at her premorbid baseline s/l cognitive status, WFL.     SLP Recommendation and Plan  No further formal SLP f/u recommended for s/l and cognitive skills. Pt will participate in further dysphagia assessment. Please see full dysphagia note below for details.    D/w pt results and recommendations w/ verbal understanding and agreement.    D/w RN results and recommendations w/ verbal understanding and agreement.     Thank you for allowing me to participate in the care of your patient-  Leah Schroeder M.A., CCC-SLP    EDUCATION  The patient has been educated in the following areas:   Cognitive Impairment Communication Impairment.    Time Calculation:     Therapy Charges for Today       Code Description Service Date Service Provider Modifiers Qty    47491382622 HC ST EVAL SPEECH AND PROD W LANG  4 2023 Leah Schroeder MA,CCC-SLP GN 1    [id number] HC ST EVAL ORAL PHARYNG SWALLOW 4 2023 Leah Schroeder MA,CCC-SLP GN 1          Leah Schroeder MA,CCC-SLP  2023   and Acute Care - Speech Language Pathology   [place] Initial Evaluation  Chris  CLINICAL DYSPHAGIA ASSESSMENT     Patient Name: Susan Us  : 1945  MRN: [id number]  Today's Date: 2023             Admit Date: 2023    Visit Dx:     ICD-10-CM ICD-9-CM   1. TIA (transient ischemic attack)  G45.9 435.9   2. Slurred speech  R47.81 784.59   3. Facial weakness  R29.810 781.94   4. Stage 4 chronic  kidney disease  N18.4 585.4   5. Primary pulmonary HTN  I27.0 416.0     Patient Active Problem List   Diagnosis    Stroke-like symptoms     Past Medical History:   Diagnosis Date    Bradycardia     s/p pacemaker placement    CKD (chronic kidney disease)     baseline renal function unknown    Dementia     Hyperlipidemia     Hypertension     Thrombocytopenia      Past Surgical History:   Procedure Laterality Date    CARDIAC PACEMAKER PLACEMENT      CATARACT EXTRACTION      CERVICAL FUSION      CHOLECYSTECTOMY      HIATAL HERNIA REPAIR      HYSTERECTOMY      REPLACEMENT TOTAL KNEE Right      Susan Us is seen at bedside this am on 3S to assess safety/efficacy of swallowing fnx, determine safest/least restrictive diet tolerance. Her daughter, and her RN, are present during part of this assessment.     Ms. Us presented to [facility] ED w/ chest pain, L arm pain, episode of drooling and slurred speech. She is referred per stroke protocol. She denies any overt s/s aspiration or dysphagia w/ po intake. She denies any recent h/o PNA or bronchitis. She does report she is lactose intolerant.     Social History     Socioeconomic History    Marital status:    Tobacco Use    Smoking status: Never    Smokeless tobacco: Never    Tobacco comments:     Second hand smoke exposure ()   Vaping Use    Vaping Use: Never used   Substance and Sexual Activity    Alcohol use: Never    Drug use: Never    Sexual activity: Defer      INDICATION: Chest pain.     TECHNIQUE: Frontal radiograph of the chest.     COMPARISON: None.     FINDINGS:   Cardiomegaly. Left-sided pacemaker. Pulmonary vasculature appears within  normal limits. No infiltrate, pleural effusion or pneumothorax. No acute  osseous abnormality evident.     IMPRESSION:  No acute cardiopulmonary process.     This report was finalized on 8/2/2023 12:52 AM by Alex Pallas, DO.    Diet Orders (active) (From admission, onward)       Start     Ordered    08/02/23 0803  NPO  Diet NPO Type: Sips with Meds  Diet Effective Now         08/02/23 0802                  She is observed on ra w/o complications.     Pt is positioned upright and centered in bed to accept multiple po presentations of ice chips, solid cracker, puree and thin liquids via spoon, cup and straw. RN provides po meds whole w/ thin liquids. She is able to self feed.     Facial/oral structures are symmetrical upon observation. Lingual protrusion reveals no deviation. Oral mucosa are moist, pink, and clean. Secretions are clear, thin, and well controlled. OROM/MINA is wfl to imitate oral postures. She is edentulous. Gag is not assessed. Volitional cough is intact w/ adequate  intensity, clear in quality, non-productive. Voice is adequate in intensity, clear in quality w/ intelligible speech.    Upon po presentations, adequate bolus anticipation and acceptance w/ good labial seal for bolus clearance via spoon bowl, cup rim stability and suction via straw. Bolus formation, manipulation and control are wfl w/ rotary mastication pattern. A-p transit is timely w/o oral residue. No overt s/s aspiration evidenced before the swallow.     Pharyngeal swallow is timely w/ adequate hyolaryngeal elevation per palpation. No overt s/s aspiration evidenced across this assessment. No silent aspiration suspected as pt is w/o changes in vocal quality, respirations or secretions post po presentations. Pt denies odynophagia.    Impression: Per this assessment, Ms. Us presents w/ wfl oropharyngeal swallow w/o s/s aspiration. No s/s indicative of silent aspiration. No odynophagia reported.  Per RN, pt to remain NPO at this time pending stress test today.     SLP Recommendation and Plan  1. Regular consistency, thin liquids (Lactose free per pt report).   2. Meds whole in puree/thins.   3. Upright and centered for all po intake.  4. CARLOS precautions.  5. Oral care protocol.  No further formal SLP f/u warranted at this time.    D/w pt results and  recommendations w/ verbal agreement.    D/w RN results and recommendations w/ verbal agreement.    Thank you for allowing me to participate in the care of your patient-  Leah Schroeder M.A., CCC-SLP      EDUCATION  The patient has been educated in the following areas:   Dysphagia (Swallowing Impairment).      Time Calculation:     Therapy Charges for Today       Code Description Service Date Service Provider Modifiers Qty    26464010680 HC ST EVAL SPEECH AND PROD W LANG  4 2023 Leah Schroeder MA,CCC-SLP GN 1    71624445819 HC ST EVAL ORAL PHARYNG SWALLOW 4 2023 Leah Schroeder MA,CCC-SLP GN 1               Leah Schroeder MA,CCC-SLP  2023    Electronically signed by Leah Schroeder MA,CCC-SLP at 23 1055       Respiratory Therapy Notes (most recent note)    No notes exist for this encounter.       ADL Documentation (most recent)      Flowsheet Row Most Recent Value   Transferring 2 - assistive person   Toileting 2 - assistive person   Bathing 2 - assistive person   Dressing 0 - independent   Eating 0 - independent   Communication 0 - understands/communicates without difficulty   Swallowing 2 - difficulty swallowing liquids   Equipment Currently Used at Home walker, rolling, shower chair, cane, quad             Discharge Summary        Behbahani, Katayoun, MD at 23 0904              [facility] Medicine Services  DISCHARGE SUMMARY    Patient Identification:  Name:  Susan Us  Age:  78 y.o.  Sex:  female  :  1945  MRN:  [id number]  Visit Number:  [id number]    Date of Admission: 2023  Date of Discharge:  8/3/2023    PCP: Trevor Garay MD      Admission/Discharge Diagnoses     Discharge Diagnoses:  TIA  HTN  Peripheral arterial disease with moderate ICA stenosis 50-55% bilaterally  Atypical chest pain  CKD 3b (Baseline cr around 1.7 per OSH records)    Consults/Procedures     Consults:   Consults       Date and Time Order  "Name Status Description    8/2/2023  5:39 AM Inpatient Cardiology Consult Completed     8/2/2023  5:39 AM Inpatient Neurology Consult Stroke Completed     8/2/2023 12:41 AM Inpatient Neurology Consult Stroke Completed     8/2/2023 12:41 AM Inpatient Neurology Consult Stroke Completed             Procedures Performed:  Stress test:   Nuclear Perfusion Findings    Study Impression Myocardial perfusion imaging indicates a normal myocardial perfusion study with no evidence of ischemia. TID 1.03.   Rest Perfusion Defect 1 No rest myocardial perfusion defect noted.   Stress Perfusion Defect 1 No stress myocardial perfusion defect noted.   Ventricle Size / Description Left ventricular ejection fraction is normal (Calculated EF = 63%). Normal LV wall motion noted.       History of Presenting Illness   Per admitting provider:  \"Patient is a 78 y.o. female with history of bradycardia s/p pacemaker placement, CKD (baseline unknown), dementia, thrombocytopenia, HTN, and HLD, who presents with complaints of chest pain and slurred/mumbled speech that started around 8pm tonight. Her daughter notes initially she complained of chest pain radiating to her left shoulder. The patient adds that she felt nauseated at this time but not short of breath or diaphoretic. A few minutes later, she started to slur her words and became very difficult to understand according to her daughter. She also became very weak. Her daughter felt like she exhibited generalized weakness, but the patient states she felt more weak on her left side. She was brought to the ED where vitals revealed mildly elevated BP (163/82). Labs showed elevated troponin 49 with downward trend to 44 (delta -5), creatinine 1.96 and BUN 34 (again, baseline unknown), hemoglobin 9.1 and platelets 80. CT head showed no acute process, while CT angiogram head showed eccentric calcific plaques with intradural portion of both internal carotid arteries causing multifocal areas of moderate " "to high-grade stenosis, along with mild stenosis of right vertebral artery. CT angiogram neck showed eccentric calcific plaques within both carotid bulbs causing moderate stenosis. MRI was not pursued due to the fact the patient had an \"MRI-conditional pacemaker\", which reportedly means all conditions have to be met in order for MRI to be safe for this patient. \"    Hospital Course     Susan Us was admitted with concern for CVA due to slurred speech as well as chest pain with radiation to left shoulder. She was seen by neurology and not a tPA candidate.  She was loaded with ASA and plavix and admitted to [facility] for monitoring. CTA head and neck, CT head and perfusion were all negative for acute occlusion or infarct. She could not get MRI due to MRI conditional PPM and not compatible with MRI here.  Her slurred speech completely resolved by next morning and passed swallow evaluation.  Per daughter she had similar episode a few months ago with slurred speech that spontaneously resolved.  TTE here did not show evidence of thrombus. She had HLD and allergic to Statins and could not tolerate PCSK9 per her daughter. Therefore has been on Nexlizet with great improvement per report. Repeat CT head 2 days after admission is also negative for CVA/infarct. she will be going home with DAPT x 21 days then ASA 81 mg alone for life (30 day supply provided here. Will refills at PCP f/u). Will need f/u with Neurology as op 4-6 weeks. PT/OT have recommended HH. Ordered placed.      Her chest pain also resolved after arrival tot he ED. Cardiac enzymes were slightly elevated but no delta on repeat noted. Cardiology recommended Nuclear stress test which was normal w/o signs of ischemia. Daughter also reports stress test last year with her cardiologist that was also reportedly negative.     She was noted to be hypertensive on he home medications. Therefore, Hydralazine was increased to 50 mg BID. She will need close f/u with PCP " after discharge for tighter control of blood pressure (BP cuff provided and instructed in correct usage/timing). TIA symptoms can also be induced by sedating medications and sleep disorders. Therefore, recommend PCP discussion to decrease burden of sedating meds. Can also benefit from sleep study as op.     She also appears to have advanced CKD with BL cr around 1.7 per OSH records obtained today. She believes she has a nephrologist but not sure. She is also on Torsemide likely for CKD. Therefore, will cont at d/c and recommend to f/u with nephrologist soon if does not have one otherwise can f/u as previously scheduled. Defer to PCP to arrange.         Discharge Vitals/Physical Examination     Vital Signs:  Temp:  [97.4 øF (36.3 øC)-98.5 øF (36.9 øC)] 98.2 øF (36.8 øC)  Heart Rate:  [65-68] 65  Resp:  [18] 18  BP: (107-177)/(54-89) 161/89  Mean Arterial Pressure (Non-Invasive) for the past 24 hrs (Last 3 readings):   Noninvasive MAP (mmHg)   08/03/23 1037 126   08/03/23 0700 115   08/03/23 0319 127     SpO2 Percentage    08/03/23 0319 08/03/23 0700 08/03/23 1037   SpO2: 98% 96% 98%     SpO2:  [92 %-99 %] 98 %  on   ;   Device (Oxygen Therapy): room air    Body mass index is 25.34 kg/mý.  Wt Readings from Last 3 Encounters:   08/03/23 69.1 kg (152 lb 4.8 oz)         Physical Exam:  GEN: NAD  CV: RRR, no murmur, distant HS  PULM: CTA, no wheeze or crackles  GI:+bs, soft and NDNT  NEURO: no facial droop, normal speech, strength equal bilaterally  PSYCH: alert and oriented x 4, no confusion    Pertinent Laboratory/Radiology Results     Pertinent Laboratory Results:  Results from last 7 days   Lab Units 08/02/23  0247 08/02/23  0014   HSTROP T ng/L 44* 49*     Results from last 7 days   Lab Units 08/02/23  0014   PROBNP pg/mL 1,344.0     Results from last 7 days   Lab Units 08/02/23  0545   CHOLESTEROL mg/dL 143   TRIGLYCERIDES mg/dL 241*   HDL CHOL mg/dL 31*   LDL CHOL mg/dL 72       Results from last 7 days   Lab Units  08/02/23  0546 08/02/23  0247 08/02/23  0014   WBC 10*3/mm3 6.36  --  4.48   HEMOGLOBIN g/dL 10.0*  --  9.1*   HEMATOCRIT % 31.4*  --  28.7*   MCV fL 89.0  --  89.7   MCHC g/dL 31.8  --  31.7   PLATELETS 10*3/mm3 85*  --  80*   INR   --  1.08 1.03     Results from last 7 days   Lab Units 08/02/23  0545 08/02/23  0014   SODIUM mmol/L 141 144   POTASSIUM mmol/L 3.8 4.3   CHLORIDE mmol/L 104 106   CO2 mmol/L 24.8 27.1   BUN mg/dL 30* 34*   CREATININE mg/dL 1.78* 1.96*   CALCIUM mg/dL 9.1 9.3   GLUCOSE mg/dL 82 92   ALBUMIN g/dL 4.3 4.4   BILIRUBIN mg/dL 0.3 0.3   ALK PHOS U/L 67 64   AST (SGOT) U/L 22 22   ALT (SGPT) U/L 10 10   Estimated Creatinine Clearance: 25.8 mL/min (A) (by C-G formula based on SCr of 1.78 mg/dL (H)).  No results found for: AMMONIA    Glucose   Date/Time Value Ref Range Status   08/02/2023 0043 89 70 - 130 mg/dL Final     Comment:     Meter: [id number] : [id number] latonia ferrer       Pertinent Radiology Results:  Imaging Results (All)       Procedure Component Value Units Date/Time    CT Angiogram Head w AI Analysis of LVO [[id number]] Collected: 08/02/23 0152     Updated: 08/02/23 0158    Narrative:      CLINICAL HISTORY: Weakness.     COMPARISON: None.     TECHNIQUE: Sequential trans-axial images were obtained with a  multi-detector helical CT after iodinated contrast administration.  Coronal and sagittal reconstructions and 3-D reconstructions were  obtained. Limited exposure control, adjustment of the MA and/or KV  according to patient's size or use of iterative reconstruction technique  was utilized.     FINDINGS:      Hypoplastic intradural right vertebral artery with a dominant left  vertebral artery-anatomical variation.  There is small eccentric  calcific plaque within the intradural portion of right vertebral artery  causing mild stenosis.  The posterior inferior cerebellar arteries are  unremarkable.     There are eccentric calcific plaques within the intradural portion of  both  intracranial carotid arteries causing multifocal areas of moderate  to high-grade stenosis. The A1 and A2 segments of the anterior cerebral  arteries, middle cerebral arteries and branches are unremarkable. The  anterior communicating artery is unremarkable. The posterior  communicating arteries are also unremarkable.     There are no aneurysms. There are no arteriovenous malformations seen.      If there is further concern, recommend conventional angiography for  complete assessment.       Impression:         1.  Eccentric calcific plaques within intradural portion of both  internal carotid arteries causing multifocal areas of moderate to  high-grade stenosis.  2.  Eccentric calcific plaque within intradural portion of right  vertebral artery causing mild stenosis.     This report was finalized on 8/2/2023 1:56 AM by Fuentes Galo MD.       CT Angiogram Neck [[id number]] Collected: 08/02/23 0145     Updated: 08/02/23 0154    Narrative:      CLINICAL HISTORY: Weakness.     COMPARISON: None available.     TECHNIQUE: Sequential transaxial images of the neck were obtained with a  multidetector helical CT after iodinated contrast administration.   Coronal, sagittal reconstructions as well as maximum intensity  projection images were generated. Limited exposure control, adjustment  of the MA and/or KV according to patient's size or use of iterative  reconstruction technique was utilized.     FINDINGS:     The visualized aortic arch shows mild atherosclerotic calcification.     RIGHT: The common carotid artery and cervical segment of internal  carotid artery is widely patent.  There eccentric calcific plaques  within right carotid bulb causing moderate stenosis (50-55% by NASCET  criteria).  The right external carotid artery is unremarkable.     LEFT: The common carotid artery and cervical segment of internal carotid  artery is widely patent.  There eccentric calcific plaques within right  carotid bulb causing moderate  stenosis (50-55% by NASCET criteria).  The  left external carotid artery is unremarkable.     The origin and cervical segments of both vertebral arteries are widely  patent.  Mildly hypoplastic right vertebral artery with a dominant left  vertebral artery anatomical variation.  No evidence of aneurysm or  dissection.     If there is further clinical concern, recommend conventional angiography  for complete assessment. Any reported ICA stenosis directly references  from the distal internal carotid artery diameter as the denominator for  stenosis measurement.     NONVASCULAR STRUCTURES: Left chest wall pacing device with wires.   Partially calcified nodules within the posterior right upper lobe,  measuring up to 1.1 cm.  Multilevel cervical degenerative changes.   Anterior cervical fusion from C3-T1.       Impression:         1.  Eccentric calcific plaques within both carotid bulbs causing  moderate stenosis (50-55% by NASCET criteria).     This report was finalized on 8/2/2023 1:52 AM by Fuentes Galo MD.       CT CEREBRAL PERFUSION WITH & WITHOUT CONTRAST [[id number]] Collected: 08/02/23 0143     Updated: 08/02/23 0147    Narrative:      CLINICAL HISTORY: Weakness.     COMPARISON: None available.     TECHNIQUE: CT perfusion imaging of brain was performed.  Color maps were  generated.   Limited exposure control, adjustment of the MA and/or KV  according to patient's size or use of iterative reconstruction technique  was utilized.     FINDINGS:     The visualized brain parenchyma shows normal MTT, CBV and CBF values.     CBF < 30% volume: 0 mL  Tmax >6s volume: 0 mL  Tmax > 10s volume: 0 mL  Mismatch volume: 0 mL  Mismatch ratio: None.       Impression:         1. No evidence of acute infarction/ischemia on present perfusion study.     Please note that small infarcts are difficult to appreciate on perfusion  imaging.     This report was finalized on 8/2/2023 1:45 AM by Fuentes Galo MD.       CT Head Without Contrast  Stroke Protocol [[id number]] Collected: 08/02/23 0119     Updated: 08/02/23 0124    Narrative:      INDICATION: Weakness.     COMPARISON: No relevant priors available     TECHNIQUE: Axial CT images of the head were obtained without IV  contrast. Coronal and sagittal reformations were reviewed.     FINDINGS:  Gray-white differentiation is relatively preserved. No mass, mass effect  or midline shift. No evidence of acute large territorial infarction or  acute intracranial hemorrhage. Ventricles appear normal in size. Basal  cisterns are patent. No depressed calvarial fracture.       Impression:      No acute intracranial process.     Report called to Dr. Bustamante at 120AM EST 8/2/2023.     This report was finalized on 8/2/2023 1:22 AM by Alex Pallas, DO.       XR Chest 1 View [[id number]] Collected: 08/02/23 0052     Updated: 08/02/23 0054    Narrative:      INDICATION: Chest pain.     TECHNIQUE: Frontal radiograph of the chest.     COMPARISON: None.     FINDINGS:   Cardiomegaly. Left-sided pacemaker. Pulmonary vasculature appears within  normal limits. No infiltrate, pleural effusion or pneumothorax. No acute  osseous abnormality evident.       Impression:      No acute cardiopulmonary process.     This report was finalized on 8/2/2023 12:52 AM by Alex Pallas, DO.               Test Results Pending at Discharge:      Discharge Disposition/Discharge Medications/Discharge Appointments     Discharge Disposition:   Home-Health Care Svc    Condition at Discharge:  Stable     DME Prescribed at Discharge:  BP cuff    Discharge Diet:  Diet Instructions       Diet: Regular/House Diet; Regular Texture (IDDSI 7); Thin (IDDSI 0)      Discharge Diet: Regular/House Diet    Texture: Regular Texture (IDDSI 7)    Fluid Consistency: Thin (IDDSI 0)            Discharge Activity:  Activity Instructions       Activity as Tolerated              Code Status While Inpatient:  Code Status and Medical Interventions:   Ordered at: 08/02/23 0451      Medical Intervention Limits:    NO intubation (DNI)     Level Of Support Discussed With:    Patient    Health Care Surrogate     Code Status (Patient has no pulse and is not breathing):    No CPR (Do Not Attempt to Resuscitate)     Medical Interventions (Patient has pulse or is breathing):    Limited Support     Comments:    confirmed with patient and daughter at bedside in ED     Release to patient:    Routine Release       Discharge Medications:     Discharge Medications        New Medications        Instructions Start Date   aspirin 81 MG chewable tablet   81 mg, Oral, Daily   Start Date: August 4, 2023     clopidogrel 75 MG tablet  Commonly known as: PLAVIX   75 mg, Oral, Daily   Start Date: August 4, 2023            Changes to Medications        Instructions Start Date   hydrALAZINE 25 MG tablet  Commonly known as: APRESOLINE  What changed: how much to take   50 mg, Oral, 2 Times Daily             Continue These Medications        Instructions Start Date   calcium 500 mg vitamin D 5 mcg (200 UT) 500-5 MG-MCG tablet per tablet   1 tablet, Oral, 2 Times Daily      Diclofenac Sodium 1 % gel gel  Commonly known as: VOLTAREN   4 g, Topical, 4 Times Daily PRN      docusate sodium 250 MG capsule  Commonly known as: COLACE   250 mg, Oral, Nightly      donepezil 5 MG tablet  Commonly known as: ARICEPT   5 mg, Oral, Nightly      ferrous sulfate 325 (65 FE) MG tablet   325 mg, Oral, Daily With Breakfast      fluticasone 50 MCG/ACT nasal spray  Commonly known as: FLONASE   2 sprays, Nasal, Nightly      gabapentin 300 MG capsule  Commonly known as: NEURONTIN   300 mg, Oral, 2 Times Daily      ICAPS AREDS 2 PO   1 tablet, Oral, 2 Times Daily      isosorbide mononitrate 30 MG 24 hr tablet  Commonly known as: IMDUR   30 mg, Oral, Daily      LORazepam 0.5 MG tablet  Commonly known as: ATIVAN   0.5 mg, Oral, 2 Times Daily      magnesium chloride ER 64 MG DR tablet   64 mg, Oral, 2 Times Daily      modafinil 200 MG  tablet  Commonly known as: PROVIGIL   200 mg, Oral, Daily      Nexlizet 180-10 MG tablet  Generic drug: Bempedoic Acid-Ezetimibe   1 tablet, Oral, Nightly      nitroglycerin 0.4 MG SL tablet  Commonly known as: NITROSTAT   0.4 mg, Sublingual, Every 5 Minutes PRN, Take no more than 3 doses in 15 minutes.      ondansetron 4 MG tablet  Commonly known as: ZOFRAN   4 mg, Oral, Every 8 Hours PRN      pantoprazole 40 MG EC tablet  Commonly known as: PROTONIX   40 mg, Oral, Nightly      sertraline 100 MG tablet  Commonly known as: ZOLOFT   200 mg, Oral, Nightly      torsemide 100 MG tablet  Commonly known as: DEMADEX   50 mg, Oral, Daily      vitamin B-12 1000 MCG tablet  Commonly known as: CYANOCOBALAMIN   1,000 mcg, Oral, Nightly             Stop These Medications      cyclobenzaprine 10 MG tablet  Commonly known as: FLEXERIL     meclizine 25 MG tablet  Commonly known as: ANTIVERT              Discharge Appointments:      Additional Instructions for the Follow-ups that You Need to Schedule       Ambulatory Referral to Home Health   As directed      Face to Face Visit Date: 8/3/2023   Follow-up provider for Plan of Care?: I treated the patient in an acute care facility and will not continue treatment after discharge.   Follow-up provider: NADINE GARAY [[id number]]   Reason/Clinical Findings: slight weakness due to TIA   Describe mobility limitations that make leaving home difficult: unsteady gait   Nursing/Therapeutic Services Requested: Physical Therapy Occupational Therapy   PT orders: Therapeutic exercise Gait Training Transfer training Strengthening Home safety assessment   Weight Bearing Status: As Tolerated   Occupational orders: Activities of daily living Strengthening Cognition Fine motor Home safety assessment   Frequency: 1 Week 1        Discharge Follow-up with PCP   As directed       Currently Documented PCP:    Nadine Garay MD    PCP Phone Number:    [phone]     Follow Up Details: 1 week for BP check and  order sleep study        Discharge Follow-up with Specified Provider: Neurology 4-6 weeks   As directed      To: Neurology 4-6 weeks   Follow Up Details: recurrent TIA                .Pending Labs at Discharge: none       The 10-year ASCVD risk score (Krupa DURBIN, et al., 2019) is: 27.6%    Values used to calculate the score:      Age: 78 years      Sex: Female      Is Non- : No      Diabetic: No      Tobacco smoker: No      Systolic Blood Pressure: 161 mmHg      Is BP treated: No      HDL Cholesterol: 31 mg/dL      Total Cholesterol: 143 mg/dL       Katayoun Behbahani, MD  Hospitalist Service -- [facility]       08/03/23  11:09 EDT    Discharge Time: <30 minutes        Electronically signed by Behbahani, Katayoun, MD at 08/03/23 1110       Discharge Order (From admission, onward)       Start     Ordered    08/03/23 1059  Discharge patient  Once        Expected Discharge Date: 08/03/23   Expected Discharge Time: Afternoon   Discharge Disposition: Home-Health Care [place] [facility]   Physician of Record for Attribution - Please select from Treatment Team: BEHBAHANI, KATAYOUN [[id number]]   Review needed by CMO to determine Physician of Record: No   Please choose which facility the patient is currently admitted if they are being discharged to another facility or unit.:  [facility]   Mode: Family      Question Answer Comment   Physician of Record for Attribution - Please select from Treatment Team BEHBAHANI, KATAYOUN    Review needed by CMO to determine Physician of Record No    Please choose which facility the patient is currently admitted if they are being discharged to another facility or unit.  [facility]    Mode: Family        08/03/23 8231

## 2023-08-03 NOTE — DISCHARGE SUMMARY
"    [facility] Medicine Services  DISCHARGE SUMMARY    Patient Identification:  Name:  Susan Us  Age:  78 y.o.  Sex:  female  :  1945  MRN:  [id number]  Visit Number:  [id number]    Date of Admission: 2023  Date of Discharge:  8/3/2023    PCP: Trevor Garay MD      Admission/Discharge Diagnoses     Discharge Diagnoses:  TIA  HTN  Peripheral arterial disease with moderate ICA stenosis 50-55% bilaterally  Atypical chest pain  CKD 3b (Baseline cr around 1.7 per OSH records)    Consults/Procedures     Consults:   Consults       Date and Time Order Name Status Description    2023  5:39 AM Inpatient Cardiology Consult Completed     2023  5:39 AM Inpatient Neurology Consult Stroke Completed     2023 12:41 AM Inpatient Neurology Consult Stroke Completed     2023 12:41 AM Inpatient Neurology Consult Stroke Completed             Procedures Performed:  Stress test:   Nuclear Perfusion Findings    Study Impression Myocardial perfusion imaging indicates a normal myocardial perfusion study with no evidence of ischemia. TID 1.03.   Rest Perfusion Defect 1 No rest myocardial perfusion defect noted.   Stress Perfusion Defect 1 No stress myocardial perfusion defect noted.   Ventricle Size / Description Left ventricular ejection fraction is normal (Calculated EF = 63%). Normal LV wall motion noted.       History of Presenting Illness   Per admitting provider:  \"Patient is a 78 y.o. female with history of bradycardia s/p pacemaker placement, CKD (baseline unknown), dementia, thrombocytopenia, HTN, and HLD, who presents with complaints of chest pain and slurred/mumbled speech that started around 8pm tonight. Her daughter notes initially she complained of chest pain radiating to her left shoulder. The patient adds that she felt nauseated at this time but not short of breath or diaphoretic. A few minutes later, she started to slur her words and became very difficult to understand " "according to her daughter. She also became very weak. Her daughter felt like she exhibited generalized weakness, but the patient states she felt more weak on her left side. She was brought to the ED where vitals revealed mildly elevated BP (163/82). Labs showed elevated troponin 49 with downward trend to 44 (delta -5), creatinine 1.96 and BUN 34 (again, baseline unknown), hemoglobin 9.1 and platelets 80. CT head showed no acute process, while CT angiogram head showed eccentric calcific plaques with intradural portion of both internal carotid arteries causing multifocal areas of moderate to high-grade stenosis, along with mild stenosis of right vertebral artery. CT angiogram neck showed eccentric calcific plaques within both carotid bulbs causing moderate stenosis. MRI was not pursued due to the fact the patient had an \"MRI-conditional pacemaker\", which reportedly means all conditions have to be met in order for MRI to be safe for this patient. \"    Hospital Course     Susan Us was admitted with concern for CVA due to slurred speech as well as chest pain with radiation to left shoulder. She was seen by neurology and not a tPA candidate.  She was loaded with ASA and plavix and admitted to [facility] for monitoring. CTA head and neck, CT head and perfusion were all negative for acute occlusion or infarct. She could not get MRI due to MRI conditional PPM and not compatible with MRI here.  Her slurred speech completely resolved by next morning and passed swallow evaluation.  Per daughter she had similar episode a few months ago with slurred speech that spontaneously resolved.  TTE here did not show evidence of thrombus. She had HLD and allergic to Statins and could not tolerate PCSK9/ Repatha. Therefore has been on Nexlizet with great improvement per report. Repeat CT head 2 days after admission is also negative for CVA/infarct. she will be going home with DAPT x 21 days then ASA 81 mg alone for life (30 day supply " provided here. Will refills at PCP f/u). Will need f/u with Neurology as op 4-6 weeks. PT/OT have recommended HH. Ordered placed.      Her chest pain also resolved after arrival tot he ED. Cardiac enzymes were slightly elevated but no delta on repeat noted. Cardiology recommended Nuclear stress test which was normal w/o signs of ischemia. Daughter also reports stress test last year with her cardiologist that was also reportedly negative.     She was noted to be hypertensive on he home medications. Therefore, Hydralazine was increased to 50 mg BID. She will need close f/u with PCP after discharge for tighter control of blood pressure (BP cuff provided and instructed in correct usage/timing). TIA symptoms can also be induced by sedating medications and sleep disorders. Therefore, recommend PCP discussion to decrease burden of sedating meds. Can also benefit from sleep study as op.     She also appears to have advanced CKD with BL cr around 1.7 per OSH records obtained today. She believes she has a nephrologist but not sure. She is also on Torsemide likely for CKD. Therefore, will cont at d/c and recommend to f/u with nephrologist soon if does not have one otherwise can f/u as previously scheduled. Defer to PCP to arrange.     PER ANNIKA [facility] records:  -No h/o ischemic heart disease but has had reports of chest pain. CTA chest showed evidence of CAD, perfusion images from June 2020 showed moderate sized anterior wall fixed defect but stress images are consistent with breast artifact and shadowing.   -st sophie PPM 5/25/17 for ventricular escape rhythm w symptomatic sinus bradycardia      Discharge Vitals/Physical Examination     Vital Signs:  Temp:  [97.4 øF (36.3 øC)-98.5 øF (36.9 øC)] 98.2 øF (36.8 øC)  Heart Rate:  [65-68] 65  Resp:  [18] 18  BP: (107-177)/(54-89) 161/89  Mean Arterial Pressure (Non-Invasive) for the past 24 hrs (Last 3 readings):   Noninvasive MAP (mmHg)   08/03/23 1037 126    08/03/23 0700 115   08/03/23 0319 127     SpO2 Percentage    08/03/23 0319 08/03/23 0700 08/03/23 1037   SpO2: 98% 96% 98%     SpO2:  [92 %-99 %] 98 %  on   ;   Device (Oxygen Therapy): room air    Body mass index is 25.34 kg/mý.  Wt Readings from Last 3 Encounters:   08/03/23 69.1 kg (152 lb 4.8 oz)         Physical Exam:  GEN: NAD  CV: RRR, no murmur, distant HS  PULM: CTA, no wheeze or crackles  GI:+bs, soft and NDNT  NEURO: no facial droop, normal speech, strength equal bilaterally  PSYCH: alert and oriented x 4, no confusion    Pertinent Laboratory/Radiology Results     Pertinent Laboratory Results:  Results from last 7 days   Lab Units 08/02/23  0247 08/02/23  0014   HSTROP T ng/L 44* 49*     Results from last 7 days   Lab Units 08/02/23  0014   PROBNP pg/mL 1,344.0     Results from last 7 days   Lab Units 08/02/23  0545   CHOLESTEROL mg/dL 143   TRIGLYCERIDES mg/dL 241*   HDL CHOL mg/dL 31*   LDL CHOL mg/dL 72       Results from last 7 days   Lab Units 08/02/23  0546 08/02/23  0247 08/02/23  0014   WBC 10*3/mm3 6.36  --  4.48   HEMOGLOBIN g/dL 10.0*  --  9.1*   HEMATOCRIT % 31.4*  --  28.7*   MCV fL 89.0  --  89.7   MCHC g/dL 31.8  --  31.7   PLATELETS 10*3/mm3 85*  --  80*   INR   --  1.08 1.03     Results from last 7 days   Lab Units 08/02/23  0545 08/02/23  0014   SODIUM mmol/L 141 144   POTASSIUM mmol/L 3.8 4.3   CHLORIDE mmol/L 104 106   CO2 mmol/L 24.8 27.1   BUN mg/dL 30* 34*   CREATININE mg/dL 1.78* 1.96*   CALCIUM mg/dL 9.1 9.3   GLUCOSE mg/dL 82 92   ALBUMIN g/dL 4.3 4.4   BILIRUBIN mg/dL 0.3 0.3   ALK PHOS U/L 67 64   AST (SGOT) U/L 22 22   ALT (SGPT) U/L 10 10   Estimated Creatinine Clearance: 25.8 mL/min (A) (by C-G formula based on SCr of 1.78 mg/dL (H)).  No results found for: AMMONIA    Glucose   Date/Time Value Ref Range Status   08/02/2023 0043 89 70 - 130 mg/dL Final     Comment:     Meter: [id number] : [id number] latonia ferrer       Pertinent Radiology Results:  Imaging Results (All)        Procedure Component Value Units Date/Time    CT Angiogram Head w AI Analysis of LVO [[id number]] Collected: 08/02/23 0152     Updated: 08/02/23 0158    Narrative:      CLINICAL HISTORY: Weakness.     COMPARISON: None.     TECHNIQUE: Sequential trans-axial images were obtained with a  multi-detector helical CT after iodinated contrast administration.  Coronal and sagittal reconstructions and 3-D reconstructions were  obtained. Limited exposure control, adjustment of the MA and/or KV  according to patient's size or use of iterative reconstruction technique  was utilized.     FINDINGS:      Hypoplastic intradural right vertebral artery with a dominant left  vertebral artery-anatomical variation.  There is small eccentric  calcific plaque within the intradural portion of right vertebral artery  causing mild stenosis.  The posterior inferior cerebellar arteries are  unremarkable.     There are eccentric calcific plaques within the intradural portion of  both intracranial carotid arteries causing multifocal areas of moderate  to high-grade stenosis. The A1 and A2 segments of the anterior cerebral  arteries, middle cerebral arteries and branches are unremarkable. The  anterior communicating artery is unremarkable. The posterior  communicating arteries are also unremarkable.     There are no aneurysms. There are no arteriovenous malformations seen.      If there is further concern, recommend conventional angiography for  complete assessment.       Impression:         1.  Eccentric calcific plaques within intradural portion of both  internal carotid arteries causing multifocal areas of moderate to  high-grade stenosis.  2.  Eccentric calcific plaque within intradural portion of right  vertebral artery causing mild stenosis.     This report was finalized on 8/2/2023 1:56 AM by Fuentes Galo MD.       CT Angiogram Neck [[id number]] Collected: 08/02/23 0145     Updated: 08/02/23 0154    Narrative:      CLINICAL HISTORY:  Weakness.     COMPARISON: None available.     TECHNIQUE: Sequential transaxial images of the neck were obtained with a  multidetector helical CT after iodinated contrast administration.   Coronal, sagittal reconstructions as well as maximum intensity  projection images were generated. Limited exposure control, adjustment  of the MA and/or KV according to patient's size or use of iterative  reconstruction technique was utilized.     FINDINGS:     The visualized aortic arch shows mild atherosclerotic calcification.     RIGHT: The common carotid artery and cervical segment of internal  carotid artery is widely patent.  There eccentric calcific plaques  within right carotid bulb causing moderate stenosis (50-55% by NASCET  criteria).  The right external carotid artery is unremarkable.     LEFT: The common carotid artery and cervical segment of internal carotid  artery is widely patent.  There eccentric calcific plaques within right  carotid bulb causing moderate stenosis (50-55% by NASCET criteria).  The  left external carotid artery is unremarkable.     The origin and cervical segments of both vertebral arteries are widely  patent.  Mildly hypoplastic right vertebral artery with a dominant left  vertebral artery anatomical variation.  No evidence of aneurysm or  dissection.     If there is further clinical concern, recommend conventional angiography  for complete assessment. Any reported ICA stenosis directly references  from the distal internal carotid artery diameter as the denominator for  stenosis measurement.     NONVASCULAR STRUCTURES: Left chest wall pacing device with wires.   Partially calcified nodules within the posterior right upper lobe,  measuring up to 1.1 cm.  Multilevel cervical degenerative changes.   Anterior cervical fusion from C3-T1.       Impression:         1.  Eccentric calcific plaques within both carotid bulbs causing  moderate stenosis (50-55% by NASCET criteria).     This report was  finalized on 8/2/2023 1:52 AM by Fuentes Galo MD.       CT CEREBRAL PERFUSION WITH & WITHOUT CONTRAST [[id number]] Collected: 08/02/23 0143     Updated: 08/02/23 0147    Narrative:      CLINICAL HISTORY: Weakness.     COMPARISON: None available.     TECHNIQUE: CT perfusion imaging of brain was performed.  Color maps were  generated.   Limited exposure control, adjustment of the MA and/or KV  according to patient's size or use of iterative reconstruction technique  was utilized.     FINDINGS:     The visualized brain parenchyma shows normal MTT, CBV and CBF values.     CBF < 30% volume: 0 mL  Tmax >6s volume: 0 mL  Tmax > 10s volume: 0 mL  Mismatch volume: 0 mL  Mismatch ratio: None.       Impression:         1. No evidence of acute infarction/ischemia on present perfusion study.     Please note that small infarcts are difficult to appreciate on perfusion  imaging.     This report was finalized on 8/2/2023 1:45 AM by Fuentes Galo MD.       CT Head Without Contrast Stroke Protocol [[id number]] Collected: 08/02/23 0119     Updated: 08/02/23 0124    Narrative:      INDICATION: Weakness.     COMPARISON: No relevant priors available     TECHNIQUE: Axial CT images of the head were obtained without IV  contrast. Coronal and sagittal reformations were reviewed.     FINDINGS:  Gray-white differentiation is relatively preserved. No mass, mass effect  or midline shift. No evidence of acute large territorial infarction or  acute intracranial hemorrhage. Ventricles appear normal in size. Basal  cisterns are patent. No depressed calvarial fracture.       Impression:      No acute intracranial process.     Report called to Dr. Bustamante at 120AM EST 8/2/2023.     This report was finalized on 8/2/2023 1:22 AM by Alex Pallas, DO.       XR Chest 1 View [[id number]] Collected: 08/02/23 0052     Updated: 08/02/23 0054    Narrative:      INDICATION: Chest pain.     TECHNIQUE: Frontal radiograph of the chest.     COMPARISON: None.      FINDINGS:   Cardiomegaly. Left-sided pacemaker. Pulmonary vasculature appears within  normal limits. No infiltrate, pleural effusion or pneumothorax. No acute  osseous abnormality evident.       Impression:      No acute cardiopulmonary process.     This report was finalized on 8/2/2023 12:52 AM by Alex Pallas, DO.               Test Results Pending at Discharge:      Discharge Disposition/Discharge Medications/Discharge Appointments     Discharge Disposition:   Home-Health Care Svc    Condition at Discharge:  Stable     DME Prescribed at Discharge:  BP cuff    Discharge Diet:  Diet Instructions       Diet: Regular/House Diet; Regular Texture (IDDSI 7); Thin (IDDSI 0)      Discharge Diet: Regular/House Diet    Texture: Regular Texture (IDDSI 7)    Fluid Consistency: Thin (IDDSI 0)            Discharge Activity:  Activity Instructions       Activity as Tolerated              Code Status While Inpatient:  Code Status and Medical Interventions:   Ordered at: 08/02/23 0450     Medical Intervention Limits:    NO intubation (DNI)     Level Of Support Discussed With:    Patient    Health Care Surrogate     Code Status (Patient has no pulse and is not breathing):    No CPR (Do Not Attempt to Resuscitate)     Medical Interventions (Patient has pulse or is breathing):    Limited Support     Comments:    confirmed with patient and daughter at bedside in ED     Release to patient:    Routine Release       Discharge Medications:     Discharge Medications        New Medications        Instructions Start Date   aspirin 81 MG chewable tablet   81 mg, Oral, Daily   Start Date: August 4, 2023     clopidogrel 75 MG tablet  Commonly known as: PLAVIX   75 mg, Oral, Daily   Start Date: August 4, 2023            Changes to Medications        Instructions Start Date   hydrALAZINE 25 MG tablet  Commonly known as: APRESOLINE  What changed: how much to take   50 mg, Oral, 2 Times Daily             Continue These Medications         Instructions Start Date   calcium 500 mg vitamin D 5 mcg (200 UT) 500-5 MG-MCG tablet per tablet   1 tablet, Oral, 2 Times Daily      Diclofenac Sodium 1 % gel gel  Commonly known as: VOLTAREN   4 g, Topical, 4 Times Daily PRN      docusate sodium 250 MG capsule  Commonly known as: COLACE   250 mg, Oral, Nightly      donepezil 5 MG tablet  Commonly known as: ARICEPT   5 mg, Oral, Nightly      ferrous sulfate 325 (65 FE) MG tablet   325 mg, Oral, Daily With Breakfast      fluticasone 50 MCG/ACT nasal spray  Commonly known as: FLONASE   2 sprays, Nasal, Nightly      gabapentin 300 MG capsule  Commonly known as: NEURONTIN   300 mg, Oral, 2 Times Daily      ICAPS AREDS 2 PO   1 tablet, Oral, 2 Times Daily      isosorbide mononitrate 30 MG 24 hr tablet  Commonly known as: IMDUR   30 mg, Oral, Daily      LORazepam 0.5 MG tablet  Commonly known as: ATIVAN   0.5 mg, Oral, 2 Times Daily      magnesium chloride ER 64 MG DR tablet   64 mg, Oral, 2 Times Daily      modafinil 200 MG tablet  Commonly known as: PROVIGIL   200 mg, Oral, Daily      Nexlizet 180-10 MG tablet  Generic drug: Bempedoic Acid-Ezetimibe   1 tablet, Oral, Nightly      nitroglycerin 0.4 MG SL tablet  Commonly known as: NITROSTAT   0.4 mg, Sublingual, Every 5 Minutes PRN, Take no more than 3 doses in 15 minutes.      ondansetron 4 MG tablet  Commonly known as: ZOFRAN   4 mg, Oral, Every 8 Hours PRN      pantoprazole 40 MG EC tablet  Commonly known as: PROTONIX   40 mg, Oral, Nightly      sertraline 100 MG tablet  Commonly known as: ZOLOFT   200 mg, Oral, Nightly      torsemide 100 MG tablet  Commonly known as: DEMADEX   50 mg, Oral, Daily      vitamin B-12 1000 MCG tablet  Commonly known as: CYANOCOBALAMIN   1,000 mcg, Oral, Nightly             Stop These Medications      cyclobenzaprine 10 MG tablet  Commonly known as: FLEXERIL     meclizine 25 MG tablet  Commonly known as: ANTIVERT              Discharge Appointments:      Additional Instructions for the  Follow-ups that You Need to Schedule       Ambulatory Referral to Home Health   As directed      Face to Face Visit Date: 8/3/2023   Follow-up provider for Plan of Care?: I treated the patient in an acute care facility and will not continue treatment after discharge.   Follow-up provider: NADINE GARAY [[id number]]   Reason/Clinical Findings: slight weakness due to TIA   Describe mobility limitations that make leaving home difficult: unsteady gait   Nursing/Therapeutic Services Requested: Physical Therapy Occupational Therapy   PT orders: Therapeutic exercise Gait Training Transfer training Strengthening Home safety assessment   Weight Bearing Status: As Tolerated   Occupational orders: Activities of daily living Strengthening Cognition Fine motor Home safety assessment   Frequency: 1 Week 1        Discharge Follow-up with PCP   As directed       Currently Documented PCP:    Nadine Garay MD    PCP Phone Number:    [phone]     Follow Up Details: 1 week for BP check and order sleep study        Discharge Follow-up with Specified Provider: Neurology 4-6 weeks   As directed      To: Neurology 4-6 weeks   Follow Up Details: recurrent TIA                .Pending Labs at Discharge: none       The 10-year ASCVD risk score (Ramona DK, et al., 2019) is: 27.6%    Values used to calculate the score:      Age: 78 years      Sex: Female      Is Non- : No      Diabetic: No      Tobacco smoker: No      Systolic Blood Pressure: 161 mmHg      Is BP treated: No      HDL Cholesterol: 31 mg/dL      Total Cholesterol: 143 mg/dL       Katayoun Behbahani, MD  Hospitalist Service -- [facility]       08/03/23  11:09 EDT    Discharge Time: <30 minutes

## 2023-08-03 NOTE — PLAN OF CARE
Goal Outcome Evaluation:   Pt disoriented to time and place. VSS on RA, paced rhythm on the monitor. No indicators of acute distress noted. PRN antiemetics given for nausea.

## 2023-08-03 NOTE — PAYOR COMM NOTE
"CONTACT: JOSE VALDES RN  [employer]IL[employer]ATION [place] [address]  PHONE: [phone]  FAX: [phone]    Ref# [id number]   Mrn# [id number]    THIS PT HAS BEEN CHANGED FROM INPATIENT BACK TO OBSERVATION STATUS ON 8/3/23, PRIOR TO DC.     Susan Chang (78 y.o. Female)       Date of Birth   1945    Social Security Number       Address   [address] [address]    Home Phone   [phone]    MRN   [id number]       Denominational   None    Marital Status                               Admission Date   8/1/23    Admission Type   Emergency    Admitting Provider   Behbahani, Katayoun, MD    Attending Provider   Behbahani, Katayoun, MD    Department, Room/Bed   [facility], 3315/1S       Discharge Date       Discharge Disposition   Home-Health Care c    Discharge Destination                                 Attending Provider: Behbahani, Katayoun, MD    Allergies: Statins    Isolation: None   Infection: None   Code Status: No CPR    Ht: 165.1 cm (65\")   Wt: 69.1 kg (152 lb 4.8 oz)    Admission Cmt: None   Principal Problem: Stroke-like symptoms [R29.90]                   Active Insurance as of 8/1/2023       Primary Coverage       Payor Plan Insurance Group Employer/Plan Group    [facility] MEDICARE REPLACEMENT [facility] MEDICARE REPLACEMENT [id number]       Payor Plan Address Payor Plan Phone Number Payor Plan Fax Number Effective Dates    [address] [address]   3/1/2023 - None Entered    [facility] [id number]         Subscriber Name Subscriber Birth Date Member ID       SUSAN CHANG 1945 [id number]               Secondary Coverage       Payor Plan Insurance Group Employer/Plan Group    [employer]        Payor Plan Address Payor Plan Phone Number Payor Plan Fax Number Effective Dates    [address] [phone]  8/1/2023 - None Entered    [facility] [id number]         " Subscriber Name Subscriber Birth Date Member ID       ROSE MARY CHANG 1945 [id number]                     Emergency Contacts        (Rel.) Home Phone Work Phone Mobile Phone    SCOTT HINDS (Daughter) [phone] -- --              Discharge Order (From admission, onward)       Start     Ordered    08/03/23 1059  Discharge patient  Once        Expected Discharge Date: 08/03/23   Expected Discharge Time: Afternoon   [place] [facility]   Physician of Record for Attribution - Please select from Treatment Team: BEHBAHANI, KATAYOUN [[id number]]   Review needed by CMO to determine Physician of Record: No   Please choose which facility the patient is currently admitted if they are being discharged to another facility or unit.:  [facility]   Mode: Family      Question Answer Comment   Physician of Record for Attribution - Please select from Treatment Team BEHBAHANI, KATAYOUN    Review needed by CMO to determine Physician of Record No    Please choose which facility the patient is currently admitted if they are being discharged to another facility or unit. LINDA Perez    Mode: Family        08/03/23 1104

## 2023-08-03 NOTE — CASE MANAGEMENT/SOCIAL WORK
Discharge Planning Assessment   [facility]     Patient Name: Susan Us  MRN: [id number]  Today's Date: 8/3/2023    Admit Date: 8/1/2023       Discharge Plan       Row Name 08/03/23 1159       Plan    Final Discharge Disposition Code 06 - home with home health care    Final Note Pt has discharge orders. Pt utilized [facility] prior to admit. SS notified [facility] [phone] per Shamir and faxed clinicals to [phone]. No other needs identified.                 Continued Care and Services - Admitted Since 8/1/2023       Home Medical Care       Service Provider Request Status Selected Services Address Phone Fax Patient Preferred    [facility]  Selected Home Health Services [address] , [facility] [address] [phone] --                  Expected Discharge Date and Time       Expected Discharge Date Expected Discharge Time    Aug 3, 2023         AMADO Guevara

## 2023-08-07 ENCOUNTER — READMISSION MANAGEMENT (OUTPATIENT)
Dept: CALL CENTER | Facility: HOSPITAL | Age: 78
End: 2023-08-07
Payer: MEDICARE

## 2023-08-07 NOTE — OUTREACH NOTE
Stroke Week 1 Survey      Flowsheet Row Responses   [facility] patient discharged from? [facility]   Does the patient have one of the following disease processes/diagnoses(primary or secondary)? Stroke   Week 1 attempt successful? Yes   Call start time 1052   Call end time 1054   Discharge diagnosis Stroke-like symptoms   Is patient permission given to speak with other caregiver? Yes   List who call center can speak with Leslie Colorado reviewed with patient/caregiver? Yes   Is the patient having any side effects they believe may be caused by any medication additions or changes? No   Does the patient have all medications ordered at discharge? Yes   Is the patient taking all medications as directed (includes completed medication regime)? Yes   Does the patient have a primary care provider?  Yes   Does the patient have an appointment with their PCP within 7 days of discharge? Yes   Has the patient kept scheduled appointments due by today? Yes   What DME was ordered? [facility] - BP cuff   Psychosocial issues? No   Does the patient require any assistance with activities of daily living such as eating, bathing, dressing, walking, etc.? No   Does the patient have any residual symptoms from stroke/TIA? No   Does the patient understand the diet ordered at discharge? No   Did the patient receive a copy of their discharge instructions? Yes   Nursing interventions Reviewed instructions with patient   What is the patient's perception of their health status since discharge? Improving   Nursing interventions Nurse provided patient education   Is the patient/caregiver able to teach back signs and symptoms related to disease process for when to call PCP? Yes   Is the patient/caregiver able to teach back signs and symptoms related to disease process for when to call 911? Yes   If the patient is a current smoker, are they able to teach back resources for cessation? Not a smoker   Is the  patient/caregiver able to teach back the hierarchy of who to call/visit for symptoms/problems? PCP, Specialist, Home health nurse, Urgent Care, ED, 911 Yes   Is the patient able to teach back FAST for Stroke? B alance: Watch for sudden loss of balance, E yes: Check for vision loss, F ace: Look for an uneven smile, A rm: Check if one arm is weak, S peech: Listen for slurred speech, T mariam: Call 9-1-1 right away   Week 1 call completed? Yes   Wrap up additional comments pt doing better today per daughter. Pt does have HH coming to see her   Call end time 1054            ANDRA BALDERAS - Registered Nurse

## 2023-08-16 ENCOUNTER — READMISSION MANAGEMENT (OUTPATIENT)
Dept: CALL CENTER | Facility: HOSPITAL | Age: 78
End: 2023-08-16
Payer: MEDICARE

## 2023-08-16 NOTE — OUTREACH NOTE
Stroke Week 2 Survey      Flowsheet Row Responses   [facility] patient discharged from? [facility]   Does the patient have one of the following disease processes/diagnoses(primary or secondary)? Stroke   Week 2 attempt successful? Yes   Call start time 1059   Call end time 1101   Discharge diagnosis Stroke-like symptoms   Person spoke with today (if not patient) and relationship Leslie Colorado reviewed with patient/caregiver? Yes   Is the patient having any side effects they believe may be caused by any medication additions or changes? No   Does the patient have all medications ordered at discharge? Yes   Is the patient taking all medications as directed (includes completed medication regime)? Yes   Does the patient have a primary care provider?  Yes   Does the patient have an appointment with their PCP within 7 days of discharge? Yes   Has the patient kept scheduled appointments due by today? Yes   What DME was ordered? [facility] - BP cuff   Psychosocial issues? No   Does the patient require any assistance with activities of daily living such as eating, bathing, dressing, walking, etc.? No   Does the patient have any residual symptoms from stroke/TIA? No   Does the patient understand the diet ordered at discharge? Yes   Did the patient receive a copy of their discharge instructions? Yes   Nursing interventions Reviewed instructions with patient   What is the patient's perception of their health status since discharge? Improving   Nursing interventions Nurse provided patient education   Is the patient/caregiver able to teach back signs and symptoms related to disease process for when to call PCP? Yes   Is the patient/caregiver able to teach back signs and symptoms related to disease process for when to call 911? Yes   If the patient is a current smoker, are they able to teach back resources for cessation? Not a smoker   Is the patient/caregiver able to teach back the hierarchy of who  to call/visit for symptoms/problems? PCP, Specialist, Home health nurse, Urgent Care, ED, 911 Yes   Week 2 call completed? Yes   Revoked No further contact(revokes)-requires comment   Is the patient interested in additional calls from an ambulatory ? No   Would this patient benefit from a Referral to [facility] Social Work? No   Wrap up additional comments Pt doing better today per daughter. Still weak, but better.  Pt does have HH coming to see her and has no needs a t this time.   Call end time 1101            Jonathan YOST - Registered Nurse

## 2023-08-28 ENCOUNTER — OFFICE VISIT (OUTPATIENT)
Dept: CARDIOLOGY | Facility: CLINIC | Age: 78
End: 2023-08-28
Payer: MEDICARE

## 2023-08-28 VITALS
HEIGHT: 65 IN | OXYGEN SATURATION: 97 % | BODY MASS INDEX: 24.86 KG/M2 | SYSTOLIC BLOOD PRESSURE: 136 MMHG | DIASTOLIC BLOOD PRESSURE: 76 MMHG | HEART RATE: 65 BPM | WEIGHT: 149.2 LBS

## 2023-08-28 DIAGNOSIS — E78.5 HYPERLIPIDEMIA LDL GOAL <70: Chronic | ICD-10-CM

## 2023-08-28 DIAGNOSIS — I10 ESSENTIAL HYPERTENSION: Primary | Chronic | ICD-10-CM

## 2023-08-28 DIAGNOSIS — Z95.0 ARTIFICIAL CARDIAC PACEMAKER: ICD-10-CM

## 2023-08-28 PROCEDURE — 1159F MED LIST DOCD IN RCRD: CPT | Performed by: NURSE PRACTITIONER

## 2023-08-28 PROCEDURE — 1160F RVW MEDS BY RX/DR IN RCRD: CPT | Performed by: NURSE PRACTITIONER

## 2023-08-28 PROCEDURE — 3078F DIAST BP <80 MM HG: CPT | Performed by: NURSE PRACTITIONER

## 2023-08-28 PROCEDURE — 99214 OFFICE O/P EST MOD 30 MIN: CPT | Performed by: NURSE PRACTITIONER

## 2023-08-28 PROCEDURE — 3075F SYST BP GE 130 - 139MM HG: CPT | Performed by: NURSE PRACTITIONER

## 2023-08-28 RX ORDER — HYDRALAZINE HYDROCHLORIDE 25 MG/1
37.5 TABLET, FILM COATED ORAL 2 TIMES DAILY
Qty: 90 TABLET | Refills: 0 | Status: SHIPPED | OUTPATIENT
Start: 2023-08-28 | End: 2023-09-27

## 2023-08-28 NOTE — PROGRESS NOTES
"Chief Complaint  Hypertension (Patient denies chest pain or shortness of breath today, also denies palpitations, patient has pacemaker )    Subjective          Susan Us presents to [facility] for follow up.    History of Present Illness  Patient presents for follow-up of recent hospitalization due to TIA symptoms.  She has completed the 21-day course that was prescribed with Plavix and has returned to 81 mg of aspirin daily.  She lives alone.  Her daughter lives 1/2 mile away and visits her daily to check on her condition.  The daughter, who accompanies her, reports that she has noticed additional confusion to her baseline dementia since the TIA symptoms in early August.  The patient reports occasional chest pain that does not cause shortness of breath or palpitations.  The daughter reports that her mother has not complained of this in the last month.    She had been seen Dr. Aguiar in [place] and they would like to transfer her cardiology care here.  She has a [employer] pacemaker.  They are unsure of her last pacemaker interrogation.  Tobacco Use: Low Risk     Smoking Tobacco Use: Never    Smokeless Tobacco Use: Never    Passive Exposure: Not on file       Objective     Vital Signs:   /76   Pulse 65   Ht 165.1 cm (65\")   Wt 67.7 kg (149 lb 3.2 oz)   SpO2 97%   BMI 24.83 kg/mý     BP Readings from Last 1 Encounters:   23 136/76       Physical Exam  Vitals and nursing note reviewed. Exam conducted with a chaperone present (Caretaking daughter).   Constitutional:       General: She is not in acute distress.     Appearance: She is normal weight. She is ill-appearing.   HENT:      Head: Normocephalic and atraumatic.   Neck:      Vascular: No carotid bruit.   Cardiovascular:      Rate and Rhythm: Normal rate and regular rhythm.      Heart sounds: Murmur heard.    with a grade of 2/6.   Musculoskeletal:      Right lower le+ Pitting Edema present.      Left lower " le+ Pitting Edema present.   Neurological:      Mental Status: She is alert. Mental status is at baseline.   Psychiatric:         Mood and Affect: Mood normal.         Behavior: Behavior normal.        Result Review :   The following data was reviewed by: TRACE Valiente on 2023:  Common labs          2023    00:14 2023    05:45 2023    05:46   Common Labs   Glucose 92  82     BUN 34  30     Creatinine 1.96  1.78     Sodium 144  141     Potassium 4.3  3.8     Chloride 106  104     Calcium 9.3  9.1     Albumin 4.4  4.3     Total Bilirubin 0.3  0.3     Alkaline Phosphatase 64  67     AST (SGOT) 22  22     ALT (SGPT) 10  10     WBC 4.48   6.36    Hemoglobin 9.1   10.0    Hematocrit 28.7   31.4    Platelets 80   85    Total Cholesterol  143     Triglycerides  241     HDL Cholesterol  31     LDL Cholesterol   72       Lipid Panel          2023    05:45   Lipid Panel   Total Cholesterol 143    Triglycerides 241    HDL Cholesterol 31    VLDL Cholesterol 40    LDL Cholesterol  72    LDL/HDL Ratio 2.06      Data reviewed : Cardiology studies as detailed below      Last Stress test 2023 Stress Test With Myocardial Perfusion One Day (2023 14:17)   Interpretation Summary    Left ventricular ejection fraction is normal (Calculated EF = 63%).    Myocardial perfusion imaging indicates a normal myocardial perfusion study with no evidence of ischemia.    TID 1.03.    Findings consistent with a normal ECG stress test.    Last Echo 2023 Adult Transthoracic Echo Complete w/ Color, Spectral and Contrast if necessary per protocol (2023 14:40)   Interpretation Summary    Left ventricular systolic function is normal. Calculated left ventricular EF = 68% Left ventricular ejection fraction appears to be 66 - 70%.    Left ventricular diastolic function was normal.    Estimated right ventricular systolic pressure from tricuspid regurgitation is normal (<35 mmHg).            Current  Outpatient Medications   Medication Sig Dispense Refill    aspirin 81 MG chewable tablet Chew & swallow 1 tablet Daily for 30 days. 30 tablet 0    Bempedoic Acid-Ezetimibe (Nexlizet) 180-10 MG tablet Take 1 tablet by mouth Every Night.      Calcium Carbonate-Vitamin D (calcium 500 mg vitamin D 5 mcg, 200 UT,) 500-5 MG-MCG tablet per tablet Take 1 tablet by mouth 2 (Two) Times a Day.      Diclofenac Sodium (VOLTAREN) 1 % gel gel Apply 4 g topically to the appropriate area as directed 4 (Four) Times a Day As Needed (joint pain).      docusate sodium (COLACE) 250 MG capsule Take 1 capsule by mouth Every Night.      donepezil (ARICEPT) 5 MG tablet Take 1 tablet by mouth Every Night.      ferrous sulfate 325 (65 FE) MG tablet Take 1 tablet by mouth Daily With Breakfast. Patient instructed to take every other day      fluticasone (FLONASE) 50 MCG/ACT nasal spray 2 sprays into the nostril(s) as directed by provider Every Night.      gabapentin (NEURONTIN) 300 MG capsule Take 1 capsule by mouth 2 (Two) Times a Day.      hydrALAZINE (APRESOLINE) 25 MG tablet Take 1.5 tablets by mouth 2 (Two) Times a Day for 30 days. Patient taking 1 and 1/2 twice per day 90 tablet 0    isosorbide mononitrate (IMDUR) 30 MG 24 hr tablet Take 1 tablet by mouth Daily.      LORazepam (ATIVAN) 0.5 MG tablet Take 1 tablet by mouth 2 (Two) Times a Day.      magnesium chloride ER 64 MG DR tablet Take 1 tablet by mouth 2 (Two) Times a Day.      modafinil (PROVIGIL) 200 MG tablet Take 1 tablet by mouth Daily.      Multiple Vitamins-Minerals (ICAPS AREDS 2 PO) Take 1 tablet by mouth 2 (Two) Times a Day.      nitroglycerin (NITROSTAT) 0.4 MG SL tablet Place 1 tablet under the tongue Every 5 (Five) Minutes As Needed for Chest Pain. Take no more than 3 doses in 15 minutes.      ondansetron (ZOFRAN) 4 MG tablet Take 1 tablet by mouth Every 8 (Eight) Hours As Needed for Nausea or Vomiting.      pantoprazole (PROTONIX) 40 MG EC tablet Take 1 tablet by mouth  Every Night.      sertraline (ZOLOFT) 100 MG tablet Take 2 tablets by mouth Every Night.      torsemide (DEMADEX) 100 MG tablet Take 0.5 tablets by mouth Daily.      vitamin B-12 (CYANOCOBALAMIN) 1000 MCG tablet Take 1 tablet by mouth Every Night.       No current facility-administered medications for this visit.            Assessment and Plan    Problem List Items Addressed This Visit          Cardiac and Vasculature    Hyperlipidemia LDL goal <70 (Chronic)    Overview     08/02/2023-total cholesterol 143, triglycerides 241, HDL 31, LDL 72         Essential hypertension - Primary    Relevant Medications    hydrALAZINE (APRESOLINE) 25 MG tablet    Artificial cardiac pacemaker     Diagnoses and all orders for this visit:    1. Essential hypertension (Primary)  -     hydrALAZINE (APRESOLINE) 25 MG tablet; Take 1.5 tablets by mouth 2 (Two) Times a Day for 30 days. Patient taking 1 and 1/2 twice per day  Dispense: 90 tablet; Refill: 0    2. Hyperlipidemia LDL goal <70    3. Artificial cardiac pacemaker            Follow Up   Keep appointments with specialist as scheduled.  Alicia has been asked to obtain records from cardiology.  We will determine when her pacemaker was last interrogated and then schedule appointment and interrogation accordingly.    Return in about 3 months (around 11/28/2023) for Next scheduled follow up in pacemaker interrogation.    Patient was given instructions and counseling regarding her condition or for health maintenance advice. Please see specific information pulled into the AVS if appropriate.       Electronically signed by TRACE Valiente, 08/28/23, 3:11 PM EDT.      Dictated Utilizing Dragon Dictation: Part of this note may be an electronic transcription/translation of spoken language to printed text using the Dragon Dictation System

## 2023-09-19 NOTE — PROGRESS NOTES
New Patient Office Visit      Encounter Date: 2023   Patient Name: Susan Us  : 1945   MRN: [id number]   PCP: Trevor Garay MD    Referring Provider: No ref. provider found     Chief Complaint:  No chief complaint on file.      History of Present Illness: Susan Us is a 78 y.o. female with known medical diagnoses of bradycardia s/p pacemaker placement, CKD, dementia, hypertension, hyperlipidemia, recent TIA presents today to establish care.  Patient initially presented to [facility] ED on 2023 with complaints of chest pain but was subsequently noted to have left facial droop, slurred speech, and left-sided weakness and code stroke was initiated.  Patient was out of the window for TNK.  CT head negative for acute changes, CTA head/neck showed mild to moderate intra and extracranial atherosclerosis.  No target vessel for endovascular therapy.  She was unable to have MRI at this facility due to presence of PPM.  Patient improved significantly by the next day.  Neurology felt her symptoms were likely TIA, although cannot rule out a small ischemic stroke that was not visible on CT.  She was loaded with Plavix, advised to continue DAPT with Plavix and aspirin for 21 days, to be followed by aspirin monotherapy.  Patient has an allergy to statins, advised to continue Nexlizet.  TTE with EF 66 to 70%.    Today she reports symptoms have resolved, no recurrence of any new stroke/TIA symptoms.  She is compliant with her medications, no issues with cost.  She completed 21 days of DAPT with Plavix/ASA.  No on ASA monotherapy.  Daughter at bedside reports it seems that her dementia symptoms have worsened since the hospitalization in early August.    Stroke Risk Factors: hyperlipidemia and hypertension      Subjective      Review of Systems:   Review of Systems   Constitutional:  Positive for fatigue. Negative for chills and fever.   HENT: Negative.     Respiratory: Negative.      Cardiovascular: Negative.    Neurological:  Positive for weakness (Generalized), memory problem and confusion. Negative for facial asymmetry, speech difficulty and numbness.   Psychiatric/Behavioral:  Positive for decreased concentration and depressed mood.      Past Medical History:   Past Medical History:   Diagnosis Date    Bradycardia     s/p pacemaker placement    CKD (chronic kidney disease)     baseline renal function unknown    Dementia     Hyperlipidemia     Hypertension     Thrombocytopenia        Past Surgical History:   Past Surgical History:   Procedure Laterality Date    CARDIAC PACEMAKER PLACEMENT      CATARACT EXTRACTION      CERVICAL FUSION      CHOLECYSTECTOMY      HIATAL HERNIA REPAIR      HYSTERECTOMY      REPLACEMENT TOTAL KNEE Right        Family History:   Family History   Problem Relation Age of Onset    Stroke Mother     Stroke Father        Social History:   Social History     Socioeconomic History    Marital status:    Tobacco Use    Smoking status: Never    Smokeless tobacco: Never    Tobacco comments:     Second hand smoke exposure ()   Vaping Use    Vaping Use: Never used   Substance and Sexual Activity    Alcohol use: Never    Drug use: Never    Sexual activity: Defer       Medications:     Current Outpatient Medications:     ARIPiprazole (ABILIFY) 2 MG tablet, Take 1 tablet by mouth Daily., Disp: , Rfl:     Bempedoic Acid-Ezetimibe (Nexlizet) 180-10 MG tablet, Take 1 tablet by mouth Every Night., Disp: , Rfl:     Calcium Carbonate-Vitamin D (calcium 500 mg vitamin D 5 mcg, 200 UT,) 500-5 MG-MCG tablet per tablet, Take 1 tablet by mouth 2 (Two) Times a Day., Disp: , Rfl:     Diclofenac Sodium (VOLTAREN) 1 % gel gel, Apply 4 g topically to the appropriate area as directed 4 (Four) Times a Day As Needed (joint pain)., Disp: , Rfl:     docusate sodium (COLACE) 250 MG capsule, Take 1 capsule by mouth Every Night., Disp: , Rfl:     donepezil (ARICEPT) 5 MG tablet, Take 1  tablet by mouth Every Night., Disp: , Rfl:     ferrous sulfate 325 (65 FE) MG tablet, Take 1 tablet by mouth Daily With Breakfast. Patient instructed to take every other day, Disp: , Rfl:     fluticasone (FLONASE) 50 MCG/ACT nasal spray, 2 sprays into the nostril(s) as directed by provider Every Night., Disp: , Rfl:     gabapentin (NEURONTIN) 300 MG capsule, Take 1 capsule by mouth 2 (Two) Times a Day., Disp: , Rfl:     hydrALAZINE (APRESOLINE) 25 MG tablet, Take 1.5 tablets by mouth 2 (Two) Times a Day for 30 days. Patient taking 1 and 1/2 twice per day (Patient taking differently: Take 1.5 tablets by mouth 2 (Two) Times a Day.), Disp: 90 tablet, Rfl: 0    isosorbide mononitrate (IMDUR) 30 MG 24 hr tablet, Take 1 tablet by mouth Daily., Disp: , Rfl:     LORazepam (ATIVAN) 0.5 MG tablet, Take 1 tablet by mouth 2 (Two) Times a Day., Disp: , Rfl:     magnesium chloride ER 64 MG DR tablet, Take 1 tablet by mouth 2 (Two) Times a Day., Disp: , Rfl:     modafinil (PROVIGIL) 200 MG tablet, Take 1 tablet by mouth Daily., Disp: , Rfl:     Multiple Vitamins-Minerals (ICAPS AREDS 2 PO), Take 1 tablet by mouth 2 (Two) Times a Day., Disp: , Rfl:     nitroglycerin (NITROSTAT) 0.4 MG SL tablet, Place 1 tablet under the tongue Every 5 (Five) Minutes As Needed for Chest Pain. Take no more than 3 doses in 15 minutes., Disp: , Rfl:     ondansetron (ZOFRAN) 4 MG tablet, Take 1 tablet by mouth Every 8 (Eight) Hours As Needed for Nausea or Vomiting., Disp: , Rfl:     pantoprazole (PROTONIX) 40 MG EC tablet, Take 1 tablet by mouth Every Night., Disp: , Rfl:     sertraline (ZOLOFT) 100 MG tablet, Take 2 tablets by mouth Every Night., Disp: , Rfl:     torsemide (DEMADEX) 100 MG tablet, Take 0.5 tablets by mouth Daily., Disp: , Rfl:     vitamin B-12 (CYANOCOBALAMIN) 1000 MCG tablet, Take 1 tablet by mouth Every Night., Disp: , Rfl:     Allergies:   Allergies   Allergen Reactions    Statins Hives       Objective     Physical Exam:  Vital  "Signs:   Vitals:    09/22/23 0953   BP: 143/71   BP Location: Right arm   Patient Position: Sitting   Cuff Size: Small Adult   Pulse: 67   SpO2: 98%   Weight: 68.1 kg (150 lb 3.2 oz)   Height: 165.1 cm (65\")     Body mass index is 24.99 kg/m².     Physical Exam  Vitals reviewed.   Constitutional:       General: She is awake. She is not in acute distress.     Appearance: She is ill-appearing.   HENT:      Head: Normocephalic.      Mouth/Throat:      Mouth: Mucous membranes are moist.      Pharynx: Oropharynx is clear.   Eyes:      General: Lids are normal.      Extraocular Movements: Extraocular movements intact.      Pupils: Pupils are equal, round, and reactive to light.   Cardiovascular:      Rate and Rhythm: Normal rate.   Pulmonary:      Effort: Pulmonary effort is normal.   Skin:     General: Skin is warm and dry.   Neurological:      Mental Status: She is alert.   Psychiatric:         Mood and Affect: Mood normal.         Speech: Speech normal.         Behavior: Behavior normal.     Neurological Exam  Mental Status  Awake and alert. Oriented only to person, place and situation. Speech is normal. Language is fluent with no aphasia. Attention and concentration are normal. Knowledge: She was unable to name the president, states he is \"old\".    Cranial Nerves  CN II: Visual fields full to confrontation.  CN III, IV, VI: Extraocular movements intact bilaterally. Normal lids and orbits bilaterally. Pupils equal round and reactive to light bilaterally.  CN V: Facial sensation is normal.  CN VII: Full and symmetric facial movement.  CN XI: Shoulder shrug strength is normal.  CN XII: Tongue midline without atrophy or fasciculations.    Motor  Decreased muscle bulk throughout. Normal muscle tone. No abnormal involuntary movements.  4+/5 throughout, no focal deficit.    Sensory  Light touch is normal in upper and lower extremities.     Coordination  Right: Finger-to-nose normal.Left: Finger-to-nose " normal.    Gait  Casual gait: Reduced stride length. Hesitant gait. Reduced right arm swing. Reduced left arm swing.  Uses rolling walker.     NIH Stroke Scale    1a  Level of consciousness: 0=alert; keenly responsive   1b. LOC questions:  1=Answers one question correctly    1c. LOC commands: 0=Performs both tasks correctly   2.  Best Gaze: 0=normal   3. Visual: 0=No visual loss   4. Facial Palsy: 0=Normal symmetric movement   5a. Motor left arm: 0=No drift, limb holds 90 (or 45) degrees for full 10 seconds   5b.  Motor right arm: 0=No drift, limb holds 90 (or 45) degrees for full 10 seconds   6a. Motor left le=No drift, limb holds 90 (or 45) degrees for full 10 seconds   6b  Motor right le=No drift, limb holds 90 (or 45) degrees for full 10 seconds   7. Limb Ataxia: 0=Absent   8.  Sensory: 0=Normal; no sensory loss   9. Best Language:  0=No aphasia, normal   10. Dysarthria: 0=Normal   11. Extinction and Inattention: 0=No abnormality    Total:   1         Modified Beaufort Score: 3        0  No Symptoms    1 No significant disability. Able to carry out all usual activities, despite some symptoms.    2 Slight disability. Able to look after own affairs without assistance, but unable to carry out all previous activities.    3 Moderate disability. Requires some help, but able to walk unassisted.    4 Moderately severe disability. Unable to attend to own bodily needs without assistance, and unable to walk unassisted.    5 Severe disability. Requires constant nursing care and attention, bedridden, incontinent.    6 Dead      PHQ-9 Depression Screening  Little interest or pleasure in doing things? 1-->several days   Feeling down, depressed, or hopeless? 3-->nearly every day   Trouble falling or staying asleep, or sleeping too much? 0-->not at all   Feeling tired or having little energy? 3-->nearly every day   Poor appetite or overeating? 1-->several days   Feeling bad about yourself - or that you are a failure or  have let yourself or your family down? 1-->several days   Trouble concentrating on things, such as reading the newspaper or watching television? 3-->nearly every day   Moving or speaking so slowly that other people could have noticed? Or the opposite - being so fidgety or restless that you have been moving around a lot more than usual? 0-->not at all   Thoughts that you would be better off dead, or of hurting yourself in some way? 0-->not at all   PHQ-9 Total Score 12   If you checked off any problems, how difficult have these problems made it for you to do your work, take care of things at home, or get along with other people? extremely difficult      Imaging Reviewed:     Results for orders placed during the hospital encounter of 08/01/23    Adult Transthoracic Echo Complete w/ Color, Spectral and Contrast if necessary per protocol    Interpretation Summary    Left ventricular systolic function is normal. Calculated left ventricular EF = 68% Left ventricular ejection fraction appears to be 66 - 70%.    Left ventricular diastolic function was normal.    Estimated right ventricular systolic pressure from tricuspid regurgitation is normal (<35 mmHg).    CT Head Without Contrast    Result Date: 8/3/2023    1. No evidence of an acute ischemic event 2. No parenchymal mass, hemorrhage, or midline shift  This report was finalized on 8/3/2023 9:51 AM by Dr. Nghia Sorenson MD.      CT Angiogram Neck    Result Date: 8/2/2023   1.  Eccentric calcific plaques within both carotid bulbs causing moderate stenosis (50-55% by NASCET criteria).  This report was finalized on 8/2/2023 1:52 AM by Fuentes Galo MD.      XR Chest 1 View    Result Date: 8/2/2023  No acute cardiopulmonary process.  This report was finalized on 8/2/2023 12:52 AM by Alex Pallas, DO.      CT Head Without Contrast Stroke Protocol    Result Date: 8/2/2023  No acute intracranial process.  Report called to Dr. Bustamante at 120AM EST 8/2/2023.  This report was  finalized on 8/2/2023 1:22 AM by Alex Pallas, DO.      CT Angiogram Head w AI Analysis of LVO    Result Date: 8/2/2023   1.  Eccentric calcific plaques within intradural portion of both internal carotid arteries causing multifocal areas of moderate to high-grade stenosis. 2.  Eccentric calcific plaque within intradural portion of right vertebral artery causing mild stenosis.  This report was finalized on 8/2/2023 1:56 AM by Fuentes Galo MD.      CT CEREBRAL PERFUSION WITH & WITHOUT CONTRAST    Result Date: 8/2/2023   1. No evidence of acute infarction/ischemia on present perfusion study.  Please note that small infarcts are difficult to appreciate on perfusion imaging.  This report was finalized on 8/2/2023 1:45 AM by Fuentes Galo MD.        Laboratory Results:   No results found for: HGBA1C  Lab Results   Component Value Date    WBC 6.36 08/02/2023    HGB 10.0 (L) 08/02/2023    HCT 31.4 (L) 08/02/2023    MCV 89.0 08/02/2023    PLT 85 (L) 08/02/2023      Lab Results   Component Value Date    GLUCOSE 82 08/02/2023    BUN 30 (H) 08/02/2023    CREATININE 1.78 (H) 08/02/2023    BCR 16.9 08/02/2023    K 3.8 08/02/2023    CO2 24.8 08/02/2023    CALCIUM 9.1 08/02/2023    ALBUMIN 4.3 08/02/2023    AST 22 08/02/2023    ALT 10 08/02/2023      Lab Results   Component Value Date    CHOL 143 08/02/2023     Lab Results   Component Value Date    TRIG 241 (H) 08/02/2023     Lab Results   Component Value Date    HDL 31 (L) 08/02/2023     Lab Results   Component Value Date    LDL 72 08/02/2023      No results found for: TSH  No results found for: FOLATE  No results found for: [id number]    Assessment / Plan      Assessment/Plan:   Diagnoses and all orders for this visit:    1. Personal history of TIA (transient ischemic attack) (Primary)    2. Primary hypertension    3. Mixed hyperlipidemia       -21 days of DAPT complete, continue aspirin monotherapy  -Continue Nexlizet  -BP today 143/71, reports similar readings at home except  it will be up to 150's right before medications are due  -Stroke labs at next visit  -Spoke with and provided patient information regarding Mediterranean diet  -Spoke with patient and family regarding specialist referral for her issues with memory and reported worsening dementia.  They declined referral to [facility], prefer to ask PCP about one in [place], as that is where she mostly goes for office visits, they live in [place], [place]  -She continues to receive in home physical therapy    Discussed the importance of medication compliance and lifestyle modifications (adequate blood pressure control, adequate control of hyperlipidemia, adequate glycemic control, increase physical activity, and healthy diet) to help reduce the risk of future cerebrovascular events.  Also discussed the signs symptoms that would warrant the patient return back to the emergency department including unilateral weakness, unilateral numbness, visual disturbances, loss of balance, speech difficulties, and/or a sudden severe headache.     Blood Pressure control to < 130/80  Goal LDL <70  Serum Glucose < 140  Call 911 for any stroke symptoms    Follow Up:   Return in about 6 months (around 3/22/2024).    EDGARD [facility] Stroke     This document has been electronically signed by TRACE Sharp  September 22, 2023 12:23 EDT    Please note that portions of this note were completed with a voice recognition program. Efforts were made to edit dictation, but occasionally words are mistranscribed.

## 2023-09-22 ENCOUNTER — OFFICE VISIT (OUTPATIENT)
Dept: NEUROLOGY | Facility: CLINIC | Age: 78
End: 2023-09-22
Payer: MEDICARE

## 2023-09-22 VITALS
WEIGHT: 150.2 LBS | BODY MASS INDEX: 25.02 KG/M2 | HEIGHT: 65 IN | OXYGEN SATURATION: 98 % | DIASTOLIC BLOOD PRESSURE: 71 MMHG | SYSTOLIC BLOOD PRESSURE: 143 MMHG | HEART RATE: 67 BPM

## 2023-09-22 DIAGNOSIS — Z86.73 PERSONAL HISTORY OF TIA (TRANSIENT ISCHEMIC ATTACK): Primary | ICD-10-CM

## 2023-09-22 DIAGNOSIS — E78.2 MIXED HYPERLIPIDEMIA: ICD-10-CM

## 2023-09-22 DIAGNOSIS — I10 PRIMARY HYPERTENSION: ICD-10-CM

## 2023-09-22 RX ORDER — ARIPIPRAZOLE 2 MG/1
2 TABLET ORAL DAILY
COMMUNITY

## 2023-09-27 ENCOUNTER — APPOINTMENT (OUTPATIENT)
Dept: CT IMAGING | Facility: HOSPITAL | Age: 78
End: 2023-09-27
Payer: MEDICARE

## 2023-09-27 ENCOUNTER — APPOINTMENT (OUTPATIENT)
Dept: GENERAL RADIOLOGY | Facility: HOSPITAL | Age: 78
End: 2023-09-27
Payer: MEDICARE

## 2023-09-27 ENCOUNTER — HOSPITAL ENCOUNTER (OUTPATIENT)
Facility: HOSPITAL | Age: 78
Setting detail: OBSERVATION
LOS: 2 days | Discharge: NURSING, REHAB OR LONG-TERM CARE | End: 2023-10-02
Attending: EMERGENCY MEDICINE | Admitting: STUDENT IN AN ORGANIZED HEALTH CARE EDUCATION/TRAINING PROGRAM
Payer: MEDICARE

## 2023-09-27 DIAGNOSIS — F03.90 DEMENTIA WITHOUT BEHAVIORAL DISTURBANCE: ICD-10-CM

## 2023-09-27 DIAGNOSIS — N28.9 ACUTE RENAL INSUFFICIENCY: Primary | ICD-10-CM

## 2023-09-27 DIAGNOSIS — G89.4 CHRONIC PAIN SYNDROME: ICD-10-CM

## 2023-09-27 DIAGNOSIS — Z79.899 CHRONIC USE OF BENZODIAZEPINE FOR THERAPEUTIC PURPOSE: ICD-10-CM

## 2023-09-27 DIAGNOSIS — R62.7 ADULT FAILURE TO THRIVE: ICD-10-CM

## 2023-09-27 LAB
ALBUMIN SERPL-MCNC: 4.5 G/DL (ref 3.5–5.2)
ALBUMIN/GLOB SERPL: 1.7 G/DL
ALP SERPL-CCNC: 61 U/L (ref 39–117)
ALT SERPL W P-5'-P-CCNC: 10 U/L (ref 1–33)
ANION GAP SERPL CALCULATED.3IONS-SCNC: 10.7 MMOL/L (ref 5–15)
AST SERPL-CCNC: 22 U/L (ref 1–32)
BACTERIA UR QL AUTO: ABNORMAL /HPF
BASOPHILS # BLD AUTO: 0.02 10*3/MM3 (ref 0–0.2)
BASOPHILS NFR BLD AUTO: 0.2 % (ref 0–1.5)
BILIRUB SERPL-MCNC: 0.4 MG/DL (ref 0–1.2)
BILIRUB UR QL STRIP: NEGATIVE
BUN SERPL-MCNC: 41 MG/DL (ref 8–23)
BUN/CREAT SERPL: 18.4 (ref 7–25)
CALCIUM SPEC-SCNC: 9 MG/DL (ref 8.6–10.5)
CHLORIDE SERPL-SCNC: 103 MMOL/L (ref 98–107)
CLARITY UR: CLEAR
CO2 SERPL-SCNC: 28.3 MMOL/L (ref 22–29)
COLOR UR: YELLOW
CREAT SERPL-MCNC: 2.23 MG/DL (ref 0.57–1)
CRP SERPL-MCNC: 1.4 MG/DL (ref 0–0.5)
D-LACTATE SERPL-SCNC: 1.2 MMOL/L (ref 0.5–2)
DEPRECATED RDW RBC AUTO: 41.7 FL (ref 37–54)
EGFRCR SERPLBLD CKD-EPI 2021: 22.2 ML/MIN/1.73
EOSINOPHIL # BLD AUTO: 0.12 10*3/MM3 (ref 0–0.4)
EOSINOPHIL NFR BLD AUTO: 1.4 % (ref 0.3–6.2)
ERYTHROCYTE [DISTWIDTH] IN BLOOD BY AUTOMATED COUNT: 12.6 % (ref 12.3–15.4)
ERYTHROCYTE [SEDIMENTATION RATE] IN BLOOD: 12 MM/HR (ref 0–30)
GEN 5 2HR TROPONIN T REFLEX: 62 NG/L
GLOBULIN UR ELPH-MCNC: 2.6 GM/DL
GLUCOSE SERPL-MCNC: 108 MG/DL (ref 65–99)
GLUCOSE UR STRIP-MCNC: NEGATIVE MG/DL
HCT VFR BLD AUTO: 28.5 % (ref 34–46.6)
HGB BLD-MCNC: 8.9 G/DL (ref 12–15.9)
HGB UR QL STRIP.AUTO: NEGATIVE
HYALINE CASTS UR QL AUTO: ABNORMAL /LPF
IMM GRANULOCYTES # BLD AUTO: 0.04 10*3/MM3 (ref 0–0.05)
IMM GRANULOCYTES NFR BLD AUTO: 0.5 % (ref 0–0.5)
KETONES UR QL STRIP: NEGATIVE
LEUKOCYTE ESTERASE UR QL STRIP.AUTO: ABNORMAL
LYMPHOCYTES # BLD AUTO: 0.41 10*3/MM3 (ref 0.7–3.1)
LYMPHOCYTES NFR BLD AUTO: 4.7 % (ref 19.6–45.3)
MAGNESIUM SERPL-MCNC: 2 MG/DL (ref 1.6–2.4)
MCH RBC QN AUTO: 28.4 PG (ref 26.6–33)
MCHC RBC AUTO-ENTMCNC: 31.2 G/DL (ref 31.5–35.7)
MCV RBC AUTO: 91.1 FL (ref 79–97)
MONOCYTES # BLD AUTO: 0.5 10*3/MM3 (ref 0.1–0.9)
MONOCYTES NFR BLD AUTO: 5.7 % (ref 5–12)
NEUTROPHILS NFR BLD AUTO: 7.68 10*3/MM3 (ref 1.7–7)
NEUTROPHILS NFR BLD AUTO: 87.5 % (ref 42.7–76)
NITRITE UR QL STRIP: NEGATIVE
NRBC BLD AUTO-RTO: 0 /100 WBC (ref 0–0.2)
NT-PROBNP SERPL-MCNC: 1668 PG/ML (ref 0–1800)
PH UR STRIP.AUTO: 5.5 [PH] (ref 5–8)
PLATELET # BLD AUTO: 80 10*3/MM3 (ref 140–450)
PMV BLD AUTO: 10.4 FL (ref 6–12)
POTASSIUM SERPL-SCNC: 4 MMOL/L (ref 3.5–5.2)
PROCALCITONIN SERPL-MCNC: 0.44 NG/ML (ref 0–0.25)
PROT SERPL-MCNC: 7.1 G/DL (ref 6–8.5)
PROT UR QL STRIP: NEGATIVE
RBC # BLD AUTO: 3.13 10*6/MM3 (ref 3.77–5.28)
RBC # UR STRIP: ABNORMAL /HPF
REF LAB TEST METHOD: ABNORMAL
SODIUM SERPL-SCNC: 142 MMOL/L (ref 136–145)
SP GR UR STRIP: 1.01 (ref 1–1.03)
SQUAMOUS #/AREA URNS HPF: ABNORMAL /HPF
T4 FREE SERPL-MCNC: 0.9 NG/DL (ref 0.93–1.7)
TROPONIN T DELTA: -4 NG/L
TROPONIN T SERPL HS-MCNC: 66 NG/L
TSH SERPL DL<=0.05 MIU/L-ACNC: 0.45 UIU/ML (ref 0.27–4.2)
UROBILINOGEN UR QL STRIP: ABNORMAL
WBC # UR STRIP: ABNORMAL /HPF
WBC NRBC COR # BLD: 8.77 10*3/MM3 (ref 3.4–10.8)

## 2023-09-27 PROCEDURE — 71250 CT THORAX DX C-: CPT

## 2023-09-27 PROCEDURE — 71250 CT THORAX DX C-: CPT | Performed by: RADIOLOGY

## 2023-09-27 PROCEDURE — 74176 CT ABD & PELVIS W/O CONTRAST: CPT | Performed by: RADIOLOGY

## 2023-09-27 PROCEDURE — 93010 ELECTROCARDIOGRAM REPORT: CPT | Performed by: INTERNAL MEDICINE

## 2023-09-27 PROCEDURE — 84439 ASSAY OF FREE THYROXINE: CPT | Performed by: EMERGENCY MEDICINE

## 2023-09-27 PROCEDURE — 71045 X-RAY EXAM CHEST 1 VIEW: CPT

## 2023-09-27 PROCEDURE — 86140 C-REACTIVE PROTEIN: CPT | Performed by: EMERGENCY MEDICINE

## 2023-09-27 PROCEDURE — 85652 RBC SED RATE AUTOMATED: CPT | Performed by: EMERGENCY MEDICINE

## 2023-09-27 PROCEDURE — 84443 ASSAY THYROID STIM HORMONE: CPT | Performed by: EMERGENCY MEDICINE

## 2023-09-27 PROCEDURE — 87086 URINE CULTURE/COLONY COUNT: CPT | Performed by: EMERGENCY MEDICINE

## 2023-09-27 PROCEDURE — 80053 COMPREHEN METABOLIC PANEL: CPT | Performed by: EMERGENCY MEDICINE

## 2023-09-27 PROCEDURE — 85025 COMPLETE CBC W/AUTO DIFF WBC: CPT | Performed by: EMERGENCY MEDICINE

## 2023-09-27 PROCEDURE — 99285 EMERGENCY DEPT VISIT HI MDM: CPT

## 2023-09-27 PROCEDURE — 83880 ASSAY OF NATRIURETIC PEPTIDE: CPT | Performed by: EMERGENCY MEDICINE

## 2023-09-27 PROCEDURE — 70450 CT HEAD/BRAIN W/O DYE: CPT | Performed by: RADIOLOGY

## 2023-09-27 PROCEDURE — 81001 URINALYSIS AUTO W/SCOPE: CPT | Performed by: EMERGENCY MEDICINE

## 2023-09-27 PROCEDURE — 83605 ASSAY OF LACTIC ACID: CPT | Performed by: EMERGENCY MEDICINE

## 2023-09-27 PROCEDURE — 93005 ELECTROCARDIOGRAM TRACING: CPT | Performed by: EMERGENCY MEDICINE

## 2023-09-27 PROCEDURE — 83735 ASSAY OF MAGNESIUM: CPT | Performed by: EMERGENCY MEDICINE

## 2023-09-27 PROCEDURE — 87040 BLOOD CULTURE FOR BACTERIA: CPT | Performed by: EMERGENCY MEDICINE

## 2023-09-27 PROCEDURE — 84145 PROCALCITONIN (PCT): CPT | Performed by: EMERGENCY MEDICINE

## 2023-09-27 PROCEDURE — 74176 CT ABD & PELVIS W/O CONTRAST: CPT

## 2023-09-27 PROCEDURE — 84484 ASSAY OF TROPONIN QUANT: CPT | Performed by: EMERGENCY MEDICINE

## 2023-09-27 PROCEDURE — 36415 COLL VENOUS BLD VENIPUNCTURE: CPT

## 2023-09-27 PROCEDURE — 71045 X-RAY EXAM CHEST 1 VIEW: CPT | Performed by: RADIOLOGY

## 2023-09-27 PROCEDURE — 99284 EMERGENCY DEPT VISIT MOD MDM: CPT

## 2023-09-27 PROCEDURE — 70450 CT HEAD/BRAIN W/O DYE: CPT

## 2023-09-27 RX ORDER — POLYETHYLENE GLYCOL 3350 17 G/17G
17 POWDER, FOR SOLUTION ORAL DAILY PRN
Status: DISCONTINUED | OUTPATIENT
Start: 2023-09-27 | End: 2023-09-30

## 2023-09-27 RX ORDER — SODIUM CHLORIDE 9 MG/ML
40 INJECTION, SOLUTION INTRAVENOUS AS NEEDED
Status: DISCONTINUED | OUTPATIENT
Start: 2023-09-27 | End: 2023-10-02 | Stop reason: HOSPADM

## 2023-09-27 RX ORDER — SODIUM CHLORIDE 0.9 % (FLUSH) 0.9 %
10 SYRINGE (ML) INJECTION AS NEEDED
Status: DISCONTINUED | OUTPATIENT
Start: 2023-09-27 | End: 2023-10-02 | Stop reason: HOSPADM

## 2023-09-27 RX ORDER — BISACODYL 5 MG/1
5 TABLET, DELAYED RELEASE ORAL DAILY PRN
Status: DISCONTINUED | OUTPATIENT
Start: 2023-09-27 | End: 2023-09-30

## 2023-09-27 RX ORDER — AMOXICILLIN 250 MG
2 CAPSULE ORAL 2 TIMES DAILY
Status: DISCONTINUED | OUTPATIENT
Start: 2023-09-28 | End: 2023-09-30

## 2023-09-27 RX ORDER — BISACODYL 10 MG
10 SUPPOSITORY, RECTAL RECTAL DAILY PRN
Status: DISCONTINUED | OUTPATIENT
Start: 2023-09-27 | End: 2023-09-30

## 2023-09-27 RX ORDER — SODIUM CHLORIDE 0.9 % (FLUSH) 0.9 %
10 SYRINGE (ML) INJECTION EVERY 12 HOURS SCHEDULED
Status: DISCONTINUED | OUTPATIENT
Start: 2023-09-28 | End: 2023-10-02 | Stop reason: HOSPADM

## 2023-09-27 RX ORDER — SODIUM CHLORIDE 9 MG/ML
100 INJECTION, SOLUTION INTRAVENOUS CONTINUOUS
Status: DISCONTINUED | OUTPATIENT
Start: 2023-09-28 | End: 2023-09-30

## 2023-09-27 RX ADMIN — SODIUM CHLORIDE 500 ML: 9 INJECTION, SOLUTION INTRAVENOUS at 16:17

## 2023-09-27 NOTE — CASE MANAGEMENT/SOCIAL WORK
Discharge Planning Assessment   Chris     Patient Name: Susan Us  MRN: [id number]  Today's Date: 9/27/2023    Admit Date: 9/27/2023    Plan: Spoke with patients daughter at bedside Leslie Hermanrott [phone]. Patient lives alone and daughter lives a short distance from patient. Pt currently utilizes [facility] at [phone]. Pt currently utilizes rolling walker, cane and shower chair via unknown provider. Pt's PCP is Trevor Garay. Pt utilizes [facility]. Pt family stated no POA or Living Will. Pt's family will transport patient home at discharge.   Discharge Needs Assessment       Row Name 09/27/23 3571       Living Environment    People in Home alone    Potentially Unsafe Housing Conditions none    Primary Care Provided by self    Provides Primary Care For no one    Family Caregiver if Needed child(zoraida), adult    Family Caregiver Names Leslie Hermanrott [phone]    Quality of Family Relationships helpful;involved;supportive    Able to Return to Prior Arrangements yes       Resource/Environmental Concerns    Resource/Environmental Concerns none    Transportation Concerns none       Food Insecurity    Within the past 12 months, you worried that your food would run out before you got the money to buy more. Never true    Within the past 12 months, the food you bought just didn't last and you didn't have money to get more. Never true       Transition Planning    Patient/Family Anticipates Transition to home    Patient/Family Anticipated Services at Transition none       Discharge Needs Assessment    Readmission Within the Last 30 Days no previous admission in last 30 days    Equipment Currently Used at Home cane, straight;shower chair;walker, rolling    Concerns to be Addressed discharge planning    Anticipated Changes Related to Illness none    Equipment Needed After Discharge none                   Discharge Plan       Row Name 09/27/23 2004       Plan    Plan Spoke with patients daughter  at bedside Leslie Shepard [phone]. Patient lives alone and daughter lives a short distance from patient. Pt currently utilizes [facility]  at [phone]. Pt currently utilizes rolling walker, cane and shower chair via unknown provider. Pt's PCP is Trevor Garay. Pt utilizes [facility]. Pt family stated no POA or Living Will. Pt's family will transport patient home at discharge.    Patient/Family in Agreement with Plan yes                  Continued Care and Services - Admitted Since 9/27/2023    Coordination has not been started for this encounter.       Selected Continued Care - Prior Encounters Includes continued care and service providers with selected services from prior encounters from 6/29/2023 to 9/27/2023      Discharged on 8/3/2023 Admission date: 8/1/2023 - Discharge disposition: Home-Health Care [facility] [place], [place]      Home Medical Care       Service Provider Selected Services Address Phone Fax Patient Preferred    [facility] [address], [facility] [facility] [address] [phone] --                     Ny Velez

## 2023-09-28 LAB
ALBUMIN SERPL-MCNC: 4 G/DL (ref 3.5–5.2)
ALBUMIN/GLOB SERPL: 1.4 G/DL
ALP SERPL-CCNC: 55 U/L (ref 39–117)
ALT SERPL W P-5'-P-CCNC: 10 U/L (ref 1–33)
ANION GAP SERPL CALCULATED.3IONS-SCNC: 12.4 MMOL/L (ref 5–15)
APTT PPP: 35.5 SECONDS (ref 26.5–34.5)
AST SERPL-CCNC: 21 U/L (ref 1–32)
BACTERIA SPEC AEROBE CULT: ABNORMAL
BASOPHILS # BLD AUTO: 0.02 10*3/MM3 (ref 0–0.2)
BASOPHILS NFR BLD AUTO: 0.2 % (ref 0–1.5)
BILIRUB SERPL-MCNC: 0.5 MG/DL (ref 0–1.2)
BUN SERPL-MCNC: 36 MG/DL (ref 8–23)
BUN/CREAT SERPL: 19.4 (ref 7–25)
CALCIUM SPEC-SCNC: 8.9 MG/DL (ref 8.6–10.5)
CHLORIDE SERPL-SCNC: 103 MMOL/L (ref 98–107)
CO2 SERPL-SCNC: 26.6 MMOL/L (ref 22–29)
CREAT SERPL-MCNC: 1.86 MG/DL (ref 0.57–1)
DEPRECATED RDW RBC AUTO: 41.1 FL (ref 37–54)
EGFRCR SERPLBLD CKD-EPI 2021: 27.6 ML/MIN/1.73
EOSINOPHIL # BLD AUTO: 0.22 10*3/MM3 (ref 0–0.4)
EOSINOPHIL NFR BLD AUTO: 2.6 % (ref 0.3–6.2)
ERYTHROCYTE [DISTWIDTH] IN BLOOD BY AUTOMATED COUNT: 12.5 % (ref 12.3–15.4)
GLOBULIN UR ELPH-MCNC: 2.8 GM/DL
GLUCOSE SERPL-MCNC: 100 MG/DL (ref 65–99)
HCT VFR BLD AUTO: 27.6 % (ref 34–46.6)
HGB BLD-MCNC: 8.8 G/DL (ref 12–15.9)
IMM GRANULOCYTES # BLD AUTO: 0.03 10*3/MM3 (ref 0–0.05)
IMM GRANULOCYTES NFR BLD AUTO: 0.4 % (ref 0–0.5)
INR PPP: 1.14 (ref 0.9–1.1)
LYMPHOCYTES # BLD AUTO: 0.47 10*3/MM3 (ref 0.7–3.1)
LYMPHOCYTES NFR BLD AUTO: 5.5 % (ref 19.6–45.3)
MCH RBC QN AUTO: 28.8 PG (ref 26.6–33)
MCHC RBC AUTO-ENTMCNC: 31.9 G/DL (ref 31.5–35.7)
MCV RBC AUTO: 90.2 FL (ref 79–97)
MONOCYTES # BLD AUTO: 0.45 10*3/MM3 (ref 0.1–0.9)
MONOCYTES NFR BLD AUTO: 5.3 % (ref 5–12)
NEUTROPHILS NFR BLD AUTO: 7.3 10*3/MM3 (ref 1.7–7)
NEUTROPHILS NFR BLD AUTO: 86 % (ref 42.7–76)
NRBC BLD AUTO-RTO: 0 /100 WBC (ref 0–0.2)
PLATELET # BLD AUTO: 78 10*3/MM3 (ref 140–450)
PMV BLD AUTO: 11 FL (ref 6–12)
POTASSIUM SERPL-SCNC: 3.5 MMOL/L (ref 3.5–5.2)
PROT SERPL-MCNC: 6.8 G/DL (ref 6–8.5)
PROTHROMBIN TIME: 15.1 SECONDS (ref 12.1–14.7)
QT INTERVAL: 438 MS
QTC INTERVAL: 455 MS
RBC # BLD AUTO: 3.06 10*6/MM3 (ref 3.77–5.28)
SODIUM SERPL-SCNC: 142 MMOL/L (ref 136–145)
WBC NRBC COR # BLD: 8.49 10*3/MM3 (ref 3.4–10.8)

## 2023-09-28 PROCEDURE — 80053 COMPREHEN METABOLIC PANEL: CPT | Performed by: HOSPITALIST

## 2023-09-28 PROCEDURE — 96361 HYDRATE IV INFUSION ADD-ON: CPT

## 2023-09-28 PROCEDURE — 85025 COMPLETE CBC W/AUTO DIFF WBC: CPT | Performed by: HOSPITALIST

## 2023-09-28 PROCEDURE — 96360 HYDRATION IV INFUSION INIT: CPT

## 2023-09-28 PROCEDURE — 97162 PT EVAL MOD COMPLEX 30 MIN: CPT

## 2023-09-28 PROCEDURE — 85730 THROMBOPLASTIN TIME PARTIAL: CPT

## 2023-09-28 PROCEDURE — 94761 N-INVAS EAR/PLS OXIMETRY MLT: CPT

## 2023-09-28 PROCEDURE — 85610 PROTHROMBIN TIME: CPT

## 2023-09-28 RX ORDER — FERROUS SULFATE 325(65) MG
325 TABLET ORAL
Status: DISCONTINUED | OUTPATIENT
Start: 2023-09-29 | End: 2023-10-02 | Stop reason: HOSPADM

## 2023-09-28 RX ORDER — CLOPIDOGREL BISULFATE 75 MG/1
75 TABLET ORAL DAILY
Status: ON HOLD | COMMUNITY
End: 2023-09-28

## 2023-09-28 RX ORDER — ISOSORBIDE MONONITRATE 30 MG/1
30 TABLET, EXTENDED RELEASE ORAL DAILY
Status: DISCONTINUED | OUTPATIENT
Start: 2023-09-29 | End: 2023-10-02 | Stop reason: HOSPADM

## 2023-09-28 RX ORDER — TORSEMIDE 20 MG/1
50 TABLET ORAL DAILY
Status: DISCONTINUED | OUTPATIENT
Start: 2023-09-29 | End: 2023-10-02 | Stop reason: HOSPADM

## 2023-09-28 RX ORDER — FERROUS SULFATE 325(65) MG
325 TABLET ORAL
Status: CANCELLED | OUTPATIENT
Start: 2023-09-28

## 2023-09-28 RX ORDER — DOCUSATE SODIUM 100 MG/1
200 CAPSULE, LIQUID FILLED ORAL DAILY PRN
Status: DISCONTINUED | OUTPATIENT
Start: 2023-09-28 | End: 2023-10-02 | Stop reason: HOSPADM

## 2023-09-28 RX ORDER — ACETAMINOPHEN 325 MG/1
650 TABLET ORAL EVERY 6 HOURS PRN
Status: DISCONTINUED | OUTPATIENT
Start: 2023-09-28 | End: 2023-10-02 | Stop reason: HOSPADM

## 2023-09-28 RX ORDER — ARIPIPRAZOLE 2 MG/1
2 TABLET ORAL DAILY
Status: DISCONTINUED | OUTPATIENT
Start: 2023-09-29 | End: 2023-10-02 | Stop reason: HOSPADM

## 2023-09-28 RX ORDER — MODAFINIL 100 MG/1
200 TABLET ORAL DAILY
Status: DISCONTINUED | OUTPATIENT
Start: 2023-09-29 | End: 2023-10-02 | Stop reason: HOSPADM

## 2023-09-28 RX ORDER — GINGER ROOT/GINGER ROOT EXT 262.5 MG
1 CAPSULE ORAL 2 TIMES DAILY
Status: DISCONTINUED | OUTPATIENT
Start: 2023-09-28 | End: 2023-10-02 | Stop reason: HOSPADM

## 2023-09-28 RX ORDER — POTASSIUM CHLORIDE 750 MG/1
10 TABLET, FILM COATED, EXTENDED RELEASE ORAL 3 TIMES DAILY
COMMUNITY

## 2023-09-28 RX ORDER — ASPIRIN 81 MG/1
81 TABLET ORAL DAILY
Status: DISCONTINUED | OUTPATIENT
Start: 2023-09-29 | End: 2023-10-02 | Stop reason: HOSPADM

## 2023-09-28 RX ORDER — HYDRALAZINE HYDROCHLORIDE 25 MG/1
37.5 TABLET, FILM COATED ORAL 2 TIMES DAILY
Status: DISCONTINUED | OUTPATIENT
Start: 2023-09-28 | End: 2023-09-30

## 2023-09-28 RX ORDER — LORAZEPAM 0.5 MG/1
0.5 TABLET ORAL 2 TIMES DAILY PRN
Status: DISCONTINUED | OUTPATIENT
Start: 2023-09-28 | End: 2023-09-29

## 2023-09-28 RX ORDER — GINGER ROOT/GINGER ROOT EXT 262.5 MG
1 CAPSULE ORAL 2 TIMES DAILY
Status: CANCELLED | OUTPATIENT
Start: 2023-09-28

## 2023-09-28 RX ORDER — HYDRALAZINE HYDROCHLORIDE 25 MG/1
25 TABLET, FILM COATED ORAL 2 TIMES DAILY
COMMUNITY
End: 2023-10-02 | Stop reason: HOSPADM

## 2023-09-28 RX ORDER — TORSEMIDE 20 MG/1
50 TABLET ORAL DAILY
Status: CANCELLED | OUTPATIENT
Start: 2023-09-28

## 2023-09-28 RX ORDER — ISOSORBIDE MONONITRATE 30 MG/1
30 TABLET, EXTENDED RELEASE ORAL DAILY
Status: CANCELLED | OUTPATIENT
Start: 2023-09-28

## 2023-09-28 RX ORDER — LANOLIN ALCOHOL/MO/W.PET/CERES
1000 CREAM (GRAM) TOPICAL DAILY
Status: CANCELLED | OUTPATIENT
Start: 2023-09-28

## 2023-09-28 RX ORDER — LANOLIN ALCOHOL/MO/W.PET/CERES
1000 CREAM (GRAM) TOPICAL DAILY
Status: DISCONTINUED | OUTPATIENT
Start: 2023-09-29 | End: 2023-10-02 | Stop reason: HOSPADM

## 2023-09-28 RX ORDER — ASPIRIN 81 MG/1
81 TABLET ORAL DAILY
Status: CANCELLED | OUTPATIENT
Start: 2023-09-28

## 2023-09-28 RX ORDER — LORAZEPAM 0.5 MG/1
0.5 TABLET ORAL 2 TIMES DAILY PRN
Status: CANCELLED | OUTPATIENT
Start: 2023-09-28

## 2023-09-28 RX ORDER — PANTOPRAZOLE SODIUM 40 MG/1
40 TABLET, DELAYED RELEASE ORAL NIGHTLY
Status: CANCELLED | OUTPATIENT
Start: 2023-09-28

## 2023-09-28 RX ORDER — DONEPEZIL HYDROCHLORIDE 5 MG/1
5 TABLET, FILM COATED ORAL NIGHTLY
Status: CANCELLED | OUTPATIENT
Start: 2023-09-28

## 2023-09-28 RX ORDER — ONDANSETRON 4 MG/1
4 TABLET, FILM COATED ORAL EVERY 8 HOURS PRN
Status: DISCONTINUED | OUTPATIENT
Start: 2023-09-28 | End: 2023-10-02 | Stop reason: HOSPADM

## 2023-09-28 RX ORDER — GABAPENTIN 300 MG/1
300 CAPSULE ORAL 2 TIMES DAILY
Status: DISCONTINUED | OUTPATIENT
Start: 2023-09-28 | End: 2023-10-02 | Stop reason: HOSPADM

## 2023-09-28 RX ORDER — MULTIPLE VITAMINS W/ MINERALS TAB 9MG-400MCG
1 TAB ORAL 2 TIMES DAILY
Status: DISCONTINUED | OUTPATIENT
Start: 2023-09-28 | End: 2023-10-02 | Stop reason: HOSPADM

## 2023-09-28 RX ORDER — HYDRALAZINE HYDROCHLORIDE 25 MG/1
37.5 TABLET, FILM COATED ORAL 2 TIMES DAILY
Status: CANCELLED | OUTPATIENT
Start: 2023-09-28

## 2023-09-28 RX ORDER — ONDANSETRON 4 MG/1
4 TABLET, FILM COATED ORAL EVERY 8 HOURS PRN
Status: CANCELLED | OUTPATIENT
Start: 2023-09-28

## 2023-09-28 RX ORDER — ARIPIPRAZOLE 2 MG/1
2 TABLET ORAL DAILY
Status: CANCELLED | OUTPATIENT
Start: 2023-09-28

## 2023-09-28 RX ORDER — POTASSIUM CHLORIDE 20 MEQ/1
10 TABLET, EXTENDED RELEASE ORAL 3 TIMES DAILY
Status: CANCELLED | OUTPATIENT
Start: 2023-09-28

## 2023-09-28 RX ORDER — MODAFINIL 100 MG/1
200 TABLET ORAL DAILY
Status: CANCELLED | OUTPATIENT
Start: 2023-09-28 | End: 2023-10-05

## 2023-09-28 RX ORDER — PANTOPRAZOLE SODIUM 40 MG/1
40 TABLET, DELAYED RELEASE ORAL NIGHTLY
Status: DISCONTINUED | OUTPATIENT
Start: 2023-09-28 | End: 2023-10-02 | Stop reason: HOSPADM

## 2023-09-28 RX ORDER — ASPIRIN 81 MG
200 TABLET, DELAYED RELEASE (ENTERIC COATED) ORAL DAILY PRN
Status: CANCELLED | OUTPATIENT
Start: 2023-09-28

## 2023-09-28 RX ORDER — GABAPENTIN 300 MG/1
300 CAPSULE ORAL 2 TIMES DAILY
Status: CANCELLED | OUTPATIENT
Start: 2023-09-28

## 2023-09-28 RX ORDER — POTASSIUM CHLORIDE 20 MEQ/1
10 TABLET, EXTENDED RELEASE ORAL 3 TIMES DAILY
Status: DISCONTINUED | OUTPATIENT
Start: 2023-09-28 | End: 2023-10-02 | Stop reason: HOSPADM

## 2023-09-28 RX ORDER — ASPIRIN 81 MG/1
81 TABLET ORAL DAILY
COMMUNITY

## 2023-09-28 RX ORDER — DONEPEZIL HYDROCHLORIDE 5 MG/1
5 TABLET, FILM COATED ORAL NIGHTLY
Status: DISCONTINUED | OUTPATIENT
Start: 2023-09-28 | End: 2023-10-02 | Stop reason: HOSPADM

## 2023-09-28 RX ADMIN — LORAZEPAM 0.5 MG: 0.5 TABLET ORAL at 22:44

## 2023-09-28 RX ADMIN — ACETAMINOPHEN 650 MG: 325 TABLET ORAL at 00:29

## 2023-09-28 RX ADMIN — SODIUM CHLORIDE 100 ML/HR: 9 INJECTION, SOLUTION INTRAVENOUS at 00:13

## 2023-09-28 RX ADMIN — SODIUM CHLORIDE 100 ML/HR: 9 INJECTION, SOLUTION INTRAVENOUS at 20:23

## 2023-09-28 RX ADMIN — POTASSIUM CHLORIDE 10 MEQ: 1500 TABLET, EXTENDED RELEASE ORAL at 22:44

## 2023-09-28 RX ADMIN — HYDRALAZINE HYDROCHLORIDE 37.5 MG: 25 TABLET, FILM COATED ORAL at 23:52

## 2023-09-28 RX ADMIN — Medication 1 TABLET: at 23:52

## 2023-09-28 RX ADMIN — SODIUM CHLORIDE 100 ML/HR: 9 INJECTION, SOLUTION INTRAVENOUS at 10:17

## 2023-09-28 RX ADMIN — SERTRALINE 200 MG: 50 TABLET, FILM COATED ORAL at 23:52

## 2023-09-28 RX ADMIN — ACETAMINOPHEN 650 MG: 325 TABLET ORAL at 14:14

## 2023-09-28 RX ADMIN — DOCUSATE SODIUM 50 MG AND SENNOSIDES 8.6 MG 2 TABLET: 8.6; 5 TABLET, FILM COATED ORAL at 00:30

## 2023-09-28 RX ADMIN — Medication 10 ML: at 08:17

## 2023-09-28 RX ADMIN — Medication 10 ML: at 20:19

## 2023-09-28 RX ADMIN — Medication 10 ML: at 00:17

## 2023-09-28 RX ADMIN — ACETAMINOPHEN 650 MG: 325 TABLET ORAL at 20:19

## 2023-09-28 RX ADMIN — DONEPEZIL HYDROCHLORIDE 5 MG: 5 TABLET, FILM COATED ORAL at 23:52

## 2023-09-28 RX ADMIN — PANTOPRAZOLE SODIUM 40 MG: 40 TABLET, DELAYED RELEASE ORAL at 22:44

## 2023-09-28 RX ADMIN — GABAPENTIN 300 MG: 300 CAPSULE ORAL at 23:56

## 2023-09-28 RX ADMIN — DOCUSATE SODIUM 50 MG AND SENNOSIDES 8.6 MG 2 TABLET: 8.6; 5 TABLET, FILM COATED ORAL at 20:19

## 2023-09-28 NOTE — PLAN OF CARE
Goal Outcome Evaluation:           Progress: no change  Outcome Evaluation: Patient has been able to ambulate in room with PT as well as nursing staff, PRN tylenol given for pain, family at bedside. Patient voices no other complaints or concerns at this time

## 2023-09-28 NOTE — CASE MANAGEMENT/SOCIAL WORK
Discharge Planning Assessment   Chris     Patient Name: Susan Us  MRN: [id number]  Today's Date: 9/28/2023    Admit Date: 9/27/2023         Discharge Plan       Row Name 09/28/23 1542       Plan    Plan SS received nursing consult or lives home alone, fall risk. Pt is confused. SS spoke with Pt's daughter Leslie [phone] on this date who states Pt will need short term rehab at SNF prior to returning home. Daughter requested SS send referral to [facility]. SS spoke with [facility] per Jarrell and faxed referral to be reviewed in EPIC. SS updated Dr. Kaminski and asked for OT eval. Pt's insurance will require pre-auth for SNF placement. SS to follow.                    RAFIQ GlasgowW

## 2023-09-28 NOTE — DISCHARGE PLACEMENT REQUEST
"Rose Mary Us (78 y.o. Female)       Date of Birth   1945    Social Security Number       Address   [address] [facility] [address]    Home Phone   [phone]    MRN   [id number]       Synagogue   None    Marital Status                               Admission Date   9/27/23    Admission Type   Emergency    Admitting Provider   Albert Rangel MD    Attending Provider   Acacia Kaminski DO    Department, Room/Bed   [address], 3342/2S       Discharge Date       Discharge Disposition       Discharge Destination                                 Attending Provider: Acacia Kaminski DO    Allergies: Evolocumab, Statins    Isolation: None   Infection: None   Code Status: No CPR    Ht: 165.1 cm (65\")   Wt: 60 kg (132 lb 4.4 oz)    Admission Cmt: None   Principal Problem: Acute renal insufficiency [N28.9]                   Active Insurance as of 9/27/2023       Primary Coverage       Payor Plan Insurance Group Employer/Plan Group    [facility] MEDICARE REPLACEMENT [facility] MEDICARE REPLACEMENT [id number]       Payor Plan Address Payor Plan Phone Number Payor Plan Fax Number Effective Dates    [address] [address]   3/1/2023 - None Entered    [facility] [id number]         Subscriber Name Subscriber Birth Date Member ID       ROSE MARY US 1945 [id number]               Secondary Coverage       Payor Plan Insurance Group Employer/Plan Group    [employer]        Payor Plan Address Payor Plan Phone Number Payor Plan Fax Number Effective Dates    [address] [phone]  8/1/2023 - None Entered    [facility] [id number]         Subscriber Name Subscriber Birth Date Member ID       ROSE MARY US 1945 [id number]                     Emergency Contacts        (Rel.) Home Phone Work Phone Mobile Phone    SCOTT HINDS (Daughter) [phone] -- --                 History & Physical        Felicia Pope PA-C at " "23       Attestation signed by Albert Rangel MD at 23 1507    I have discussed this patient with Felicia oPpe PA-C, and have reviewed this documentation and agree. Renal function improved with IV fluids; creatinine 1.86 essentially back within baseline CKD range. Platelets slightly lower at 78, hemoglobin stable at 8.8. PTT and INR only marginally elevated. Continue to monitor petechial rash.                        [facility] Services  HISTORY & PHYSICAL    Patient Identification:  Name:  Susan Us  Age:  78 y.o.  Sex:  female  :  1945  MRN:  [id number]   Visit Number:  [id number]  Admit Date: 2023   Primary Care Physician:  Trevor Garay MD     Subjective     Chief complaint:   Chief Complaint   Patient presents with    Weakness - Generalized     History of presenting illness:   Patient is a 78 y.o. female with past medical history significant for bradycardia s/p pacemaker, CKD, dementia, hypertension, hyperlipidemia, anemia, thrombocytopenia, history of TIA that presented to the [facility] emergency department for evaluation of generalized weakness.     Patient examined at bedside in the ED.  Daughter at bedside.  Daughter states that the patient has not been acting like herself for roughly the past 10 days.  She states around that time she was diagnosed with a UTI and started on Macrobid by nephrologist, however she continued to get more confused, therefore her PCP changed her to Levaquin, which seemed to improve her symptoms.  She states this weekend patient was back to her normal mentation, but has since been becoming confused again.  She states that today the patient seemed much more confused and was \"talking out of her head\" . On my exam patient was oriented to self and place, however not time or situation.  This is baseline per daughter.  Daughter states patient has not been complaining of any urinary symptoms, however " she has she has had increased urinary frequency.  Daughter notes a more rapidly progressing dementia since her TIA approximately 2 months ago.  She states however today she was significantly worse.    Upon arrival to the ED, vitals were temperature 98.4, HR 65, RR 17, /58, SPO2 99% on room air.  Labs significant for BUN 41, creatinine 2.23, CRP 1.4, Pro-Yifan 0.44.  Initial high-sensitivity troponin 66, repeat 62.    Patient has been admitted to .   ---------------------------------------------------------------------------------------------------------------------   Review of Systems   Unable to perform ROS: Dementia    ---------------------------------------------------------------------------------------------------------------------   Past Medical History:   Diagnosis Date    Bradycardia     s/p pacemaker placement    CKD (chronic kidney disease)     baseline renal function unknown    Dementia     Hyperlipidemia     Hypertension     Thrombocytopenia      Past Surgical History:   Procedure Laterality Date    CARDIAC PACEMAKER PLACEMENT      CATARACT EXTRACTION      CERVICAL FUSION      CHOLECYSTECTOMY      HIATAL HERNIA REPAIR      HYSTERECTOMY      REPLACEMENT TOTAL KNEE Right      Family History   Problem Relation Age of Onset    Stroke Mother     Stroke Father      Social History     Socioeconomic History    Marital status:    Tobacco Use    Smoking status: Never    Smokeless tobacco: Never    Tobacco comments:     Second hand smoke exposure ()   Vaping Use    Vaping Use: Never used   Substance and Sexual Activity    Alcohol use: Never    Drug use: Never    Sexual activity: Defer     ---------------------------------------------------------------------------------------------------------------------   Allergies:  Statins  ---------------------------------------------------------------------------------------------------------------------   Medications below are reported home medications  pulling from within the system; at this time, these medications have not been reconciled unless otherwise specified and are in the verification process for further verifcation as current home medications.    Prior to Admission Medications       Prescriptions Last Dose Informant Patient Reported? Taking?    ARIPiprazole (ABILIFY) 2 MG tablet   Yes No    Take 1 tablet by mouth Daily.    Bempedoic Acid-Ezetimibe (Nexlizet) 180-10 MG tablet  Child Yes No    Take 1 tablet by mouth Every Night.    Calcium Carbonate-Vitamin D (calcium 500 mg vitamin D 5 mcg, 200 UT,) 500-5 MG-MCG tablet per tablet  Child Yes No    Take 1 tablet by mouth 2 (Two) Times a Day.    Diclofenac Sodium (VOLTAREN) 1 % gel gel  Child Yes No    Apply 4 g topically to the appropriate area as directed 4 (Four) Times a Day As Needed (joint pain).    docusate sodium (COLACE) 250 MG capsule  Child Yes No    Take 1 capsule by mouth Every Night.    donepezil (ARICEPT) 5 MG tablet  Child Yes No    Take 1 tablet by mouth Every Night.    ferrous sulfate 325 (65 FE) MG tablet  Child Yes No    Take 1 tablet by mouth Daily With Breakfast. Patient instructed to take every other day    fluticasone (FLONASE) 50 MCG/ACT nasal spray  Child Yes No    2 sprays into the nostril(s) as directed by provider Every Night.    gabapentin (NEURONTIN) 300 MG capsule  Child Yes No    Take 1 capsule by mouth 2 (Two) Times a Day.    hydrALAZINE (APRESOLINE) 25 MG tablet   No No    Take 1.5 tablets by mouth 2 (Two) Times a Day for 30 days. Patient taking 1 and 1/2 twice per day    Patient taking differently:  Take 1.5 tablets by mouth 2 (Two) Times a Day.    isosorbide mononitrate (IMDUR) 30 MG 24 hr tablet  Child Yes No    Take 1 tablet by mouth Daily.    LORazepam (ATIVAN) 0.5 MG tablet  Child Yes No    Take 1 tablet by mouth 2 (Two) Times a Day.    magnesium chloride ER 64 MG DR tablet  Child Yes No    Take 1 tablet by mouth 2 (Two) Times a Day.    modafinil (PROVIGIL) 200 MG  tablet  Child Yes No    Take 1 tablet by mouth Daily.    Multiple Vitamins-Minerals (ICAPS AREDS 2 PO)  Child Yes No    Take 1 tablet by mouth 2 (Two) Times a Day.    nitroglycerin (NITROSTAT) 0.4 MG SL tablet  Child Yes No    Place 1 tablet under the tongue Every 5 (Five) Minutes As Needed for Chest Pain. Take no more than 3 doses in 15 minutes.    ondansetron (ZOFRAN) 4 MG tablet  Child Yes No    Take 1 tablet by mouth Every 8 (Eight) Hours As Needed for Nausea or Vomiting.    pantoprazole (PROTONIX) 40 MG EC tablet  Child Yes No    Take 1 tablet by mouth Every Night.    sertraline (ZOLOFT) 100 MG tablet   Yes No    Take 2 tablets by mouth Every Night.    torsemide (DEMADEX) 100 MG tablet  Child Yes No    Take 0.5 tablets by mouth Daily.    vitamin B-12 (CYANOCOBALAMIN) 1000 MCG tablet  Child Yes No    Take 1 tablet by mouth Every Night.          ---------------------------------------------------------------------------------------------------------------------    Objective     Hospital Scheduled Meds:          Current listed hospital scheduled medications may not yet reflect those currently placed in orders that are signed and held, awaiting patient's arrival to floor/unit.    ---------------------------------------------------------------------------------------------------------------------   Vital Signs:  Temp:  [98.4 °F (36.9 °C)] 98.4 °F (36.9 °C)  Heart Rate:  [65] 65  Resp:  [17] 17  BP: (113-137)/(50-65) 135/65  Mean Arterial Pressure (Non-Invasive) for the past 24 hrs (Last 3 readings):   Noninvasive MAP (mmHg)   09/27/23 1720 91   09/27/23 1700 85   09/27/23 1640 79     SpO2 Percentage    09/27/23 1600   SpO2: 99%     SpO2:  [99 %] 99 %  on   ;   Device (Oxygen Therapy): room air    Body mass index is 24.96 kg/m².  Wt Readings from Last 3 Encounters:   09/27/23 68 kg (150 lb)   09/22/23 68.1 kg (150 lb 3.2 oz)   08/28/23 67.7 kg (149 lb 3.2 oz)      ---------------------------------------------------------------------------------------------------------------------   Physical Exam:  Physical Exam  Constitutional:       General: She is not in acute distress.     Appearance: Normal appearance.   HENT:      Head: Normocephalic and atraumatic.      Right Ear: External ear normal.      Left Ear: External ear normal.      Nose: No nasal deformity.      Mouth/Throat:      Lips: Pink.      Mouth: Mucous membranes are moist.   Eyes:      Conjunctiva/sclera: Conjunctivae normal.      Pupils: Pupils are equal, round, and reactive to light.   Cardiovascular:      Rate and Rhythm: Normal rate and regular rhythm.      Pulses:           Dorsalis pedis pulses are 2+ on the right side and 2+ on the left side.      Heart sounds: Normal heart sounds.   Pulmonary:      Effort: Pulmonary effort is normal.      Breath sounds: Normal breath sounds. No wheezing, rhonchi or rales.   Abdominal:      General: Abdomen is flat. Bowel sounds are normal.      Palpations: Abdomen is soft.      Tenderness: There is no guarding or rebound.   Genitourinary:     Comments: No sharpe catheter in place   Musculoskeletal:      Cervical back: Neck supple. Normal range of motion.      Right lower leg: No edema.      Left lower leg: No edema.   Skin:     General: Skin is warm and dry.      Findings: Petechiae (Bilateral lower legs) present.   Neurological:      General: No focal deficit present.      Mental Status: She is alert.      Comments: Oriented to self and place only.   Psychiatric:         Mood and Affect: Mood normal.         Behavior: Behavior normal.     ---------------------------------------------------------------------------------------------------------------------  EKG: Atrial paced rhythm.  Heart rate 65.  No acute ST elevation.        I have personally reviewed the EKG/Telemetry  strip  ---------------------------------------------------------------------------------------------------------------------   Results from last 7 days   Lab Units 09/27/23  1800 09/27/23  1615   HSTROP T ng/L 62* 66*     Results from last 7 days   Lab Units 09/27/23  1615   PROBNP pg/mL 1,668.0         Results from last 7 days   Lab Units 09/27/23  1800 09/27/23  1615   CRP mg/dL  --  1.40*   LACTATE mmol/L 1.2  --    WBC 10*3/mm3  --  8.77   HEMOGLOBIN g/dL  --  8.9*   HEMATOCRIT %  --  28.5*   MCV fL  --  91.1   MCHC g/dL  --  31.2*   PLATELETS 10*3/mm3  --  80*     Results from last 7 days   Lab Units 09/27/23  1615   SODIUM mmol/L 142   POTASSIUM mmol/L 4.0   MAGNESIUM mg/dL 2.0   CHLORIDE mmol/L 103   CO2 mmol/L 28.3   BUN mg/dL 41*   CREATININE mg/dL 2.23*   CALCIUM mg/dL 9.0   GLUCOSE mg/dL 108*   ALBUMIN g/dL 4.5   BILIRUBIN mg/dL 0.4   ALK PHOS U/L 61   AST (SGOT) U/L 22   ALT (SGPT) U/L 10   Estimated Creatinine Clearance: 22.7 mL/min (A) (by C-G formula based on SCr of 2.23 mg/dL (H)).  No results found for: AMMONIA    No results found for: HGBA1C, POCGLU  No results found for: HGBA1C  Lab Results   Component Value Date    TSH 0.454 09/27/2023    FREET4 0.90 (L) 09/27/2023       No results found for: BLOODCX  No results found for: URINECX  No results found for: WOUNDCX  No results found for: STOOLCX  No results found for: RESPCX  No results found for: MRSACX  Pain Management Panel          Latest Ref Rng & Units 8/2/2023   Pain Management Panel   Amphetamine, Urine Qual Negative Negative    Barbiturates Screen, Urine Negative Negative    Benzodiazepine Screen, Urine Negative Positive    Buprenorphine, Screen, Urine Negative Negative    Cocaine Screen, Urine Negative Negative    Fentanyl, Urine Negative Negative    Methadone Screen , Urine Negative Negative    Methamphetamine, Ur Negative Negative      I have personally reviewed the above laboratory results.    ---------------------------------------------------------------------------------------------------------------------  Imaging Results (Last 7 Days)       Procedure Component Value Units Date/Time    CT Chest Without Contrast Diagnostic [[id number]] Collected: 09/27/23 1936     Updated: 09/27/23 1942    Narrative:      PROCEDURE: CT of the chest performed without IV contrast on September 27, 2023. The examination was performed with 3 mm axial imaging and 3 mm  sagittal and coronal reconstruction images. Total DLP = 703. The  examination was performed according to as low as reasonably achievable  dose protocol.     HISTORY: Chest pain.     FINDINGS:     Mild degenerative disc disease throughout the thoracic spine.  Dextroconvex curvature affecting the thoracic spine and thoracolumbar  junction.  Prior fixation noted at the cervical spine.  Left-sided pacemaker in place with pacing leads to the right atrium and  right ventricle.  Heart size at the upper limits of normal.  Small size hiatal hernia.  Minimal atelectasis at the lung bases.  Minimal atelectasis in the lower lingula.  No pleural effusion or pneumothorax.  Right upper lobe calcified granulomas.  No interstitial edema or pneumothorax.  No endobronchial lesion.  Small unenlarged mediastinal nodes.  No thoracic aortic aneurysm.  Cholecystectomy clips in the right upper quadrant.  Mild diverticulosis at the splenic flexure.  No free fluid or free air in the upper abdomen.       Impression:         1.  Heart size at the upper limits of normal.  2.  Small size hiatal hernia.  3.  Left-sided pacemaker in place.  4.  Small unenlarged mediastinal nodes.  5.  Minimal atelectasis in the lung bases, left greater than right  6.  Minimal atelectasis in the lower lingula.  7.  No conclusive features of pneumonia, edema, pleural effusion, or  pneumothorax.  8.  No pneumomediastinum.  9.  Calcified granulomas in the right upper lobe.  10.  No thoracic aortic  aneurysm.  11.  Cholecystectomy.        This report was finalized on 9/27/2023 7:40 PM by Shamir Malik MD.       XR Chest 1 View [[id number]] Collected: 09/27/23 1645     Updated: 09/27/23 1647    Narrative:      EXAM:    XR Chest, 1 View     EXAM DATE:    9/27/2023 5:13 PM     CLINICAL HISTORY:    weakness     TECHNIQUE:    Frontal view of the chest.     COMPARISON:    8/2/2023     FINDINGS:    LUNGS AND PLEURAL SPACES:  Coarsened interstitial markings.  No  pneumothorax.    HEART:  Cardiomegaly again noted.    MEDIASTINUM:  Unremarkable as visualized.    BONES/JOINTS:  Unremarkable as visualized.    TUBES, LINES AND DEVICES:  Automatic implantable cardioverter  defibrillator (AICD).       Impression:        Coarsened interstitial markings.        This report was finalized on 9/27/2023 4:45 PM by Dr. Say Valente MD.             I have personally reviewed the above radiology results.     Last Echocardiogram:  Results for orders placed during the hospital encounter of 08/01/23    Adult Transthoracic Echo Complete w/ Color, Spectral and Contrast if necessary per protocol    Interpretation Summary    Left ventricular systolic function is normal. Calculated left ventricular EF = 68% Left ventricular ejection fraction appears to be 66 - 70%.    Left ventricular diastolic function was normal.    Estimated right ventricular systolic pressure from tricuspid regurgitation is normal (<35 mmHg).    ---------------------------------------------------------------------------------------------------------------------    Assessment & Plan      Active Hospital Problems    Diagnosis  POA    **Acute renal insufficiency [N28.9]  Yes     Generalized weakness, POA  Acute renal insufficiency on CKD, stage unknown  Elevated troponin, POA  Dementia  Patient presented with generalized weakness and worsening of baseline confusion  CT head pending to rule out intracranial cause of delirium  CT abdomen/pelvis ordered to rule out obstructive  uropathy  Recent UTI, UA with trace leukocytes/WBC.  No bacteria.  Creatinine 2.23 on admission, baseline unclear.  On last admission creatinine ranged between 1.78-1.96.  Continue with IV hydration and repeat BMP in the a.m.  Initial troponin 66, repeat 62.  Patient with elevated troponin on last admission.  Patient denies any chest pain.  Likely worsening of chronic elevation.  EKG with no acute ischemia.    CHRONIC MEDICAL PROBLEMS    bradycardia s/p pacemaker  Essential hypertension  BP appears well controlled   Plan to resume home antihypertensive regimen once reconciled per pharmacy with appropriate holding parameters to prevent hypotension and/or bradycardia   Closely monitor BP per hospital protocol, titrate medications as necessary  Hyperlipidemia   History TIA   Restart home ASA and statin pending med rec   Normocytic anemia, POA  Thrombocytopenia  Hemoglobin 8.9, Hematocrit 28.5. Baseline hemoglobin ~9  Continue to monitor H&H and signs bleeding  Can transfuse as needed  Petechial rash noted on bilateral lower extremities.  Could be related to thrombocytopenia.  PT/INR/PTT pending.  F/E/N: IV NS at 100 ml/hour.  Continue monitor electrolytes.  Heart healthy diet.    ---------------------------------------------------  DVT Prophylaxis: SCDs  GI Prophylaxis: Bowel regimen  Activity: Up with assistance    The patient is considered to be a high risk patient due to: Generalized weakness, advanced age, acute renal insufficiency    INPATIENT status due to the need for care which can only be reasonably provided in an hospital setting such as aggressive/expedited ancillary services and/or consultation services, the necessity for IV medications, close physician monitoring and/or the possible need for procedures.  In such, I feel patient’s risk for adverse outcomes and need for care warrant INPATIENT evaluation and predict the patient’s care encounter to likely last beyond 2 midnights.      Code Status: DNR/DNI      Disposition/Discharge planning: Pending clinical course    I have discussed the patient's assessment and plan with Dr. Rangel.      Felicia Pope PA-C  Hospitalist Service -- [facility]       09/27/23  21:49 EDT    Attending Physician: Dr. Rangel.     Electronically signed by Albert Rangel MD at 09/28/23 0405       Vital Signs (last day)       Date/Time Temp Temp src Pulse Resp BP Patient Position SpO2    09/28/23 1434 98.1 (36.7) Oral 65 16 161/87 Lying --    09/28/23 0653 98.4 (36.9) Oral 65 16 113/56 Lying 95    09/28/23 0300 97.8 (36.6) Axillary 65 16 110/58 Lying 98    09/27/23 2314 97.1 (36.2) Axillary 62 18 154/62 Lying 96    09/27/23 1720 -- -- -- -- 135/65 -- --    09/27/23 1700 -- -- -- -- 137/62 -- --    09/27/23 1640 -- -- -- -- 132/60 -- --    09/27/23 1619 -- -- -- -- 122/54 -- --    09/27/23 1600 98.4 (36.9) Oral 65 17 113/50 Sitting 99          Lines, Drains & Airways       Active LDAs       Name Placement date Placement time Site Days    Peripheral IV 09/27/23 1615 Left Antecubital 09/27/23  1615  Antecubital  less than 1    External Urinary Catheter --  --  --  --                  Current Facility-Administered Medications   Medication Dose Route Frequency Provider Last Rate Last Admin    acetaminophen (TYLENOL) tablet 650 mg  650 mg Oral Q6H PRN Felicia Pope PA-C   650 mg at 09/28/23 1414    sennosides-docusate (PERICOLACE) 8.6-50 MG per tablet 2 tablet  2 tablet Oral BID Albert Rangel MD   2 tablet at 09/28/23 0030    And    polyethylene glycol (MIRALAX) packet 17 g  17 g Oral Daily PRN Albert Rangel MD        And    bisacodyl (DULCOLAX) EC tablet 5 mg  5 mg Oral Daily PRN Albert Rangel MD        And    bisacodyl (DULCOLAX) suppository 10 mg  10 mg Rectal Daily PRN Albert Rangel MD        sodium chloride 0.9 % flush 10 mL  10 mL Intravenous PRN Albert Rangel MD        sodium chloride 0.9 % flush 10 mL  10 mL Intravenous  Q12H Albert Rangel MD   10 mL at 23 0817    sodium chloride 0.9 % flush 10 mL  10 mL Intravenous PRN Albert Rangel MD        sodium chloride 0.9 % infusion 40 mL  40 mL Intravenous PRN Albert Rangel MD        sodium chloride 0.9 % infusion  100 mL/hr Intravenous Continuous Albert Rangel  mL/hr at 23 1017 100 mL/hr at 23 1017     Operative/Procedure Notes (most recent note)    No notes of this type exist for this encounter.       Physician Progress Notes (most recent note)    No notes of this type exist for this encounter.       Consult Notes (most recent note)    No notes of this type exist for this encounter.          Physical Therapy Notes (most recent note)        Denia Poe, PT at 23 1427  Version 1 of 1         Acute Care - Physical Therapy Initial Evaluation   Chris     Patient Name: Susan Us  : 1945  MRN: [id number]  Today's Date: 2023   Onset of Illness/Injury or Date of Surgery: 23  Visit Dx:   No diagnosis found.  Patient Active Problem List   Diagnosis    Stroke-like symptoms    TIA (transient ischemic attack)    Essential hypertension    Hyperlipidemia LDL goal <70    Artificial cardiac pacemaker    Acute renal insufficiency     Past Medical History:   Diagnosis Date    Bradycardia     s/p pacemaker placement    CKD (chronic kidney disease)     baseline renal function unknown    Dementia     Hyperlipidemia     Hypertension     Thrombocytopenia      Past Surgical History:   Procedure Laterality Date    CARDIAC PACEMAKER PLACEMENT      CATARACT EXTRACTION      CERVICAL FUSION      CHOLECYSTECTOMY      HIATAL HERNIA REPAIR      HYSTERECTOMY      REPLACEMENT TOTAL KNEE Right      PT Assessment (last 12 hours)       PT Evaluation and Treatment       Row Name 23 1412          Physical Therapy Time and Intention    Subjective Information complains of;pain  -AG     Document Type evaluation  -AG     Mode of  Treatment physical therapy  -AG     Patient Effort good  -AG     Symptoms Noted During/After Treatment fatigue  -AG       Row Name 09/28/23 1412          General Information    Patient Profile Reviewed yes  -AG     Onset of Illness/Injury or Date of Surgery 09/27/23  -AG     Referring Physician Dr. Kaminski  -AG     Patient Observations agree to therapy;cooperative;alert  -AG     Patient/Family/Caregiver Comments/Observations pt. supine in bed on room air; pt. is intermittently confused.  Pt. is pleasant, cooperative for evaluation.  -AG     Prior Level of Function independent:;all household mobility  pt. mostly homebound, using walker but admits not using walker consistently.  Daughter assists with BADL.  -AG     Equipment Currently Used at Home shower chair;walker, rolling  -AG     Pertinent History of Current Functional Problem pt. admitted with acute renal insufficiency  -AG     Existing Precautions/Restrictions fall  -AG     Equipment Issued to Patient gait belt  -AG     Risks Reviewed patient:;LOB;dizziness  -AG     Benefits Reviewed patient:;improve function;increase independence;increase strength;increase balance;increase knowledge;decrease risk of DVT  -AG     Barriers to Rehab previous functional deficit;cognitive status  -AG       Row Name 09/28/23 1412          Previous Level of Function/Home Environm    BADLs, Premorbid Functional Level partial assistance  -AG     Bed Mobility, Premorbid Functional Level independent  -AG     Transfers, Premorbid Functional Level independent;uses device or equipment  -AG     Household Ambulation, Premorbid Functional Level independent;uses device or equipment  -AG     Stairs, Premorbid Functional Level partial assistance;uses device or equipment  -AG       Row Name 09/28/23 1412          Living Environment    Current Living Arrangements home  -AG     Home Accessibility stairs to enter home  -AG     People in Home alone  -AG     Primary Care Provided by self;child(zoraida)   daughter assists as needed  -[facility] Name 09/28/23 1412          Home Main Entrance    Number of Stairs, Main Entrance three  -AG     Stair Railings, Main Entrance none  -[facility] Name 09/28/23 1412          Home Use of Assistive/Adaptive Equipment    Equipment Currently Used at Home shower chair;walker, rolling  -[facility] Name 09/28/23 1412          Pain    Additional Documentation --  reports chronic shoulder, neck pain for many years  -[facility] Name 09/28/23 1412          Cognition    Affect/Mental Status (Cognition) WFL  -     Orientation Status (Cognition) oriented x 3  -AG     Follows Commands (Cognition) verbal cues/prompting required  -     Personal Safety Interventions fall prevention program maintained;gait belt;nonskid shoes/slippers when out of bed;supervised activity  -[facility] Name 09/28/23 1412          Range of Motion (ROM)    Range of Motion ROM is WFL;bilateral lower extremities  -[facility] Name 09/28/23 1412          Range of Motion Comprehensive    Additional Documentation --  limited CROM in extension, rotation  -[facility] Name 09/28/23 1412          Strength (Manual Muscle Testing)    Strength (Manual Muscle Testing) --  B LE 4/5  -[facility] Name 09/28/23 1412          Sensory    Hearing Status WFL  -AG       Row Name 09/28/23 1412          Vision Assessment/Intervention    Visual Impairment/Limitations WFL;corrective lenses full-time  -[facility] Name 09/28/23 1412          Mobility    Extremity Weight-bearing Status --  no restrictions  -AG       Row Name 09/28/23 1412          Bed Mobility    Bed Mobility rolling left;rolling right;scooting/bridging;supine-sit;sit-supine  -     Scooting/Bridging [place] (Bed Mobility) standby assist  -     Supine-Sit [place] (Bed Mobility) standby assist  -     Sit-Supine [place] (Bed Mobility) minimum assist (75% patient effort);nonverbal cues (demo/gesture);verbal cues  -     Bed Mobility, Safety  Issues decreased use of arms for pushing/pulling;decreased use of legs for bridging/pushing  -AG     Assistive Device (Bed Mobility) bed rails  -AG       Row Name 09/28/23 1412          Transfers    Transfers sit-stand transfer;stand-sit transfer;stand pivot/stand step transfer  -AG       Row Name 09/28/23 1412          Sit-Stand Transfer    Sit-Stand [place] (Transfers) contact guard;nonverbal cues (demo/gesture);verbal cues  -AG     Assistive Device (Sit-Stand Transfers) walker, front-wheeled  -AG       Row Name 09/28/23 1412          Stand-Sit Transfer    Stand-Sit [place] (Transfers) contact guard;nonverbal cues (demo/gesture);verbal cues  -AG     Assistive Device (Stand-Sit Transfers) walker, front-wheeled  -AG       Row Name 09/28/23 1412          Stand Pivot/Stand Step Transfer    Stand Pivot/Stand Step [place] (Transfers) contact guard;nonverbal cues (demo/gesture);verbal cues  -AG     Assistive Device (Stand Pivot Stand Step Transfer) walker, front-wheeled  -AG       Row Name 09/28/23 1412          Gait/Stairs (Locomotion)    Gait/Stairs Locomotion gait/ambulation independence;gait/ambulation assistive device;distance ambulated;gait pattern;gait deviations  -AG     [place] Level (Gait) verbal cues;nonverbal cues (demo/gesture);contact guard  -AG     Assistive Device (Gait) walker, front-wheeled  -AG     Patient was able to Ambulate yes  -AG     Distance in Feet (Gait) 80  -AG     Pattern (Gait) step-through  -AG     Deviations/Abnormal Patterns (Gait) stride length decreased;gait speed decreased  -AG     Bilateral Gait Deviations forward flexed posture  -AG       Row Name 09/28/23 1412          Safety Issues, Functional Mobility    Impairments Affecting Function (Mobility) balance;endurance/activity tolerance;strength  -AG       [facility] Name 09/28/23 1412          Balance    Balance Assessment sitting static balance;sitting dynamic balance;sit to stand dynamic balance;standing static  balance;standing dynamic balance  -AG     Static Sitting Balance supervision  -AG     Position, Sitting Balance unsupported;sitting edge of bed  -AG     Static Standing Balance contact guard;non-verbal cues (demo/gesture);verbal cues  -AG     Dynamic Standing Balance contact guard;non-verbal cues (demo/gesture);verbal cues  -AG     Position/Device Used, Standing Balance walker, front-wheeled  -AG       Row Name 09/28/23 1412          Coping    Observed Emotional State calm;cooperative  -AG     Verbalized Emotional State acceptance  -AG     Trust Relationship/Rapport care explained;choices provided;thoughts/feelings acknowledged  -AG     Family/Support Persons daughter;son  -AG     Involvement in Care not present at bedside  -AG       Row Name 09/28/23 1412          Plan of Care Review    Plan of Care Reviewed With patient  -AG     Outcome Evaluation PT evaluation complete.  Pt. requires CGA for functional mobility skills, ambulated x 80 ft with RW, CGA with slow pace.  Will continue to follow.  -       Row Name 09/28/23 1412          Positioning and Restraints    Pre-Treatment Position in bed  -AG     Post Treatment Position bed  -AG     In Bed supine;call light within reach;encouraged to call for assist;exit alarm on;side rails up x3;heels elevated  -AG       Row Name 09/28/23 1412          Therapy Assessment/Plan (PT)    Patient/Family Therapy Goals Statement (PT) patient's goal is to return home with daughter to assist as needed  -AG     Functional Level at Time of Evaluation (PT) CGA  -AG     PT Diagnosis (PT) impaired functional mobility, balance and gait safety  -AG     Rehab Potential (PT) good, to achieve stated therapy goals  -AG     Criteria for Skilled Interventions Met (PT) yes;meets criteria  -AG     Therapy Frequency (PT) 2 times/wk  2-5 times/ week per priority  -AG     Predicted Duration of Therapy Intervention (PT) LOS  -AG     Problem List (PT) problems related  to;balance;cognition;mobility;strength  -AG       Row Name 09/28/23 1412          Therapy Plan Review/Discharge Plan (PT)    Therapy Plan Review (PT) evaluation/treatment results reviewed;care plan/treatment goals reviewed;risks/benefits reviewed;current/potential barriers reviewed;participants voiced agreement with care plan;participants included;patient  -AG       Row Name 09/28/23 1412          Physical Therapy Goals    Bed Mobility Goal Selection (PT) bed mobility, PT goal 1  -AG     Transfer Goal Selection (PT) transfer, PT goal 1  -AG     Gait Training Goal Selection (PT) gait training, PT goal 1  -AG       Row Name 09/28/23 1412          Bed Mobility Goal 1 (PT)    Activity/Assistive Device (Bed Mobility Goal 1, PT) scooting;sit to supine;supine to sit  -AG     [place] Level/Cues Needed (Bed Mobility Goal 1, PT) modified independence  -AG     Time Frame (Bed Mobility Goal 1, PT) by discharge  -AG       Row Name 09/28/23 1412          Transfer Goal 1 (PT)    Activity/Assistive Device (Transfer Goal 1, PT) sit-to-stand/stand-to-sit;bed-to-chair/chair-to-bed;toilet;walker, rolling  -AG     [place] Level/Cues Needed (Transfer Goal 1, PT) standby assist  -AG     Time Frame (Transfer Goal 1, PT) by discharge  -AG       Row Name 09/28/23 1412          Gait Training Goal 1 (PT)    Activity/Assistive Device (Gait Training Goal 1, PT) gait (walking locomotion);assistive device use;decrease fall risk;diminish gait deviation;improve balance and speed;increase endurance/gait distance  -AG     [place] Level (Gait Training Goal 1, PT) standby assist  -AG     Distance (Gait Training Goal 1, PT) 150  -AG     Time Frame (Gait Training Goal 1, PT) by discharge  -AG               User Key  (r) = Recorded By, (t) = Taken By, (c) = Cosigned By      Initials Name Provider Type    AG Denia Poe, PT Physical Therapist                    Physical Therapy Education       Title: PT OT SLP Therapies (Done)       Topic:  Physical Therapy (Done)       Point: Mobility training (Done)       Learning Progress Summary             Patient Acceptance, E,D, VU,NR by  at 9/28/2023 1410                         Point: Home exercise program (Done)       Learning Progress Summary             Patient Acceptance, E,D, VU,NR by  at 9/28/2023 1410                         Point: Body mechanics (Done)       Learning Progress Summary             Patient Acceptance, E,D, VU,NR by  at 9/28/2023 1410                         Point: Precautions (Done)       Learning Progress Summary             Patient Acceptance, E,D, VU,NR by  at 9/28/2023 1410                                         User Key       Initials Effective Dates Name Provider Type Discipline     06/16/21 -  Denia Poe, PT Physical Therapist PT                  PT Recommendation and Plan  Anticipated Discharge Disposition (PT): home with assist  Planned Therapy Interventions (PT): balance training, bed mobility training, gait training, home exercise program, transfer training, strengthening, postural re-education, patient/family education, neuromuscular re-education  Therapy Frequency (PT): 2 times/wk (2-5 times/ week per priority)  Plan of Care Reviewed With: patient  Outcome Evaluation: PT evaluation complete.  Pt. requires CGA for functional mobility skills, ambulated x 80 ft with RW, CGA with slow pace.  Will continue to follow.       Time Calculation:    PT Charges       Row Name 09/28/23 1411             Time Calculation    PT Received On 09/28/23  -      PT Goal Re-Cert Due Date 10/12/23  -                User Key  (r) = Recorded By, (t) = Taken By, (c) = Cosigned By      Initials Name Provider Type     Denia Poe, PT Physical Therapist                  Therapy Charges for Today       Code Description Service Date Service Provider Modifiers Qty    74474394538  PT EVAL MOD COMPLEXITY 4 9/28/2023 Denia Poe, PT GP 1            PT G-Codes  AM-PAC 6 Clicks Score  (PT): 17    Denia Poe, PT  9/28/2023      Electronically signed by Denia Poe, PT at 09/28/23 [id number]       Occupational Therapy Notes (most recent note)    No notes exist for this encounter.

## 2023-09-28 NOTE — THERAPY EVALUATION
Acute Care - Physical Therapy Initial Evaluation   Chris     Patient Name: Susan Us  : 1945  MRN: [id number]  Today's Date: 2023   Onset of Illness/Injury or Date of Surgery: 23  Visit Dx:   No diagnosis found.  Patient Active Problem List   Diagnosis    Stroke-like symptoms    TIA (transient ischemic attack)    Essential hypertension    Hyperlipidemia LDL goal <70    Artificial cardiac pacemaker    Acute renal insufficiency     Past Medical History:   Diagnosis Date    Bradycardia     s/p pacemaker placement    CKD (chronic kidney disease)     baseline renal function unknown    Dementia     Hyperlipidemia     Hypertension     Thrombocytopenia      Past Surgical History:   Procedure Laterality Date    CARDIAC PACEMAKER PLACEMENT      CATARACT EXTRACTION      CERVICAL FUSION      CHOLECYSTECTOMY      HIATAL HERNIA REPAIR      HYSTERECTOMY      REPLACEMENT TOTAL KNEE Right      PT Assessment (last 12 hours)       PT Evaluation and Treatment       Row Name 23 1412          Physical Therapy Time and Intention    Subjective Information complains of;pain  -AG     Document Type evaluation  -AG     Mode of Treatment physical therapy  -AG     Patient Effort good  -AG     Symptoms Noted During/After Treatment fatigue  -AG       Row Name 23 1412          General Information    Patient Profile Reviewed yes  -AG     Onset of Illness/Injury or Date of Surgery 23  -AG     Referring Physician Dr. Kaminski  -AG     Patient Observations agree to therapy;cooperative;alert  -AG     Patient/Family/Caregiver Comments/Observations pt. supine in bed on room air; pt. is intermittently confused.  Pt. is pleasant, cooperative for evaluation.  -AG     Prior Level of Function independent:;all household mobility  pt. mostly homebound, using walker but admits not using walker consistently.  Daughter assists with BADL.  -AG     Equipment Currently Used at Home shower chair;walker, rolling  -AG      Pertinent History of Current Functional Problem pt. admitted with acute renal insufficiency  -AG     Existing Precautions/Restrictions fall  -AG     Equipment Issued to Patient gait belt  -AG     Risks Reviewed patient:;LOB;dizziness  -AG     Benefits Reviewed patient:;improve function;increase independence;increase strength;increase balance;increase knowledge;decrease risk of DVT  -AG     Barriers to Rehab previous functional deficit;cognitive status  -AG       Row Name 09/28/23 1412          Previous Level of Function/Home Environm    BADLs, Premorbid Functional Level partial assistance  -AG     Bed Mobility, Premorbid Functional Level independent  -AG     Transfers, Premorbid Functional Level independent;uses device or equipment  -AG     Household Ambulation, Premorbid Functional Level independent;uses device or equipment  -AG     Stairs, Premorbid Functional Level partial assistance;uses device or equipment  -AG       Row Name 09/28/23 1412          Living Environment    Current Living Arrangements home  -AG     Home Accessibility stairs to enter home  -AG     People in Home alone  -AG     Primary Care Provided by self;child(zoraida)  daughter assists as needed  -AG       Row Name 09/28/23 1412          Home Main Entrance    Number of Stairs, Main Entrance three  -AG     Stair Railings, Main Entrance none  -AG       Row Name 09/28/23 1412          Home Use of Assistive/Adaptive Equipment    Equipment Currently Used at Home shower chair;walker, rolling  -AG       Row Name 09/28/23 1412          Pain    Additional Documentation --  reports chronic shoulder, neck pain for many years  -AG       Row Name 09/28/23 1412          Cognition    Affect/Mental Status (Cognition) WFL  -AG     Orientation Status (Cognition) oriented x 3  -AG     Follows Commands (Cognition) verbal cues/prompting required  -AG     Personal Safety Interventions fall prevention program maintained;gait belt;nonskid shoes/slippers when out of  bed;supervised activity  -[facility] Name 09/28/23 1412          Range of Motion (ROM)    Range of Motion ROM is WFL;bilateral lower extremities  -AG       Row Name 09/28/23 1412          Range of Motion Comprehensive    Additional Documentation --  limited CROM in extension, rotation  -AG       Row Name 09/28/23 1412          Strength (Manual Muscle Testing)    Strength (Manual Muscle Testing) --  B LE 4/5  -AG       Row Name 09/28/23 1412          Sensory    Hearing Status WFL  -AG       Row Name 09/28/23 1412          Vision Assessment/Intervention    Visual Impairment/Limitations WFL;corrective lenses full-time  -AG       Row Name 09/28/23 1412          Mobility    Extremity Weight-bearing Status --  no restrictions  -AG       Row Name 09/28/23 1412          Bed Mobility    Bed Mobility rolling left;rolling right;scooting/bridging;supine-sit;sit-supine  -     Scooting/Bridging [place] (Bed Mobility) standby assist  -AG     Supine-Sit [place] (Bed Mobility) standby assist  -     Sit-Supine [place] (Bed Mobility) minimum assist (75% patient effort);nonverbal cues (demo/gesture);verbal cues  -     Bed Mobility, Safety Issues decreased use of arms for pushing/pulling;decreased use of legs for bridging/pushing  -     Assistive Device (Bed Mobility) bed rails  -AG       Row Name 09/28/23 1412          Transfers    Transfers sit-stand transfer;stand-sit transfer;stand pivot/stand step transfer  -AG       Row Name 09/28/23 1412          Sit-Stand Transfer    Sit-Stand [place] (Transfers) contact guard;nonverbal cues (demo/gesture);verbal cues  -     Assistive Device (Sit-Stand Transfers) walker, front-wheeled  -AG       Row Name 09/28/23 1412          Stand-Sit Transfer    Stand-Sit [place] (Transfers) contact guard;nonverbal cues (demo/gesture);verbal cues  -     Assistive Device (Stand-Sit Transfers) walker, front-wheeled  -AG       Row Name 09/28/23 1412          Stand  Pivot/Stand Step Transfer    Stand Pivot/Stand Step [place] (Transfers) contact guard;nonverbal cues (demo/gesture);verbal cues  -AG     Assistive Device (Stand Pivot Stand Step Transfer) walker front-wheeled  -AG       Row Name 09/28/23 1412          Gait/Stairs (Locomotion)    Gait/Stairs Locomotion gait/ambulation independence;gait/ambulation assistive device;distance ambulated;gait pattern;gait deviations  -AG     [place] Level (Gait) verbal cues;nonverbal cues (demo/gesture);contact guard  -AG     Assistive Device (Gait) walker, front-wheeled  -AG     Patient was able to Ambulate yes  -AG     Distance in Feet (Gait) 80  -AG     Pattern (Gait) step-through  -AG     Deviations/Abnormal Patterns (Gait) stride length decreased;gait speed decreased  -AG     Bilateral Gait Deviations forward flexed posture  -AG       Row Name 09/28/23 1412          Safety Issues, Functional Mobility    Impairments Affecting Function (Mobility) balance;endurance/activity tolerance;strength  -AG       Row Name 09/28/23 1412          Balance    Balance Assessment sitting static balance;sitting dynamic balance;sit to stand dynamic balance;standing static balance;standing dynamic balance  -AG     Static Sitting Balance supervision  -AG     Position, Sitting Balance unsupported;sitting edge of bed  -AG     Static Standing Balance contact guard;non-verbal cues (demo/gesture);verbal cues  -AG     Dynamic Standing Balance contact guard;non-verbal cues (demo/gesture);verbal cues  -AG     Position/Device Used, Standing Balance walker front-wheeled  -AG       Row Name 09/28/23 1412          Coping    Observed Emotional State calm;cooperative  -AG     Verbalized Emotional State acceptance  -AG     Trust Relationship/Rapport care explained;choices provided;thoughts/feelings acknowledged  -     Family/Support Persons daughter;son  -AG     Involvement in Care not present at bedside  -AG       Row Name 09/28/23 1412          Plan of  Care Review    Plan of Care Reviewed With patient  -AG     Outcome Evaluation PT evaluation complete.  Pt. requires CGA for functional mobility skills, ambulated x 80 ft with RW, CGA with slow pace.  Will continue to follow.  -AG       Row Name 09/28/23 1412          Positioning and Restraints    Pre-Treatment Position in bed  -AG     Post Treatment Position bed  -AG     In Bed supine;call light within reach;encouraged to call for assist;exit alarm on;side rails up x3;heels elevated  -AG       Row Name 09/28/23 1412          Therapy Assessment/Plan (PT)    Patient/Family Therapy Goals Statement (PT) patient's goal is to return home with daughter to assist as needed  -AG     Functional Level at Time of Evaluation (PT) CGA  -AG     PT Diagnosis (PT) impaired functional mobility, balance and gait safety  -AG     Rehab Potential (PT) good, to achieve stated therapy goals  -AG     Criteria for Skilled Interventions Met (PT) yes;meets criteria  -AG     Therapy Frequency (PT) 2 times/wk  2-5 times/ week per priority  -AG     Predicted Duration of Therapy Intervention (PT) LOS  -AG     Problem List (PT) problems related to;balance;cognition;mobility;strength  -AG       Row Name 09/28/23 1412          Therapy Plan Review/Discharge Plan (PT)    Therapy Plan Review (PT) evaluation/treatment results reviewed;care plan/treatment goals reviewed;risks/benefits reviewed;current/potential barriers reviewed;participants voiced agreement with care plan;participants included;patient  -AG       Row Name 09/28/23 1412          Physical Therapy Goals    Bed Mobility Goal Selection (PT) bed mobility, PT goal 1  -AG     Transfer Goal Selection (PT) transfer, PT goal 1  -AG     Gait Training Goal Selection (PT) gait training, PT goal 1  -AG       Row Name 09/28/23 1412          Bed Mobility Goal 1 (PT)    Activity/Assistive Device (Bed Mobility Goal 1, PT) scooting;sit to supine;supine to sit  -AG     [place] Level/Cues Needed (Bed  Mobility Goal 1, PT) modified independence  -AG     Time Frame (Bed Mobility Goal 1, PT) by discharge  -AG       Row Name 09/28/23 1412          Transfer Goal 1 (PT)    Activity/Assistive Device (Transfer Goal 1, PT) sit-to-stand/stand-to-sit;bed-to-chair/chair-to-bed;toilet;walker, rolling  -AG     [place] Level/Cues Needed (Transfer Goal 1, PT) standby assist  -AG     Time Frame (Transfer Goal 1, PT) by discharge  -AG       Row Name 09/28/23 1412          Gait Training Goal 1 (PT)    Activity/Assistive Device (Gait Training Goal 1, PT) gait (walking locomotion);assistive device use;decrease fall risk;diminish gait deviation;improve balance and speed;increase endurance/gait distance  -AG     [place] Level (Gait Training Goal 1, PT) standby assist  -AG     Distance (Gait Training Goal 1, PT) 150  -AG     Time Frame (Gait Training Goal 1, PT) by discharge  -AG               User Key  (r) = Recorded By, (t) = Taken By, (c) = Cosigned By      Initials Name Provider Type     Denia Poe, PT Physical Therapist                    Physical Therapy Education       Title: PT OT SLP Therapies (Done)       Topic: Physical Therapy (Done)       Point: Mobility training (Done)       Learning Progress Summary             Patient Acceptance, E,D, VU,NR by  at 9/28/2023 1410                         Point: Home exercise program (Done)       Learning Progress Summary             Patient Acceptance, E,D, VU,NR by  at 9/28/2023 1410                         Point: Body mechanics (Done)       Learning Progress Summary             Patient Acceptance, E,D, VU,NR by  at 9/28/2023 1410                         Point: Precautions (Done)       Learning Progress Summary             Patient Acceptance, E,D, VU,NR by  at 9/28/2023 1410                                         User Key       Initials Effective Dates Name Provider Type [facility] 06/16/21 -  Denia Poe, PT Physical Therapist PT                  PT  Recommendation and Plan  Anticipated Discharge Disposition (PT): home with assist  Planned Therapy Interventions (PT): balance training, bed mobility training, gait training, home exercise program, transfer training, strengthening, postural re-education, patient/family education, neuromuscular re-education  Therapy Frequency (PT): 2 times/wk (2-5 times/ week per priority)  Plan of Care Reviewed With: patient  Outcome Evaluation: PT evaluation complete.  Pt. requires CGA for functional mobility skills, ambulated x 80 ft with RW, CGA with slow pace.  Will continue to follow.       Time Calculation:    PT Charges       Row Name 09/28/23 1411             Time Calculation    PT Received On 09/28/23  -      PT Goal Re-Cert Due Date 10/12/23  -AG                User Key  (r) = Recorded By, (t) = Taken By, (c) = Cosigned By      Initials Name Provider Type    Denia Pike, PT Physical Therapist                  Therapy Charges for Today       Code Description Service Date Service Provider Modifiers Qty    67738180441 HC PT EVAL MOD COMPLEXITY 4 9/28/2023 Denia Poe, PT GP 1            PT G-Codes  AM-PAC 6 Clicks Score (PT): 17    Denia Poe PT  9/28/2023

## 2023-09-28 NOTE — PLAN OF CARE
Goal Outcome Evaluation:  Plan of Care Reviewed With: patient           Outcome Evaluation: Pt admited to floor this shift, rested in bed with daughter at bedside. Pt A&O on RA. Orders reviewed and followed, VSS and will continue with POC.

## 2023-09-28 NOTE — H&P
"     [facility]  HISTORY & PHYSICAL    Patient Identification:  Name:  Susan Us  Age:  78 y.o.  Sex:  female  :  1945  MRN:  [id number]   Visit Number:  [id number]  Admit Date: 2023   Primary Care Physician:  Trevor Garay MD     Subjective     Chief complaint:   Chief Complaint   Patient presents with    Weakness - Generalized     History of presenting illness:   Patient is a 78 y.o. female with past medical history significant for bradycardia s/p pacemaker, CKD, dementia, hypertension, hyperlipidemia, anemia, thrombocytopenia, history of TIA that presented to the [facility] emergency department for evaluation of generalized weakness.     Patient examined at bedside in the ED.  Daughter at bedside.  Daughter states that the patient has not been acting like herself for roughly the past 10 days.  She states around that time she was diagnosed with a UTI and started on Macrobid by nephrologist, however she continued to get more confused, therefore her PCP changed her to Levaquin, which seemed to improve her symptoms.  She states this weekend patient was back to her normal mentation, but has since been becoming confused again.  She states that today the patient seemed much more confused and was \"talking out of her head\" . On my exam patient was oriented to self and place, however not time or situation.  This is baseline per daughter.  Daughter states patient has not been complaining of any urinary symptoms, however she has she has had increased urinary frequency.  Daughter notes a more rapidly progressing dementia since her TIA approximately 2 months ago.  She states however today she was significantly worse.    Upon arrival to the ED, vitals were temperature 98.4, HR 65, RR 17, /58, SPO2 99% on room air.  Labs significant for BUN 41, creatinine 2.23, CRP 1.4, Pro-Yifan 0.44.  Initial high-sensitivity troponin 66, repeat 62.    Patient has been " admitted to 3N.   ---------------------------------------------------------------------------------------------------------------------   Review of Systems   Unable to perform ROS: Dementia    ---------------------------------------------------------------------------------------------------------------------   Past Medical History:   Diagnosis Date    Bradycardia     s/p pacemaker placement    CKD (chronic kidney disease)     baseline renal function unknown    Dementia     Hyperlipidemia     Hypertension     Thrombocytopenia      Past Surgical History:   Procedure Laterality Date    CARDIAC PACEMAKER PLACEMENT      CATARACT EXTRACTION      CERVICAL FUSION      CHOLECYSTECTOMY      HIATAL HERNIA REPAIR      HYSTERECTOMY      REPLACEMENT TOTAL KNEE Right      Family History   Problem Relation Age of Onset    Stroke Mother     Stroke Father      Social History     Socioeconomic History    Marital status:    Tobacco Use    Smoking status: Never    Smokeless tobacco: Never    Tobacco comments:     Second hand smoke exposure ()   Vaping Use    Vaping Use: Never used   Substance and Sexual Activity    Alcohol use: Never    Drug use: Never    Sexual activity: Defer     ---------------------------------------------------------------------------------------------------------------------   Allergies:  Statins  ---------------------------------------------------------------------------------------------------------------------   Medications below are reported home medications pulling from within the system; at this time, these medications have not been reconciled unless otherwise specified and are in the verification process for further verifcation as current home medications.    Prior to Admission Medications       Prescriptions Last Dose Informant Patient Reported? Taking?    ARIPiprazole (ABILIFY) 2 MG tablet   Yes No    Take 1 tablet by mouth Daily.    Bempedoic Acid-Ezetimibe (Nexlizet) 180-10 MG tablet   Child Yes No    Take 1 tablet by mouth Every Night.    Calcium Carbonate-Vitamin D (calcium 500 mg vitamin D 5 mcg, 200 UT,) 500-5 MG-MCG tablet per tablet  Child Yes No    Take 1 tablet by mouth 2 (Two) Times a Day.    Diclofenac Sodium (VOLTAREN) 1 % gel gel  Child Yes No    Apply 4 g topically to the appropriate area as directed 4 (Four) Times a Day As Needed (joint pain).    docusate sodium (COLACE) 250 MG capsule  Child Yes No    Take 1 capsule by mouth Every Night.    donepezil (ARICEPT) 5 MG tablet  Child Yes No    Take 1 tablet by mouth Every Night.    ferrous sulfate 325 (65 FE) MG tablet  Child Yes No    Take 1 tablet by mouth Daily With Breakfast. Patient instructed to take every other day    fluticasone (FLONASE) 50 MCG/ACT nasal spray  Child Yes No    2 sprays into the nostril(s) as directed by provider Every Night.    gabapentin (NEURONTIN) 300 MG capsule  Child Yes No    Take 1 capsule by mouth 2 (Two) Times a Day.    hydrALAZINE (APRESOLINE) 25 MG tablet   No No    Take 1.5 tablets by mouth 2 (Two) Times a Day for 30 days. Patient taking 1 and 1/2 twice per day    Patient taking differently:  Take 1.5 tablets by mouth 2 (Two) Times a Day.    isosorbide mononitrate (IMDUR) 30 MG 24 hr tablet  Child Yes No    Take 1 tablet by mouth Daily.    LORazepam (ATIVAN) 0.5 MG tablet  Child Yes No    Take 1 tablet by mouth 2 (Two) Times a Day.    magnesium chloride ER 64 MG DR tablet  Child Yes No    Take 1 tablet by mouth 2 (Two) Times a Day.    modafinil (PROVIGIL) 200 MG tablet  Child Yes No    Take 1 tablet by mouth Daily.    Multiple Vitamins-Minerals (ICAPS AREDS 2 PO)  Child Yes No    Take 1 tablet by mouth 2 (Two) Times a Day.    nitroglycerin (NITROSTAT) 0.4 MG SL tablet  Child Yes No    Place 1 tablet under the tongue Every 5 (Five) Minutes As Needed for Chest Pain. Take no more than 3 doses in 15 minutes.    ondansetron (ZOFRAN) 4 MG tablet  Child Yes No    Take 1 tablet by mouth Every 8 (Eight) Hours  As Needed for Nausea or Vomiting.    pantoprazole (PROTONIX) 40 MG EC tablet  Child Yes No    Take 1 tablet by mouth Every Night.    sertraline (ZOLOFT) 100 MG tablet   Yes No    Take 2 tablets by mouth Every Night.    torsemide (DEMADEX) 100 MG tablet  Child Yes No    Take 0.5 tablets by mouth Daily.    vitamin B-12 (CYANOCOBALAMIN) 1000 MCG tablet  Child Yes No    Take 1 tablet by mouth Every Night.          ---------------------------------------------------------------------------------------------------------------------    Objective     Hospital Scheduled Meds:          Current listed hospital scheduled medications may not yet reflect those currently placed in orders that are signed and held, awaiting patient's arrival to floor/unit.    ---------------------------------------------------------------------------------------------------------------------   Vital Signs:  Temp:  [98.4 °F (36.9 °C)] 98.4 °F (36.9 °C)  Heart Rate:  [65] 65  Resp:  [17] 17  BP: (113-137)/(50-65) 135/65  Mean Arterial Pressure (Non-Invasive) for the past 24 hrs (Last 3 readings):   Noninvasive MAP (mmHg)   09/27/23 1720 91   09/27/23 1700 85   09/27/23 1640 79     SpO2 Percentage    09/27/23 1600   SpO2: 99%     SpO2:  [99 %] 99 %  on   ;   Device (Oxygen Therapy): room air    Body mass index is 24.96 kg/m².  Wt Readings from Last 3 Encounters:   09/27/23 68 kg (150 lb)   09/22/23 68.1 kg (150 lb 3.2 oz)   08/28/23 67.7 kg (149 lb 3.2 oz)     ---------------------------------------------------------------------------------------------------------------------   Physical Exam:  Physical Exam  Constitutional:       General: She is not in acute distress.     Appearance: Normal appearance.   HENT:      Head: Normocephalic and atraumatic.      Right Ear: External ear normal.      Left Ear: External ear normal.      Nose: No nasal deformity.      Mouth/Throat:      Lips: Pink.      Mouth: Mucous membranes are moist.   Eyes:       Conjunctiva/sclera: Conjunctivae normal.      Pupils: Pupils are equal, round, and reactive to light.   Cardiovascular:      Rate and Rhythm: Normal rate and regular rhythm.      Pulses:           Dorsalis pedis pulses are 2+ on the right side and 2+ on the left side.      Heart sounds: Normal heart sounds.   Pulmonary:      Effort: Pulmonary effort is normal.      Breath sounds: Normal breath sounds. No wheezing, rhonchi or rales.   Abdominal:      General: Abdomen is flat. Bowel sounds are normal.      Palpations: Abdomen is soft.      Tenderness: There is no guarding or rebound.   Genitourinary:     Comments: No sharpe catheter in place   Musculoskeletal:      Cervical back: Neck supple. Normal range of motion.      Right lower leg: No edema.      Left lower leg: No edema.   Skin:     General: Skin is warm and dry.      Findings: Petechiae (Bilateral lower legs) present.   Neurological:      General: No focal deficit present.      Mental Status: She is alert.      Comments: Oriented to self and place only.   Psychiatric:         Mood and Affect: Mood normal.         Behavior: Behavior normal.     ---------------------------------------------------------------------------------------------------------------------  EKG: Atrial paced rhythm.  Heart rate 65.  No acute ST elevation.        I have personally reviewed the EKG/Telemetry strip  ---------------------------------------------------------------------------------------------------------------------   Results from last 7 days   Lab Units 09/27/23  1800 09/27/23  1615   HSTROP T ng/L 62* 66*     Results from last 7 days   Lab Units 09/27/23  1615   PROBNP pg/mL 1,668.0         Results from last 7 days   Lab Units 09/27/23  1800 09/27/23  1615   CRP mg/dL  --  1.40*   LACTATE mmol/L 1.2  --    WBC 10*3/mm3  --  8.77   HEMOGLOBIN g/dL  --  8.9*   HEMATOCRIT %  --  28.5*   MCV fL  --  91.1   MCHC g/dL  --  31.2*   PLATELETS 10*3/mm3  --  80*     Results from last 7  days   Lab Units 09/27/23  1615   SODIUM mmol/L 142   POTASSIUM mmol/L 4.0   MAGNESIUM mg/dL 2.0   CHLORIDE mmol/L 103   CO2 mmol/L 28.3   BUN mg/dL 41*   CREATININE mg/dL 2.23*   CALCIUM mg/dL 9.0   GLUCOSE mg/dL 108*   ALBUMIN g/dL 4.5   BILIRUBIN mg/dL 0.4   ALK PHOS U/L 61   AST (SGOT) U/L 22   ALT (SGPT) U/L 10   Estimated Creatinine Clearance: 22.7 mL/min (A) (by C-G formula based on SCr of 2.23 mg/dL (H)).  No results found for: AMMONIA    No results found for: HGBA1C, POCGLU  No results found for: HGBA1C  Lab Results   Component Value Date    TSH 0.454 09/27/2023    FREET4 0.90 (L) 09/27/2023       No results found for: BLOODCX  No results found for: URINECX  No results found for: WOUNDCX  No results found for: STOOLCX  No results found for: RESPCX  No results found for: MRSACX  Pain Management Panel          Latest Ref Rng & Units 8/2/2023   Pain Management Panel   Amphetamine, Urine Qual Negative Negative    Barbiturates Screen, Urine Negative Negative    Benzodiazepine Screen, Urine Negative Positive    Buprenorphine, Screen, Urine Negative Negative    Cocaine Screen, Urine Negative Negative    Fentanyl, Urine Negative Negative    Methadone Screen , Urine Negative Negative    Methamphetamine, Ur Negative Negative      I have personally reviewed the above laboratory results.   ---------------------------------------------------------------------------------------------------------------------  Imaging Results (Last 7 Days)       Procedure Component Value Units Date/Time    CT Chest Without Contrast Diagnostic [[id number]] Collected: 09/27/23 1936     Updated: 09/27/23 1942    Narrative:      PROCEDURE: CT of the chest performed without IV contrast on September  27, 2023. The examination was performed with 3 mm axial imaging and 3 mm  sagittal and coronal reconstruction images. Total DLP = 703. The  examination was performed according to as low as reasonably achievable  dose protocol.     HISTORY: Chest  pain.     FINDINGS:     Mild degenerative disc disease throughout the thoracic spine.  Dextroconvex curvature affecting the thoracic spine and thoracolumbar  junction.  Prior fixation noted at the cervical spine.  Left-sided pacemaker in place with pacing leads to the right atrium and  right ventricle.  Heart size at the upper limits of normal.  Small size hiatal hernia.  Minimal atelectasis at the lung bases.  Minimal atelectasis in the lower lingula.  No pleural effusion or pneumothorax.  Right upper lobe calcified granulomas.  No interstitial edema or pneumothorax.  No endobronchial lesion.  Small unenlarged mediastinal nodes.  No thoracic aortic aneurysm.  Cholecystectomy clips in the right upper quadrant.  Mild diverticulosis at the splenic flexure.  No free fluid or free air in the upper abdomen.       Impression:         1.  Heart size at the upper limits of normal.  2.  Small size hiatal hernia.  3.  Left-sided pacemaker in place.  4.  Small unenlarged mediastinal nodes.  5.  Minimal atelectasis in the lung bases, left greater than right  6.  Minimal atelectasis in the lower lingula.  7.  No conclusive features of pneumonia, edema, pleural effusion, or  pneumothorax.  8.  No pneumomediastinum.  9.  Calcified granulomas in the right upper lobe.  10.  No thoracic aortic aneurysm.  11.  Cholecystectomy.        This report was finalized on 9/27/2023 7:40 PM by Shamir Malik MD.       XR Chest 1 View [[id number]] Collected: 09/27/23 1645     Updated: 09/27/23 1647    Narrative:      EXAM:    XR Chest, 1 View     EXAM DATE:    9/27/2023 5:13 PM     CLINICAL HISTORY:    weakness     TECHNIQUE:    Frontal view of the chest.     COMPARISON:    8/2/2023     FINDINGS:    LUNGS AND PLEURAL SPACES:  Coarsened interstitial markings.  No  pneumothorax.    HEART:  Cardiomegaly again noted.    MEDIASTINUM:  Unremarkable as visualized.    BONES/JOINTS:  Unremarkable as visualized.    TUBES, LINES AND DEVICES:  Automatic  implantable cardioverter  defibrillator (AICD).       Impression:        Coarsened interstitial markings.        This report was finalized on 9/27/2023 4:45 PM by Dr. Say Valente MD.             I have personally reviewed the above radiology results.     Last Echocardiogram:  Results for orders placed during the hospital encounter of 08/01/23    Adult Transthoracic Echo Complete w/ Color, Spectral and Contrast if necessary per protocol    Interpretation Summary    Left ventricular systolic function is normal. Calculated left ventricular EF = 68% Left ventricular ejection fraction appears to be 66 - 70%.    Left ventricular diastolic function was normal.    Estimated right ventricular systolic pressure from tricuspid regurgitation is normal (<35 mmHg).    ---------------------------------------------------------------------------------------------------------------------    Assessment & Plan      Active Hospital Problems    Diagnosis  POA    **Acute renal insufficiency [N28.9]  Yes     Generalized weakness, POA  Acute renal insufficiency on CKD, stage unknown  Elevated troponin, POA  Dementia  Patient presented with generalized weakness and worsening of baseline confusion  CT head pending to rule out intracranial cause of delirium  CT abdomen/pelvis ordered to rule out obstructive uropathy  Recent UTI, UA with trace leukocytes/WBC.  No bacteria.  Creatinine 2.23 on admission, baseline unclear.  On last admission creatinine ranged between 1.78-1.96.  Continue with IV hydration and repeat BMP in the a.m.  Initial troponin 66, repeat 62.  Patient with elevated troponin on last admission.  Patient denies any chest pain.  Likely worsening of chronic elevation.  EKG with no acute ischemia.    CHRONIC MEDICAL PROBLEMS    bradycardia s/p pacemaker  Essential hypertension  BP appears well controlled   Plan to resume home antihypertensive regimen once reconciled per pharmacy with appropriate holding parameters to prevent  hypotension and/or bradycardia   Closely monitor BP per hospital protocol, titrate medications as necessary  Hyperlipidemia   History TIA   Restart home ASA and statin pending med rec   Normocytic anemia, POA  Thrombocytopenia  Hemoglobin 8.9, Hematocrit 28.5. Baseline hemoglobin ~9  Continue to monitor H&H and signs bleeding  Can transfuse as needed  Petechial rash noted on bilateral lower extremities.  Could be related to thrombocytopenia.  PT/INR/PTT pending.  F/E/N: IV NS at 100 ml/hour.  Continue monitor electrolytes.  Heart healthy diet.    ---------------------------------------------------  DVT Prophylaxis: SCDs  GI Prophylaxis: Bowel regimen  Activity: Up with assistance    The patient is considered to be a high risk patient due to: Generalized weakness, advanced age, acute renal insufficiency    INPATIENT status due to the need for care which can only be reasonably provided in an hospital setting such as aggressive/expedited ancillary services and/or consultation services, the necessity for IV medications, close physician monitoring and/or the possible need for procedures.  In such, I feel patient’s risk for adverse outcomes and need for care warrant INPATIENT evaluation and predict the patient’s care encounter to likely last beyond 2 midnights.      Code Status: DNR/DNI     Disposition/Discharge planning: Pending clinical course    I have discussed the patient's assessment and plan with Dr. Rangel.      Felicia Pope PA-C  Hospitalist Service -- Psychiatric       09/27/23  21:49 EDT    Attending Physician: Dr. Rangel.

## 2023-09-29 LAB
ANION GAP SERPL CALCULATED.3IONS-SCNC: 9.9 MMOL/L (ref 5–15)
BUN SERPL-MCNC: 27 MG/DL (ref 8–23)
BUN/CREAT SERPL: 17.8 (ref 7–25)
CALCIUM SPEC-SCNC: 9.5 MG/DL (ref 8.6–10.5)
CHLORIDE SERPL-SCNC: 107 MMOL/L (ref 98–107)
CO2 SERPL-SCNC: 24.1 MMOL/L (ref 22–29)
CREAT SERPL-MCNC: 1.52 MG/DL (ref 0.57–1)
DEPRECATED RDW RBC AUTO: 41.1 FL (ref 37–54)
EGFRCR SERPLBLD CKD-EPI 2021: 35.2 ML/MIN/1.73
ERYTHROCYTE [DISTWIDTH] IN BLOOD BY AUTOMATED COUNT: 12.4 % (ref 12.3–15.4)
GLUCOSE SERPL-MCNC: 95 MG/DL (ref 65–99)
HCT VFR BLD AUTO: 29.3 % (ref 34–46.6)
HGB BLD-MCNC: 9.4 G/DL (ref 12–15.9)
MCH RBC QN AUTO: 29 PG (ref 26.6–33)
MCHC RBC AUTO-ENTMCNC: 32.1 G/DL (ref 31.5–35.7)
MCV RBC AUTO: 90.4 FL (ref 79–97)
PLATELET # BLD AUTO: 87 10*3/MM3 (ref 140–450)
PMV BLD AUTO: 11.4 FL (ref 6–12)
POTASSIUM SERPL-SCNC: 3.6 MMOL/L (ref 3.5–5.2)
RBC # BLD AUTO: 3.24 10*6/MM3 (ref 3.77–5.28)
SODIUM SERPL-SCNC: 141 MMOL/L (ref 136–145)
WBC NRBC COR # BLD: 5.82 10*3/MM3 (ref 3.4–10.8)

## 2023-09-29 PROCEDURE — 97116 GAIT TRAINING THERAPY: CPT

## 2023-09-29 PROCEDURE — 80048 BASIC METABOLIC PNL TOTAL CA: CPT | Performed by: STUDENT IN AN ORGANIZED HEALTH CARE EDUCATION/TRAINING PROGRAM

## 2023-09-29 PROCEDURE — G0378 HOSPITAL OBSERVATION PER HR: HCPCS

## 2023-09-29 PROCEDURE — 96361 HYDRATE IV INFUSION ADD-ON: CPT

## 2023-09-29 PROCEDURE — 97166 OT EVAL MOD COMPLEX 45 MIN: CPT

## 2023-09-29 PROCEDURE — 85027 COMPLETE CBC AUTOMATED: CPT | Performed by: STUDENT IN AN ORGANIZED HEALTH CARE EDUCATION/TRAINING PROGRAM

## 2023-09-29 RX ORDER — LORAZEPAM 0.5 MG/1
0.5 TABLET ORAL EVERY 12 HOURS SCHEDULED
Status: DISCONTINUED | OUTPATIENT
Start: 2023-09-29 | End: 2023-10-02 | Stop reason: HOSPADM

## 2023-09-29 RX ORDER — HYDROXYZINE HYDROCHLORIDE 25 MG/1
25 TABLET, FILM COATED ORAL 3 TIMES DAILY PRN
Status: DISCONTINUED | OUTPATIENT
Start: 2023-09-29 | End: 2023-10-02 | Stop reason: HOSPADM

## 2023-09-29 RX ADMIN — DONEPEZIL HYDROCHLORIDE 5 MG: 5 TABLET, FILM COATED ORAL at 21:53

## 2023-09-29 RX ADMIN — Medication 1 TABLET: at 08:48

## 2023-09-29 RX ADMIN — HYDRALAZINE HYDROCHLORIDE 37.5 MG: 25 TABLET, FILM COATED ORAL at 08:48

## 2023-09-29 RX ADMIN — GABAPENTIN 300 MG: 300 CAPSULE ORAL at 21:53

## 2023-09-29 RX ADMIN — Medication 1000 MCG: at 08:48

## 2023-09-29 RX ADMIN — PANTOPRAZOLE SODIUM 40 MG: 40 TABLET, DELAYED RELEASE ORAL at 21:54

## 2023-09-29 RX ADMIN — MODAFINIL 200 MG: 100 TABLET ORAL at 08:48

## 2023-09-29 RX ADMIN — SODIUM CHLORIDE 100 ML/HR: 9 INJECTION, SOLUTION INTRAVENOUS at 15:15

## 2023-09-29 RX ADMIN — LORAZEPAM 0.5 MG: 0.5 TABLET ORAL at 21:53

## 2023-09-29 RX ADMIN — LORAZEPAM 0.5 MG: 0.5 TABLET ORAL at 11:08

## 2023-09-29 RX ADMIN — GABAPENTIN 300 MG: 300 CAPSULE ORAL at 08:49

## 2023-09-29 RX ADMIN — Medication 1 TABLET: at 21:54

## 2023-09-29 RX ADMIN — HYDROXYZINE HYDROCHLORIDE 25 MG: 25 TABLET, FILM COATED ORAL at 02:19

## 2023-09-29 RX ADMIN — POTASSIUM CHLORIDE 10 MEQ: 1500 TABLET, EXTENDED RELEASE ORAL at 08:48

## 2023-09-29 RX ADMIN — DOCUSATE SODIUM 50 MG AND SENNOSIDES 8.6 MG 2 TABLET: 8.6; 5 TABLET, FILM COATED ORAL at 08:49

## 2023-09-29 RX ADMIN — ASPIRIN 81 MG: 81 TABLET, COATED ORAL at 08:48

## 2023-09-29 RX ADMIN — TORSEMIDE 50 MG: 20 TABLET ORAL at 08:48

## 2023-09-29 RX ADMIN — ARIPIPRAZOLE 2 MG: 2 TABLET ORAL at 08:48

## 2023-09-29 RX ADMIN — ACETAMINOPHEN 650 MG: 325 TABLET ORAL at 11:08

## 2023-09-29 RX ADMIN — DOCUSATE SODIUM 50 MG AND SENNOSIDES 8.6 MG 2 TABLET: 8.6; 5 TABLET, FILM COATED ORAL at 21:53

## 2023-09-29 RX ADMIN — FERROUS SULFATE TAB 325 MG (65 MG ELEMENTAL FE) 325 MG: 325 (65 FE) TAB at 08:48

## 2023-09-29 RX ADMIN — POTASSIUM CHLORIDE 10 MEQ: 1500 TABLET, EXTENDED RELEASE ORAL at 15:15

## 2023-09-29 RX ADMIN — Medication 10 ML: at 08:49

## 2023-09-29 RX ADMIN — POTASSIUM CHLORIDE 10 MEQ: 1500 TABLET, EXTENDED RELEASE ORAL at 21:53

## 2023-09-29 RX ADMIN — Medication 10 ML: at 21:54

## 2023-09-29 RX ADMIN — SODIUM CHLORIDE 100 ML/HR: 9 INJECTION, SOLUTION INTRAVENOUS at 22:03

## 2023-09-29 RX ADMIN — SERTRALINE 200 MG: 50 TABLET, FILM COATED ORAL at 21:53

## 2023-09-29 RX ADMIN — ISOSORBIDE MONONITRATE 30 MG: 30 TABLET, EXTENDED RELEASE ORAL at 08:49

## 2023-09-29 RX ADMIN — Medication 1 TABLET: at 21:53

## 2023-09-29 NOTE — PAYOR COMM NOTE
"CONTACT: JOSE VALDES RN  [facility] [address]  PHONE: [phone]  FAX: [phone]    STATUS CHANGE NOTIFICATION.  PT HAS BEEN CHANGED FROM INPATIENT TO OBSERVATION STATUS ON 9/29/23.    REF#[id number]       Rose Mary Chang (78 y.o. Female)       Date of Birth   1945    Social Security Number       Address   [address] [id number]    Home Phone   [phone]    MRN   [id number]       Episcopal   None    Marital Status                               Admission Date   9/27/23    Admission Type   Emergency    Admitting Provider   Albert Rangel MD    Attending Provider   Acacia Kaminski DO    Department, Room/Bed   [place] [id number]/       Discharge Date       Discharge Disposition       Discharge Destination                                 Attending Provider: Acacia Kaminski DO    Allergies: Evolocumab, Statins    Isolation: None   Infection: None   Code Status: No CPR    Ht: 165.1 cm (65\")   Wt: 67.6 kg (149 lb 0.5 oz)    Admission Cmt: None   Principal Problem: Acute renal insufficiency [N28.9]                   Active Insurance as of 9/27/2023       Primary Coverage       Payor Plan Insurance Group Employer/Plan Group    [facility] MEDICARE REPLACEMENT [facility] MEDICARE REPLACEMENT [id number]       Payor Plan Address Payor Plan Phone Number Payor Plan Fax Number Effective Dates    [address] [address]   3/1/2023 - None Entered    [facility] [id number]         Subscriber Name Subscriber Birth Date Member ID       ROSE MARY CHANG 1945 [id number]               Secondary Coverage       Payor Plan Insurance Group Employer/Plan Group    [employer]        Payor Plan Address Payor Plan Phone Number Payor Plan Fax Number Effective Dates    [address] [phone]  8/1/2023 - None Entered    [facility] [id number]         Subscriber Name Subscriber " Birth Date Member ID       ROSE MARY CHANG 1945 [id number]                     Emergency Contacts        (Rel.) Home Phone Work Phone Mobile Phone    SCOTT HINDS (Daughter) [phone] -- --              Orders (last 24 hrs)        Start     Ordered    09/29/23 1059  Initiate Observation Status  Once         09/29/23 1058    09/29/23 0900  modafinil (PROVIGIL) tablet 200 mg  Daily         09/28/23 2154 09/29/23 0900  isosorbide mononitrate (IMDUR) 24 hr tablet 30 mg  Daily         09/28/23 2154 09/29/23 0900  torsemide (DEMADEX) tablet 50 mg  Daily         09/28/23 2154 09/29/23 0900  ferrous sulfate tablet 325 mg  Every 48 Hours         09/28/23 2154 09/29/23 0900  vitamin B-12 (CYANOCOBALAMIN) tablet 1,000 mcg  Daily         09/28/23 2154 09/29/23 0900  aspirin EC tablet 81 mg  Daily         09/28/23 2154 09/29/23 0900  ARIPiprazole (ABILIFY) tablet 2 mg  Daily         09/28/23 2154 09/29/23 0843  OT Consult: Eval & Treat  Once         09/29/23 0843    09/29/23 0600  Basic Metabolic Panel  Morning Draw         09/28/23 2229 09/29/23 0600  CBC (No Diff)  Morning Draw         09/28/23 2229 09/29/23 0215  hydrOXYzine (ATARAX) tablet 25 mg  3 Times Daily PRN         09/29/23 0216 09/28/23 2300  gabapentin (NEURONTIN) capsule 300 mg  2 Times Daily         09/28/23 2154 09/28/23 2300  sertraline (ZOLOFT) tablet 200 mg  Nightly         09/28/23 2154 09/28/23 2300  Bempedoic Acid-Ezetimibe 180-10 MG tablet 1 tablet  Nightly         09/28/23 2154 09/28/23 2300  hydrALAZINE (APRESOLINE) tablet 37.5 mg  2 Times Daily         09/28/23 2154 09/28/23 2300  Calcium Carb-Cholecalciferol 600-20 MG-MCG tablet 1 tablet  2 Times Daily         09/28/23 2154 09/28/23 2300  potassium chloride (K-DUR,KLOR-CON) CR tablet 10 mEq  3 times daily         09/28/23 2154 09/28/23 2300  multivitamin with minerals 1 tablet  2 Times Daily         09/28/23 2154 09/28/23 2300   donepezil (ARICEPT) tablet 5 mg  Nightly         09/28/23 2154 09/28/23 2300  pantoprazole (PROTONIX) EC tablet 40 mg  Nightly         09/28/23 2154 09/28/23 2152  LORazepam (ATIVAN) tablet 0.5 mg  2 Times Daily PRN         09/28/23 2154 09/28/23 2152  ondansetron (ZOFRAN) tablet 4 mg  Every 8 Hours PRN         09/28/23 2154 09/28/23 2152  Diclofenac Sodium (VOLTAREN) 1 % gel 2 g  4 Times Daily PRN         09/28/23 2154 09/28/23 2152  docusate sodium (COLACE) capsule 200 mg  Daily PRN         09/28/23 2154 09/28/23 0800  Oral Care  2 Times Daily       09/27/23 2310 09/28/23 0000  acetaminophen (TYLENOL) tablet 650 mg  Every 6 Hours PRN         09/28/23 0000    09/28/23 0000  sodium chloride 0.9 % flush 10 mL  Every 12 Hours Scheduled         09/27/23 2310 09/28/23 0000  sennosides-docusate (PERICOLACE) 8.6-50 MG per tablet 2 tablet  2 Times Daily        See Hyperspace for full Linked Orders Report.    09/27/23 2310 09/28/23 0000  sodium chloride 0.9 % infusion  Continuous         09/27/23 2310 09/27/23 2311  Daily Weights  Daily       09/27/23 2310 09/27/23 2310  sodium chloride 0.9 % flush 10 mL  As Needed         09/27/23 2310 09/27/23 2310  sodium chloride 0.9 % infusion 40 mL  As Needed         09/27/23 2310 09/27/23 2310  polyethylene glycol (MIRALAX) packet 17 g  Daily PRN        See Hyperspace for full Linked Orders Report.    09/27/23 2310 09/27/23 2310  bisacodyl (DULCOLAX) EC tablet 5 mg  Daily PRN        See Hyperspace for full Linked Orders Report.    09/27/23 2310 09/27/23 2310  bisacodyl (DULCOLAX) suppository 10 mg  Daily PRN        See Hyperspace for full Linked Orders Report.    09/27/23 2310    09/27/23 1607  sodium chloride 0.9 % flush 10 mL  As Needed        See Hyperspace for full Linked Orders Report.    09/27/23 1608    Unscheduled  Up With Assistance  As Needed       09/27/23 2310    --  clopidogrel (PLAVIX) 75 MG tablet  Daily,   Status:   Discontinued         09/28/23 0454    --  potassium chloride 10 MEQ CR tablet  3 times daily         09/28/23 1454    --  hydrALAZINE (APRESOLINE) 25 MG tablet  2 Times Daily         09/28/23 1456    --  aspirin 81 MG EC tablet  Daily         09/28/23 1500

## 2023-09-29 NOTE — PLAN OF CARE
Goal Outcome Evaluation:           Progress: no change  Outcome Evaluation: Patient has been able to work with PT today, daughter at bedside, PRN medication has been given for pain and anxiety

## 2023-09-29 NOTE — CASE MANAGEMENT/SOCIAL WORK
Discharge Planning Assessment   Chris     Patient Name: Susan Us  MRN: [id number]  Today's Date: 9/29/2023    Admit Date: 9/27/2023            Discharge Plan       Row Name 09/29/23 1659       Plan    Plan SS spoke with Pt and Pt's daughter at bedside. Pt and family are agreeable to SNF placement and prefer [facility]. SS notified by [facility] that Pt has been accepting clinically, pre-auth submitted by  SS mgr. SS to follow.    Roadmap to Recovery Yes    Patient/Family in Agreement with Plan yes    Provided Post Acute Provider List? N/A  Pt and family prefers [facility].                    LAZARO Glasgow

## 2023-09-29 NOTE — THERAPY EVALUATION
Acute Care - Occupational Therapy Initial Evaluation   Chris     Patient Name: Susan Us  : 1945  MRN: [id number]  Today's Date: 2023  Onset of Illness/Injury or Date of Surgery: 23     Referring Physician: Dr. Kaminski    Admit Date: 2023       ICD-10-CM ICD-9-CM   1. Acute renal insufficiency  N28.9 593.9     Patient Active Problem List   Diagnosis    Stroke-like symptoms    TIA (transient ischemic attack)    Essential hypertension    Hyperlipidemia LDL goal <70    Artificial cardiac pacemaker    Acute renal insufficiency     Past Medical History:   Diagnosis Date    Bradycardia     s/p pacemaker placement    CKD (chronic kidney disease)     baseline renal function unknown    Dementia     Hyperlipidemia     Hypertension     Thrombocytopenia      Past Surgical History:   Procedure Laterality Date    CARDIAC PACEMAKER PLACEMENT      CATARACT EXTRACTION      CERVICAL FUSION      CHOLECYSTECTOMY      HIATAL HERNIA REPAIR      HYSTERECTOMY      REPLACEMENT TOTAL KNEE Right          OT ASSESSMENT FLOWSHEET (last 12 hours)       OT Evaluation and Treatment       Row Name 23 1012                   OT Time and Intention    Document Type evaluation  -KR        Mode of Treatment occupational therapy  -KR        Patient Effort adequate  -KR           General Information    General Observations of Patient alert/cooperative, confused  -KR           Living Environment    Current Living Arrangements home  -KR        People in Home alone  -KR        Primary Care Provided by self  -KR           Home Use of Assistive/Adaptive Equipment    Equipment Currently Used at Home walker, rolling  -KR           Cognition    Affect/Mental Status (Cognition) confused  -KR        Orientation Status (Cognition) oriented to;person  -KR        Follows Commands (Cognition) verbal cues/prompting required  -KR           Range of Motion Comprehensive    Comment, General Range of Motion BUE WFL  -KR           Strength  Comprehensive (MMT)    Comment, General Manual Muscle Testing (MMT) Assessment BUE 3/5  -KR           Activities of Daily Living    BADL Assessment/Intervention bathing;upper body dressing;lower body dressing;grooming;feeding;toileting  -KR           Bathing Assessment/Intervention    [place] Level (Bathing) bathing skills;minimum assist (75% patient effort)  -KR           Upper Body Dressing Assessment/Training    [place] Level (Upper Body Dressing) upper body dressing skills;minimum assist (75% patient effort)  -KR           Lower Body Dressing Assessment/Training    [place] Level (Lower Body Dressing) lower body dressing skills;minimum assist (75% patient effort)  -KR           Grooming Assessment/Training    [place] Level (Grooming) grooming skills;minimum assist (75% patient effort)  -KR           Self-Feeding Assessment/Training    [place] Level (Feeding) feeding skills;set up  -KR           Toileting Assessment/Training    [place] Level (Toileting) toileting skills;minimum assist (75% patient effort)  -KR           Plan of Care Review    Plan of Care Reviewed With patient;daughter  -KR           Therapy Assessment/Plan (OT)    Planned Therapy Interventions (OT) activity tolerance training;BADL retraining;strengthening exercise;cognitive/visual perception retraining  -KR           Therapy Plan Review/Discharge Plan (OT)    Anticipated Discharge Disposition (OT) extended care facility  -KR           OT Goals    Dressing Goal Selection (OT) dressing, OT goal 1  -KR        Strength Goal Selection (OT) strength, OT goal 1  -KR           Dressing Goal 1 (OT)    Activity/Device (Dressing Goal 1, OT) dressing skills, all  -KR        [place]/Cues Needed (Dressing Goal 1, OT) standby assist  -KR        Time Frame (Dressing Goal 1, OT) by discharge  -KR           Strength Goal 1 (OT)    Strength Goal 1 (OT) BUE increase x 1 to enhance self care/mobility skills  -KR        Time Frame  (Strength Goal 1, OT) by discharge  -KR           OT Cognitive Goals    Orientation Goal Selection (OT) orientation, OT goal 1  -KR           Orientation Goal 1 (OT)    Activity (Orientation Goal 1, OT) oriented to person;oriented to place;oriented to situation  -KR        [place]/Cues/Accuracy (Orientation Goal 1, OT) independently  -KR        Time Frame (Orientation Goal 1, OT) by discharge  -KR                  User Key  (r) = Recorded By, (t) = Taken By, (c) = Cosigned By      Initials Name Effective Dates    KR Gordo Kilpatrick OT 06/16/21 -                            OT Recommendation and Plan  Planned Therapy Interventions (OT): activity tolerance training, BADL retraining, strengthening exercise, cognitive/visual perception retraining  Plan of Care Review  Plan of Care Reviewed With: patient, daughter  Plan of Care Reviewed With: patient, daughter        Time Calculation:     Therapy Charges for Today       Code Description Service Date Service Provider Modifiers Qty    03322712331  OT EVAL MOD COMPLEXITY 4 9/29/2023 Gordo Kilpatrick OT GO 1                 Gordo Kilpatrick OT  9/29/2023

## 2023-09-29 NOTE — THERAPY TREATMENT NOTE
Acute Care - Physical Therapy Treatment Note   Chris     Patient Name: Susan Us  : 1945  MRN: [id number]  Today's Date: 2023   Onset of Illness/Injury or Date of Surgery: 23  Visit Dx:     ICD-10-CM ICD-9-CM   1. Acute renal insufficiency  N28.9 593.9     Patient Active Problem List   Diagnosis    Stroke-like symptoms    TIA (transient ischemic attack)    Essential hypertension    Hyperlipidemia LDL goal <70    Artificial cardiac pacemaker    Acute renal insufficiency     Past Medical History:   Diagnosis Date    Bradycardia     s/p pacemaker placement    CKD (chronic kidney disease)     baseline renal function unknown    Dementia     Hyperlipidemia     Hypertension     Thrombocytopenia      Past Surgical History:   Procedure Laterality Date    CARDIAC PACEMAKER PLACEMENT      CATARACT EXTRACTION      CERVICAL FUSION      CHOLECYSTECTOMY      HIATAL HERNIA REPAIR      HYSTERECTOMY      REPLACEMENT TOTAL KNEE Right      PT Assessment (last 12 hours)       PT Evaluation and Treatment       Row Name 23 1423          Physical Therapy Time and Intention    Document Type therapy note (daily note)  -CS     Mode of Treatment physical therapy  -CS     Patient Effort good  -CS     Comment Pt seen bedside this PM. Pt agreed to PT.  -CS       Row Name 23 1423          General Information    Patient Profile Reviewed yes  -CS     Equipment Currently Used at Home shower chair;walker, rolling  -CS     Existing Precautions/Restrictions fall  -CS       Row Name 23 1423          Living Environment    Primary Care Provided by self;child(zoraida)  daughter assists as needed  -CS       Row Name 23 1423          Home Use of Assistive/Adaptive Equipment    Equipment Currently Used at Home shower chair;walker, rolling  -CS       Row Name 23 1423          Pain    Additional Documentation --  no c/o  -CS       Row Name 23 1423          Cognition    Affect/Mental Status (Cognition)  WFL  -CS     Orientation Status (Cognition) oriented x 3  -CS     Follows Commands (Cognition) verbal cues/prompting required  -       Row Name 09/29/23 1423          Sensory    Hearing Status WFL  -       Row Name 09/29/23 1423          Vision Assessment/Intervention    Visual Impairment/Limitations WFL;corrective lenses full-time  -       Row Name 09/29/23 1423          Mobility    Extremity Weight-bearing Status --  no restrictions  -       Row Name 09/29/23 1423          Bed Mobility    Bed Mobility rolling left;rolling right;scooting/bridging;supine-sit;sit-supine  -     Scooting/Bridging [place] (Bed Mobility) standby assist  -     Supine-Sit [place] (Bed Mobility) standby assist  -     Sit-Supine [place] (Bed Mobility) minimum assist (75% patient effort);nonverbal cues (demo/gesture);verbal cues  -CS     Bed Mobility, Safety Issues decreased use of arms for pushing/pulling;decreased use of legs for bridging/pushing  -     Assistive Device (Bed Mobility) bed rails  -       Row Name 09/29/23 1423          Transfers    Transfers sit-stand transfer;stand-sit transfer;stand pivot/stand step transfer  -       Row Name 09/29/23 1423          Sit-Stand Transfer    Sit-Stand [place] (Transfers) contact guard;nonverbal cues (demo/gesture);verbal cues  -CS     Assistive Device (Sit-Stand Transfers) walker, front-wheeled  -       Row Name 09/29/23 1423          Stand-Sit Transfer    Stand-Sit [place] (Transfers) contact guard;nonverbal cues (demo/gesture);verbal cues  -     Assistive Device (Stand-Sit Transfers) walker, front-wheeled  -       Row Name 09/29/23 1423          Stand Pivot/Stand Step Transfer    Stand Pivot/Stand Step [place] (Transfers) contact guard;nonverbal cues (demo/gesture);verbal cues  -CS     Assistive Device (Stand Pivot Stand Step Transfer) walker, front-wheeled  -       Row Name 09/29/23 1423          Gait/Stairs (Locomotion)     Gait/Stairs Locomotion gait/ambulation independence;gait/ambulation assistive device;distance ambulated;gait pattern;gait deviations  -CS     [place] Level (Gait) verbal cues;nonverbal cues (demo/gesture);contact guard  -CS     Assistive Device (Gait) walker, front-wheeled  -CS     Distance in Feet (Gait) 250  -CS     Pattern (Gait) step-through  -CS     Deviations/Abnormal Patterns (Gait) stride length decreased;gait speed decreased  -CS     Bilateral Gait Deviations forward flexed posture  -CS       Row Name 09/29/23 1423          Safety Issues, Functional Mobility    Impairments Affecting Function (Mobility) balance;endurance/activity tolerance;strength  -CS       Row Name 09/29/23 1423          Coping    Observed Emotional State calm;cooperative  -CS     Verbalized Emotional State acceptance  -CS     Family/Support Persons daughter;son  -CS     Involvement in Care not present at bedside  -CS       Row Name 09/29/23 1423          Plan of Care Review    Plan of Care Reviewed With patient  -CS     Progress no change  -CS     Outcome Evaluation Pt w/ improved tolerance for ambulation. Pt would benefit from continued skilled PT.  -CS       Row Name 09/29/23 1423          Positioning and Restraints    Pre-Treatment Position in bed  -CS     Post Treatment Position bed  -CS     In Bed supine;call light within reach;encouraged to call for assist;exit alarm on  -CS       Row Name 09/29/23 1423          Therapy Assessment/Plan (PT)    Rehab Potential (PT) good, to achieve stated therapy goals  -CS     Criteria for Skilled Interventions Met (PT) yes;meets criteria  -CS     Therapy Frequency (PT) 2 times/wk  2-5 times/ week per priority  -CS     Problem List (PT) problems related to;balance;cognition;mobility;strength  -CS       Row Name 09/29/23 1423          Progress Summary (PT)    Progress Toward Functional Goals (PT) progress toward functional goals is good  -CS     Daily Progress Summary (PT) Pt w/ improved  tolerance for ambulation. Pt would benefit from continued skilled PT.  -       Row Name 09/29/23 1423          Physical Therapy Goals    Bed Mobility Goal Selection (PT) bed mobility, PT goal 1  -CS     Transfer Goal Selection (PT) transfer, PT goal 1  -CS     Gait Training Goal Selection (PT) gait training, PT goal 1  -CS       Row Name 09/29/23 1423          Bed Mobility Goal 1 (PT)    Activity/Assistive Device (Bed Mobility Goal 1, PT) scooting;sit to supine;supine to sit  -CS     [place] Level/Cues Needed (Bed Mobility Goal 1, PT) modified independence  -CS     Time Frame (Bed Mobility Goal 1, PT) by discharge  -       Row Name 09/29/23 1423          Transfer Goal 1 (PT)    Activity/Assistive Device (Transfer Goal 1, PT) sit-to-stand/stand-to-sit;bed-to-chair/chair-to-bed;toilet;walker, rolling  -CS     [place] Level/Cues Needed (Transfer Goal 1, PT) standby assist  -CS     Time Frame (Transfer Goal 1, PT) by discharge  -       Row Name 09/29/23 1423          Gait Training Goal 1 (PT)    Activity/Assistive Device (Gait Training Goal 1, PT) gait (walking locomotion);assistive device use;decrease fall risk;diminish gait deviation;improve balance and speed;increase endurance/gait distance  -CS     [place] Level (Gait Training Goal 1, PT) standby assist  -CS     Distance (Gait Training Goal 1, PT) 150  -CS     Time Frame (Gait Training Goal 1, PT) by discharge  -               User Key  (r) = Recorded By, (t) = Taken By, (c) = Cosigned By      Initials Name Provider Type    Óscar Jules, PT Physical Therapist                    Physical Therapy Education       Title: PT OT SLP Therapies (Done)       Topic: Physical Therapy (Done)       Point: Mobility training (Done)       Learning Progress Summary             Patient Acceptance, E,D, VU,NR by  at 9/28/2023 1410                         Point: Home exercise program (Done)       Learning Progress Summary             Patient Acceptance,  E,D, VU,NR by  at 9/28/2023 1410                         Point: Body mechanics (Done)       Learning Progress Summary             Patient Acceptance, E,D, VU,NR by  at 9/28/2023 1410                         Point: Precautions (Done)       Learning Progress Summary             Patient Acceptance, E,D, VU,NR by  at 9/28/2023 1410                                         User Key       Initials Effective Dates Name Provider Type [facility] 06/16/21 -  Denia Poe, PT Physical Therapist PT                  PT Recommendation and Plan  Anticipated Discharge Disposition (PT): home with assist  Therapy Frequency (PT): 2 times/wk (2-5 times/ week per priority)  Progress Summary (PT)  Progress Toward Functional Goals (PT): progress toward functional goals is good  Daily Progress Summary (PT): Pt w/ improved tolerance for ambulation. Pt would benefit from continued skilled PT.  Plan of Care Reviewed With: patient  Progress: no change  Outcome Evaluation: Pt w/ improved tolerance for ambulation. Pt would benefit from continued skilled PT.       Time Calculation:    PT Charges       Row Name 09/29/23 1426             Time Calculation    PT Received On 09/29/23  -      PT Goal Re-Cert Due Date 10/12/23  -CS         Timed Charges    65388 - Gait Training Minutes  23  -CS         Total Minutes    Timed Charges Total Minutes 23  -CS       Total Minutes 23  -CS                User Key  (r) = Recorded By, (t) = Taken By, (c) = Cosigned By      Initials Name Provider Type    CS Óscar Alonso, PT Physical Therapist                  Therapy Charges for Today       Code Description Service Date Service Provider Modifiers Qty    52171163026 HC GAIT TRAINING EA 15 MIN 9/29/2023 Óscar Alonso, PT GP 2            PT G-Codes  AM-PAC 6 Clicks Score (PT): 17    Óscar Alonso, PT  9/29/2023

## 2023-09-29 NOTE — DISCHARGE PLACEMENT REQUEST
"Rose Mary Us (78 y.o. Female)       Date of Birth   1945    Social Security Number       Address   [address] [address]    Home Phone   [phone]    MRN   [id number]       Latter day   None    Marital Status                               Admission Date   9/27/23    Admission Type   Emergency    Admitting Provider   Albert Rangel MD    Attending Provider   Acacia Kaminski DO    Department, Room/Bed   [address], 3342/2S       Discharge Date       Discharge Disposition       Discharge Destination                                 Attending Provider: Acacia Kaminski DO    Allergies: Evolocumab, Statins    Isolation: None   Infection: None   Code Status: No CPR    Ht: 165.1 cm (65\")   Wt: 67.6 kg (149 lb 0.5 oz)    Admission Cmt: None   Principal Problem: Acute renal insufficiency [N28.9]                   Active Insurance as of 9/27/2023       Primary Coverage       Payor Plan Insurance Group Employer/Plan Group    [facility] MEDICARE REPLACEMENT [facility] MEDICARE REPLACEMENT [id number]       Payor Plan Address Payor Plan Phone Number Payor Plan Fax Number Effective Dates    [address] [address]   3/1/2023 - None Entered    [facility] [id number]         Subscriber Name Subscriber Birth Date Member ID       ROSE MARY US 1945 [id number]               Secondary Coverage       Payor Plan Insurance Group Employer/Plan Group    [employer]        Payor Plan Address Payor Plan Phone Number Payor Plan Fax Number Effective Dates    [address] [phone]  8/1/2023 - None Entered    [facility] [id number]         Subscriber Name Subscriber Birth Date Member ID       ROSE MARY US 1945 [id number]                     Emergency Contacts        (Rel.) Home Phone Work Phone Mobile Phone    SCOTT HINDS (Daughter) [phone] -- --                 History & Physical        Felicia Pope PA-C at " "23       Attestation signed by Albert Rangel MD at [phone]    I have discussed this patient with Felicia Pope PA-C, and have reviewed this documentation and agree. Renal function improved with IV fluids; creatinine 1.86 essentially back within baseline CKD range. Platelets slightly lower at 78, hemoglobin stable at 8.8. PTT and INR only marginally elevated. Continue to monitor petechial rash.                        [facility] Medicine Services  HISTORY & PHYSICAL    Patient Identification:  Name:  Susan Us  Age:  78 y.o.  Sex:  female  :  1945  MRN:  [id number]   Visit Number:  [id number]  Admit Date: 2023   Primary Care Physician:  Trevor Garay MD     Subjective     Chief complaint:   Chief Complaint   Patient presents with    Weakness - Generalized     History of presenting illness:   Patient is a 78 y.o. female with past medical history significant for bradycardia s/p pacemaker, CKD, dementia, hypertension, hyperlipidemia, anemia, thrombocytopenia, history of TIA that presented to the [facility] emergency department for evaluation of generalized weakness.     Patient examined at bedside in the ED.  Daughter at bedside.  Daughter states that the patient has not been acting like herself for roughly the past 10 days.  She states around that time she was diagnosed with a UTI and started on Macrobid by nephrologist, however she continued to get more confused, therefore her PCP changed her to Levaquin, which seemed to improve her symptoms.  She states this weekend patient was back to her normal mentation, but has since been becoming confused again.  She states that today the patient seemed much more confused and was \"talking out of her head\" . On my exam patient was oriented to self and place, however not time or situation.  This is baseline per daughter.  Daughter states patient has not been complaining of any urinary symptoms, however " she has she has had increased urinary frequency.  Daughter notes a more rapidly progressing dementia since her TIA approximately 2 months ago.  She states however today she was significantly worse.    Upon arrival to the ED, vitals were temperature 98.4, HR 65, RR 17, /58, SPO2 99% on room air.  Labs significant for BUN 41, creatinine 2.23, CRP 1.4, Pro-Yifan 0.44.  Initial high-sensitivity troponin 66, repeat 62.    Patient has been admitted to .   ---------------------------------------------------------------------------------------------------------------------   Review of Systems   Unable to perform ROS: Dementia    ---------------------------------------------------------------------------------------------------------------------   Past Medical History:   Diagnosis Date    Bradycardia     s/p pacemaker placement    CKD (chronic kidney disease)     baseline renal function unknown    Dementia     Hyperlipidemia     Hypertension     Thrombocytopenia      Past Surgical History:   Procedure Laterality Date    CARDIAC PACEMAKER PLACEMENT      CATARACT EXTRACTION      CERVICAL FUSION      CHOLECYSTECTOMY      HIATAL HERNIA REPAIR      HYSTERECTOMY      REPLACEMENT TOTAL KNEE Right      Family History   Problem Relation Age of Onset    Stroke Mother     Stroke Father      Social History     Socioeconomic History    Marital status:    Tobacco Use    Smoking status: Never    Smokeless tobacco: Never    Tobacco comments:     Second hand smoke exposure ()   Vaping Use    Vaping Use: Never used   Substance and Sexual Activity    Alcohol use: Never    Drug use: Never    Sexual activity: Defer     ---------------------------------------------------------------------------------------------------------------------   Allergies:  Statins  ---------------------------------------------------------------------------------------------------------------------   Medications below are reported home medications  pulling from within the system; at this time, these medications have not been reconciled unless otherwise specified and are in the verification process for further verifcation as current home medications.    Prior to Admission Medications       Prescriptions Last Dose Informant Patient Reported? Taking?    ARIPiprazole (ABILIFY) 2 MG tablet   Yes No    Take 1 tablet by mouth Daily.    Bempedoic Acid-Ezetimibe (Nexlizet) 180-10 MG tablet  Child Yes No    Take 1 tablet by mouth Every Night.    Calcium Carbonate-Vitamin D (calcium 500 mg vitamin D 5 mcg, 200 UT,) 500-5 MG-MCG tablet per tablet  Child Yes No    Take 1 tablet by mouth 2 (Two) Times a Day.    Diclofenac Sodium (VOLTAREN) 1 % gel gel  Child Yes No    Apply 4 g topically to the appropriate area as directed 4 (Four) Times a Day As Needed (joint pain).    docusate sodium (COLACE) 250 MG capsule  Child Yes No    Take 1 capsule by mouth Every Night.    donepezil (ARICEPT) 5 MG tablet  Child Yes No    Take 1 tablet by mouth Every Night.    ferrous sulfate 325 (65 FE) MG tablet  Child Yes No    Take 1 tablet by mouth Daily With Breakfast. Patient instructed to take every other day    fluticasone (FLONASE) 50 MCG/ACT nasal spray  Child Yes No    2 sprays into the nostril(s) as directed by provider Every Night.    gabapentin (NEURONTIN) 300 MG capsule  Child Yes No    Take 1 capsule by mouth 2 (Two) Times a Day.    hydrALAZINE (APRESOLINE) 25 MG tablet   No No    Take 1.5 tablets by mouth 2 (Two) Times a Day for 30 days. Patient taking 1 and 1/2 twice per day    Patient taking differently:  Take 1.5 tablets by mouth 2 (Two) Times a Day.    isosorbide mononitrate (IMDUR) 30 MG 24 hr tablet  Child Yes No    Take 1 tablet by mouth Daily.    LORazepam (ATIVAN) 0.5 MG tablet  Child Yes No    Take 1 tablet by mouth 2 (Two) Times a Day.    magnesium chloride ER 64 MG DR tablet  Child Yes No    Take 1 tablet by mouth 2 (Two) Times a Day.    modafinil (PROVIGIL) 200 MG  tablet  Child Yes No    Take 1 tablet by mouth Daily.    Multiple Vitamins-Minerals (ICAPS AREDS 2 PO)  Child Yes No    Take 1 tablet by mouth 2 (Two) Times a Day.    nitroglycerin (NITROSTAT) 0.4 MG SL tablet  Child Yes No    Place 1 tablet under the tongue Every 5 (Five) Minutes As Needed for Chest Pain. Take no more than 3 doses in 15 minutes.    ondansetron (ZOFRAN) 4 MG tablet  Child Yes No    Take 1 tablet by mouth Every 8 (Eight) Hours As Needed for Nausea or Vomiting.    pantoprazole (PROTONIX) 40 MG EC tablet  Child Yes No    Take 1 tablet by mouth Every Night.    sertraline (ZOLOFT) 100 MG tablet   Yes No    Take 2 tablets by mouth Every Night.    torsemide (DEMADEX) 100 MG tablet  Child Yes No    Take 0.5 tablets by mouth Daily.    vitamin B-12 (CYANOCOBALAMIN) 1000 MCG tablet  Child Yes No    Take 1 tablet by mouth Every Night.          ---------------------------------------------------------------------------------------------------------------------    Objective     Hospital Scheduled Meds:          Current listed hospital scheduled medications may not yet reflect those currently placed in orders that are signed and held, awaiting patient's arrival to floor/unit.    ---------------------------------------------------------------------------------------------------------------------   Vital Signs:  Temp:  [98.4 °F (36.9 °C)] 98.4 °F (36.9 °C)  Heart Rate:  [65] 65  Resp:  [17] 17  BP: (113-137)/(50-65) 135/65  Mean Arterial Pressure (Non-Invasive) for the past 24 hrs (Last 3 readings):   Noninvasive MAP (mmHg)   09/27/23 1720 91   09/27/23 1700 85   09/27/23 1640 79     SpO2 Percentage    09/27/23 1600   SpO2: 99%     SpO2:  [99 %] 99 %  on   ;   Device (Oxygen Therapy): room air    Body mass index is 24.96 kg/m².  Wt Readings from Last 3 Encounters:   09/27/23 68 kg (150 lb)   09/22/23 68.1 kg (150 lb 3.2 oz)   08/28/23 67.7 kg (149 lb 3.2 oz)      ---------------------------------------------------------------------------------------------------------------------   Physical Exam:  Physical Exam  Constitutional:       General: She is not in acute distress.     Appearance: Normal appearance.   HENT:      Head: Normocephalic and atraumatic.      Right Ear: External ear normal.      Left Ear: External ear normal.      Nose: No nasal deformity.      Mouth/Throat:      Lips: Pink.      Mouth: Mucous membranes are moist.   Eyes:      Conjunctiva/sclera: Conjunctivae normal.      Pupils: Pupils are equal, round, and reactive to light.   Cardiovascular:      Rate and Rhythm: Normal rate and regular rhythm.      Pulses:           Dorsalis pedis pulses are 2+ on the right side and 2+ on the left side.      Heart sounds: Normal heart sounds.   Pulmonary:      Effort: Pulmonary effort is normal.      Breath sounds: Normal breath sounds. No wheezing, rhonchi or rales.   Abdominal:      General: Abdomen is flat. Bowel sounds are normal.      Palpations: Abdomen is soft.      Tenderness: There is no guarding or rebound.   Genitourinary:     Comments: No sharpe catheter in place   Musculoskeletal:      Cervical back: Neck supple. Normal range of motion.      Right lower leg: No edema.      Left lower leg: No edema.   Skin:     General: Skin is warm and dry.      Findings: Petechiae (Bilateral lower legs) present.   Neurological:      General: No focal deficit present.      Mental Status: She is alert.      Comments: Oriented to self and place only.   Psychiatric:         Mood and Affect: Mood normal.         Behavior: Behavior normal.     ---------------------------------------------------------------------------------------------------------------------  EKG: Atrial paced rhythm.  Heart rate 65.  No acute ST elevation.        I have personally reviewed the EKG/Telemetry  strip  ---------------------------------------------------------------------------------------------------------------------   Results from last 7 days   Lab Units 09/27/23  1800 09/27/23  1615   HSTROP T ng/L 62* 66*     Results from last 7 days   Lab Units 09/27/23  1615   PROBNP pg/mL 1,668.0         Results from last 7 days   Lab Units 09/27/23  1800 09/27/23  1615   CRP mg/dL  --  1.40*   LACTATE mmol/L 1.2  --    WBC 10*3/mm3  --  8.77   HEMOGLOBIN g/dL  --  8.9*   HEMATOCRIT %  --  28.5*   MCV fL  --  91.1   MCHC g/dL  --  31.2*   PLATELETS 10*3/mm3  --  80*     Results from last 7 days   Lab Units 09/27/23  1615   SODIUM mmol/L 142   POTASSIUM mmol/L 4.0   MAGNESIUM mg/dL 2.0   CHLORIDE mmol/L 103   CO2 mmol/L 28.3   BUN mg/dL 41*   CREATININE mg/dL 2.23*   CALCIUM mg/dL 9.0   GLUCOSE mg/dL 108*   ALBUMIN g/dL 4.5   BILIRUBIN mg/dL 0.4   ALK PHOS U/L 61   AST (SGOT) U/L 22   ALT (SGPT) U/L 10   Estimated Creatinine Clearance: 22.7 mL/min (A) (by C-G formula based on SCr of 2.23 mg/dL (H)).  No results found for: AMMONIA    No results found for: HGBA1C, POCGLU  No results found for: HGBA1C  Lab Results   Component Value Date    TSH 0.454 09/27/2023    FREET4 0.90 (L) 09/27/2023       No results found for: BLOODCX  No results found for: URINECX  No results found for: WOUNDCX  No results found for: STOOLCX  No results found for: RESPCX  No results found for: MRSACX  Pain Management Panel          Latest Ref Rng & Units 8/2/2023   Pain Management Panel   Amphetamine, Urine Qual Negative Negative    Barbiturates Screen, Urine Negative Negative    Benzodiazepine Screen, Urine Negative Positive    Buprenorphine, Screen, Urine Negative Negative    Cocaine Screen, Urine Negative Negative    Fentanyl, Urine Negative Negative    Methadone Screen , Urine Negative Negative    Methamphetamine, Ur Negative Negative      I have personally reviewed the above laboratory results.    ---------------------------------------------------------------------------------------------------------------------  Imaging Results (Last 7 Days)       Procedure Component Value Units Date/Time    CT Chest Without Contrast Diagnostic [[id number]] Collected: 09/27/23 1936     Updated: 09/27/23 1942    Narrative:      PROCEDURE: CT of the chest performed without IV contrast on September 27, 2023. The examination was performed with 3 mm axial imaging and 3 mm  sagittal and coronal reconstruction images. Total DLP = 703. The  examination was performed according to as low as reasonably achievable  dose protocol.     HISTORY: Chest pain.     FINDINGS:     Mild degenerative disc disease throughout the thoracic spine.  Dextroconvex curvature affecting the thoracic spine and thoracolumbar  junction.  Prior fixation noted at the cervical spine.  Left-sided pacemaker in place with pacing leads to the right atrium and  right ventricle.  Heart size at the upper limits of normal.  Small size hiatal hernia.  Minimal atelectasis at the lung bases.  Minimal atelectasis in the lower lingula.  No pleural effusion or pneumothorax.  Right upper lobe calcified granulomas.  No interstitial edema or pneumothorax.  No endobronchial lesion.  Small unenlarged mediastinal nodes.  No thoracic aortic aneurysm.  Cholecystectomy clips in the right upper quadrant.  Mild diverticulosis at the splenic flexure.  No free fluid or free air in the upper abdomen.       Impression:         1.  Heart size at the upper limits of normal.  2.  Small size hiatal hernia.  3.  Left-sided pacemaker in place.  4.  Small unenlarged mediastinal nodes.  5.  Minimal atelectasis in the lung bases, left greater than right  6.  Minimal atelectasis in the lower lingula.  7.  No conclusive features of pneumonia, edema, pleural effusion, or  pneumothorax.  8.  No pneumomediastinum.  9.  Calcified granulomas in the right upper lobe.  10.  No thoracic aortic  aneurysm.  11.  Cholecystectomy.        This report was finalized on 9/27/2023 7:40 PM by Shamir Malik MD.       XR Chest 1 View [[id number]] Collected: 09/27/23 1645     Updated: 09/27/23 1647    Narrative:      EXAM:    XR Chest, 1 View     EXAM DATE:    9/27/2023 5:13 PM     CLINICAL HISTORY:    weakness     TECHNIQUE:    Frontal view of the chest.     COMPARISON:    8/2/2023     FINDINGS:    LUNGS AND PLEURAL SPACES:  Coarsened interstitial markings.  No  pneumothorax.    HEART:  Cardiomegaly again noted.    MEDIASTINUM:  Unremarkable as visualized.    BONES/JOINTS:  Unremarkable as visualized.    TUBES, LINES AND DEVICES:  Automatic implantable cardioverter  defibrillator (AICD).       Impression:        Coarsened interstitial markings.        This report was finalized on 9/27/2023 4:45 PM by Dr. Say Valente MD.             I have personally reviewed the above radiology results.     Last Echocardiogram:  Results for orders placed during the hospital encounter of 08/01/23    Adult Transthoracic Echo Complete w/ Color, Spectral and Contrast if necessary per protocol    Interpretation Summary    Left ventricular systolic function is normal. Calculated left ventricular EF = 68% Left ventricular ejection fraction appears to be 66 - 70%.    Left ventricular diastolic function was normal.    Estimated right ventricular systolic pressure from tricuspid regurgitation is normal (<35 mmHg).    ---------------------------------------------------------------------------------------------------------------------    Assessment & Plan      Active Hospital Problems    Diagnosis  POA    **Acute renal insufficiency [N28.9]  Yes     Generalized weakness, POA  Acute renal insufficiency on CKD, stage unknown  Elevated troponin, POA  Dementia  Patient presented with generalized weakness and worsening of baseline confusion  CT head pending to rule out intracranial cause of delirium  CT abdomen/pelvis ordered to rule out obstructive  uropathy  Recent UTI, UA with trace leukocytes/WBC.  No bacteria.  Creatinine 2.23 on admission, baseline unclear.  On last admission creatinine ranged between 1.78-1.96.  Continue with IV hydration and repeat BMP in the a.m.  Initial troponin 66, repeat 62.  Patient with elevated troponin on last admission.  Patient denies any chest pain.  Likely worsening of chronic elevation.  EKG with no acute ischemia.    CHRONIC MEDICAL PROBLEMS    bradycardia s/p pacemaker  Essential hypertension  BP appears well controlled   Plan to resume home antihypertensive regimen once reconciled per pharmacy with appropriate holding parameters to prevent hypotension and/or bradycardia   Closely monitor BP per hospital protocol, titrate medications as necessary  Hyperlipidemia   History TIA   Restart home ASA and statin pending med rec   Normocytic anemia, POA  Thrombocytopenia  Hemoglobin 8.9, Hematocrit 28.5. Baseline hemoglobin ~9  Continue to monitor H&H and signs bleeding  Can transfuse as needed  Petechial rash noted on bilateral lower extremities.  Could be related to thrombocytopenia.  PT/INR/PTT pending.  F/E/N: IV NS at 100 ml/hour.  Continue monitor electrolytes.  Heart healthy diet.    ---------------------------------------------------  DVT Prophylaxis: SCDs  GI Prophylaxis: Bowel regimen  Activity: Up with assistance    The patient is considered to be a high risk patient due to: Generalized weakness, advanced age, acute renal insufficiency    INPATIENT status due to the need for care which can only be reasonably provided in an hospital setting such as aggressive/expedited ancillary services and/or consultation services, the necessity for IV medications, close physician monitoring and/or the possible need for procedures.  In such, I feel patient’s risk for adverse outcomes and need for care warrant INPATIENT evaluation and predict the patient’s care encounter to likely last beyond 2 midnights.      Code Status: DNR/DNI      Disposition/Discharge planning: Pending clinical course    I have discussed the patient's assessment and plan with Dr. Rangel.      Felicia Pope PA-C  Hospitalist Service -- [facility]       09/27/23  21:49 EDT    Attending Physician: Dr. Rangel.     Electronically signed by Albert Rangel MD at 09/28/23 0405       Current Facility-Administered Medications   Medication Dose Route Frequency Provider Last Rate Last Admin    acetaminophen (TYLENOL) tablet 650 mg  650 mg Oral Q6H PRN Felicia Pope PA-C   650 mg at 09/28/23 2019    ARIPiprazole (ABILIFY) tablet 2 mg  2 mg Oral Daily Acacia Kaminski DO   2 mg at 09/29/23 0848    aspirin EC tablet 81 mg  81 mg Oral Daily Acacia Kaminski DO   81 mg at 09/29/23 0848    Bempedoic Acid-Ezetimibe 180-10 MG tablet 1 tablet  1 tablet Oral Nightly Acacia Kaminski DO        sennosides-docusate (PERICOLACE) 8.6-50 MG per tablet 2 tablet  2 tablet Oral BID Albert Rangel MD   2 tablet at 09/29/23 0849    And    polyethylene glycol (MIRALAX) packet 17 g  17 g Oral Daily PRN Albert Rangel MD        And    bisacodyl (DULCOLAX) EC tablet 5 mg  5 mg Oral Daily PRN Albert Rangel MD        And    bisacodyl (DULCOLAX) suppository 10 mg  10 mg Rectal Daily PRN Albert Rangel MD        Calcium Carb-Cholecalciferol 600-20 MG-MCG tablet 1 tablet  1 tablet Oral BID Acacia Kaminski, DO   1 tablet at 09/29/23 0848    Diclofenac Sodium (VOLTAREN) 1 % gel 2 g  2 g Topical 4x Daily PRN Acacia Kaminski DO        docusate sodium (COLACE) capsule 200 mg  200 mg Oral Daily PRN Acacia Kaminski DO        donepezil (ARICEPT) tablet 5 mg  5 mg Oral Nightly Acacia Kaminski DO   5 mg at 09/28/23 2352    ferrous sulfate tablet 325 mg  325 mg Oral Q48H Acacia Kaminski DO   325 mg at 09/29/23 0848    gabapentin (NEURONTIN) capsule 300 mg  300 mg Oral BID Acacia Kaminski DO   300 mg at 09/29/23 0849    hydrALAZINE (APRESOLINE) tablet 37.5 mg  37.5 mg  Oral BID Gordy Acacia, DO   37.5 mg at 09/29/23 0848    hydrOXYzine (ATARAX) tablet 25 mg  25 mg Oral TID PRN Felicia Pope PA-C   25 mg at 09/29/23 0219    isosorbide mononitrate (IMDUR) 24 hr tablet 30 mg  30 mg Oral Daily Acacia Kaminski, DO   30 mg at 09/29/23 0849    LORazepam (ATIVAN) tablet 0.5 mg  0.5 mg Oral BID PRN Acacia Kaminski, DO   0.5 mg at 09/28/23 2244    modafinil (PROVIGIL) tablet 200 mg  200 mg Oral Daily Acacia Kaminski, DO   200 mg at 09/29/23 0848    multivitamin with minerals 1 tablet  1 tablet Oral BID cAacia Kaminski, DO   1 tablet at 09/29/23 0848    ondansetron (ZOFRAN) tablet 4 mg  4 mg Oral Q8H PRN Acacia Kaminski, DO        pantoprazole (PROTONIX) EC tablet 40 mg  40 mg Oral Nightly Acacia Kaminski, DO   40 mg at 09/28/23 2244    potassium chloride (K-DUR,KLOR-CON) CR tablet 10 mEq  10 mEq Oral TID Acacia Kaminski, DO   10 mEq at 09/29/23 0848    sertraline (ZOLOFT) tablet 200 mg  200 mg Oral Nightly Gordy Acacia, DO   200 mg at 09/28/23 2352    sodium chloride 0.9 % flush 10 mL  10 mL Intravenous PRN Albert Rangel MD        sodium chloride 0.9 % flush 10 mL  10 mL Intravenous Q12H Albert Rangel MD   10 mL at 09/29/23 0849    sodium chloride 0.9 % flush 10 mL  10 mL Intravenous PRN Albert Rangel MD        sodium chloride 0.9 % infusion 40 mL  40 mL Intravenous PRN Albert Rangel MD        sodium chloride 0.9 % infusion  100 mL/hr Intravenous Continuous Albert Rangel  mL/hr at 09/29/23 0621 100 mL/hr at 09/29/23 0621    torsemide (DEMADEX) tablet 50 mg  50 mg Oral Daily Acacia Kaminski, DO   50 mg at 09/29/23 0848    vitamin B-12 (CYANOCOBALAMIN) tablet 1,000 mcg  1,000 mcg Oral Daily Acacia Kaminski DO   1,000 mcg at 09/29/23 0848        Physician Progress Notes (most recent note)        Acacia Kaminski DO at 09/28/23 2154                [facility]     Patient Identification:  Name:  Mahinamrata  Abeba  Age:  78 y.o.  Sex:  female  :  1945  MRN:  [id number]  Visit Number:  [id number]  ROOM: [address] [place] [address]     Primary Care Provider:  Trevor Garay MD    Length of stay in inpatient status:  1    Subjective     Chief Compliant:    Chief Complaint   Patient presents with    Weakness - Generalized       History of Presenting Illness:    Patient seen and examined earlier this afternoon with her daughter present at bedside.  Patient denies any shortness of breath, chest pain, or other complaints.  Daughter says that she is worried about her living at home by herself due to worsening confusion and recent falls, even though she checks on her frequently.  Requesting placement.    Objective     Current Hospital Meds:[START ON 2023] ARIPiprazole, 2 mg, Oral, Daily  [START ON 2023] aspirin, 81 mg, Oral, Daily  Bempedoic Acid-Ezetimibe, 1 tablet, Oral, Nightly  Calcium Carb-Cholecalciferol, 1 tablet, Oral, BID  donepezil, 5 mg, Oral, Nightly  [START ON 2023] ferrous sulfate, 325 mg, Oral, Q48H  gabapentin, 300 mg, Oral, BID  hydrALAZINE, 37.5 mg, Oral, BID  [START ON 2023] isosorbide mononitrate, 30 mg, Oral, Daily  [START ON 2023] modafinil, 200 mg, Oral, Daily  multivitamin with minerals, 1 tablet, Oral, BID  pantoprazole, 40 mg, Oral, Nightly  potassium chloride, 10 mEq, Oral, TID  senna-docusate sodium, 2 tablet, Oral, BID  sertraline, 200 mg, Oral, Nightly  sodium chloride, 10 mL, Intravenous, Q12H  [START ON 2023] torsemide, 50 mg, Oral, Daily  [START ON 2023] vitamin B-12, 1,000 mcg, Oral, Daily    sodium chloride, 100 mL/hr, Last Rate: 100 mL/hr (23)        Current Antimicrobial Therapy:  Anti-Infectives (From admission, onward)      None          Current Diuretic Therapy:  Diuretics (From admission, onward)      Ordered     Dose/Rate Route Frequency Start Stop    23  torsemide (DEMADEX) tablet 50 mg        Ordering Provider: Acacia Kaminski DO    50 mg  Oral Daily 09/29/23 0900            ----------------------------------------------------------------------------------------------------------------------  Vital Signs:  Temp:  [97.1 °F (36.2 °C)-98.4 °F (36.9 °C)] 97.9 °F (36.6 °C)  Heart Rate:  [62-67] 67  Resp:  [16-18] 16  BP: (110-161)/(56-87) 149/64  SpO2:  [95 %-98 %] 95 %  on   ;   Device (Oxygen Therapy): room air  Body mass index is 22.01 kg/m².    Wt Readings from Last 3 Encounters:   09/27/23 60 kg (132 lb 4.4 oz)   09/22/23 68.1 kg (150 lb 3.2 oz)   08/28/23 67.7 kg (149 lb 3.2 oz)     Intake & Output (last 3 days)         09/26 0701 09/27 0700 09/27 0701 09/28 0700 09/28 0701 09/29 0700    P.O.  120 780    I.V. (mL/kg)   1600 (26.7)    IV Piggyback  500     Total Intake(mL/kg)  620 (10.3) 2380 (39.7)    Urine (mL/kg/hr)  300 1300 (1.5)    Stool  0     Total Output  300 1300    Net  +320 +1080           Urine Unmeasured Occurrence   2 x    Stool Unmeasured Occurrence  0 x           Diet: Cardiac Diets, Renal Diets; Healthy Heart (2-3 Na+); Low Sodium (2-3g), Low Potassium, Low Phosphorus; Texture: Regular Texture (IDDSI 7); Fluid Consistency: Thin (IDDSI 0)  ----------------------------------------------------------------------------------------------------------------------  Physical exam:   Constitutional:  Well-developed and well-nourished.  No acute distress.      HENT:  Head:  Normocephalic and atraumatic.    Cardiovascular:  Normal rate, regular rhythm   Pulmonary/Chest:  No respiratory distress, no wheezes, no crackles, with normal breath sounds and good air movement.  Musculoskeletal:  No deformity.    Neurological: Awake, alert, no focal deficit on gross examination. No slurred speech or facial droop. Disoriented.  Skin:  Skin is warm and dry.    Peripheral vascular:  No cyanosis, no edema.  Psychiatric: Appropriate mood and affect    Edited by: Acacia Kaminski DO at 9/28/2023  2156  ----------------------------------------------------------------------------------------------------------------------  Results from last 7 days   Lab Units 09/28/23  0026 09/27/23  1800 09/27/23  1615   CRP mg/dL  --   --  1.40*   LACTATE mmol/L  --  1.2  --    WBC 10*3/mm3 8.49  --  8.77   HEMOGLOBIN g/dL 8.8*  --  8.9*   HEMATOCRIT % 27.6*  --  28.5*   MCV fL 90.2  --  91.1   MCHC g/dL 31.9  --  31.2*   PLATELETS 10*3/mm3 78*  --  80*   INR  1.14*  --   --          Results from last 7 days   Lab Units 09/28/23  0026 09/27/23  1615   SODIUM mmol/L 142 142   POTASSIUM mmol/L 3.5 4.0   MAGNESIUM mg/dL  --  2.0   CHLORIDE mmol/L 103 103   CO2 mmol/L 26.6 28.3   BUN mg/dL 36* 41*   CREATININE mg/dL 1.86* 2.23*   CALCIUM mg/dL 8.9 9.0   GLUCOSE mg/dL 100* 108*   ALBUMIN g/dL 4.0 4.5   BILIRUBIN mg/dL 0.5 0.4   ALK PHOS U/L 55 61   AST (SGOT) U/L 21 22   ALT (SGPT) U/L 10 10   Estimated Creatinine Clearance: 24 mL/min (A) (by C-G formula based on SCr of 1.86 mg/dL (H)).  No results found for: AMMONIA  Results from last 7 days   Lab Units 09/27/23  1800 09/27/23  1615   HSTROP T ng/L 62* 66*     Results from last 7 days   Lab Units 09/27/23  1615   PROBNP pg/mL 1,668.0         No results found for: HGBA1C, POCGLU  Lab Results   Component Value Date    TSH 0.454 09/27/2023    FREET4 0.90 (L) 09/27/2023     No results found for: PREGTESTUR, PREGSERUM, HCG, HCGQUANT  Pain Management Panel          Latest Ref Rng & Units 8/2/2023   Pain Management Panel   Amphetamine, Urine Qual Negative Negative    Barbiturates Screen, Urine Negative Negative    Benzodiazepine Screen, Urine Negative Positive    Buprenorphine, Screen, Urine Negative Negative    Cocaine Screen, Urine Negative Negative    Fentanyl, Urine Negative Negative    Methadone Screen , Urine Negative Negative    Methamphetamine, Ur Negative Negative      Brief Urine Lab Results  (Last result in the past 365 days)        Color   Clarity   Blood   Leuk Est    Nitrite   Protein   CREAT   Urine HCG        09/27/23 1754 Yellow   Clear   Negative   Trace   Negative   Negative                 Blood Culture   Date Value Ref Range Status   09/27/2023 No growth at 24 hours  Preliminary   09/27/2023 No growth at 24 hours  Preliminary     Urine Culture   Date Value Ref Range Status   09/27/2023 No growth  Preliminary     No results found for: WOUNDCX  No results found for: STOOLCX  No results found for: RESPCX  No results found for: AFBCX  Results from last 7 days   Lab Units 09/27/23  1800 09/27/23  1615   PROCALCITONIN ng/mL 0.44*  --    LACTATE mmol/L 1.2  --    SED RATE mm/hr  --  12   CRP mg/dL  --  1.40*       I have personally looked at the labs and they are summarized above.  ----------------------------------------------------------------------------------------------------------------------  Detailed radiology reports for the last 24 hours:  Imaging Results (Last 24 Hours)       Procedure Component Value Units Date/Time    CT Head Without Contrast [[id number]] Collected: 09/27/23 2353     Updated: 09/27/23 2355    Narrative:      Noncontrast CT brain     Indications: Generalized weakness     Technique: Noncontrast CT images of the brain were obtained.  Limited  exposure control, adjustment of the MA and/or KV according to patient  size or use of an iterative reconstruction technique was utilized.     Findings CT brain:     There is no evidence of acute intracranial hemorrhage.  The ventricles  are normal in size and position.  There is no mass-effect or midline  shift.  There is an old lacunar infarct of the right thalamus.  No  abnormal extra-axial fluid collections are demonstrated.     The bony calvarium is normal.       Impression:      Impression:  1.  No CT evidence of an acute intracranial process.  2.  Old lacunar infarct of the right thalamus     This report was finalized on 9/27/2023 11:53 PM by Francis Nevarez MD.       CT Abdomen Pelvis Without Contrast  [[id number]] Collected: 09/27/23 2351     Updated: 09/27/23 2353    Narrative:      Noncontrast CT abdomen and pelvis     Indications: Generalized abdominal pain     Technique: Noncontrast CT images of the abdomen and pelvis were  obtained.  The mid exposure control, adjustment of the MA and/or KV  according to patient size or use of an iterative reconstruction  technique was utilized.     Findings CT abdomen pelvis:     Included portions of the lung bases are normal.     The liver and spleen are normal.  There is no biliary dilation.  The  gallbladder has been removed.     The pancreas is normal.     The adrenal glands are normal.     Kidneys are normal.  There is no hydronephrosis or renal or ureteral  calculi.     The bowel loops are normal in course and caliber.  There is  diverticulosis of the descending and sigmoid colon.     There is no pelvic mass or free fluid or lymphadenopathy.     Degenerative changes involve the lumbar spine.       Impression:      Impression:     No CT evidence of an acute intra-abdominal or intrapelvic process.     This report was finalized on 9/27/2023 11:51 PM by Francis Nevarez MD.             Assessment & Plan      #Generalized weakness  #Acute renal insufficiency on CKD, stage unknown  #Mildly elevated high-sensitivity troponin  #Dementia  -Patient presented with generalized weakness and worsening of baseline confusion.  No acute abnormalities seen on CT head.  -No acute abnormalities on CT abdomen.  Atelectasis seen on CT chest, but no evidence of acute edema, pneumonia, pleural effusion.  -Renal function improved after IV fluids with creatinine down to 1.86.  Previous baseline unclear, but at the beginning of August 2023 creatinine was 1.78, so she may be nearing her baseline.  -Family requests placement, case management following, appreciate assistance.  -No reports of chest pain.  EKG had no acute ischemic change.  She has a chronically elevated troponin and this was  slightly more elevated, but with flat/negative trend.  Likely due to combination of baseline increase and or renal dysfunction.  Monitor for development of any chest pain or other cardiac symptoms.  - Restart home dementia medications    #Bradycardia status post pacemaker  #Hypertension  -Blood pressure moderately well controlled for her advanced age.  -Restart home hydralazine  -Monitor blood pressure per protocol and titrate medications as needed    #Hyperlipidemia  #History of TIA  -Restart home aspirin and statin    #Normocytic anemia  #Thrombocytopenia  -Chronic, appears relatively stable based on results from 2023  -Repeat CBC periodically to monitor          Edited by: Acacia Kaminski DO at 2023    VTE Prophylaxis:   Mechanical Order History:        Ordered        23  Place Sequential Compression Device  Once            230  Maintain Sequential Compression Device  Continuous                          Pharmalogical Order History:       None            Dispo: Placement pending medical stability    Acacia Kaminski DO  [facility] Hospitalist  23  21:56 EDT    Electronically signed by Acacia Kaminski DO at 23       Consult Notes (most recent note)    No notes of this type exist for this encounter.          Physical Therapy Notes (most recent note)        Denia Poe, PT at 23 1427  Version 1 of 1         Acute Care - Physical Therapy Initial Evaluation  [facility]     Patient Name: Susan Us  : 1945  MRN: [id number]  Today's Date: 2023   Onset of Illness/Injury or Date of Surgery: 23  Visit Dx:   No diagnosis found.  Patient Active Problem List   Diagnosis    Stroke-like symptoms    TIA (transient ischemic attack)    Essential hypertension    Hyperlipidemia LDL goal <70    Artificial cardiac pacemaker    Acute renal insufficiency     Past Medical History:   Diagnosis Date    Bradycardia     s/p pacemaker placement     CKD (chronic kidney disease)     baseline renal function unknown    Dementia     Hyperlipidemia     Hypertension     Thrombocytopenia      Past Surgical History:   Procedure Laterality Date    CARDIAC PACEMAKER PLACEMENT      CATARACT EXTRACTION      CERVICAL FUSION      CHOLECYSTECTOMY      HIATAL HERNIA REPAIR      HYSTERECTOMY      REPLACEMENT TOTAL KNEE Right      PT Assessment (last 12 hours)       PT Evaluation and Treatment       Row Name 09/28/23 1412          Physical Therapy Time and Intention    Subjective Information complains of;pain  -AG     Document Type evaluation  -AG     Mode of Treatment physical therapy  -AG     Patient Effort good  -AG     Symptoms Noted During/After Treatment fatigue  -AG       Row Name 09/28/23 1412          General Information    Patient Profile Reviewed yes  -AG     Onset of Illness/Injury or Date of Surgery 09/27/23  -AG     Referring Physician Dr. Kaminski  -AG     Patient Observations agree to therapy;cooperative;alert  -AG     Patient/Family/Caregiver Comments/Observations pt. supine in bed on room air; pt. is intermittently confused.  Pt. is pleasant, cooperative for evaluation.  -AG     Prior Level of Function independent:;all household mobility  pt. mostly homebound, using walker but admits not using walker consistently.  Daughter assists with BADL.  -AG     Equipment Currently Used at Home shower chair;walker, rolling  -AG     Pertinent History of Current Functional Problem pt. admitted with acute renal insufficiency  -AG     Existing Precautions/Restrictions fall  -AG     Equipment Issued to Patient gait belt  -AG     Risks Reviewed patient:;LOB;dizziness  -AG     Benefits Reviewed patient:;improve function;increase independence;increase strength;increase balance;increase knowledge;decrease risk of DVT  -AG     Barriers to Rehab previous functional deficit;cognitive status  -AG       Row Name 09/28/23 1412          Previous Level of Function/Home Environm    BADLs,  Premorbid Functional Level partial assistance  -AG     Bed Mobility, Premorbid Functional Level independent  -AG     Transfers, Premorbid Functional Level independent;uses device or equipment  -AG     Household Ambulation, Premorbid Functional Level independent;uses device or equipment  -AG     Stairs, Premorbid Functional Level partial assistance;uses device or equipment  -AG       Row Name 09/28/23 1412          Living Environment    Current Living Arrangements home  -AG     Home Accessibility stairs to enter home  -AG     People in Home alone  -AG     Primary Care Provided by self;child(zoraida)  daughter assists as needed  -       Row Name 09/28/23 1412          Home Main Entrance    Number of Stairs, Main Entrance three  -AG     Stair Railings, Main Entrance none  -       Row Name 09/28/23 1412          Home Use of Assistive/Adaptive Equipment    Equipment Currently Used at Home shower chair;walker, rolling  -       Row Name 09/28/23 1412          Pain    Additional Documentation --  reports chronic shoulder, neck pain for many years  -       Row Name 09/28/23 1412          Cognition    Affect/Mental Status (Cognition) WFL  -     Orientation Status (Cognition) oriented x 3  -AG     Follows Commands (Cognition) verbal cues/prompting required  -     Personal Safety Interventions fall prevention program maintained;gait belt;nonskid shoes/slippers when out of bed;supervised activity  -       Row Name 09/28/23 1412          Range of Motion (ROM)    Range of Motion ROM is WFL;bilateral lower extremities  -       Row Name 09/28/23 1412          Range of Motion Comprehensive    Additional Documentation --  limited CROM in extension, rotation  -       Row Name 09/28/23 1412          Strength (Manual Muscle Testing)    Strength (Manual Muscle Testing) --  B LE 4/5  -       Row Name 09/28/23 1412          Sensory    Hearing Status WFL  -       Row Name 09/28/23 1412          Vision  Assessment/Intervention    Visual Impairment/Limitations WFL;corrective lenses full-time  -AG       Row Name 09/28/23 1412          Mobility    Extremity Weight-bearing Status --  no restrictions  -       Row Name 09/28/23 1412          Bed Mobility    Bed Mobility rolling left;rolling right;scooting/bridging;supine-sit;sit-supine  -AG     Scooting/Bridging [place] (Bed Mobility) standby assist  -AG     Supine-Sit [place] (Bed Mobility) standby assist  -AG     Sit-Supine [place] (Bed Mobility) minimum assist (75% patient effort);nonverbal cues (demo/gesture);verbal cues  -AG     Bed Mobility, Safety Issues decreased use of arms for pushing/pulling;decreased use of legs for bridging/pushing  -     Assistive Device (Bed Mobility) bed rails  -       Row Name 09/28/23 1412          Transfers    Transfers sit-stand transfer;stand-sit transfer;stand pivot/stand step transfer  -       Row Name 09/28/23 1412          Sit-Stand Transfer    Sit-Stand [place] (Transfers) contact guard;nonverbal cues (demo/gesture);verbal cues  -AG     Assistive Device (Sit-Stand Transfers) walker, front-wheeled  -AG       [facility] Name 09/28/23 1412          Stand-Sit Transfer    Stand-Sit [place] (Transfers) contact guard;nonverbal cues (demo/gesture);verbal cues  -AG     Assistive Device (Stand-Sit Transfers) walker, front-wheeled  -AG       [facility] Name 09/28/23 1412          Stand Pivot/Stand Step Transfer    Stand Pivot/Stand Step [place] (Transfers) contact guard;nonverbal cues (demo/gesture);verbal cues  -AG     Assistive Device (Stand Pivot Stand Step Transfer) walker, front-wheeled  -AG       [facility] Name 09/28/23 1412          Gait/Stairs (Locomotion)    Gait/Stairs Locomotion gait/ambulation independence;gait/ambulation assistive device;distance ambulated;gait pattern;gait deviations  -     [place] Level (Gait) verbal cues;nonverbal cues (demo/gesture);contact guard  -     Assistive Device (Gait)  walker, front-wheeled  -AG     Patient was able to Ambulate yes  -AG     Distance in Feet (Gait) 80  -AG     Pattern (Gait) step-through  -AG     Deviations/Abnormal Patterns (Gait) stride length decreased;gait speed decreased  -AG     Bilateral Gait Deviations forward flexed posture  -AG       Row Name 09/28/23 1412          Safety Issues, Functional Mobility    Impairments Affecting Function (Mobility) balance;endurance/activity tolerance;strength  -AG       Row Name 09/28/23 1412          Balance    Balance Assessment sitting static balance;sitting dynamic balance;sit to stand dynamic balance;standing static balance;standing dynamic balance  -AG     Static Sitting Balance supervision  -AG     Position, Sitting Balance unsupported;sitting edge of bed  -AG     Static Standing Balance contact guard;non-verbal cues (demo/gesture);verbal cues  -AG     Dynamic Standing Balance contact guard;non-verbal cues (demo/gesture);verbal cues  -AG     Position/Device Used, Standing Balance walker, front-wheeled  -AG       Row Name 09/28/23 1412          Coping    Observed Emotional State calm;cooperative  -AG     Verbalized Emotional State acceptance  -AG     Trust Relationship/Rapport care explained;choices provided;thoughts/feelings acknowledged  -     Family/Support Persons daughter;son  -AG     Involvement in Care not present at bedside  -AG       Row Name 09/28/23 1412          Plan of Care Review    Plan of Care Reviewed With patient  -AG     Outcome Evaluation PT evaluation complete.  Pt. requires CGA for functional mobility skills, ambulated x 80 ft with RW, CGA with slow pace.  Will continue to follow.  -       Row Name 09/28/23 1412          Positioning and Restraints    Pre-Treatment Position in bed  -AG     Post Treatment Position bed  -AG     In Bed supine;call light within reach;encouraged to call for assist;exit alarm on;side rails up x3;heels elevated  -       Row Name 09/28/23 1412          Therapy  Assessment/Plan (PT)    Patient/Family Therapy Goals Statement (PT) patient's goal is to return home with daughter to assist as needed  -AG     Functional Level at Time of Evaluation (PT) CGA  -AG     PT Diagnosis (PT) impaired functional mobility, balance and gait safety  -AG     Rehab Potential (PT) good, to achieve stated therapy goals  -AG     Criteria for Skilled Interventions Met (PT) yes;meets criteria  -AG     Therapy Frequency (PT) 2 times/wk  2-5 times/ week per priority  -AG     Predicted Duration of Therapy Intervention (PT) LOS  -AG     Problem List (PT) problems related to;balance;cognition;mobility;strength  -AG       Row Name 09/28/23 1412          Therapy Plan Review/Discharge Plan (PT)    Therapy Plan Review (PT) evaluation/treatment results reviewed;care plan/treatment goals reviewed;risks/benefits reviewed;current/potential barriers reviewed;participants voiced agreement with care plan;participants included;patient  -AG       Row Name 09/28/23 1412          Physical Therapy Goals    Bed Mobility Goal Selection (PT) bed mobility, PT goal 1  -AG     Transfer Goal Selection (PT) transfer, PT goal 1  -AG     Gait Training Goal Selection (PT) gait training, PT goal 1  -AG       Row Name 09/28/23 1412          Bed Mobility Goal 1 (PT)    Activity/Assistive Device (Bed Mobility Goal 1, PT) scooting;sit to supine;supine to sit  -AG     [place] Level/Cues Needed (Bed Mobility Goal 1, PT) modified independence  -AG     Time Frame (Bed Mobility Goal 1, PT) by discharge  -AG       Row Name 09/28/23 1412          Transfer Goal 1 (PT)    Activity/Assistive Device (Transfer Goal 1, PT) sit-to-stand/stand-to-sit;bed-to-chair/chair-to-bed;toilet;walker, rolling  -AG     [place] Level/Cues Needed (Transfer Goal 1, PT) standby assist  -AG     Time Frame (Transfer Goal 1, PT) by discharge  -AG       Row Name 09/28/23 1412          Gait Training Goal 1 (PT)    Activity/Assistive Device (Gait Training Goal  1, PT) gait (walking locomotion);assistive device use;decrease fall risk;diminish gait deviation;improve balance and speed;increase endurance/gait distance  -AG     [place] Level (Gait Training Goal 1, PT) standby assist  -AG     Distance (Gait Training Goal 1, PT) 150  -AG     Time Frame (Gait Training Goal 1, PT) by discharge  -AG               User Key  (r) = Recorded By, (t) = Taken By, (c) = Cosigned By      Initials Name Provider Type    AG Denia Poe, PT Physical Therapist                    Physical Therapy Education       Title: PT OT SLP Therapies (Done)       Topic: Physical Therapy (Done)       Point: Mobility training (Done)       Learning Progress Summary             Patient Acceptance, E,D, VU,NR by  at 9/28/2023 1410                         Point: Home exercise program (Done)       Learning Progress Summary             Patient Acceptance, E,D, VU,NR by  at 9/28/2023 1410                         Point: Body mechanics (Done)       Learning Progress Summary             Patient Acceptance, E,D, VU,NR by  at 9/28/2023 1410                         Point: Precautions (Done)       Learning Progress Summary             Patient Acceptance, E,D, VU,NR by  at 9/28/2023 1410                                         User Key       Initials Effective Dates Name Provider Type [facility] 06/16/21 -  Denia Poe, PT Physical Therapist PT                  PT Recommendation and Plan  Anticipated Discharge Disposition (PT): home with assist  Planned Therapy Interventions (PT): balance training, bed mobility training, gait training, home exercise program, transfer training, strengthening, postural re-education, patient/family education, neuromuscular re-education  Therapy Frequency (PT): 2 times/wk (2-5 times/ week per priority)  Plan of Care Reviewed With: patient  Outcome Evaluation: PT evaluation complete.  Pt. requires CGA for functional mobility skills, ambulated x 80 ft with RW, CGA with  slow pace.  Will continue to follow.       Time Calculation:    PT Charges       Row Name 23 1411             Time Calculation    PT Received On 23  -      PT Goal Re-Cert Due Date 10/12/23  -                User Key  (r) = Recorded By, (t) = Taken By, (c) = Cosigned By      Initials Name Provider Type    AG Denia Poe, PT Physical Therapist                  Therapy Charges for Today       Code Description Service Date Service Provider Modifiers Qty    51506350809 HC PT EVAL MOD COMPLEXITY 4 2023 Denia Poe, PT GP 1            PT G-Codes  AM-PAC 6 Clicks Score (PT): 17    Denia Poe PT  2023      Electronically signed by Denia Poe, PT at 23 1427          Occupational Therapy Notes (most recent note)        Gordo Kilpatrick, OT at 23 1018          Acute Care - Occupational Therapy Initial Evaluation   [facility]     Patient Name: Susan Us  : 1945  MRN: [id number]  Today's Date: 2023  Onset of Illness/Injury or Date of Surgery: 23     Referring Physician: Dr. Kaminski    Admit Date: 2023       ICD-10-CM ICD-9-CM   1. Acute renal insufficiency  N28.9 593.9     Patient Active Problem List   Diagnosis    Stroke-like symptoms    TIA (transient ischemic attack)    Essential hypertension    Hyperlipidemia LDL goal <70    Artificial cardiac pacemaker    Acute renal insufficiency     Past Medical History:   Diagnosis Date    Bradycardia     s/p pacemaker placement    CKD (chronic kidney disease)     baseline renal function unknown    Dementia     Hyperlipidemia     Hypertension     Thrombocytopenia      Past Surgical History:   Procedure Laterality Date    CARDIAC PACEMAKER PLACEMENT      CATARACT EXTRACTION      CERVICAL FUSION      CHOLECYSTECTOMY      HIATAL HERNIA REPAIR      HYSTERECTOMY      REPLACEMENT TOTAL [employer]          OT ASSESSMENT FLOWSHEET (last 12 hours)       OT Evaluation and Treatment       Row Name 23 1012                    OT Time and Intention    Document Type evaluation  -KR        Mode of Treatment occupational therapy  -KR        Patient Effort adequate  -KR           General Information    General Observations of Patient alert/cooperative, confused  -KR           Living Environment    Current Living Arrangements home  -KR        People in Home alone  -KR        Primary Care Provided by self  -KR           Home Use of Assistive/Adaptive Equipment    Equipment Currently Used at Home walker, rolling  -KR           Cognition    Affect/Mental Status (Cognition) confused  -KR        Orientation Status (Cognition) oriented to;person  -KR        Follows Commands (Cognition) verbal cues/prompting required  -KR           Range of Motion Comprehensive    Comment, General Range of Motion BUE WFL  -KR           Strength Comprehensive (MMT)    Comment, General Manual Muscle Testing (MMT) Assessment BUE 3/5  -KR           Activities of Daily Living    BADL Assessment/Intervention bathing;upper body dressing;lower body dressing;grooming;feeding;toileting  -KR           Bathing Assessment/Intervention    [place] Level (Bathing) bathing skills;minimum assist (75% patient effort)  -KR           Upper Body Dressing Assessment/Training    [place] Level (Upper Body Dressing) upper body dressing skills;minimum assist (75% patient effort)  -KR           Lower Body Dressing Assessment/Training    [place] Level (Lower Body Dressing) lower body dressing skills;minimum assist (75% patient effort)  -KR           Grooming Assessment/Training    [place] Level (Grooming) grooming skills;minimum assist (75% patient effort)  -KR           Self-Feeding Assessment/Training    [place] Level (Feeding) feeding skills;set up  -KR           Toileting Assessment/Training    [place] Level (Toileting) toileting skills;minimum assist (75% patient effort)  -KR           Plan of Care Review    Plan of Care Reviewed With patient;daughter  -KR            Therapy Assessment/Plan (OT)    Planned Therapy Interventions (OT) activity tolerance training;BADL retraining;strengthening exercise;cognitive/visual perception retraining  -KR           Therapy Plan Review/Discharge Plan (OT)    Anticipated Discharge Disposition (OT) extended care facility  -KR           OT Goals    Dressing Goal Selection (OT) dressing, OT goal 1  -KR        Strength Goal Selection (OT) strength, OT goal 1  -KR           Dressing Goal 1 (OT)    Activity/Device (Dressing Goal 1, OT) dressing skills, all  -KR        [place]/Cues Needed (Dressing Goal 1, OT) standby assist  -KR        Time Frame (Dressing Goal 1, OT) by discharge  -KR           Strength Goal 1 (OT)    Strength Goal 1 (OT) BUE increase x 1 to enhance self care/mobility skills  -KR        Time Frame (Strength Goal 1, OT) by discharge  -KR           OT Cognitive Goals    Orientation Goal Selection (OT) orientation, OT goal 1  -KR           Orientation Goal 1 (OT)    Activity (Orientation Goal 1, OT) oriented to person;oriented to place;oriented to situation  -KR        [place]/Cues/Accuracy (Orientation Goal 1, OT) independently  -KR        Time Frame (Orientation Goal 1, OT) by discharge  -KR                  User Key  (r) = Recorded By, (t) = Taken By, (c) = Cosigned By      Initials Name Effective Dates    KR Gordo Kilpatrick OT 06/16/21 -                            OT Recommendation and Plan  Planned Therapy Interventions (OT): activity tolerance training, BADL retraining, strengthening exercise, cognitive/visual perception retraining  Plan of Care Review  Plan of Care Reviewed With: patient, daughter  Plan of Care Reviewed With: patient, daughter        Time Calculation:     Therapy Charges for Today       Code Description Service Date Service Provider Modifiers Qty    [id number]  OT EVAL MOD COMPLEXITY 4 9/29/2023 Gordo Kilpatrick OT GO 1                 Gordo Kilpatrick OT  9/29/2023    Electronically signed by  Gordo Kilpatrick, OT at 09/29/23 1012

## 2023-09-29 NOTE — PLAN OF CARE
Goal Outcome Evaluation:  Plan of Care Reviewed With: patient        Progress: no change  Outcome Evaluation: Pt has rested in bed overnight. VSS on RA. Pt confused, anxious, and fearful. PRN medication adminstered and daughter called for pts comfort. No acute changes. Will continue POC.          Quality 130: Documentation Of Current Medications In The Medical Record: Current Medications Documented Detail Level: Detailed

## 2023-09-29 NOTE — ED PROVIDER NOTES
Subjective     History provided by:  Relative   used: No    Weakness - Generalized  Severity:  Severe  Onset quality:  Gradual  Timing:  Constant  Progression:  Worsening  Chronicity:  New  Context: not alcohol use, not allergies, not change in medication, not decreased sleep, not dehydration, not drug use, not increased activity, not pinched nerve, not recent infection, not stress and not urinary tract infection    Relieved by:  Nothing  Worsened by:  Nothing  Ineffective treatments:  None tried  Associated symptoms: no abdominal pain, no anorexia, no aphasia, no arthralgias, no ataxia, no chest pain, no cough, no diarrhea, no difficulty walking, no dizziness, no drooling, no dysphagia, no dysuria, no numbness in extremities, no falls, no fever, no foul-smelling urine, no frequency, no headaches, no hematochezia, no lethargy, no loss of consciousness, no melena, no myalgias, no nausea, no near-syncope, no seizures, no sensory-motor deficit, no shortness of breath, no stroke symptoms, no syncope, no urgency, no vision change and no vomiting    Risk factors: no anemia, no congestive heart failure, no coronary artery disease, no diabetes, no excessive menstruation, no family hx of stroke, no heart disease, no neurologic disease, no new medications and no recent stressors      Review of Systems   Constitutional:  Negative for activity change, appetite change, chills, diaphoresis, fatigue and fever.   HENT:  Negative for congestion, drooling, ear pain and sore throat.    Eyes:  Negative for redness.   Respiratory:  Negative for cough, chest tightness, shortness of breath and wheezing.    Cardiovascular:  Negative for chest pain, palpitations, leg swelling, syncope and near-syncope.   Gastrointestinal:  Negative for abdominal pain, anorexia, diarrhea, dysphagia, hematochezia, melena, nausea and vomiting.   Genitourinary:  Negative for dysuria, frequency and urgency.   Musculoskeletal:  Negative for  arthralgias, back pain, falls, myalgias and neck pain.   Skin:  Negative for pallor, rash and wound.   Neurological:  Positive for weakness. Negative for dizziness, seizures, loss of consciousness, speech difficulty and headaches.   Psychiatric/Behavioral:  Negative for agitation, behavioral problems, confusion and decreased concentration.    All other systems reviewed and are negative.    Past Medical History:   Diagnosis Date    Bradycardia     s/p pacemaker placement    CKD (chronic kidney disease)     baseline renal function unknown    Dementia     Hyperlipidemia     Hypertension     Thrombocytopenia        Allergies   Allergen Reactions    Evolocumab Itching     itching    Statins Hives       Past Surgical History:   Procedure Laterality Date    CARDIAC PACEMAKER PLACEMENT      CATARACT EXTRACTION      CERVICAL FUSION      CHOLECYSTECTOMY      HIATAL HERNIA REPAIR      HYSTERECTOMY      REPLACEMENT TOTAL KNEE Right        Family History   Problem Relation Age of Onset    Stroke Mother     Stroke Father        Social History     Socioeconomic History    Marital status:    Tobacco Use    Smoking status: Never    Smokeless tobacco: Never    Tobacco comments:     Second hand smoke exposure ()   Vaping Use    Vaping Use: Never used   Substance and Sexual Activity    Alcohol use: Never    Drug use: Never    Sexual activity: Defer           Objective   Physical Exam  Vitals and nursing note reviewed.   Constitutional:       General: She is not in acute distress.     Appearance: Normal appearance. She is well-developed. She is not toxic-appearing or diaphoretic.   HENT:      Head: Normocephalic and atraumatic.      Right Ear: External ear normal.      Left Ear: External ear normal.      Nose: Nose normal.      Mouth/Throat:      Pharynx: No oropharyngeal exudate.      Tonsils: No tonsillar exudate.   Eyes:      General: Lids are normal.      Conjunctiva/sclera: Conjunctivae normal.      Pupils: Pupils are  equal, round, and reactive to light.   Neck:      Thyroid: No thyromegaly.   Cardiovascular:      Rate and Rhythm: Normal rate and regular rhythm.      Pulses: Normal pulses.      Heart sounds: Normal heart sounds, S1 normal and S2 normal.   Pulmonary:      Effort: Pulmonary effort is normal. No tachypnea or respiratory distress.      Breath sounds: Normal breath sounds. No decreased breath sounds, wheezing or rales.   Chest:      Chest wall: No tenderness.   Abdominal:      General: Bowel sounds are normal. There is no distension.      Palpations: Abdomen is soft.      Tenderness: There is no abdominal tenderness. There is no guarding or rebound.   Musculoskeletal:         General: No tenderness or deformity. Normal range of motion.      Cervical back: Full passive range of motion without pain, normal range of motion and neck supple.   Lymphadenopathy:      Cervical: No cervical adenopathy.   Skin:     General: Skin is warm and dry.      Coloration: Skin is not pale.      Findings: No erythema or rash.   Neurological:      Mental Status: She is alert and oriented to person, place, and time.      GCS: GCS eye subscore is 4. GCS verbal subscore is 5. GCS motor subscore is 6.      Cranial Nerves: No cranial nerve deficit.      Sensory: No sensory deficit.      Motor: Weakness present.   Psychiatric:         Speech: Speech normal.         Behavior: Behavior normal.         Thought Content: Thought content normal.         Judgment: Judgment normal.       Procedures           ED Course  ED Course as of 09/29/23 0942   Wed Sep 27, 2023   1652 XR Chest 1 View  IMPRESSION:    Coarsened interstitial markings.   [ES]   1705 ECG 12 Lead Chest Pain  Vent. Rate :  65 BPM     Atrial Rate :  41 BPM     P-R Int :   * ms          QRS Dur :  98 ms      QT Int : 438 ms       P-R-T Axes :  27  -7  66 degrees     QTc Int : 455 ms     Atrial-paced rhythm  Incomplete right bundle branch block  Abnormal ECG  When compared with ECG of  02-AUG-2023 13:22, (Unconfirmed)  Electronic atrial pacemaker has replaced Junctional rhythm  Incomplete right bundle branch block is now present   [ES]   2013 CT Chest Without Contrast Diagnostic  IMPRESSION:     1.  Heart size at the upper limits of normal.  2.  Small size hiatal hernia.  3.  Left-sided pacemaker in place.  4.  Small unenlarged mediastinal nodes.  5.  Minimal atelectasis in the lung bases, left greater than right  6.  Minimal atelectasis in the lower lingula.  7.  No conclusive features of pneumonia, edema, pleural effusion, or  pneumothorax.  8.  No pneumomediastinum.  9.  Calcified granulomas in the right upper lobe.  10.  No thoracic aortic aneurysm.  11.  Cholecystectomy.   [ES]      ED Course User Index  [ES] Toribio Hall MD                                           Medical Decision Making    History provided by:  Relative   used: No    Weakness - Generalized  Severity:  Severe  Onset quality:  Gradual  Timing:  Constant  Progression:  Worsening  Chronicity:  New  Context: not alcohol use, not allergies, not change in medication, not decreased sleep, not dehydration, not drug use, not increased activity, not pinched nerve, not recent infection, not stress and not urinary tract infection    Relieved by:  Nothing  Worsened by:  Nothing  Ineffective treatments:  None tried  Associated symptoms: no abdominal pain, no anorexia, no aphasia, no arthralgias, no ataxia, no chest pain, no cough, no diarrhea, no difficulty walking, no dizziness, no drooling, no dysphagia, no dysuria, no numbness in extremities, no falls, no fever, no foul-smelling urine, no frequency, no headaches, no hematochezia, no lethargy, no loss of consciousness, no melena, no myalgias, no nausea, no near-syncope, no seizures, no sensory-motor deficit, no shortness of breath, no stroke symptoms, no syncope, no urgency, no vision change and no vomiting    Risk factors: no anemia, no congestive  heart failure, no coronary artery disease, no diabetes, no excessive menstruation, no family hx of stroke, no heart disease, no neurologic disease, no new medications and no recent stressors      Amount and/or Complexity of Data Reviewed  External Data Reviewed: labs, radiology, ECG and notes.  Labs: ordered. Decision-making details documented in ED Course.  Radiology: ordered and independent interpretation performed. Decision-making details documented in ED Course.  ECG/medicine tests: ordered and independent interpretation performed. Decision-making details documented in ED Course.    Risk  Decision regarding hospitalization.        Final diagnoses:   Acute renal insufficiency       ED Disposition  ED Disposition       ED Disposition   Decision to Admit    Condition   --    Comment   Level of Care: Med/Surg [1]   Diagnosis: Acute renal insufficiency [952303]   Admitting Physician: JASON LONG [[id number]]   Attending Physician: JASON LONG [[id number]]   Certification: I Certify That Inpatient Hospital Services Are Medically Necessary For Greater Than 2 Midnights                 No follow-up provider specified.       Medication List        ASK your doctor about these medications      hydrALAZINE 25 MG tablet  Commonly known as: APRESOLINE  Ask about: Which instructions should I use?                 Toribio Hall MD  09/29/23 5812

## 2023-09-29 NOTE — PROGRESS NOTES
[facility] HOSPITALIST PROGRESS NOTE     Patient Identification:  Name:  Susan Us  Age:  78 y.o.  Sex:  female  :  1945  MRN:  [id number]  Visit Number:  [id number]  ROOM: [address] [place] [address]     Primary Care Provider:  Trevor Garay MD    Length of stay in inpatient status:  1    Subjective     Chief Compliant:    Chief Complaint   Patient presents with    Weakness - Generalized       History of Presenting Illness:    Patient seen and examined earlier this afternoon with her daughter present at bedside.  Patient denies any shortness of breath, chest pain, or other complaints.  Daughter says that she is worried about her living at home by herself due to worsening confusion and recent falls, even though she checks on her frequently.  Requesting placement.    Objective     Current Hospital Meds:[START ON 2023] ARIPiprazole, 2 mg, Oral, Daily  [START ON 2023] aspirin, 81 mg, Oral, Daily  Bempedoic Acid-Ezetimibe, 1 tablet, Oral, Nightly  Calcium Carb-Cholecalciferol, 1 tablet, Oral, BID  donepezil, 5 mg, Oral, Nightly  [START ON 2023] ferrous sulfate, 325 mg, Oral, Q48H  gabapentin, 300 mg, Oral, BID  hydrALAZINE, 37.5 mg, Oral, BID  [START ON 2023] isosorbide mononitrate, 30 mg, Oral, Daily  [START ON 2023] modafinil, 200 mg, Oral, Daily  multivitamin with minerals, 1 tablet, Oral, BID  pantoprazole, 40 mg, Oral, Nightly  potassium chloride, 10 mEq, Oral, TID  senna-docusate sodium, 2 tablet, Oral, BID  sertraline, 200 mg, Oral, Nightly  sodium chloride, 10 mL, Intravenous, Q12H  [START ON 2023] torsemide, 50 mg, Oral, Daily  [START ON 2023] vitamin B-12, 1,000 mcg, Oral, Daily    sodium chloride, 100 mL/hr, Last Rate: 100 mL/hr (23)        Current Antimicrobial Therapy:  Anti-Infectives (From admission, onward)      None          Current Diuretic Therapy:  Diuretics (From admission, onward)      Ordered     Dose/Rate Route Frequency Start Stop     09/28/23 2154  torsemide (DEMADEX) tablet 50 mg        Ordering Provider: Acacia Kaminski DO    50 mg Oral Daily 09/29/23 0900            ----------------------------------------------------------------------------------------------------------------------  Vital Signs:  Temp:  [97.1 °F (36.2 °C)-98.4 °F (36.9 °C)] 97.9 °F (36.6 °C)  Heart Rate:  [62-67] 67  Resp:  [16-18] 16  BP: (110-161)/(56-87) 149/64  SpO2:  [95 %-98 %] 95 %  on   ;   Device (Oxygen Therapy): room air  Body mass index is 22.01 kg/m².    Wt Readings from Last 3 Encounters:   09/27/23 60 kg (132 lb 4.4 oz)   09/22/23 68.1 kg (150 lb 3.2 oz)   08/28/23 67.7 kg (149 lb 3.2 oz)     Intake & Output (last 3 days)         09/26 0701 09/27 0700 09/27 0701 09/28 0700 09/28 0701 09/29 0700    P.O.  120 780    I.V. (mL/kg)   1600 (26.7)    IV Piggyback  500     Total Intake(mL/kg)  620 (10.3) 2380 (39.7)    Urine (mL/kg/hr)  300 1300 (1.5)    Stool  0     Total Output  300 1300    Net  +320 +1080           Urine Unmeasured Occurrence   2 x    Stool Unmeasured Occurrence  0 x           Diet: Cardiac Diets, Renal Diets; Healthy Heart (2-3 Na+); Low Sodium (2-3g), Low Potassium, Low Phosphorus; Texture: Regular Texture (IDDSI 7); Fluid Consistency: Thin (IDDSI 0)  ----------------------------------------------------------------------------------------------------------------------  Physical exam:   Constitutional:  Well-developed and well-nourished.  No acute distress.      HENT:  Head:  Normocephalic and atraumatic.    Cardiovascular:  Normal rate, regular rhythm   Pulmonary/Chest:  No respiratory distress, no wheezes, no crackles, with normal breath sounds and good air movement.  Musculoskeletal:  No deformity.    Neurological: Awake, alert, no focal deficit on gross examination. No slurred speech or facial droop. Disoriented.  Skin:  Skin is warm and dry.    Peripheral vascular:  No cyanosis, no edema.  Psychiatric: Appropriate mood and affect    Edited  by: Acacia Kaminski DO at 9/28/2023 2156  ----------------------------------------------------------------------------------------------------------------------  Results from last 7 days   Lab Units 09/28/23  0026 09/27/23  1800 09/27/23  1615   CRP mg/dL  --   --  1.40*   LACTATE mmol/L  --  1.2  --    WBC 10*3/mm3 8.49  --  8.77   HEMOGLOBIN g/dL 8.8*  --  8.9*   HEMATOCRIT % 27.6*  --  28.5*   MCV fL 90.2  --  91.1   MCHC g/dL 31.9  --  31.2*   PLATELETS 10*3/mm3 78*  --  80*   INR  1.14*  --   --          Results from last 7 days   Lab Units 09/28/23  0026 09/27/23  1615   SODIUM mmol/L 142 142   POTASSIUM mmol/L 3.5 4.0   MAGNESIUM mg/dL  --  2.0   CHLORIDE mmol/L 103 103   CO2 mmol/L 26.6 28.3   BUN mg/dL 36* 41*   CREATININE mg/dL 1.86* 2.23*   CALCIUM mg/dL 8.9 9.0   GLUCOSE mg/dL 100* 108*   ALBUMIN g/dL 4.0 4.5   BILIRUBIN mg/dL 0.5 0.4   ALK PHOS U/L 55 61   AST (SGOT) U/L 21 22   ALT (SGPT) U/L 10 10   Estimated Creatinine Clearance: 24 mL/min (A) (by C-G formula based on SCr of 1.86 mg/dL (H)).  No results found for: AMMONIA  Results from last 7 days   Lab Units 09/27/23  1800 09/27/23  1615   HSTROP T ng/L 62* 66*     Results from last 7 days   Lab Units 09/27/23  1615   PROBNP pg/mL 1,668.0         No results found for: HGBA1C, POCGLU  Lab Results   Component Value Date    TSH 0.454 09/27/2023    FREET4 0.90 (L) 09/27/2023     No results found for: PREGTESTUR, PREGSERUM, HCG, HCGQUANT  Pain Management Panel          Latest Ref Rng & Units 8/2/2023   Pain Management Panel   Amphetamine, Urine Qual Negative Negative    Barbiturates Screen, Urine Negative Negative    Benzodiazepine Screen, Urine Negative Positive    Buprenorphine, Screen, Urine Negative Negative    Cocaine Screen, Urine Negative Negative    Fentanyl, Urine Negative Negative    Methadone Screen , Urine Negative Negative    Methamphetamine, Ur Negative Negative      Brief Urine Lab Results  (Last result in the past 365 days)        Color    Clarity   Blood   Leuk Est   Nitrite   Protein   CREAT   Urine HCG        09/27/23 1754 Yellow   Clear   Negative   Trace   Negative   Negative                 Blood Culture   Date Value Ref Range Status   09/27/2023 No growth at 24 hours  Preliminary   09/27/2023 No growth at 24 hours  Preliminary     Urine Culture   Date Value Ref Range Status   09/27/2023 No growth  Preliminary     No results found for: WOUNDCX  No results found for: STOOLCX  No results found for: RESPCX  No results found for: AFBCX  Results from last 7 days   Lab Units 09/27/23  1800 09/27/23  1615   PROCALCITONIN ng/mL 0.44*  --    LACTATE mmol/L 1.2  --    SED RATE mm/hr  --  12   CRP mg/dL  --  1.40*       I have personally looked at the labs and they are summarized above.  ----------------------------------------------------------------------------------------------------------------------  Detailed radiology reports for the last 24 hours:  Imaging Results (Last 24 Hours)       Procedure Component Value Units Date/Time    CT Head Without Contrast [[id number]] Collected: 09/27/23 2353     Updated: 09/27/23 2355    Narrative:      Noncontrast CT brain     Indications: Generalized weakness     Technique: Noncontrast CT images of the brain were obtained.  Limited  exposure control, adjustment of the MA and/or KV according to patient  size or use of an iterative reconstruction technique was utilized.     Findings CT brain:     There is no evidence of acute intracranial hemorrhage.  The ventricles  are normal in size and position.  There is no mass-effect or midline  shift.  There is an old lacunar infarct of the right thalamus.  No  abnormal extra-axial fluid collections are demonstrated.     The bony calvarium is normal.       Impression:      Impression:  1.  No CT evidence of an acute intracranial process.  2.  Old lacunar infarct of the right thalamus     This report was finalized on 9/27/2023 11:53 PM by Francis Nevarez MD.       CT Abdomen  Pelvis Without Contrast [[id number]] Collected: 09/27/23 2351     Updated: 09/27/23 2353    Narrative:      Noncontrast CT abdomen and pelvis     Indications: Generalized abdominal pain     Technique: Noncontrast CT images of the abdomen and pelvis were  obtained.  The mid exposure control, adjustment of the MA and/or KV  according to patient size or use of an iterative reconstruction  technique was utilized.     Findings CT abdomen pelvis:     Included portions of the lung bases are normal.     The liver and spleen are normal.  There is no biliary dilation.  The  gallbladder has been removed.     The pancreas is normal.     The adrenal glands are normal.     Kidneys are normal.  There is no hydronephrosis or renal or ureteral  calculi.     The bowel loops are normal in course and caliber.  There is  diverticulosis of the descending and sigmoid colon.     There is no pelvic mass or free fluid or lymphadenopathy.     Degenerative changes involve the lumbar spine.       Impression:      Impression:     No CT evidence of an acute intra-abdominal or intrapelvic process.     This report was finalized on 9/27/2023 11:51 PM by Francis Nevarez MD.             Assessment & Plan      #Generalized weakness  #Acute renal insufficiency on CKD, stage unknown  #Mildly elevated high-sensitivity troponin  #Dementia  -Patient presented with generalized weakness and worsening of baseline confusion.  No acute abnormalities seen on CT head.  -No acute abnormalities on CT abdomen.  Atelectasis seen on CT chest, but no evidence of acute edema, pneumonia, pleural effusion.  -Renal function improved after IV fluids with creatinine down to 1.86.  Previous baseline unclear, but at the beginning of August 2023 creatinine was 1.78, so she may be nearing her baseline.  -Family requests placement, case management following, appreciate assistance.  -No reports of chest pain.  EKG had no acute ischemic change.  She has a chronically elevated  troponin and this was slightly more elevated, but with flat/negative trend.  Likely due to combination of baseline increase and or renal dysfunction.  Monitor for development of any chest pain or other cardiac symptoms.  - Restart home dementia medications    #Bradycardia status post pacemaker  #Hypertension  -Blood pressure moderately well controlled for her advanced age.  -Restart home hydralazine  -Monitor blood pressure per protocol and titrate medications as needed    #Hyperlipidemia  #History of TIA  -Restart home aspirin and statin    #Normocytic anemia  #Thrombocytopenia  -Chronic, appears relatively stable based on results from August 2023  -Repeat CBC periodically to monitor          Edited by: Acacia Kaminski DO at 9/28/2023 2156    VTE Prophylaxis:   Mechanical Order History:        Ordered        09/27/23 2310  Place Sequential Compression Device  Once            09/27/23 2310  Maintain Sequential Compression Device  Continuous                          Pharmalogical Order History:       None            Dispo: Placement pending medical stability    Acacia Kaminski DO  [facility] Hospitalist  09/28/23  21:56 EDT

## 2023-09-30 LAB
ANION GAP SERPL CALCULATED.3IONS-SCNC: 11.7 MMOL/L (ref 5–15)
BUN SERPL-MCNC: 30 MG/DL (ref 8–23)
BUN/CREAT SERPL: 18.2 (ref 7–25)
CALCIUM SPEC-SCNC: 9.1 MG/DL (ref 8.6–10.5)
CHLORIDE SERPL-SCNC: 105 MMOL/L (ref 98–107)
CO2 SERPL-SCNC: 24.3 MMOL/L (ref 22–29)
CREAT SERPL-MCNC: 1.65 MG/DL (ref 0.57–1)
EGFRCR SERPLBLD CKD-EPI 2021: 31.9 ML/MIN/1.73
GLUCOSE SERPL-MCNC: 95 MG/DL (ref 65–99)
POTASSIUM SERPL-SCNC: 3.4 MMOL/L (ref 3.5–5.2)
SODIUM SERPL-SCNC: 141 MMOL/L (ref 136–145)

## 2023-09-30 PROCEDURE — 63710000001 ONDANSETRON PER 8 MG: Performed by: STUDENT IN AN ORGANIZED HEALTH CARE EDUCATION/TRAINING PROGRAM

## 2023-09-30 PROCEDURE — 96361 HYDRATE IV INFUSION ADD-ON: CPT

## 2023-09-30 PROCEDURE — G0378 HOSPITAL OBSERVATION PER HR: HCPCS

## 2023-09-30 PROCEDURE — 80048 BASIC METABOLIC PNL TOTAL CA: CPT | Performed by: STUDENT IN AN ORGANIZED HEALTH CARE EDUCATION/TRAINING PROGRAM

## 2023-09-30 RX ORDER — AMOXICILLIN 250 MG
2 CAPSULE ORAL 2 TIMES DAILY
Status: DISCONTINUED | OUTPATIENT
Start: 2023-09-30 | End: 2023-10-02 | Stop reason: HOSPADM

## 2023-09-30 RX ORDER — BISACODYL 10 MG
10 SUPPOSITORY, RECTAL RECTAL DAILY PRN
Status: DISCONTINUED | OUTPATIENT
Start: 2023-09-30 | End: 2023-10-02 | Stop reason: HOSPADM

## 2023-09-30 RX ORDER — BISACODYL 5 MG/1
5 TABLET, DELAYED RELEASE ORAL DAILY PRN
Status: DISCONTINUED | OUTPATIENT
Start: 2023-09-30 | End: 2023-10-02 | Stop reason: HOSPADM

## 2023-09-30 RX ORDER — POLYETHYLENE GLYCOL 3350 17 G/17G
17 POWDER, FOR SOLUTION ORAL DAILY
Status: DISCONTINUED | OUTPATIENT
Start: 2023-09-30 | End: 2023-10-02 | Stop reason: HOSPADM

## 2023-09-30 RX ORDER — HYDRALAZINE HYDROCHLORIDE 25 MG/1
25 TABLET, FILM COATED ORAL 2 TIMES DAILY
Status: DISCONTINUED | OUTPATIENT
Start: 2023-09-30 | End: 2023-10-01

## 2023-09-30 RX ADMIN — HYDRALAZINE HYDROCHLORIDE 37.5 MG: 25 TABLET, FILM COATED ORAL at 08:54

## 2023-09-30 RX ADMIN — Medication 10 ML: at 21:09

## 2023-09-30 RX ADMIN — TORSEMIDE 50 MG: 20 TABLET ORAL at 08:54

## 2023-09-30 RX ADMIN — SODIUM CHLORIDE 100 ML/HR: 9 INJECTION, SOLUTION INTRAVENOUS at 09:05

## 2023-09-30 RX ADMIN — Medication 1 TABLET: at 08:54

## 2023-09-30 RX ADMIN — POTASSIUM CHLORIDE 10 MEQ: 1500 TABLET, EXTENDED RELEASE ORAL at 21:07

## 2023-09-30 RX ADMIN — HYDRALAZINE HYDROCHLORIDE 25 MG: 25 TABLET, FILM COATED ORAL at 21:07

## 2023-09-30 RX ADMIN — GABAPENTIN 300 MG: 300 CAPSULE ORAL at 08:54

## 2023-09-30 RX ADMIN — LORAZEPAM 0.5 MG: 0.5 TABLET ORAL at 21:09

## 2023-09-30 RX ADMIN — ACETAMINOPHEN 650 MG: 325 TABLET ORAL at 19:41

## 2023-09-30 RX ADMIN — ISOSORBIDE MONONITRATE 30 MG: 30 TABLET, EXTENDED RELEASE ORAL at 08:54

## 2023-09-30 RX ADMIN — ASPIRIN 81 MG: 81 TABLET, COATED ORAL at 08:54

## 2023-09-30 RX ADMIN — DONEPEZIL HYDROCHLORIDE 5 MG: 5 TABLET, FILM COATED ORAL at 21:07

## 2023-09-30 RX ADMIN — MODAFINIL 200 MG: 100 TABLET ORAL at 08:54

## 2023-09-30 RX ADMIN — PANTOPRAZOLE SODIUM 40 MG: 40 TABLET, DELAYED RELEASE ORAL at 21:07

## 2023-09-30 RX ADMIN — ONDANSETRON HYDROCHLORIDE 4 MG: 4 TABLET, FILM COATED ORAL at 22:22

## 2023-09-30 RX ADMIN — Medication 1 TABLET: at 21:07

## 2023-09-30 RX ADMIN — POTASSIUM CHLORIDE 10 MEQ: 1500 TABLET, EXTENDED RELEASE ORAL at 08:54

## 2023-09-30 RX ADMIN — POTASSIUM CHLORIDE 10 MEQ: 1500 TABLET, EXTENDED RELEASE ORAL at 14:49

## 2023-09-30 RX ADMIN — SERTRALINE 200 MG: 50 TABLET, FILM COATED ORAL at 21:07

## 2023-09-30 RX ADMIN — LORAZEPAM 0.5 MG: 0.5 TABLET ORAL at 08:54

## 2023-09-30 RX ADMIN — ARIPIPRAZOLE 2 MG: 2 TABLET ORAL at 08:54

## 2023-09-30 RX ADMIN — GABAPENTIN 300 MG: 300 CAPSULE ORAL at 21:07

## 2023-09-30 RX ADMIN — Medication 1000 MCG: at 08:54

## 2023-09-30 RX ADMIN — DOCUSATE SODIUM 50 MG AND SENNOSIDES 8.6 MG 2 TABLET: 8.6; 5 TABLET, FILM COATED ORAL at 08:54

## 2023-09-30 RX ADMIN — Medication 10 ML: at 10:02

## 2023-09-30 NOTE — PROGRESS NOTES
[facility] HOSPITALIST PROGRESS NOTE     Patient Identification:  Name:  Susan Us  Age:  78 y.o.  Sex:  female  :  1945  MRN:  [id number]  Visit Number:  [id number]  ROOM: [address] [place] [address]     Primary Care Provider:  Trevor Garay MD    Length of stay in inpatient status:  2    Subjective     Chief Compliant:    Chief Complaint   Patient presents with    Weakness - Generalized       History of Presenting Illness:    Patient seen and examined with her daughter present at bedside today.  Daughter says that patient has been more confused today, but has not had any specific complaints.  Patient denies any pain anywhere, shortness of breath, or other complaints.    Objective     Current Hospital Meds:ARIPiprazole, 2 mg, Oral, Daily  aspirin, 81 mg, Oral, Daily  Bempedoic Acid-Ezetimibe, 1 tablet, Oral, Nightly  Calcium Carb-Cholecalciferol, 1 tablet, Oral, BID  donepezil, 5 mg, Oral, Nightly  ferrous sulfate, 325 mg, Oral, Q48H  gabapentin, 300 mg, Oral, BID  hydrALAZINE, 37.5 mg, Oral, BID  isosorbide mononitrate, 30 mg, Oral, Daily  LORazepam, 0.5 mg, Oral, Q12H  modafinil, 200 mg, Oral, Daily  multivitamin with minerals, 1 tablet, Oral, BID  pantoprazole, 40 mg, Oral, Nightly  potassium chloride, 10 mEq, Oral, TID  senna-docusate sodium, 2 tablet, Oral, BID  sertraline, 200 mg, Oral, Nightly  sodium chloride, 10 mL, Intravenous, Q12H  torsemide, 50 mg, Oral, Daily  vitamin B-12, 1,000 mcg, Oral, Daily    sodium chloride, 100 mL/hr, Last Rate: 100 mL/hr (233)        Current Antimicrobial Therapy:  Anti-Infectives (From admission, onward)      None          Current Diuretic Therapy:  Diuretics (From admission, onward)      Ordered     Dose/Rate Route Frequency Start Stop    23  torsemide (DEMADEX) tablet 50 mg        Ordering Provider: Acacia Kaminski DO    50 mg Oral Daily 23 0900             ----------------------------------------------------------------------------------------------------------------------  Vital Signs:  Temp:  [97.4 °F (36.3 °C)-99.2 °F (37.3 °C)] 97.5 °F (36.4 °C)  Heart Rate:  [65] 65  Resp:  [16-18] 18  BP: ()/(54-82) 98/54  SpO2:  [100 %] 100 %  on   ;   Device (Oxygen Therapy): room air  Body mass index is 24.8 kg/m².    Wt Readings from Last 3 Encounters:   09/29/23 67.6 kg (149 lb 0.5 oz)   09/22/23 68.1 kg (150 lb 3.2 oz)   08/28/23 67.7 kg (149 lb 3.2 oz)     Intake & Output (last 3 days)         09/27 0701 09/28 0700 09/28 0701 09/29 0700 09/29 0701 09/30 0700    P.O. 120 900 240    I.V. (mL/kg)  2800 (41.4)     IV Piggyback 500      Total Intake(mL/kg) 620 (10.3) 3700 (54.7) 240 (3.6)    Urine (mL/kg/hr) 300 2900 (1.8) 750 (0.7)    Stool 0 0     Total Output 300 2900 750    Net +320 +800 -510           Urine Unmeasured Occurrence  3 x 3 x    Stool Unmeasured Occurrence 0 x 1 x           Diet: Cardiac Diets, Renal Diets; Healthy Heart (2-3 Na+); Low Sodium (2-3g), Low Potassium, Low Phosphorus; Texture: Regular Texture (IDDSI 7); Fluid Consistency: Thin (IDDSI 0)  ----------------------------------------------------------------------------------------------------------------------  Physical exam:   Constitutional:  Well-developed and well-nourished.  No acute distress.      HENT:  Head:  Normocephalic and atraumatic.    Cardiovascular:  Normal rate, regular rhythm   Pulmonary/Chest:  No respiratory distress, no wheezes, no crackles, with normal breath sounds and good air movement.  Musculoskeletal:  No deformity.    Neurological: Awake, alert, no focal deficit on gross examination. No slurred speech or facial droop. Disoriented and confused as to situation and location.  Skin:  Skin is warm and dry.    Peripheral vascular:  No cyanosis, no edema.  Psychiatric: Appropriate mood and affect    Edited by: Acacia Kaminski DO at 9/29/2023  2247  ----------------------------------------------------------------------------------------------------------------------  Results from last 7 days   Lab Units 09/29/23 0108 09/28/23  0026 09/27/23  1800 09/27/23  1615   CRP mg/dL  --   --   --  1.40*   LACTATE mmol/L  --   --  1.2  --    WBC 10*3/mm3 5.82 8.49  --  8.77   HEMOGLOBIN g/dL 9.4* 8.8*  --  8.9*   HEMATOCRIT % 29.3* 27.6*  --  28.5*   MCV fL 90.4 90.2  --  91.1   MCHC g/dL 32.1 31.9  --  31.2*   PLATELETS 10*3/mm3 87* 78*  --  80*   INR   --  1.14*  --   --          Results from last 7 days   Lab Units 09/29/23 0108 09/28/23  0026 09/27/23  1615   SODIUM mmol/L 141 142 142   POTASSIUM mmol/L 3.6 3.5 4.0   MAGNESIUM mg/dL  --   --  2.0   CHLORIDE mmol/L 107 103 103   CO2 mmol/L 24.1 26.6 28.3   BUN mg/dL 27* 36* 41*   CREATININE mg/dL 1.52* 1.86* 2.23*   CALCIUM mg/dL 9.5 8.9 9.0   GLUCOSE mg/dL 95 100* 108*   ALBUMIN g/dL  --  4.0 4.5   BILIRUBIN mg/dL  --  0.5 0.4   ALK PHOS U/L  --  55 61   AST (SGOT) U/L  --  21 22   ALT (SGPT) U/L  --  10 10   Estimated Creatinine Clearance: 33.1 mL/min (A) (by C-G formula based on SCr of 1.52 mg/dL (H)).  No results found for: AMMONIA  Results from last 7 days   Lab Units 09/27/23  1800 09/27/23  1615   HSTROP T ng/L 62* 66*     Results from last 7 days   Lab Units 09/27/23  1615   PROBNP pg/mL 1,668.0         No results found for: HGBA1C, POCGLU  Lab Results   Component Value Date    TSH 0.454 09/27/2023    FREET4 0.90 (L) 09/27/2023     No results found for: PREGTESTUR, PREGSERUM, HCG, HCGQUANT  Pain Management Panel          Latest Ref Rng & Units 8/2/2023   Pain Management Panel   Amphetamine, Urine Qual Negative Negative    Barbiturates Screen, Urine Negative Negative    Benzodiazepine Screen, Urine Negative Positive    Buprenorphine, Screen, Urine Negative Negative    Cocaine Screen, Urine Negative Negative    Fentanyl, Urine Negative Negative    Methadone Screen , Urine Negative Negative     Methamphetamine, Ur Negative Negative      Brief Urine Lab Results  (Last result in the past 365 days)        Color   Clarity   Blood   Leuk Est   Nitrite   Protein   CREAT   Urine HCG        09/27/23 1754 Yellow   Clear   Negative   Trace   Negative   Negative                 Blood Culture   Date Value Ref Range Status   09/27/2023 No growth at 2 days  Preliminary   09/27/2023 No growth at 2 days  Preliminary     Urine Culture   Date Value Ref Range Status   09/27/2023 50,000 CFU/mL Mixed Gram Positive Madina (A)  Final     No results found for: WOUNDCX  No results found for: STOOLCX  No results found for: RESPCX  No results found for: AFBCX  Results from last 7 days   Lab Units 09/27/23  1800 09/27/23  1615   PROCALCITONIN ng/mL 0.44*  --    LACTATE mmol/L 1.2  --    SED RATE mm/hr  --  12   CRP mg/dL  --  1.40*       I have personally looked at the labs and they are summarized above.  ----------------------------------------------------------------------------------------------------------------------  Detailed radiology reports for the last 24 hours:  Imaging Results (Last 24 Hours)       ** No results found for the last 24 hours. **          Assessment & Plan      #Generalized weakness  #Acute renal insufficiency on CKD, stage unknown  #Mildly elevated high-sensitivity troponin  #Dementia  -Patient presented with generalized weakness and worsening of baseline confusion.  No acute abnormalities seen on CT head.  -No acute abnormalities on CT abdomen.  Atelectasis seen on CT chest, but no evidence of acute edema, pneumonia, pleural effusion.  -Renal function continues to improve with IV fluids. Creatinine down to 1.52.  Previous baseline unclear.  -Family requests placement, case management following, appreciate assistance.  -No reports of chest pain.  EKG had no acute ischemic change.  She has a chronically elevated troponin and this was slightly more elevated, but with flat/negative trend.  Likely due to  combination of baseline increase and or renal dysfunction.  Monitor for development of any chest pain or other cardiac symptoms.  - Home dementia medications reordered, change home ativan to BID scheduled per daughter's recommendation as she says she takes it every day, twice a day, at home and gets very anxious if she does not have it. Monitor for oversedation.    #Bradycardia status post pacemaker  #Hypertension  -Blood pressure moderately well controlled for her advanced age.  -Continue home hydralazine  -Monitor blood pressure per protocol and titrate medications as needed    #Hyperlipidemia  #History of TIA  -Restart home aspirin and statin    #Normocytic anemia  #Thrombocytopenia  -Chronic, appears relatively stable based on results from August 2023  -Repeat CBC periodically to monitor          Edited by: Acacia Kaminski DO at 9/29/2023 4236    VTE Prophylaxis:   Mechanical Order History:        Ordered        09/27/23 2310  Place Sequential Compression Device  Once            09/27/23 2310  Maintain Sequential Compression Device  Continuous                          Pharmalogical Order History:       None            Dispo: Pending skilled nursing facility placement    Acacia Kaminski DO  [facility] Hospitalist  09/29/23  22:47 EDT

## 2023-09-30 NOTE — PLAN OF CARE
Goal Outcome Evaluation:  Plan of Care Reviewed With: patient        Progress: no change  Outcome Evaluation: Patient has been resting in bed throughout the night. VSS on RA. pt voiced no complaints. no acute changes are noted at this time. will continue with plan of care.

## 2023-09-30 NOTE — PLAN OF CARE
Goal Outcome Evaluation:              Outcome Evaluation: Pt resting in bed at this time. Family at bedside. Pt has been pleasent through shift. Daughter voiced concern about dosage of hydralazine, MD aware. No complaints or acute changes at this time. Will continue with plan of care.

## 2023-09-30 NOTE — PROGRESS NOTES
[facility] HOSPITALIST PROGRESS NOTE     Patient Identification:  Name:  Susan Us  Age:  78 y.o.  Sex:  female  :  1945  MRN:  [id number]  Visit Number:  [id number]  ROOM: [address] [place] [address]     Primary Care Provider:  Trevor Garay MD    Length of stay in inpatient status:  2    Subjective     Chief Compliant:    Chief Complaint   Patient presents with    Weakness - Generalized       History of Presenting Illness:    Patient seen and examined earlier this afternoon with her daughter present at bedside.  Daughter says that the confusion seems to be a little better today.  Patient denies any specific complaints including shortness of breath, chest pain, or any other physical complaints.  Daughter reports that she has not had a bowel movement since admission.    Objective     Current Hospital Meds:ARIPiprazole, 2 mg, Oral, Daily  aspirin, 81 mg, Oral, Daily  Bempedoic Acid-Ezetimibe, 1 tablet, Oral, Nightly  Calcium Carb-Cholecalciferol, 1 tablet, Oral, BID  donepezil, 5 mg, Oral, Nightly  ferrous sulfate, 325 mg, Oral, Q48H  gabapentin, 300 mg, Oral, BID  hydrALAZINE, 25 mg, Oral, BID  isosorbide mononitrate, 30 mg, Oral, Daily  LORazepam, 0.5 mg, Oral, Q12H  modafinil, 200 mg, Oral, Daily  multivitamin with minerals, 1 tablet, Oral, BID  pantoprazole, 40 mg, Oral, Nightly  potassium chloride, 10 mEq, Oral, TID  senna-docusate sodium, 2 tablet, Oral, BID  sertraline, 200 mg, Oral, Nightly  sodium chloride, 10 mL, Intravenous, Q12H  torsemide, 50 mg, Oral, Daily  vitamin B-12, 1,000 mcg, Oral, Daily         Current Antimicrobial Therapy:  Anti-Infectives (From admission, onward)      None          Current Diuretic Therapy:  Diuretics (From admission, onward)      Ordered     Dose/Rate Route Frequency Start Stop    23  torsemide (DEMADEX) tablet 50 mg        Ordering Provider: Acacia Kaminski DO    50 mg Oral Daily 23 0900             ----------------------------------------------------------------------------------------------------------------------  Vital Signs:  Temp:  [97.6 °F (36.4 °C)-99.7 °F (37.6 °C)] 97.7 °F (36.5 °C)  Heart Rate:  [65-67] 65  Resp:  [18] 18  BP: ()/(55-83) 92/55  SpO2:  [95 %] 95 %  on   ;   Device (Oxygen Therapy): room air  Body mass index is 25.28 kg/m².    Wt Readings from Last 3 Encounters:   09/30/23 68.9 kg (151 lb 14.4 oz)   09/22/23 68.1 kg (150 lb 3.2 oz)   08/28/23 67.7 kg (149 lb 3.2 oz)     Intake & Output (last 3 days)         09/28 0701 09/29 0700 09/29 0701 09/30 0700 09/30 0701  10/01 0700    P.O. 900 360     I.V. (mL/kg) 2800 (41.4)  2500 (36.3)    IV Piggyback       Total Intake(mL/kg) 3700 (54.7) 360 (5.2) 2500 (36.3)    Urine (mL/kg/hr) 2900 (1.8) 750 (0.5)     Stool 0      Total Output 2900 750     Net +800 -390 +2500           Urine Unmeasured Occurrence 3 x 3 x 1 x    Stool Unmeasured Occurrence 1 x            Diet: Cardiac Diets, Renal Diets; Healthy Heart (2-3 Na+); Low Sodium (2-3g), Low Potassium, Low Phosphorus; Texture: Regular Texture (IDDSI 7); Fluid Consistency: Thin (IDDSI 0)  ----------------------------------------------------------------------------------------------------------------------  Physical exam:   Constitutional:  Well-developed and well-nourished.  No acute distress.      HENT:  Head:  Normocephalic and atraumatic.    Cardiovascular:  Normal rate, regular rhythm   Pulmonary/Chest:  No respiratory distress, no wheezes, no crackles, with normal breath sounds and fair air movement.  Musculoskeletal:  No deformity.    Neurological: Awake, alert, no focal deficit on gross examination. No slurred speech or facial droop. Disoriented and confused as to situation and location.  Skin:  Skin is warm and dry.    Peripheral vascular:  No cyanosis, no edema.  Psychiatric: Appropriate mood and affect, pleasant and cooperative    Edited by: Acacia Kaminski DO at 9/30/2023  1952  ----------------------------------------------------------------------------------------------------------------------  Results from last 7 days   Lab Units 09/29/23  0108 09/28/23  0026 09/27/23  1800 09/27/23  1615   CRP mg/dL  --   --   --  1.40*   LACTATE mmol/L  --   --  1.2  --    WBC 10*3/mm3 5.82 8.49  --  8.77   HEMOGLOBIN g/dL 9.4* 8.8*  --  8.9*   HEMATOCRIT % 29.3* 27.6*  --  28.5*   MCV fL 90.4 90.2  --  91.1   MCHC g/dL 32.1 31.9  --  31.2*   PLATELETS 10*3/mm3 87* 78*  --  80*   INR   --  1.14*  --   --          Results from last 7 days   Lab Units 09/30/23  0550 09/29/23 0108 09/28/23  0026 09/27/23  1615   SODIUM mmol/L 141 141 142 142   POTASSIUM mmol/L 3.4* 3.6 3.5 4.0   MAGNESIUM mg/dL  --   --   --  2.0   CHLORIDE mmol/L 105 107 103 103   CO2 mmol/L 24.3 24.1 26.6 28.3   BUN mg/dL 30* 27* 36* 41*   CREATININE mg/dL 1.65* 1.52* 1.86* 2.23*   CALCIUM mg/dL 9.1 9.5 8.9 9.0   GLUCOSE mg/dL 95 95 100* 108*   ALBUMIN g/dL  --   --  4.0 4.5   BILIRUBIN mg/dL  --   --  0.5 0.4   ALK PHOS U/L  --   --  55 61   AST (SGOT) U/L  --   --  21 22   ALT (SGPT) U/L  --   --  10 10   Estimated Creatinine Clearance: 27.9 mL/min (A) (by C-G formula based on SCr of 1.65 mg/dL (H)).  No results found for: AMMONIA  Results from last 7 days   Lab Units 09/27/23  1800 09/27/23  1615   HSTROP T ng/L 62* 66*     Results from last 7 days   Lab Units 09/27/23  1615   PROBNP pg/mL 1,668.0         No results found for: HGBA1C, POCGLU  Lab Results   Component Value Date    TSH 0.454 09/27/2023    FREET4 0.90 (L) 09/27/2023     No results found for: PREGTESTUR, PREGSERUM, HCG, HCGQUANT  Pain Management Panel          Latest Ref Rng & Units 8/2/2023   Pain Management Panel   Amphetamine, Urine Qual Negative Negative    Barbiturates Screen, Urine Negative Negative    Benzodiazepine Screen, Urine Negative Positive    Buprenorphine, Screen, Urine Negative Negative    Cocaine Screen, Urine Negative Negative    Fentanyl, Urine  Negative Negative    Methadone Screen , Urine Negative Negative    Methamphetamine, Ur Negative Negative      Brief Urine Lab Results  (Last result in the past 365 days)        Color   Clarity   Blood   Leuk Est   Nitrite   Protein   CREAT   Urine HCG        09/27/23 1754 Yellow   Clear   Negative   Trace   Negative   Negative                 Blood Culture   Date Value Ref Range Status   09/27/2023 No growth at 3 days  Preliminary   09/27/2023 No growth at 3 days  Preliminary     Urine Culture   Date Value Ref Range Status   09/27/2023 50,000 CFU/mL Mixed Gram Positive Madina (A)  Final     No results found for: WOUNDCX  No results found for: STOOLCX  No results found for: RESPCX  No results found for: AFBCX  Results from last 7 days   Lab Units 09/27/23  1800 09/27/23  1615   PROCALCITONIN ng/mL 0.44*  --    LACTATE mmol/L 1.2  --    SED RATE mm/hr  --  12   CRP mg/dL  --  1.40*       I have personally looked at the labs and they are summarized above.  ----------------------------------------------------------------------------------------------------------------------  Detailed radiology reports for the last 24 hours:  Imaging Results (Last 24 Hours)       ** No results found for the last 24 hours. **          Assessment & Plan      #Generalized weakness  #Acute renal insufficiency on CKD, stage unknown  #Mildly elevated high-sensitivity troponin  #Dementia  -Patient presented with generalized weakness and worsening of baseline confusion.  No acute abnormalities seen on CT head.  -No acute abnormalities on CT abdomen.  Atelectasis seen on CT chest, but no evidence of acute edema, pneumonia, pleural effusion.  -Renal function improved after IV fluids.  Previous baseline unclear, but at the beginning of August 2023 creatinine was 1.78, so she may be nearing her baseline.  Creatinine briefly improved to 1.52 yesterday, is relatively stable at 1.65 today.  Daughter reports she seems to be eating and drinking  well.  -Family requests placement, case management following, appreciate assistance.  -No reports of chest pain.  EKG had no acute ischemic change.  She has a chronically elevated troponin and this was slightly more elevated, but with flat/negative trend.  Likely due to combination of baseline increase and or renal dysfunction.  Monitor for development of any chest pain or other cardiac symptoms.  - Home dementia medications reordered based on her home regimen.    #Bradycardia status post pacemaker  #Hypertension  -Continue home hydralazine.  Daughter reports that she takes 25 mg twice daily.  She was ordered to have 37.5 mg twice daily at admission and blood pressure has been low normal today.  Decrease the dose she takes at home which is 25 mg twice daily.  -Monitor blood pressure per protocol and titrate medications as needed    #Hyperlipidemia  #History of TIA  -Continue home aspirin and statin    #Normocytic anemia  #Thrombocytopenia  -Chronic, appears relatively stable based on results from August 2023  -Repeat CBC periodically to monitor    #Constipation  - Increase bowel regimen with scheduled Shruti-Colace and MiraLAX.  Continue as needed Dulcolax.    Edited by: Acacia Kaminski DO at 9/30/2023 1952    VTE Prophylaxis:   Mechanical Order History:        Ordered        09/27/23 2310  Place Sequential Compression Device  Once            09/27/23 2310  Maintain Sequential Compression Device  Continuous                          Pharmalogical Order History:       None            Dispo: Pending skilled nursing facility placement    Acacia Kaminski DO  [facility]  09/30/23  19:52 EDT

## 2023-10-01 LAB
ANION GAP SERPL CALCULATED.3IONS-SCNC: 13.5 MMOL/L (ref 5–15)
BUN SERPL-MCNC: 34 MG/DL (ref 8–23)
BUN/CREAT SERPL: 20.5 (ref 7–25)
CALCIUM SPEC-SCNC: 9.3 MG/DL (ref 8.6–10.5)
CHLORIDE SERPL-SCNC: 104 MMOL/L (ref 98–107)
CO2 SERPL-SCNC: 25.5 MMOL/L (ref 22–29)
CREAT SERPL-MCNC: 1.66 MG/DL (ref 0.57–1)
EGFRCR SERPLBLD CKD-EPI 2021: 31.6 ML/MIN/1.73
GLUCOSE SERPL-MCNC: 108 MG/DL (ref 65–99)
POTASSIUM SERPL-SCNC: 3.8 MMOL/L (ref 3.5–5.2)
SODIUM SERPL-SCNC: 143 MMOL/L (ref 136–145)

## 2023-10-01 PROCEDURE — 80048 BASIC METABOLIC PNL TOTAL CA: CPT | Performed by: STUDENT IN AN ORGANIZED HEALTH CARE EDUCATION/TRAINING PROGRAM

## 2023-10-01 PROCEDURE — G0378 HOSPITAL OBSERVATION PER HR: HCPCS

## 2023-10-01 PROCEDURE — 63710000001 ONDANSETRON PER 8 MG: Performed by: STUDENT IN AN ORGANIZED HEALTH CARE EDUCATION/TRAINING PROGRAM

## 2023-10-01 RX ORDER — HYDRALAZINE HYDROCHLORIDE 25 MG/1
12.5 TABLET, FILM COATED ORAL 2 TIMES DAILY
Status: DISCONTINUED | OUTPATIENT
Start: 2023-10-01 | End: 2023-10-02

## 2023-10-01 RX ADMIN — Medication 1 TABLET: at 20:49

## 2023-10-01 RX ADMIN — DOCUSATE SODIUM 50 MG AND SENNOSIDES 8.6 MG 2 TABLET: 8.6; 5 TABLET, FILM COATED ORAL at 20:49

## 2023-10-01 RX ADMIN — POTASSIUM CHLORIDE 10 MEQ: 1500 TABLET, EXTENDED RELEASE ORAL at 14:35

## 2023-10-01 RX ADMIN — ASPIRIN 81 MG: 81 TABLET, COATED ORAL at 08:13

## 2023-10-01 RX ADMIN — Medication 1 TABLET: at 08:14

## 2023-10-01 RX ADMIN — POLYETHYLENE GLYCOL (3350) 17 G: 17 POWDER, FOR SOLUTION ORAL at 08:13

## 2023-10-01 RX ADMIN — LORAZEPAM 0.5 MG: 0.5 TABLET ORAL at 08:14

## 2023-10-01 RX ADMIN — ISOSORBIDE MONONITRATE 30 MG: 30 TABLET, EXTENDED RELEASE ORAL at 08:13

## 2023-10-01 RX ADMIN — DONEPEZIL HYDROCHLORIDE 5 MG: 5 TABLET, FILM COATED ORAL at 20:49

## 2023-10-01 RX ADMIN — SERTRALINE 200 MG: 50 TABLET, FILM COATED ORAL at 20:50

## 2023-10-01 RX ADMIN — LORAZEPAM 0.5 MG: 0.5 TABLET ORAL at 20:49

## 2023-10-01 RX ADMIN — POTASSIUM CHLORIDE 10 MEQ: 1500 TABLET, EXTENDED RELEASE ORAL at 20:49

## 2023-10-01 RX ADMIN — ARIPIPRAZOLE 2 MG: 2 TABLET ORAL at 08:13

## 2023-10-01 RX ADMIN — FERROUS SULFATE TAB 325 MG (65 MG ELEMENTAL FE) 325 MG: 325 (65 FE) TAB at 08:13

## 2023-10-01 RX ADMIN — GABAPENTIN 300 MG: 300 CAPSULE ORAL at 08:14

## 2023-10-01 RX ADMIN — Medication 10 ML: at 08:14

## 2023-10-01 RX ADMIN — GABAPENTIN 300 MG: 300 CAPSULE ORAL at 20:49

## 2023-10-01 RX ADMIN — Medication 10 ML: at 20:50

## 2023-10-01 RX ADMIN — PANTOPRAZOLE SODIUM 40 MG: 40 TABLET, DELAYED RELEASE ORAL at 20:49

## 2023-10-01 RX ADMIN — MODAFINIL 200 MG: 100 TABLET ORAL at 08:14

## 2023-10-01 RX ADMIN — HYDRALAZINE HYDROCHLORIDE 25 MG: 25 TABLET, FILM COATED ORAL at 08:14

## 2023-10-01 RX ADMIN — TORSEMIDE 50 MG: 20 TABLET ORAL at 08:14

## 2023-10-01 RX ADMIN — POTASSIUM CHLORIDE 10 MEQ: 1500 TABLET, EXTENDED RELEASE ORAL at 08:13

## 2023-10-01 RX ADMIN — Medication 1000 MCG: at 08:14

## 2023-10-01 RX ADMIN — DOCUSATE SODIUM 50 MG AND SENNOSIDES 8.6 MG 2 TABLET: 8.6; 5 TABLET, FILM COATED ORAL at 08:13

## 2023-10-01 RX ADMIN — ONDANSETRON HYDROCHLORIDE 4 MG: 4 TABLET, FILM COATED ORAL at 21:46

## 2023-10-01 NOTE — PLAN OF CARE
Goal Outcome Evaluation:  Plan of Care Reviewed With: patient        Progress: no change  Outcome Evaluation: Pt has rested in bed throughout the shift. NS continious infusion discontinued. pt voiced no complaints this shift. no acute changes are noted at this time. will continue with plan of care.

## 2023-10-01 NOTE — PLAN OF CARE
Goal Outcome Evaluation:           Progress: no change  Outcome Evaluation: Pt resting in bed at this time. Confused this morning and difficult to redirect. Pt ambulated in room and got up to bedside comode. No complaints or acute changes at this time. Will continue with plan of care.

## 2023-10-01 NOTE — NURSING NOTE
Pt refuses bath. Pt educated on importance of bathing and pt is still adamant about not being bathed.

## 2023-10-01 NOTE — PROGRESS NOTES
[facility] HOSPITALIST PROGRESS NOTE     Patient Identification:  Name:  Susan Us  Age:  78 y.o.  Sex:  female  :  1945  MRN:  [id number]  Visit Number:  [id number]  ROOM: [address] [place] [address]     Primary Care Provider:  Trevor Garay MD    Length of stay in inpatient status:  2    Subjective     Chief Compliant:    Chief Complaint   Patient presents with    Weakness - Generalized       History of Presenting Illness:    Patient seen and examined early this afternoon with her daughter present in the ED. Patient denies shortness of breath, pain, or other complaints. Daughter says she has been intermittently more confused and was agitated this morning before her a.m. meds.    Objective     Current Hospital Meds:ARIPiprazole, 2 mg, Oral, Daily  aspirin, 81 mg, Oral, Daily  Bempedoic Acid-Ezetimibe, 1 tablet, Oral, Nightly  Calcium Carb-Cholecalciferol, 1 tablet, Oral, BID  donepezil, 5 mg, Oral, Nightly  ferrous sulfate, 325 mg, Oral, Q48H  gabapentin, 300 mg, Oral, BID  hydrALAZINE, 12.5 mg, Oral, BID  isosorbide mononitrate, 30 mg, Oral, Daily  LORazepam, 0.5 mg, Oral, Q12H  modafinil, 200 mg, Oral, Daily  multivitamin with minerals, 1 tablet, Oral, BID  pantoprazole, 40 mg, Oral, Nightly  senna-docusate sodium, 2 tablet, Oral, BID   And  polyethylene glycol, 17 g, Oral, Daily  potassium chloride, 10 mEq, Oral, TID  sertraline, 200 mg, Oral, Nightly  sodium chloride, 10 mL, Intravenous, Q12H  torsemide, 50 mg, Oral, Daily  vitamin B-12, 1,000 mcg, Oral, Daily         Current Antimicrobial Therapy:  Anti-Infectives (From admission, onward)      None          Current Diuretic Therapy:  Diuretics (From admission, onward)      Ordered     Dose/Rate Route Frequency Start Stop    23  torsemide (DEMADEX) tablet 50 mg        Ordering Provider: Acacia Kaminski DO    50 mg Oral Daily 23 0900             ----------------------------------------------------------------------------------------------------------------------  Vital Signs:  Temp:  [97.5 °F (36.4 °C)-98.2 °F (36.8 °C)] 97.5 °F (36.4 °C)  Heart Rate:  [65-72] 65  Resp:  [18-20] 18  BP: ()/(68-76) 90/68  SpO2:  [95 %-99 %] 95 %  on   ;   Device (Oxygen Therapy): room air  Body mass index is 24.87 kg/m².    Wt Readings from Last 3 Encounters:   10/01/23 67.8 kg (149 lb 7.6 oz)   09/22/23 68.1 kg (150 lb 3.2 oz)   08/28/23 67.7 kg (149 lb 3.2 oz)     Intake & Output (last 3 days)         09/29 0701  09/30 0700 09/30 0701  10/01 0700 10/01 0701  10/02 0700    P.O. 360 350 900    I.V. (mL/kg)  2958.7 (43.6)     Total Intake(mL/kg) 360 (5.2) 3308.7 (48.8) 900 (13.3)    Urine (mL/kg/hr) 750 (0.5)  720 (0.9)    Stool       Total Output 750  720    Net -390 +3308.7 +180           Urine Unmeasured Occurrence 3 x 3 x           Diet: Cardiac Diets, Renal Diets; Healthy Heart (2-3 Na+); Low Sodium (2-3g), Low Potassium, Low Phosphorus; Texture: Regular Texture (IDDSI 7); Fluid Consistency: Thin (IDDSI 0)  ----------------------------------------------------------------------------------------------------------------------  Physical exam:   Constitutional:  Well-developed and well-nourished.  No acute distress.      HENT:  Head:  Normocephalic and atraumatic.    Cardiovascular:  Normal rate, regular rhythm   Pulmonary/Chest:  No respiratory distress, no wheezes, no crackles, with normal breath sounds and fair air movement.  Musculoskeletal:  No deformity.    Neurological: Initially sleeping but woke easily, no focal deficit on gross examination. No slurred speech or facial droop. Disoriented and confused as to situation and location.  Skin:  Skin is warm and dry.    Peripheral vascular:  No cyanosis, no edema.  Psychiatric: Appropriate mood and affect, pleasant and cooperative    Edited by: Acacia Kaminski DO at 10/1/2023  1928  ----------------------------------------------------------------------------------------------------------------------  Results from last 7 days   Lab Units 09/29/23  0108 09/28/23  0026 09/27/23  1800 09/27/23  1615   CRP mg/dL  --   --   --  1.40*   LACTATE mmol/L  --   --  1.2  --    WBC 10*3/mm3 5.82 8.49  --  8.77   HEMOGLOBIN g/dL 9.4* 8.8*  --  8.9*   HEMATOCRIT % 29.3* 27.6*  --  28.5*   MCV fL 90.4 90.2  --  91.1   MCHC g/dL 32.1 31.9  --  31.2*   PLATELETS 10*3/mm3 87* 78*  --  80*   INR   --  1.14*  --   --          Results from last 7 days   Lab Units 10/01/23  0336 09/30/23  0550 09/29/23  0108 09/28/23  0026 09/27/23  1615   SODIUM mmol/L 143 141 141 142 142   POTASSIUM mmol/L 3.8 3.4* 3.6 3.5 4.0   MAGNESIUM mg/dL  --   --   --   --  2.0   CHLORIDE mmol/L 104 105 107 103 103   CO2 mmol/L 25.5 24.3 24.1 26.6 28.3   BUN mg/dL 34* 30* 27* 36* 41*   CREATININE mg/dL 1.66* 1.65* 1.52* 1.86* 2.23*   CALCIUM mg/dL 9.3 9.1 9.5 8.9 9.0   GLUCOSE mg/dL 108* 95 95 100* 108*   ALBUMIN g/dL  --   --   --  4.0 4.5   BILIRUBIN mg/dL  --   --   --  0.5 0.4   ALK PHOS U/L  --   --   --  55 61   AST (SGOT) U/L  --   --   --  21 22   ALT (SGPT) U/L  --   --   --  10 10   Estimated Creatinine Clearance: 30.4 mL/min (A) (by C-G formula based on SCr of 1.66 mg/dL (H)).  No results found for: AMMONIA  Results from last 7 days   Lab Units 09/27/23  1800 09/27/23  1615   HSTROP T ng/L 62* 66*     Results from last 7 days   Lab Units 09/27/23  1615   PROBNP pg/mL 1,668.0         No results found for: HGBA1C, POCGLU  Lab Results   Component Value Date    TSH 0.454 09/27/2023    FREET4 0.90 (L) 09/27/2023     No results found for: PREGTESTUR, PREGSERUM, HCG, HCGQUANT  Pain Management Panel          Latest Ref Rng & Units 8/2/2023   Pain Management Panel   Amphetamine, Urine Qual Negative Negative    Barbiturates Screen, Urine Negative Negative    Benzodiazepine Screen, Urine Negative Positive    Buprenorphine, Screen,  Urine Negative Negative    Cocaine Screen, Urine Negative Negative    Fentanyl, Urine Negative Negative    Methadone Screen , Urine Negative Negative    Methamphetamine, Ur Negative Negative      Brief Urine Lab Results  (Last result in the past 365 days)        Color   Clarity   Blood   Leuk Est   Nitrite   Protein   CREAT   Urine HCG        09/27/23 1754 Yellow   Clear   Negative   Trace   Negative   Negative                 Blood Culture   Date Value Ref Range Status   09/27/2023 No growth at 4 days  Preliminary   09/27/2023 No growth at 4 days  Preliminary     Urine Culture   Date Value Ref Range Status   09/27/2023 50,000 CFU/mL Mixed Gram Positive Madina (A)  Final     No results found for: WOUNDCX  No results found for: STOOLCX  No results found for: RESPCX  No results found for: AFBCX  Results from last 7 days   Lab Units 09/27/23  1800 09/27/23  1615   PROCALCITONIN ng/mL 0.44*  --    LACTATE mmol/L 1.2  --    SED RATE mm/hr  --  12   CRP mg/dL  --  1.40*       I have personally looked at the labs and they are summarized above.  ----------------------------------------------------------------------------------------------------------------------  Detailed radiology reports for the last 24 hours:  Imaging Results (Last 24 Hours)       ** No results found for the last 24 hours. **          Assessment & Plan      #Generalized weakness  #Acute renal insufficiency on CKD, stage unknown  #Mildly elevated high-sensitivity troponin  #Dementia  -Patient presented with generalized weakness and worsening of baseline confusion.  No acute abnormalities seen on CT head.  -No acute abnormalities on CT abdomen.  Atelectasis seen on CT chest, but no evidence of acute edema, pneumonia, pleural effusion.  -Renal function improved after IV fluids.  Previous baseline unclear, but at the beginning of August 2023 creatinine was 1.78, so she may be nearing her baseline.  Creatinine stable at around 1.5-1.6.  -Family requests  placement, case management following, appreciate assistance.  -No reports of chest pain.  EKG had no acute ischemic change.  She has a chronically elevated troponin and this was slightly more elevated, but with flat/negative trend.  Likely due to combination of baseline increase and or renal dysfunction.  Monitor for development of any chest pain or other cardiac symptoms.  - Home dementia medications reordered based on her home regimen.  - Ordered delirium interventions/precautions    #Bradycardia status post pacemaker  #Hypertension  -Patient has been having borderline hypotension so I have decreased her hydralazine to 12.5mg BID.  -Monitor blood pressure per protocol and titrate medications as needed    #Hyperlipidemia  #History of TIA  -Continue home aspirin and statin    #Normocytic anemia  #Thrombocytopenia  -Chronic, appears relatively stable based on results from August 2023  -Repeat CBC periodically to monitor    Edited by: Acacia Kaminski DO at 10/1/2023 1928    VTE Prophylaxis:   Mechanical Order History:        Ordered        09/27/23 2310  Place Sequential Compression Device  Once            09/27/23 2310  Maintain Sequential Compression Device  Continuous                          Pharmalogical Order History:       None            Dispo: pending short term rehab placement    Acacia Kaminski DO  [facility]  10/01/23  19:28 EDT

## 2023-10-02 VITALS
RESPIRATION RATE: 16 BRPM | TEMPERATURE: 98.1 F | OXYGEN SATURATION: 97 % | DIASTOLIC BLOOD PRESSURE: 82 MMHG | WEIGHT: 144.6 LBS | SYSTOLIC BLOOD PRESSURE: 158 MMHG | HEIGHT: 65 IN | HEART RATE: 65 BPM | BODY MASS INDEX: 24.09 KG/M2

## 2023-10-02 LAB
BACTERIA SPEC AEROBE CULT: NORMAL
BACTERIA SPEC AEROBE CULT: NORMAL

## 2023-10-02 PROCEDURE — G0378 HOSPITAL OBSERVATION PER HR: HCPCS

## 2023-10-02 PROCEDURE — 97110 THERAPEUTIC EXERCISES: CPT

## 2023-10-02 PROCEDURE — 97116 GAIT TRAINING THERAPY: CPT

## 2023-10-02 PROCEDURE — 97530 THERAPEUTIC ACTIVITIES: CPT

## 2023-10-02 RX ORDER — NIFEDIPINE 30 MG/1
30 TABLET, FILM COATED, EXTENDED RELEASE ORAL
Status: DISCONTINUED | OUTPATIENT
Start: 2023-10-03 | End: 2023-10-02 | Stop reason: HOSPADM

## 2023-10-02 RX ORDER — LORAZEPAM 0.5 MG/1
0.5 TABLET ORAL 2 TIMES DAILY PRN
Qty: 3 TABLET | Refills: 0 | Status: SHIPPED | OUTPATIENT
Start: 2023-10-02

## 2023-10-02 RX ORDER — MODAFINIL 200 MG/1
200 TABLET ORAL DAILY
Qty: 3 TABLET | Refills: 0 | Status: SHIPPED | OUTPATIENT
Start: 2023-10-02

## 2023-10-02 RX ORDER — NIFEDIPINE 30 MG/1
30 TABLET, FILM COATED, EXTENDED RELEASE ORAL
Qty: 30 TABLET | Refills: 0 | Status: SHIPPED | OUTPATIENT
Start: 2023-10-03

## 2023-10-02 RX ORDER — GABAPENTIN 300 MG/1
300 CAPSULE ORAL 2 TIMES DAILY
Qty: 6 CAPSULE | Refills: 0 | Status: SHIPPED | OUTPATIENT
Start: 2023-10-02

## 2023-10-02 RX ADMIN — LORAZEPAM 0.5 MG: 0.5 TABLET ORAL at 08:13

## 2023-10-02 RX ADMIN — MODAFINIL 200 MG: 100 TABLET ORAL at 08:12

## 2023-10-02 RX ADMIN — Medication 1 TABLET: at 08:13

## 2023-10-02 RX ADMIN — POLYETHYLENE GLYCOL (3350) 17 G: 17 POWDER, FOR SOLUTION ORAL at 08:14

## 2023-10-02 RX ADMIN — POTASSIUM CHLORIDE 10 MEQ: 1500 TABLET, EXTENDED RELEASE ORAL at 08:12

## 2023-10-02 RX ADMIN — ASPIRIN 81 MG: 81 TABLET, COATED ORAL at 08:12

## 2023-10-02 RX ADMIN — DOCUSATE SODIUM 50 MG AND SENNOSIDES 8.6 MG 2 TABLET: 8.6; 5 TABLET, FILM COATED ORAL at 08:12

## 2023-10-02 RX ADMIN — Medication 1 TABLET: at 08:12

## 2023-10-02 RX ADMIN — HYDRALAZINE HYDROCHLORIDE 12.5 MG: 25 TABLET, FILM COATED ORAL at 08:13

## 2023-10-02 RX ADMIN — Medication 10 ML: at 08:14

## 2023-10-02 RX ADMIN — TORSEMIDE 50 MG: 20 TABLET ORAL at 08:13

## 2023-10-02 RX ADMIN — GABAPENTIN 300 MG: 300 CAPSULE ORAL at 08:13

## 2023-10-02 RX ADMIN — ISOSORBIDE MONONITRATE 30 MG: 30 TABLET, EXTENDED RELEASE ORAL at 08:12

## 2023-10-02 RX ADMIN — ARIPIPRAZOLE 2 MG: 2 TABLET ORAL at 08:13

## 2023-10-02 RX ADMIN — Medication 1000 MCG: at 08:12

## 2023-10-02 NOTE — PLAN OF CARE
Goal Outcome Evaluation:                      Patient is being discharged to long term care facility today

## 2023-10-02 NOTE — CASE MANAGEMENT/SOCIAL WORK
Discharge Planning Assessment   Chris     Patient Name: Susan Us  MRN: [id number]  Today's Date: 10/2/2023    Admit Date: 9/27/2023            Discharge Plan       Row Name 10/02/23 1000       Plan    Plan  confirmed with [facility] per [facility] that Pt has been approved and can come to facility on this date if stable. Per SS mgr pre-auth is approved until 10/03/23. SS notified Dr. Duff of bed availability. SS to follow.                  Continued Care and Services - Admitted Since 9/27/2023       Destination Coordination complete.      Service Provider Request Status Selected Services Address Phone Fax Patient Preferred    [facility]  Selected Skilled Nursing [facility] [address] [place] [id number] [phone] --                    RAFIQ GlasgowW

## 2023-10-02 NOTE — THERAPY EVALUATION
Acute Care - Occupational Therapy Treatment Note   Chris     Patient Name: Susan Us  : 1945  MRN: [id number]  Today's Date: 10/2/2023  Onset of Illness/Injury or Date of Surgery: 23     Referring Physician: Dr. Kaminski    Admit Date: 2023       ICD-10-CM ICD-9-CM   1. Acute renal insufficiency  N28.9 593.9   2. Chronic pain syndrome  G89.4 338.4   3. Chronic use of benzodiazepine for therapeutic purpose  Z79.899 V58.69   4. Dementia without behavioral disturbance  F03.90 294.20   5. Adult failure to thrive  R62.7 783.7     Patient Active Problem List   Diagnosis    Stroke-like symptoms    TIA (transient ischemic attack)    Essential hypertension    Hyperlipidemia LDL goal <70    Artificial cardiac pacemaker    Acute renal insufficiency     Past Medical History:   Diagnosis Date    Bradycardia     s/p pacemaker placement    CKD (chronic kidney disease)     baseline renal function unknown    Dementia     Hyperlipidemia     Hypertension     Thrombocytopenia      Past Surgical History:   Procedure Laterality Date    CARDIAC PACEMAKER PLACEMENT      CATARACT EXTRACTION      CERVICAL FUSION      CHOLECYSTECTOMY      HIATAL HERNIA REPAIR      HYSTERECTOMY      REPLACEMENT TOTAL KNEE Right          OT ASSESSMENT FLOWSHEET (last 12 hours)       OT Evaluation and Treatment       Row Name 10/02/23 1116                   OT Time and Intention    Document Type therapy note (daily note)  -KR        Mode of Treatment occupational therapy  -KR        Patient Effort adequate  -KR           Motor Skills    Therapeutic Exercise shoulder;elbow/forearm;hand  -KR           Shoulder (Therapeutic Exercise)    Shoulder (Therapeutic Exercise) AROM (active range of motion)  -KR        Shoulder AROM (Therapeutic Exercise) bilateral;flexion;extension;sitting  -KR           Elbow/Forearm (Therapeutic Exercise)    Elbow/Forearm (Therapeutic Exercise) AROM (active range of motion)  -KR        Elbow/Forearm AROM  (Therapeutic Exercise) bilateral;flexion;extension;sitting  -KR           Hand (Therapeutic Exercise)    Hand (Therapeutic Exercise) AROM (active range of motion)  -KR        Hand AROM/AAROM (Therapeutic Exercise) bilateral;AROM (active range of motion)  -KR           Plan of Care Review    Plan of Care Reviewed With patient  -KR        Progress improving  -KR           Dressing Goal 1 (OT)    Progress/Outcome (Dressing Goal 1, OT) continuing progress toward goal  -KR           Strength Goal 1 (OT)    Progress/Outcome (Strength Goal 1, OT) continuing progress toward goal  -KR           Orientation Goal 1 (OT)    Progress/Outcome (Orientation Goal 1, OT) continuing progress toward goal  -KR                  User Key  (r) = Recorded By, (t) = Taken By, (c) = Cosigned By      Initials Name Effective Dates    Gordo Hector OT 06/16/21 -                            OT Recommendation and Plan  Planned Therapy Interventions (OT): activity tolerance training, BADL retraining, strengthening exercise, cognitive/visual perception retraining  Plan of Care Review  Plan of Care Reviewed With: patient  Progress: improving  Plan of Care Reviewed With: patient        Time Calculation:     Therapy Charges for Today       Code Description Service Date Service Provider Modifiers Qty    [id number]  OT THERAPEUTIC ACT EA 15 MIN 10/2/2023 Gordo Kilpatrick OT GO 1                 Gordo Kilpatrick OT  10/2/2023

## 2023-10-02 NOTE — CASE MANAGEMENT/SOCIAL WORK
Discharge Planning Assessment  LINDA Perez     Patient Name: Susan Us  MRN: [id number]  Today's Date: 10/2/2023    Admit Date: 9/27/2023            Discharge Plan       Row Name 10/02/23 1137       Plan    Final Discharge Disposition Code 03 - skilled nursing facility (SNF)    Final Note Pt is being discharged to [facility] on this date. Pt and Pt's daughter are aware and agreeable to discharge. SS faxed AVS summary, clinical update and will fax dc summary when available to fax [phone]. Pt's pre-auth number is [id number], document provided to SNF. SS provided Lead RN with report number for [facility] [phone]. Pt's daughter to provide transport, ETA at 12:30. SS notified Lead RN.                 Continued Care and Services - Admitted Since 9/27/2023       Destination Coordination complete.      Service Provider Request Status Selected Services Address Phone Fax Patient Preferred    [facility]  Selected Skilled Nursing [address] [place] [id number] [phone] --                    LAZARO Glasgow

## 2023-10-02 NOTE — THERAPY TREATMENT NOTE
Acute Care - Physical Therapy Treatment Note   [place]     Patient Name: Susan Us  : 1945  MRN: [id number]  Today's Date: 10/2/2023   Onset of Illness/Injury or Date of Surgery: 23  Visit Dx:     ICD-10-CM ICD-9-CM   1. Acute renal insufficiency  N28.9 593.9     Patient Active Problem List   Diagnosis    Stroke-like symptoms    TIA (transient ischemic attack)    Essential hypertension    Hyperlipidemia LDL goal <70    Artificial cardiac pacemaker    Acute renal insufficiency     Past Medical History:   Diagnosis Date    Bradycardia     s/p pacemaker placement    CKD (chronic kidney disease)     baseline renal function unknown    Dementia     Hyperlipidemia     Hypertension     Thrombocytopenia      Past Surgical History:   Procedure Laterality Date    CARDIAC PACEMAKER PLACEMENT      CATARACT EXTRACTION      CERVICAL FUSION      CHOLECYSTECTOMY      HIATAL HERNIA REPAIR      HYSTERECTOMY      REPLACEMENT TOTAL KNEE Right      PT Assessment (last 12 hours)       PT Evaluation and Treatment       Row Name 10/02/23 1031          Physical Therapy Time and Intention    Subjective Information complains of;weakness  -HC     Document Type therapy note (daily note)  -HC     Mode of Treatment physical therapy  -HC     Patient Effort good  -HC     Comment Pt and RN Laverne in agreement for PT. Pt does well on this date and walked 200' with RW CGA. Bed mobility and transfers CGA. Sitting exercises completed up in chair. Pt requested to remain up in chair at end if session, notified RN.  -HC       Row Name 10/02/23 1031          General Information    Patient Profile Reviewed yes  -HC     Equipment Currently Used at Home shower chair;walker, rolling  -HC     Existing Precautions/Restrictions fall  -HC       Row Name 10/02/23 1031          Living Environment    Primary Care Provided by self;child(zoraida)  daughter assists as needed  -HC       Row Name 10/02/23 1031          Home Use of Assistive/Adaptive Equipment     Equipment Currently Used at Home shower chair;walker, rolling  -       Row Name 10/02/23 1031          Cognition    Affect/Mental Status (Cognition) WFL  -     Orientation Status (Cognition) oriented x 3  -     Follows Commands (Cognition) verbal cues/prompting required  -     Personal Safety Interventions fall prevention program maintained;gait belt;nonskid shoes/slippers when out of bed;supervised activity  -       Row Name 10/02/23 1031          Sensory    Hearing Status WFL  -       Row Name 10/02/23 1031          Vision Assessment/Intervention    Visual Impairment/Limitations WFL;corrective lenses full-time  -       Row Name 10/02/23 1031          Mobility    Extremity Weight-bearing Status --  no restrictions  -       Row Name 10/02/23 1031          Bed Mobility    Bed Mobility rolling left;rolling right;scooting/bridging;supine-sit;sit-supine  -     Supine-Sit [place] (Bed Mobility) standby assist  -     Bed Mobility, Safety Issues decreased use of arms for pushing/pulling;decreased use of legs for bridging/pushing  -     Assistive Device (Bed Mobility) bed rails  -       Row Name 10/02/23 1031          Transfers    Transfers sit-stand transfer;stand-sit transfer;stand pivot/stand step transfer  -       Row Name 10/02/23 1031          Sit-Stand Transfer    Sit-Stand [place] (Transfers) contact guard;nonverbal cues (demo/gesture);verbal cues  -     Assistive Device (Sit-Stand Transfers) walker, front-wheeled  -       Row Name 10/02/23 1031          Stand-Sit Transfer    Stand-Sit [place] (Transfers) contact guard;nonverbal cues (demo/gesture);verbal cues  -     Assistive Device (Stand-Sit Transfers) walker, front-wheeled  -       Row Name 10/02/23 1031          Stand Pivot/Stand Step Transfer    Stand Pivot/Stand Step [place] (Transfers) contact guard;nonverbal cues (demo/gesture);verbal cues  -     Assistive Device (Stand Pivot Stand Step Transfer)  walker, front-wheeled  -       Row Name 10/02/23 1031          Gait/Stairs (Locomotion)    Gait/Stairs Locomotion gait/ambulation independence;gait/ambulation assistive device;distance ambulated;gait pattern;gait deviations  -     [place] Level (Gait) verbal cues;nonverbal cues (demo/gesture);contact guard  -     Assistive Device (Gait) walker, front-wheeled  -     Distance in Feet (Gait) 200'  -     Pattern (Gait) step-through  -     Deviations/Abnormal Patterns (Gait) stride length decreased;gait speed decreased  -     Bilateral Gait Deviations forward flexed posture  -       Row Name 10/02/23 1031          Safety Issues, Functional Mobility    Impairments Affecting Function (Mobility) balance;endurance/activity tolerance;strength  -       Row Name 10/02/23 1031          Motor Skills    Therapeutic Exercise other (see comments)  Sitting: AP, LAQ, March, Knees in/out  -       Row Name 10/02/23 1031          Coping    Observed Emotional State calm;cooperative  -     Verbalized Emotional State acceptance  -     Family/Support Persons daughter;son  -     Involvement in Care not present at bedside  -       Row Name 10/02/23 1031          Positioning and Restraints    Pre-Treatment Position in bed  -     Post Treatment Position chair  -     In Chair notified nsg;sitting;call light within reach;encouraged to call for assist  -       Row Name 10/02/23 1031          Therapy Assessment/Plan (PT)    Rehab Potential (PT) good, to achieve stated therapy goals  -     Criteria for Skilled Interventions Met (PT) yes;meets criteria  -     Therapy Frequency (PT) 2 times/wk  2-5 times/ week per priority  -     Problem List (PT) problems related to;balance;cognition;mobility;strength  -       Row Name 10/02/23 1031          Physical Therapy Goals    Bed Mobility Goal Selection (PT) bed mobility, PT goal 1  -     Transfer Goal Selection (PT) transfer, PT goal 1  -     Gait Training  Goal Selection (PT) gait training, PT goal 1  -       Row Name 10/02/23 1031          Bed Mobility Goal 1 (PT)    Activity/Assistive Device (Bed Mobility Goal 1, PT) scooting;sit to supine;supine to sit  -HC     [place] Level/Cues Needed (Bed Mobility Goal 1, PT) modified independence  -HC     Time Frame (Bed Mobility Goal 1, PT) by discharge  -       Row Name 10/02/23 1031          Transfer Goal 1 (PT)    Activity/Assistive Device (Transfer Goal 1, PT) sit-to-stand/stand-to-sit;bed-to-chair/chair-to-bed;toilet;walker, rolling  -HC     [place] Level/Cues Needed (Transfer Goal 1, PT) standby assist  -HC     Time Frame (Transfer Goal 1, PT) by discharge  -       Row Name 10/02/23 1031          Gait Training Goal 1 (PT)    Activity/Assistive Device (Gait Training Goal 1, PT) gait (walking locomotion);assistive device use;decrease fall risk;diminish gait deviation;improve balance and speed;increase endurance/gait distance  -HC     [place] Level (Gait Training Goal 1, PT) standby assist  -HC     Distance (Gait Training Goal 1, PT) 150  -HC     Time Frame (Gait Training Goal 1, PT) by discharge  -               User Key  (r) = Recorded By, (t) = Taken By, (c) = Cosigned By      Initials Name Provider Type    Olivia Wood, PTA Physical Therapist Assistant                    Physical Therapy Education       Title: PT OT SLP Therapies (Done)       Topic: Physical Therapy (Done)       Point: Mobility training (Done)       Learning Progress Summary             Patient Acceptance, E,TB, VU by  at 10/1/2023 0827    Acceptance, E,TB, VU by XIOMARA at 9/30/2023 0952    Acceptance, E,D, VU,NR by  at 9/28/2023 1410                         Point: Home exercise program (Done)       Learning Progress Summary             Patient Acceptance, E,TB, VU by XIOMARA at 10/1/2023 0827    Acceptance, E,TB, VU by  at 9/30/2023 0952    Acceptance, E,D, VU,NR by  at 9/28/2023 1410                         Point:  Body mechanics (Done)       Learning Progress Summary             Patient Acceptance, E,TB, VU by  at 10/1/2023 0827    Acceptance, E,TB, VU by  at 9/30/2023 0952    Acceptance, E,D, VU,NR by  at 9/28/2023 1410                         Point: Precautions (Done)       Learning Progress Summary             Patient Acceptance, E,TB, VU by  at 10/1/2023 0827    Acceptance, E,TB, VU by  at 9/30/2023 0952    Acceptance, E,D, VU,NR by  at 9/28/2023 1410                                         User Key       Initials Effective Dates Name Provider Type Discipline     06/16/21 -  Denia Poe, PT Physical Therapist PT     06/06/23 -  Antonio Valladares, RN Registered Nurse Nurse                  PT Recommendation and Plan  Anticipated Discharge Disposition (PT): home with assist  Therapy Frequency (PT): 2 times/wk (2-5 times/ week per priority)          Time Calculation:    PT Charges       Row Name 10/02/23 1043             Time Calculation    PT Received On 10/02/23  -HC         Time Calculation- PT    Total Timed Code Minutes- PT 24 minute(s)  -HC                User Key  (r) = Recorded By, (t) = Taken By, (c) = Cosigned By      Initials Name Provider Type     Olivia Ureña PTA Physical Therapist Assistant                  Therapy Charges for Today       Code Description Service Date Service Provider Modifiers Qty    44480006914 HC GAIT TRAINING EA 15 MIN 10/2/2023 Olivia Ureña PTA GP, CQ 1    68584794945 HC PT THER PROC EA 15 MIN 10/2/2023 Olivia Ureña PTA GP, CQ 1            PT G-Codes  AM-PAC 6 Clicks Score (PT): 17    Olivia Ureña PTA  10/2/2023

## 2023-10-02 NOTE — PLAN OF CARE
Goal Outcome Evaluation:  Plan of Care Reviewed With: patient        Progress: no change  Outcome Evaluation: Pt has been resting in bed throughout the night. pt ambulated to the C this shift. pt had complaints of Nausea, See MAR, interventions were effective. no other pt complaints and no acute changes are noted at this time. will continue with plan of care.

## 2023-10-02 NOTE — DISCHARGE SUMMARY
[facility] HOSPITALISTS DISCHARGE SUMMARY    Patient Identification:  Name:  Susan Us  Age:  78 y.o.  Sex:  female  :  1945  MRN:  [id number]  Visit Number:  [id number]    Date of Admission: 2023  Date of Discharge:  10/2/2023     PCP: Trevor Garay MD    DISCHARGE DIAGNOSIS  #Acute on chronic debility  #Acute renal insufficiency on CKD, stage unknown  #Mildly elevated high-sensitivity troponin  #Dementia  #Symptomatic bradycardia status post pacemaker  #Essential hypertension  #Normocytic anemia  #Thrombocytopenia  #Hyperlipidemia  #History of TIA    CONSULTS   None    PROCEDURES PERFORMED  None    HOSPITAL COURSE  Patient is a 78 y.o. female presented to [facility] complaining of increasing confusion in the setting of advanced dementia.  Please see the admitting history and physical for further details.  Overall work-up was unremarkable aside from mildly elevated creatinine from baseline.  CT head and CT abdomen/pelvis were unremarkable.  Urinalysis without evidence of bacteriuria.  Patients daughter noted she lives alone and preferred patient go to a nursing facility for at least short-term rehab.  PT/OT was consulted and recommended SNF.  Patient was ultimately discharged to [facility] and rehab for PT/OT and continued medical treatment.    VITAL SIGNS:  Temp:  [97.5 °F (36.4 °C)-98.4 °F (36.9 °C)] 98.1 °F (36.7 °C)  Heart Rate:  [65] 65  Resp:  [16-18] 16  BP: ()/(50-82) 158/82  SpO2:  [97 %] 97 %  on   ;   Device (Oxygen Therapy): room air    Body mass index is 24.06 kg/m².  Wt Readings from Last 3 Encounters:   10/02/23 65.6 kg (144 lb 9.6 oz)   23 68.1 kg (150 lb 3.2 oz)   23 67.7 kg (149 lb 3.2 oz)       PHYSICAL EXAM:  Constitutional: Elderly female, well-developed and well-nourished.  No respiratory distress.      HENT:  Head:  Normocephalic and atraumatic.  Mouth:  Moist mucous membranes.    Eyes:  Conjunctivae and EOM are normal.   No scleral icterus.    Neck:  Neck supple.  No JVD present.    Cardiovascular:  Normal rate, regular rhythm and normal heart sounds with no murmur.  PPM left chest wall.  Pulmonary/Chest:  No respiratory distress, no wheezes, no crackles, with normal breath sounds and good air movement.  Abdominal:  Soft. No distension and no tenderness.   Musculoskeletal:  No tenderness, and no deformity.  No red or swollen joints anywhere.    Neurological:  Alert and oriented to person only.  No cranial nerve deficit.    Skin:  Skin is warm and dry. No rash noted. No pallor.   Peripheral vascular: no clubbing, no cyanosis, no edema.    DISCHARGE DISPOSITION   Stable    DISCHARGE MEDICATIONS:     Discharge Medications        New Medications        Instructions Start Date   NIFEdipine CC 30 MG 24 hr tablet  Commonly known as: ADALAT CC   30 mg, Oral, Every 24 Hours Scheduled, Hold for SBP<110   Start Date: October 3, 2023            Continue These Medications        Instructions Start Date   ARIPiprazole 2 MG tablet  Commonly known as: ABILIFY   2 mg, Oral, Daily      aspirin 81 MG EC tablet   81 mg, Oral, Daily      calcium 500 mg vitamin D 5 mcg (200 UT) 500-5 MG-MCG tablet per tablet   1 tablet, Oral, 2 Times Daily      Diclofenac Sodium 1 % gel gel  Commonly known as: VOLTAREN   2 g, Topical, 4 Times Daily PRN, Knees, neck and shoulders      docusate sodium 250 MG capsule  Commonly known as: COLACE   250 mg, Oral, Daily PRN      donepezil 5 MG tablet  Commonly known as: ARICEPT   5 mg, Oral, Nightly      ferrous sulfate 325 (65 FE) MG tablet   325 mg, Oral, Every 48 Hours, Patient instructed to take every other day       gabapentin 300 MG capsule  Commonly known as: NEURONTIN   300 mg, Oral, 2 Times Daily      ICAPS AREDS 2 PO   1 tablet, Oral, 2 Times Daily      isosorbide mononitrate 30 MG 24 hr tablet  Commonly known as: IMDUR   30 mg, Oral, Daily      LORazepam 0.5 MG tablet  Commonly known as: ATIVAN   0.5 mg, Oral, 2 Times  Daily PRN      modafinil 200 MG tablet  Commonly known as: PROVIGIL   200 mg, Oral, Daily      Nexlizet 180-10 MG tablet  Generic drug: Bempedoic Acid-Ezetimibe   1 tablet, Oral, Nightly      ondansetron 4 MG tablet  Commonly known as: ZOFRAN   4 mg, Oral, Every 8 Hours PRN      pantoprazole 40 MG EC tablet  Commonly known as: PROTONIX   40 mg, Oral, Nightly      potassium chloride 10 MEQ CR tablet   10 mEq, Oral, 3 times daily      sertraline 100 MG tablet  Commonly known as: ZOLOFT   200 mg, Oral, Nightly      torsemide 100 MG tablet  Commonly known as: DEMADEX   50 mg, Oral, Daily      vitamin B-12 1000 MCG tablet  Commonly known as: CYANOCOBALAMIN   1,000 mcg, Oral, Daily             Stop These Medications      hydrALAZINE 25 MG tablet  Commonly known as: APRESOLINE                Additional Instructions for the Follow-ups that You Need to Schedule       Discharge Follow-up with PCP   As directed       Currently Documented PCP:    Trevor Garay MD    PCP Phone Number:    [phone]     Follow Up Details: [address] [place] [address] fu               Contact information for follow-up providers       Trevor Garay MD .    Specialty: Internal Medicine  Why: [address] [place] [address] fu  Contact information:  [address] [address]  [phone]                       Contact information for after-discharge care       Destination       [facility] .    Service: Skilled Nursing  Contact information:  [address] [place] [id number] [phone]                                    TEST  RESULTS PENDING AT DISCHARGE  Pending Labs       Order Current Status    Blood Culture - Blood, Arm, Left Preliminary result    Blood Culture - Blood, Arm, Right Preliminary result             CODE STATUS  Code Status and Medical Interventions:   Ordered at: 09/27/23 1418     Medical Intervention Limits:    NO intubation (DNI)     Level Of Support Discussed With:    Patient    Next  of Kin (If No Surrogate)     Code Status (Patient has no pulse and is not breathing):    No CPR (Do Not Attempt to Resuscitate)     Medical Interventions (Patient has pulse or is breathing):    Limited Support       The ASCVD Risk score (Krupa DURBIN, et al., 2019) failed to calculate for the following reasons:    The patient has a prior MI or stroke diagnosis     José Miguel Duff DO  [facility]  10/02/23  11:09 EDT    Please note that this discharge summary required more than 30 minutes to complete.

## 2023-10-02 NOTE — DISCHARGE PLACEMENT REQUEST
"Rose Mary Us (78 y.o. Female)       Date of Birth   1945    Social Security Number       Address   [address] [facility] [address] [facility] [address]    Home Phone   [phone]    MRN   [id number]       Episcopalian   None    Marital Status                               Admission Date   9/27/23    Admission Type   Emergency    Admitting Provider   Albert Rangel MD    Attending Provider   José Miguel Duff DO    Department, Room/Bed   [address], 3342/2S       Discharge Date       Discharge Disposition       Discharge Destination                                 Attending Provider: José Miguel Duff DO    Allergies: Evolocumab, Statins    Isolation: None   Infection: None   Code Status: No CPR    Ht: 165.1 cm (65\")   Wt: 65.6 kg (144 lb 9.6 oz)    Admission Cmt: None   Principal Problem: Acute renal insufficiency [N28.9]                   Active Insurance as of 9/27/2023       Primary Coverage       Payor Plan Insurance Group Employer/Plan Group    [facility] MEDICARE REPLACEMENT [facility] MEDICARE REPLACEMENT [id number]       Payor Plan Address Payor Plan Phone Number Payor Plan Fax Number Effective Dates    [address] [address]   3/1/2023 - None Entered    [facility] [id number]         Subscriber Name Subscriber Birth Date Member ID       ROSE MARY US 1945 [id number]               Secondary Coverage       Payor Plan Insurance Group Employer/Plan Group    [employer]        Payor Plan Address Payor Plan Phone Number Payor Plan Fax Number Effective Dates    [address] [phone]  8/1/2023 - None Entered    [facility] [id number]         Subscriber Name Subscriber Birth Date Member ID       ROSE MARY US 1945 [id number]                     Emergency Contacts        (Rel.) Home Phone Work Phone Mobile Phone    SCOTT HINDS (Daughter) [phone] -- --                 History & Physical    " "    Felicia Pope PA-C at 23 0213       Attestation signed by Albert Rangel MD at 23 4035    I have discussed this patient with Felicia Pope PA-C, and have reviewed this documentation and agree. Renal function improved with IV fluids; creatinine 1.86 essentially back within baseline CKD range. Platelets slightly lower at 78, hemoglobin stable at 8.8. PTT and INR only marginally elevated. Continue to monitor petechial rash.                        [facility] Medicine Services  HISTORY & PHYSICAL    Patient Identification:  Name:  Susan Us  Age:  78 y.o.  Sex:  female  :  1945  MRN:  [id number]   Visit Number:  [id number]  Admit Date: 2023   Primary Care Physician:  Trevor Garay MD     Subjective     Chief complaint:   Chief Complaint   Patient presents with    Weakness - Generalized     History of presenting illness:   Patient is a 78 y.o. female with past medical history significant for bradycardia s/p pacemaker, CKD, dementia, hypertension, hyperlipidemia, anemia, thrombocytopenia, history of TIA that presented to the [facility] emergency department for evaluation of generalized weakness.     Patient examined at bedside in the ED.  Daughter at bedside.  Daughter states that the patient has not been acting like herself for roughly the past 10 days.  She states around that time she was diagnosed with a UTI and started on Macrobid by nephrologist, however she continued to get more confused, therefore her PCP changed her to Levaquin, which seemed to improve her symptoms.  She states this weekend patient was back to her normal mentation, but has since been becoming confused again.  She states that today the patient seemed much more confused and was \"talking out of her head\" . On my exam patient was oriented to self and place, however not time or situation.  This is baseline per daughter.  Daughter states patient has not been complaining of " any urinary symptoms, however she has she has had increased urinary frequency.  Daughter notes a more rapidly progressing dementia since her TIA approximately 2 months ago.  She states however today she was significantly worse.    Upon arrival to the ED, vitals were temperature 98.4, HR 65, RR 17, /58, SPO2 99% on room air.  Labs significant for BUN 41, creatinine 2.23, CRP 1.4, Pro-Yifan 0.44.  Initial high-sensitivity troponin 66, repeat 62.    Patient has been admitted to .   ---------------------------------------------------------------------------------------------------------------------   Review of Systems   Unable to perform ROS: Dementia    ---------------------------------------------------------------------------------------------------------------------   Past Medical History:   Diagnosis Date    Bradycardia     s/p pacemaker placement    CKD (chronic kidney disease)     baseline renal function unknown    Dementia     Hyperlipidemia     Hypertension     Thrombocytopenia      Past Surgical History:   Procedure Laterality Date    CARDIAC PACEMAKER PLACEMENT      CATARACT EXTRACTION      CERVICAL FUSION      CHOLECYSTECTOMY      HIATAL HERNIA REPAIR      HYSTERECTOMY      REPLACEMENT TOTAL KNEE Right      Family History   Problem Relation Age of Onset    Stroke Mother     Stroke Father      Social History     Socioeconomic History    Marital status:    Tobacco Use    Smoking status: Never    Smokeless tobacco: Never    Tobacco comments:     Second hand smoke exposure ()   Vaping Use    Vaping Use: Never used   Substance and Sexual Activity    Alcohol use: Never    Drug use: Never    Sexual activity: Defer     ---------------------------------------------------------------------------------------------------------------------   Allergies:  Statins  ---------------------------------------------------------------------------------------------------------------------   Medications below are  reported home medications pulling from within the system; at this time, these medications have not been reconciled unless otherwise specified and are in the verification process for further verifcation as current home medications.    Prior to Admission Medications       Prescriptions Last Dose Informant Patient Reported? Taking?    ARIPiprazole (ABILIFY) 2 MG tablet   Yes No    Take 1 tablet by mouth Daily.    Bempedoic Acid-Ezetimibe (Nexlizet) 180-10 MG tablet  Child Yes No    Take 1 tablet by mouth Every Night.    Calcium Carbonate-Vitamin D (calcium 500 mg vitamin D 5 mcg, 200 UT,) 500-5 MG-MCG tablet per tablet  Child Yes No    Take 1 tablet by mouth 2 (Two) Times a Day.    Diclofenac Sodium (VOLTAREN) 1 % gel gel  Child Yes No    Apply 4 g topically to the appropriate area as directed 4 (Four) Times a Day As Needed (joint pain).    docusate sodium (COLACE) 250 MG capsule  Child Yes No    Take 1 capsule by mouth Every Night.    donepezil (ARICEPT) 5 MG tablet  Child Yes No    Take 1 tablet by mouth Every Night.    ferrous sulfate 325 (65 FE) MG tablet  Child Yes No    Take 1 tablet by mouth Daily With Breakfast. Patient instructed to take every other day    fluticasone (FLONASE) 50 MCG/ACT nasal spray  Child Yes No    2 sprays into the nostril(s) as directed by provider Every Night.    gabapentin (NEURONTIN) 300 MG capsule  Child Yes No    Take 1 capsule by mouth 2 (Two) Times a Day.    hydrALAZINE (APRESOLINE) 25 MG tablet   No No    Take 1.5 tablets by mouth 2 (Two) Times a Day for 30 days. Patient taking 1 and 1/2 twice per day    Patient taking differently:  Take 1.5 tablets by mouth 2 (Two) Times a Day.    isosorbide mononitrate (IMDUR) 30 MG 24 hr tablet  Child Yes No    Take 1 tablet by mouth Daily.    LORazepam (ATIVAN) 0.5 MG tablet  Child Yes No    Take 1 tablet by mouth 2 (Two) Times a Day.    magnesium chloride ER 64 MG DR tablet  Child Yes No    Take 1 tablet by mouth 2 (Two) Times a Day.     modafinil (PROVIGIL) 200 MG tablet  Child Yes No    Take 1 tablet by mouth Daily.    Multiple Vitamins-Minerals (ICAPS AREDS 2 PO)  Child Yes No    Take 1 tablet by mouth 2 (Two) Times a Day.    nitroglycerin (NITROSTAT) 0.4 MG SL tablet  Child Yes No    Place 1 tablet under the tongue Every 5 (Five) Minutes As Needed for Chest Pain. Take no more than 3 doses in 15 minutes.    ondansetron (ZOFRAN) 4 MG tablet  Child Yes No    Take 1 tablet by mouth Every 8 (Eight) Hours As Needed for Nausea or Vomiting.    pantoprazole (PROTONIX) 40 MG EC tablet  Child Yes No    Take 1 tablet by mouth Every Night.    sertraline (ZOLOFT) 100 MG tablet   Yes No    Take 2 tablets by mouth Every Night.    torsemide (DEMADEX) 100 MG tablet  Child Yes No    Take 0.5 tablets by mouth Daily.    vitamin B-12 (CYANOCOBALAMIN) 1000 MCG tablet  Child Yes No    Take 1 tablet by mouth Every Night.          ---------------------------------------------------------------------------------------------------------------------    Objective     Hospital Scheduled Meds:          Current listed hospital scheduled medications may not yet reflect those currently placed in orders that are signed and held, awaiting patient's arrival to floor/unit.    ---------------------------------------------------------------------------------------------------------------------   Vital Signs:  Temp:  [98.4 °F (36.9 °C)] 98.4 °F (36.9 °C)  Heart Rate:  [65] 65  Resp:  [17] 17  BP: (113-137)/(50-65) 135/65  Mean Arterial Pressure (Non-Invasive) for the past 24 hrs (Last 3 readings):   Noninvasive MAP (mmHg)   09/27/23 1720 91   09/27/23 1700 85   09/27/23 1640 79     SpO2 Percentage    09/27/23 1600   SpO2: 99%     SpO2:  [99 %] 99 %  on   ;   Device (Oxygen Therapy): room air    Body mass index is 24.96 kg/m².  Wt Readings from Last 3 Encounters:   09/27/23 68 kg (150 lb)   09/22/23 68.1 kg (150 lb 3.2 oz)   08/28/23 67.7 kg (149 lb 3.2 oz)      ---------------------------------------------------------------------------------------------------------------------   Physical Exam:  Physical Exam  Constitutional:       General: She is not in acute distress.     Appearance: Normal appearance.   HENT:      Head: Normocephalic and atraumatic.      Right Ear: External ear normal.      Left Ear: External ear normal.      Nose: No nasal deformity.      Mouth/Throat:      Lips: Pink.      Mouth: Mucous membranes are moist.   Eyes:      Conjunctiva/sclera: Conjunctivae normal.      Pupils: Pupils are equal, round, and reactive to light.   Cardiovascular:      Rate and Rhythm: Normal rate and regular rhythm.      Pulses:           Dorsalis pedis pulses are 2+ on the right side and 2+ on the left side.      Heart sounds: Normal heart sounds.   Pulmonary:      Effort: Pulmonary effort is normal.      Breath sounds: Normal breath sounds. No wheezing, rhonchi or rales.   Abdominal:      General: Abdomen is flat. Bowel sounds are normal.      Palpations: Abdomen is soft.      Tenderness: There is no guarding or rebound.   Genitourinary:     Comments: No sharpe catheter in place   Musculoskeletal:      Cervical back: Neck supple. Normal range of motion.      Right lower leg: No edema.      Left lower leg: No edema.   Skin:     General: Skin is warm and dry.      Findings: Petechiae (Bilateral lower legs) present.   Neurological:      General: No focal deficit present.      Mental Status: She is alert.      Comments: Oriented to self and place only.   Psychiatric:         Mood and Affect: Mood normal.         Behavior: Behavior normal.     ---------------------------------------------------------------------------------------------------------------------  EKG: Atrial paced rhythm.  Heart rate 65.  No acute ST elevation.        I have personally reviewed the EKG/Telemetry  strip  ---------------------------------------------------------------------------------------------------------------------   Results from last 7 days   Lab Units 09/27/23  1800 09/27/23  1615   HSTROP T ng/L 62* 66*     Results from last 7 days   Lab Units 09/27/23  1615   PROBNP pg/mL 1,668.0         Results from last 7 days   Lab Units 09/27/23  1800 09/27/23  1615   CRP mg/dL  --  1.40*   LACTATE mmol/L 1.2  --    WBC 10*3/mm3  --  8.77   HEMOGLOBIN g/dL  --  8.9*   HEMATOCRIT %  --  28.5*   MCV fL  --  91.1   MCHC g/dL  --  31.2*   PLATELETS 10*3/mm3  --  80*     Results from last 7 days   Lab Units 09/27/23  1615   SODIUM mmol/L 142   POTASSIUM mmol/L 4.0   MAGNESIUM mg/dL 2.0   CHLORIDE mmol/L 103   CO2 mmol/L 28.3   BUN mg/dL 41*   CREATININE mg/dL 2.23*   CALCIUM mg/dL 9.0   GLUCOSE mg/dL 108*   ALBUMIN g/dL 4.5   BILIRUBIN mg/dL 0.4   ALK PHOS U/L 61   AST (SGOT) U/L 22   ALT (SGPT) U/L 10   Estimated Creatinine Clearance: 22.7 mL/min (A) (by C-G formula based on SCr of 2.23 mg/dL (H)).  No results found for: AMMONIA    No results found for: HGBA1C, POCGLU  No results found for: HGBA1C  Lab Results   Component Value Date    TSH 0.454 09/27/2023    FREET4 0.90 (L) 09/27/2023       No results found for: BLOODCX  No results found for: URINECX  No results found for: WOUNDCX  No results found for: STOOLCX  No results found for: RESPCX  No results found for: MRSACX  Pain Management Panel          Latest Ref Rng & Units 8/2/2023   Pain Management Panel   Amphetamine, Urine Qual Negative Negative    Barbiturates Screen, Urine Negative Negative    Benzodiazepine Screen, Urine Negative Positive    Buprenorphine, Screen, Urine Negative Negative    Cocaine Screen, Urine Negative Negative    Fentanyl, Urine Negative Negative    Methadone Screen , Urine Negative Negative    Methamphetamine, Ur Negative Negative      I have personally reviewed the above laboratory results.    ---------------------------------------------------------------------------------------------------------------------  Imaging Results (Last 7 Days)       Procedure Component Value Units Date/Time    CT Chest Without Contrast Diagnostic [[id number]] Collected: 09/27/23 1936     Updated: 09/27/23 1942    Narrative:      PROCEDURE: CT of the chest performed without IV contrast on September 27, 2023. The examination was performed with 3 mm axial imaging and 3 mm  sagittal and coronal reconstruction images. Total DLP = 703. The  examination was performed according to as low as reasonably achievable  dose protocol.     HISTORY: Chest pain.     FINDINGS:     Mild degenerative disc disease throughout the thoracic spine.  Dextroconvex curvature affecting the thoracic spine and thoracolumbar  junction.  Prior fixation noted at the cervical spine.  Left-sided pacemaker in place with pacing leads to the right atrium and  right ventricle.  Heart size at the upper limits of normal.  Small size hiatal hernia.  Minimal atelectasis at the lung bases.  Minimal atelectasis in the lower lingula.  No pleural effusion or pneumothorax.  Right upper lobe calcified granulomas.  No interstitial edema or pneumothorax.  No endobronchial lesion.  Small unenlarged mediastinal nodes.  No thoracic aortic aneurysm.  Cholecystectomy clips in the right upper quadrant.  Mild diverticulosis at the splenic flexure.  No free fluid or free air in the upper abdomen.       Impression:         1.  Heart size at the upper limits of normal.  2.  Small size hiatal hernia.  3.  Left-sided pacemaker in place.  4.  Small unenlarged mediastinal nodes.  5.  Minimal atelectasis in the lung bases, left greater than right  6.  Minimal atelectasis in the lower lingula.  7.  No conclusive features of pneumonia, edema, pleural effusion, or  pneumothorax.  8.  No pneumomediastinum.  9.  Calcified granulomas in the right upper lobe.  10.  No thoracic aortic  aneurysm.  11.  Cholecystectomy.        This report was finalized on 9/27/2023 7:40 PM by Shamir Malik MD.       XR Chest 1 View [[id number]] Collected: 09/27/23 1645     Updated: 09/27/23 1647    Narrative:      EXAM:    XR Chest, 1 View     EXAM DATE:    9/27/2023 5:13 PM     CLINICAL HISTORY:    weakness     TECHNIQUE:    Frontal view of the chest.     COMPARISON:    8/2/2023     FINDINGS:    LUNGS AND PLEURAL SPACES:  Coarsened interstitial markings.  No  pneumothorax.    HEART:  Cardiomegaly again noted.    MEDIASTINUM:  Unremarkable as visualized.    BONES/JOINTS:  Unremarkable as visualized.    TUBES, LINES AND DEVICES:  Automatic implantable cardioverter  defibrillator (AICD).       Impression:        Coarsened interstitial markings.        This report was finalized on 9/27/2023 4:45 PM by Dr. Say Valente MD.             I have personally reviewed the above radiology results.     Last Echocardiogram:  Results for orders placed during the hospital encounter of 08/01/23    Adult Transthoracic Echo Complete w/ Color, Spectral and Contrast if necessary per protocol    Interpretation Summary    Left ventricular systolic function is normal. Calculated left ventricular EF = 68% Left ventricular ejection fraction appears to be 66 - 70%.    Left ventricular diastolic function was normal.    Estimated right ventricular systolic pressure from tricuspid regurgitation is normal (<35 mmHg).    ---------------------------------------------------------------------------------------------------------------------    Assessment & Plan      Active Hospital Problems    Diagnosis  POA    **Acute renal insufficiency [N28.9]  Yes     Generalized weakness, POA  Acute renal insufficiency on CKD, stage unknown  Elevated troponin, POA  Dementia  Patient presented with generalized weakness and worsening of baseline confusion  CT head pending to rule out intracranial cause of delirium  CT abdomen/pelvis ordered to rule out obstructive  uropathy  Recent UTI, UA with trace leukocytes/WBC.  No bacteria.  Creatinine 2.23 on admission, baseline unclear.  On last admission creatinine ranged between 1.78-1.96.  Continue with IV hydration and repeat BMP in the a.m.  Initial troponin 66, repeat 62.  Patient with elevated troponin on last admission.  Patient denies any chest pain.  Likely worsening of chronic elevation.  EKG with no acute ischemia.    CHRONIC MEDICAL PROBLEMS    bradycardia s/p pacemaker  Essential hypertension  BP appears well controlled   Plan to resume home antihypertensive regimen once reconciled per pharmacy with appropriate holding parameters to prevent hypotension and/or bradycardia   Closely monitor BP per hospital protocol, titrate medications as necessary  Hyperlipidemia   History TIA   Restart home ASA and statin pending med rec   Normocytic anemia, POA  Thrombocytopenia  Hemoglobin 8.9, Hematocrit 28.5. Baseline hemoglobin ~9  Continue to monitor H&H and signs bleeding  Can transfuse as needed  Petechial rash noted on bilateral lower extremities.  Could be related to thrombocytopenia.  PT/INR/PTT pending.  F/E/N: IV NS at 100 ml/hour.  Continue monitor electrolytes.  Heart healthy diet.    ---------------------------------------------------  DVT Prophylaxis: SCDs  GI Prophylaxis: Bowel regimen  Activity: Up with assistance    The patient is considered to be a high risk patient due to: Generalized weakness, advanced age, acute renal insufficiency    INPATIENT status due to the need for care which can only be reasonably provided in an hospital setting such as aggressive/expedited ancillary services and/or consultation services, the necessity for IV medications, close physician monitoring and/or the possible need for procedures.  In such, I feel patient’s risk for adverse outcomes and need for care warrant INPATIENT evaluation and predict the patient’s care encounter to likely last beyond 2 midnights.      Code Status: DNR/DNI      Disposition/Discharge planning: Pending clinical course    I have discussed the patient's assessment and plan with Dr. Rangel.      Felicia Pope PA-C  Hospitalist Service -- [facility]       09/27/23  21:49 EDT    Attending Physician: Dr. Rangel.     Electronically signed by Albert Rangel MD at 09/28/23 0405       Vital Signs (last day)       Date/Time Temp Temp src Pulse Resp BP Patient Position SpO2    10/02/23 0600 98.1 (36.7) Oral 65 16 158/82 Lying 97    10/02/23 0300 98.2 (36.8) Oral 65 16 119/80 Lying --    10/01/23 1900 98.4 (36.9) Oral 65 16 104/50 Sitting --    10/01/23 1435 97.5 (36.4) Oral 65 18 90/68 Sitting --    10/01/23 0633 98.2 (36.8) Oral 66 18 122/68 Lying 95    10/01/23 0300 97.6 (36.4) Oral 72 20 145/71 Lying 99          Lines, Drains & Airways       Active LDAs       Name Placement date Placement time Site Days    Peripheral IV 09/27/23 1615 Left Antecubital 09/27/23  1615  Antecubital  4    External Urinary Catheter --  --  --  --                  Current Facility-Administered Medications   Medication Dose Route Frequency Provider Last Rate Last Admin    acetaminophen (TYLENOL) tablet 650 mg  650 mg Oral Q6H PRN Felicia Pope PA-C   650 mg at 09/30/23 1941    ARIPiprazole (ABILIFY) tablet 2 mg  2 mg Oral Daily Acacia Kaminski DO   2 mg at 10/02/23 0813    aspirin EC tablet 81 mg  81 mg Oral Daily Acacia Kaminski DO   81 mg at 10/02/23 0812    Bempedoic Acid-Ezetimibe 180-10 MG tablet 1 tablet  1 tablet Oral Nightly Acacia Kaminski DO   1 tablet at 10/01/23 2050    sennosides-docusate (PERICOLACE) 8.6-50 MG per tablet 2 tablet  2 tablet Oral BID Acacia Kaminski DO   2 tablet at 10/02/23 0812    And    polyethylene glycol (MIRALAX) packet 17 g  17 g Oral Daily Acacai Kaminski DO   17 g at 10/02/23 0814    And    bisacodyl (DULCOLAX) EC tablet 5 mg  5 mg Oral Daily PRN Acacia Kaminski DO        And    bisacodyl (DULCOLAX) suppository 10 mg  10 mg Rectal Daily PRN  Acacia Kaminski DO        Calcium Carb-Cholecalciferol 600-20 MG-MCG tablet 1 tablet  1 tablet Oral BID Acacia Kaminski DO   1 tablet at 10/02/23 0813    Diclofenac Sodium (VOLTAREN) 1 % gel 2 g  2 g Topical 4x Daily PRN Acacia Kaminski DO        docusate sodium (COLACE) capsule 200 mg  200 mg Oral Daily PRN Acacia Kaminski DO        donepezil (ARICEPT) tablet 5 mg  5 mg Oral Nightly Acacia Kaminski DO   5 mg at 10/01/23 2049    ferrous sulfate tablet 325 mg  325 mg Oral Q48H Acacia Kaminski DO   325 mg at 10/01/23 0813    gabapentin (NEURONTIN) capsule 300 mg  300 mg Oral BID Acacia Kaminski DO   300 mg at 10/02/23 0813    hydrALAZINE (APRESOLINE) tablet 12.5 mg  12.5 mg Oral BID Acacia Kaminski DO   12.5 mg at 10/02/23 0813    hydrOXYzine (ATARAX) tablet 25 mg  25 mg Oral TID PRN Felicia Pope PA-C   25 mg at 09/29/23 0219    isosorbide mononitrate (IMDUR) 24 hr tablet 30 mg  30 mg Oral Daily Acacia Kaminski DO   30 mg at 10/02/23 0812    LORazepam (ATIVAN) tablet 0.5 mg  0.5 mg Oral Q12H Acacia aKminski DO   0.5 mg at 10/02/23 0813    modafinil (PROVIGIL) tablet 200 mg  200 mg Oral Daily Acacia Kaminski DO   200 mg at 10/02/23 0812    multivitamin with minerals 1 tablet  1 tablet Oral BID Acacia Kaminski DO   1 tablet at 10/02/23 0812    ondansetron (ZOFRAN) tablet 4 mg  4 mg Oral Q8H PRN Acacia Kaminski DO   4 mg at 10/01/23 2146    pantoprazole (PROTONIX) EC tablet 40 mg  40 mg Oral Nightly Acacia Kaminski,    40 mg at 10/01/23 2049    potassium chloride (K-DUR,KLOR-CON) CR tablet 10 mEq  10 mEq Oral TID Acacia Kaminski DO   10 mEq at 10/02/23 0812    sertraline (ZOLOFT) tablet 200 mg  200 mg Oral Nightly Acacia Kaminski DO   200 mg at 10/01/23 2050    sodium chloride 0.9 % flush 10 mL  10 mL Intravenous PRN Albert Rangel MD        sodium chloride 0.9 % flush 10 mL  10 mL Intravenous Q12H Albert Rangel MD   10 mL at 10/02/23 0814    sodium chloride 0.9 % flush 10 mL  10 mL Intravenous PRN  Albert Rangel MD        sodium chloride 0.9 % infusion 40 mL  40 mL Intravenous PRN Albert Rangel MD        torsemide (DEMADEX) tablet 50 mg  50 mg Oral Daily Gordy Acacia, DO   50 mg at 10/02/23 0813    vitamin B-12 (CYANOCOBALAMIN) tablet 1,000 mcg  1,000 mcg Oral Daily Acacia Kaminski DO   1,000 mcg at 10/02/23 0812     Operative/Procedure Notes (most recent note)    No notes of this type exist for this encounter.          Physician Progress Notes (most recent note)        Acacia Kaminski DO at 10/01/23 1928              [facility] HOSPITALIST PROGRESS NOTE     Patient Identification:  Name:  Susan Us  Age:  78 y.o.  Sex:  female  :  1945  MRN:  [id number]  Visit Number:  [id number]  ROOM: [address] [place] [address]     Primary Care Provider:  Trevor Garay MD    Length of stay in inpatient status:  2    Subjective     Chief Compliant:    Chief Complaint   Patient presents with    Weakness - Generalized       History of Presenting Illness:    Patient seen and examined early this afternoon with her daughter present in the ED. Patient denies shortness of breath, pain, or other complaints. Daughter says she has been intermittently more confused and was agitated this morning before her a.m. meds.    Objective     Current Hospital Meds:ARIPiprazole, 2 mg, Oral, Daily  aspirin, 81 mg, Oral, Daily  Bempedoic Acid-Ezetimibe, 1 tablet, Oral, Nightly  Calcium Carb-Cholecalciferol, 1 tablet, Oral, BID  donepezil, 5 mg, Oral, Nightly  ferrous sulfate, 325 mg, Oral, Q48H  gabapentin, 300 mg, Oral, BID  hydrALAZINE, 12.5 mg, Oral, BID  isosorbide mononitrate, 30 mg, Oral, Daily  LORazepam, 0.5 mg, Oral, Q12H  modafinil, 200 mg, Oral, Daily  multivitamin with minerals, 1 tablet, Oral, BID  pantoprazole, 40 mg, Oral, Nightly  senna-docusate sodium, 2 tablet, Oral, BID   And  polyethylene glycol, 17 g, Oral, Daily  potassium chloride, 10 mEq, Oral, TID  sertraline, 200 mg, Oral, Nightly  sodium  chloride, 10 mL, Intravenous, Q12H  torsemide, 50 mg, Oral, Daily  vitamin B-12, 1,000 mcg, Oral, Daily         Current Antimicrobial Therapy:  Anti-Infectives (From admission, onward)      None          Current Diuretic Therapy:  Diuretics (From admission, onward)      Ordered     Dose/Rate Route Frequency Start Stop    09/28/23 2154  torsemide (DEMADEX) tablet 50 mg        Ordering Provider: Acacia Kaminski DO    50 mg Oral Daily 09/29/23 0900            ----------------------------------------------------------------------------------------------------------------------  Vital Signs:  Temp:  [97.5 °F (36.4 °C)-98.2 °F (36.8 °C)] 97.5 °F (36.4 °C)  Heart Rate:  [65-72] 65  Resp:  [18-20] 18  BP: ()/(68-76) 90/68  SpO2:  [95 %-99 %] 95 %  on   ;   Device (Oxygen Therapy): room air  Body mass index is 24.87 kg/m².    Wt Readings from Last 3 Encounters:   10/01/23 67.8 kg (149 lb 7.6 oz)   09/22/23 68.1 kg (150 lb 3.2 oz)   08/28/23 67.7 kg (149 lb 3.2 oz)     Intake & Output (last 3 days)         09/29 0701  09/30 0700 09/30 0701  10/01 0700 10/01 0701  10/02 0700    P.O. 360 350 900    I.V. (mL/kg)  2958.7 (43.6)     Total Intake(mL/kg) 360 (5.2) 3308.7 (48.8) 900 (13.3)    Urine (mL/kg/hr) 750 (0.5)  720 (0.9)    Stool       Total Output 750  720    Net -390 +3308.7 +180           Urine Unmeasured Occurrence 3 x 3 x           Diet: Cardiac Diets, Renal Diets; Healthy Heart (2-3 Na+); Low Sodium (2-3g), Low Potassium, Low Phosphorus; Texture: Regular Texture (IDDSI 7); Fluid Consistency: Thin (IDDSI 0)  ----------------------------------------------------------------------------------------------------------------------  Physical exam:   Constitutional:  Well-developed and well-nourished.  No acute distress.      HENT:  Head:  Normocephalic and atraumatic.    Cardiovascular:  Normal rate, regular rhythm   Pulmonary/Chest:  No respiratory distress, no wheezes, no crackles, with normal breath sounds and fair  air movement.  Musculoskeletal:  No deformity.    Neurological: Initially sleeping but woke easily, no focal deficit on gross examination. No slurred speech or facial droop. Disoriented and confused as to situation and location.  Skin:  Skin is warm and dry.    Peripheral vascular:  No cyanosis, no edema.  Psychiatric: Appropriate mood and affect, pleasant and cooperative    Edited by: Acacia Kaminski DO at 10/1/2023 1928  ----------------------------------------------------------------------------------------------------------------------  Results from last 7 days   Lab Units 09/29/23  0108 09/28/23  0026 09/27/23  1800 09/27/23  1615   CRP mg/dL  --   --   --  1.40*   LACTATE mmol/L  --   --  1.2  --    WBC 10*3/mm3 5.82 8.49  --  8.77   HEMOGLOBIN g/dL 9.4* 8.8*  --  8.9*   HEMATOCRIT % 29.3* 27.6*  --  28.5*   MCV fL 90.4 90.2  --  91.1   MCHC g/dL 32.1 31.9  --  31.2*   PLATELETS 10*3/mm3 87* 78*  --  80*   INR   --  1.14*  --   --          Results from last 7 days   Lab Units 10/01/23  0336 09/30/23  0550 09/29/23  0108 09/28/23  0026 09/27/23  1615   SODIUM mmol/L 143 141 141 142 142   POTASSIUM mmol/L 3.8 3.4* 3.6 3.5 4.0   MAGNESIUM mg/dL  --   --   --   --  2.0   CHLORIDE mmol/L 104 105 107 103 103   CO2 mmol/L 25.5 24.3 24.1 26.6 28.3   BUN mg/dL 34* 30* 27* 36* 41*   CREATININE mg/dL 1.66* 1.65* 1.52* 1.86* 2.23*   CALCIUM mg/dL 9.3 9.1 9.5 8.9 9.0   GLUCOSE mg/dL 108* 95 95 100* 108*   ALBUMIN g/dL  --   --   --  4.0 4.5   BILIRUBIN mg/dL  --   --   --  0.5 0.4   ALK PHOS U/L  --   --   --  55 61   AST (SGOT) U/L  --   --   --  21 22   ALT (SGPT) U/L  --   --   --  10 10   Estimated Creatinine Clearance: 30.4 mL/min (A) (by C-G formula based on SCr of 1.66 mg/dL (H)).  No results found for: AMMONIA  Results from last 7 days   Lab Units 09/27/23  1800 09/27/23  1615   HSTROP T ng/L 62* 66*     Results from last 7 days   Lab Units 09/27/23  1615   PROBNP pg/mL 1,668.0         No results found for: HGBA1C,  POCGLU  Lab Results   Component Value Date    TSH 0.454 09/27/2023    FREET4 0.90 (L) 09/27/2023     No results found for: PREGTESTUR, PREGSERUM, HCG, HCGQUANT  Pain Management Panel          Latest Ref Rng & Units 8/2/2023   Pain Management Panel   Amphetamine, Urine Qual Negative Negative    Barbiturates Screen, Urine Negative Negative    Benzodiazepine Screen, Urine Negative Positive    Buprenorphine, Screen, Urine Negative Negative    Cocaine Screen, Urine Negative Negative    Fentanyl, Urine Negative Negative    Methadone Screen , Urine Negative Negative    Methamphetamine, Ur Negative Negative      Brief Urine Lab Results  (Last result in the past 365 days)        Color   Clarity   Blood   Leuk Est   Nitrite   Protein   CREAT   Urine HCG        09/27/23 1754 Yellow   Clear   Negative   Trace   Negative   Negative                 Blood Culture   Date Value Ref Range Status   09/27/2023 No growth at 4 days  Preliminary   09/27/2023 No growth at 4 days  Preliminary     Urine Culture   Date Value Ref Range Status   09/27/2023 50,000 CFU/mL Mixed Gram Positive [place] (A)  Final     No results found for: WOUNDCX  No results found for: STOOLCX  No results found for: RESPCX  No results found for: AFBCX  Results from last 7 days   Lab Units 09/27/23  1800 09/27/23  1615   PROCALCITONIN ng/mL 0.44*  --    LACTATE mmol/L 1.2  --    SED RATE mm/hr  --  12   CRP mg/dL  --  1.40*       I have personally looked at the labs and they are summarized above.  ----------------------------------------------------------------------------------------------------------------------  Detailed radiology reports for the last 24 hours:  Imaging Results (Last 24 Hours)       ** No results found for the last 24 hours. **          Assessment & Plan      #Generalized weakness  #Acute renal insufficiency on CKD, stage unknown  #Mildly elevated high-sensitivity troponin  #Dementia  -Patient presented with generalized weakness and worsening of  baseline confusion.  No acute abnormalities seen on CT head.  -No acute abnormalities on CT abdomen.  Atelectasis seen on CT chest, but no evidence of acute edema, pneumonia, pleural effusion.  -Renal function improved after IV fluids.  Previous baseline unclear, but at the beginning of August 2023 creatinine was 1.78, so she may be nearing her baseline.  Creatinine stable at around 1.5-1.6.  -Family requests placement, case management following, appreciate assistance.  -No reports of chest pain.  EKG had no acute ischemic change.  She has a chronically elevated troponin and this was slightly more elevated, but with flat/negative trend.  Likely due to combination of baseline increase and or renal dysfunction.  Monitor for development of any chest pain or other cardiac symptoms.  - Home dementia medications reordered based on her home regimen.  - Ordered delirium interventions/precautions    #Bradycardia status post pacemaker  #Hypertension  -Patient has been having borderline hypotension so I have decreased her hydralazine to 12.5mg BID.  -Monitor blood pressure per protocol and titrate medications as needed    #Hyperlipidemia  #History of TIA  -Continue home aspirin and statin    #Normocytic anemia  #Thrombocytopenia  -Chronic, appears relatively stable based on results from August 2023  -Repeat CBC periodically to monitor    Edited by: Acacia Kaminski DO at 10/1/2023 1928    VTE Prophylaxis:   Mechanical Order History:        Ordered        09/27/23 2310  Place Sequential Compression Device  Once            09/27/23 2310  Maintain Sequential Compression Device  Continuous                          Pharmalogical Order History:       None            Dispo: pending short term rehab placement    Acacia Kaminski DO  [facility]  10/01/23  19:28 EDT     Electronically signed by Acacia Kaminski DO at 10/01/23 1930       Consult Notes (most recent note)    No notes of this type exist for this  encounter.          Physical Therapy Notes (most recent note)        Óscar Alonso, PT at 23 1427  Version 1 of 1         Acute Care - Physical Therapy Treatment Note  LINDA Chris     Patient Name: Susan Us  : 1945  MRN: [id number]  Today's Date: 2023   Onset of Illness/Injury or Date of Surgery: 23  Visit Dx:     ICD-10-CM ICD-9-CM   1. Acute renal insufficiency  N28.9 593.9     Patient Active Problem List   Diagnosis    Stroke-like symptoms    TIA (transient ischemic attack)    Essential hypertension    Hyperlipidemia LDL goal <70    Artificial cardiac pacemaker    Acute renal insufficiency     Past Medical History:   Diagnosis Date    Bradycardia     s/p pacemaker placement    CKD (chronic kidney disease)     baseline renal function unknown    Dementia     Hyperlipidemia     Hypertension     Thrombocytopenia      Past Surgical History:   Procedure Laterality Date    CARDIAC PACEMAKER PLACEMENT      CATARACT EXTRACTION      CERVICAL FUSION      CHOLECYSTECTOMY      HIATAL HERNIA REPAIR      HYSTERECTOMY      REPLACEMENT TOTAL KNEE Right      PT Assessment (last 12 hours)       PT Evaluation and Treatment       Row Name 23 1423          Physical Therapy Time and Intention    Document Type therapy note (daily note)  -CS     Mode of Treatment physical therapy  -CS     Patient Effort good  -CS     Comment Pt seen bedside this PM. Pt agreed to PT.  -CS       Row Name 23 1423          General Information    Patient Profile Reviewed yes  -CS     Equipment Currently Used at Home shower chair;walker, rolling  -CS     Existing Precautions/Restrictions fall  -CS       Row Name 23 1423          Living Environment    Primary Care Provided by self;child(zoraida)  daughter assists as needed  -CS       Row Name 23 1423          Home Use of Assistive/Adaptive Equipment    Equipment Currently Used at Home shower chair;walker, rolling  -CS       Row Name 23 1423          Pain     Additional Documentation --  no c/o  -       Row Name 09/29/23 1423          Cognition    Affect/Mental Status (Cognition) WFL  -     Orientation Status (Cognition) oriented x 3  -CS     Follows Commands (Cognition) verbal cues/prompting required  -       Row Name 09/29/23 1423          Sensory    Hearing Status WFL  -       Row Name 09/29/23 1423          Vision Assessment/Intervention    Visual Impairment/Limitations WFL;corrective lenses full-time  -       Row Name 09/29/23 1423          Mobility    Extremity Weight-bearing Status --  no restrictions  -       Row Name 09/29/23 1423          Bed Mobility    Bed Mobility rolling left;rolling right;scooting/bridging;supine-sit;sit-supine  -     Scooting/Bridging [place] (Bed Mobility) standby assist  -     Supine-Sit [place] (Bed Mobility) standby assist  -     Sit-Supine [place] (Bed Mobility) minimum assist (75% patient effort);nonverbal cues (demo/gesture);verbal cues  -CS     Bed Mobility, Safety Issues decreased use of arms for pushing/pulling;decreased use of legs for bridging/pushing  -     Assistive Device (Bed Mobility) bed rails  -       Row Name 09/29/23 1423          Transfers    Transfers sit-stand transfer;stand-sit transfer;stand pivot/stand step transfer  -       Row Name 09/29/23 1423          Sit-Stand Transfer    Sit-Stand [place] (Transfers) contact guard;nonverbal cues (demo/gesture);verbal cues  -CS     Assistive Device (Sit-Stand Transfers) walker, front-wheeled  -       Row Name 09/29/23 1423          Stand-Sit Transfer    Stand-Sit [place] (Transfers) contact guard;nonverbal cues (demo/gesture);verbal cues  -CS     Assistive Device (Stand-Sit Transfers) walker, front-wheeled  -       Row Name 09/29/23 1423          Stand Pivot/Stand Step Transfer    Stand Pivot/Stand Step [place] (Transfers) contact guard;nonverbal cues (demo/gesture);verbal cues  -CS     Assistive Device (Stand Pivot  Stand Step Transfer) walker, front-wheeled  -CS       Row Name 09/29/23 1423          Gait/Stairs (Locomotion)    Gait/Stairs Locomotion gait/ambulation independence;gait/ambulation assistive device;distance ambulated;gait pattern;gait deviations  -CS     [place] Level (Gait) verbal cues;nonverbal cues (demo/gesture);contact guard  -CS     Assistive Device (Gait) walker, front-wheeled  -CS     Distance in Feet (Gait) 250  -CS     Pattern (Gait) step-through  -CS     Deviations/Abnormal Patterns (Gait) stride length decreased;gait speed decreased  -CS     Bilateral Gait Deviations forward flexed posture  -CS       Row Name 09/29/23 1423          Safety Issues, Functional Mobility    Impairments Affecting Function (Mobility) balance;endurance/activity tolerance;strength  -CS       Row Name 09/29/23 1423          Coping    Observed Emotional State calm;cooperative  -CS     Verbalized Emotional State acceptance  -CS     Family/Support Persons daughter;son  -CS     Involvement in Care not present at bedside  -CS       Row Name 09/29/23 1423          Plan of Care Review    Plan of Care Reviewed With patient  -CS     Progress no change  -CS     Outcome Evaluation Pt w/ improved tolerance for ambulation. Pt would benefit from continued skilled PT.  -CS       Row Name 09/29/23 1423          Positioning and Restraints    Pre-Treatment Position in bed  -CS     Post Treatment Position bed  -CS     In Bed supine;call light within reach;encouraged to call for assist;exit alarm on  -CS       Row Name 09/29/23 1423          Therapy Assessment/Plan (PT)    Rehab Potential (PT) good, to achieve stated therapy goals  -CS     Criteria for Skilled Interventions Met (PT) yes;meets criteria  -CS     Therapy Frequency (PT) 2 times/wk  2-5 times/ week per priority  -CS     Problem List (PT) problems related to;balance;cognition;mobility;strength  -CS       Row Name 09/29/23 1423          Progress Summary (PT)    Progress Toward  Functional Goals (PT) progress toward functional goals is good  -CS     Daily Progress Summary (PT) Pt w/ improved tolerance for ambulation. Pt would benefit from continued skilled PT.  -       Row Name 09/29/23 1423          Physical Therapy Goals    Bed Mobility Goal Selection (PT) bed mobility, PT goal 1  -CS     Transfer Goal Selection (PT) transfer, PT goal 1  -CS     Gait Training Goal Selection (PT) gait training, PT goal 1  -CS       Row Name 09/29/23 1423          Bed Mobility Goal 1 (PT)    Activity/Assistive Device (Bed Mobility Goal 1, PT) scooting;sit to supine;supine to sit  -CS     [place] Level/Cues Needed (Bed Mobility Goal 1, PT) modified independence  -CS     Time Frame (Bed Mobility Goal 1, PT) by discharge  -       Row Name 09/29/23 1423          Transfer Goal 1 (PT)    Activity/Assistive Device (Transfer Goal 1, PT) sit-to-stand/stand-to-sit;bed-to-chair/chair-to-bed;toilet;walker, rolling  -CS     [place] Level/Cues Needed (Transfer Goal 1, PT) standby assist  -CS     Time Frame (Transfer Goal 1, PT) by discharge  -       Row Name 09/29/23 1423          Gait Training Goal 1 (PT)    Activity/Assistive Device (Gait Training Goal 1, PT) gait (walking locomotion);assistive device use;decrease fall risk;diminish gait deviation;improve balance and speed;increase endurance/gait distance  -CS     [place] Level (Gait Training Goal 1, PT) standby assist  -CS     Distance (Gait Training Goal 1, PT) 150  -CS     Time Frame (Gait Training Goal 1, PT) by discharge  -               User Key  (r) = Recorded By, (t) = Taken By, (c) = Cosigned By      Initials Name Provider Type    CS Óscar Alonso, PT Physical Therapist                    Physical Therapy Education       Title: PT OT SLP Therapies (Done)       Topic: Physical Therapy (Done)       Point: Mobility training (Done)       Learning Progress Summary             Patient Acceptance, E,D, VU,NR by  at 9/28/2023 1411                          Point: Home exercise program (Done)       Learning Progress Summary             Patient Acceptance, E,D, VU,NR by  at 9/28/2023 1410                         Point: Body mechanics (Done)       Learning Progress Summary             Patient Acceptance, E,D, VU,NR by  at 9/28/2023 1410                         Point: Precautions (Done)       Learning Progress Summary             Patient Acceptance, E,D, VU,NR by  at 9/28/2023 1410                                         User Key       Initials Effective Dates Name Provider Type Discipline     06/16/21 -  Denia Poe, PT Physical Therapist PT                  PT Recommendation and Plan  Anticipated Discharge Disposition (PT): home with assist  Therapy Frequency (PT): 2 times/wk (2-5 times/ week per priority)  Progress Summary (PT)  Progress Toward Functional Goals (PT): progress toward functional goals is good  Daily Progress Summary (PT): Pt w/ improved tolerance for ambulation. Pt would benefit from continued skilled PT.  Plan of Care Reviewed With: patient  Progress: no change  Outcome Evaluation: Pt w/ improved tolerance for ambulation. Pt would benefit from continued skilled PT.       Time Calculation:    PT Charges       Row Name 09/29/23 1426             Time Calculation    PT Received On 09/29/23  -CS      PT Goal Re-Cert Due Date 10/12/23  -CS         Timed Charges    34136 - Gait Training Minutes  23  -CS         Total Minutes    Timed Charges Total Minutes 23  -CS       Total Minutes 23  -CS                User Key  (r) = Recorded By, (t) = Taken By, (c) = Cosigned By      Initials Name Provider Type    CS Óscar Alonso, PT Physical Therapist                  Therapy Charges for Today       Code Description Service Date Service Provider Modifiers Qty    82694891551 HC GAIT TRAINING EA 15 MIN 9/29/2023 Óscar Alonso, PT GP 2            PT G-Codes  AM-PAC 6 Clicks Score (PT): 17    Óscar Alonso PT  9/29/2023      Electronically signed by  Óscar Alonso, PT at 23 1428          Occupational Therapy Notes (most recent note)        Gordo Kilpatrick, OT at 23 1018          Acute Care - Occupational Therapy Initial Evaluation  LINDA Perez     Patient Name: Susan Us  : 1945  MRN: [id number]  Today's Date: 2023  Onset of Illness/Injury or Date of Surgery: 23     Referring Physician: Dr. Kaminski    Admit Date: 2023       ICD-10-CM ICD-9-CM   1. Acute renal insufficiency  N28.9 593.9     Patient Active Problem List   Diagnosis    Stroke-like symptoms    TIA (transient ischemic attack)    Essential hypertension    Hyperlipidemia LDL goal <70    Artificial cardiac pacemaker    Acute renal insufficiency     Past Medical History:   Diagnosis Date    Bradycardia     s/p pacemaker placement    CKD (chronic kidney disease)     baseline renal function unknown    Dementia     Hyperlipidemia     Hypertension     Thrombocytopenia      Past Surgical History:   Procedure Laterality Date    CARDIAC PACEMAKER PLACEMENT      CATARACT EXTRACTION      CERVICAL FUSION      CHOLECYSTECTOMY      HIATAL HERNIA REPAIR      HYSTERECTOMY      REPLACEMENT TOTAL KNEE Right          OT ASSESSMENT FLOWSHEET (last 12 hours)       OT Evaluation and Treatment       Row Name 23 1012                   OT Time and Intention    Document Type evaluation  -KR        Mode of Treatment occupational therapy  -KR        Patient Effort adequate  -KR           General Information    General Observations of Patient alert/cooperative, confused  -KR           Living Environment    Current Living Arrangements home  -KR        People in Home alone  -KR        Primary Care Provided by self  -KR           Home Use of Assistive/Adaptive Equipment    Equipment Currently Used at Home walker, rolling  -KR           Cognition    Affect/Mental Status (Cognition) confused  -KR        Orientation Status (Cognition) oriented to;person  -KR        Follows Commands (Cognition)  verbal cues/prompting required  -KR           Range of Motion Comprehensive    Comment, General Range of Motion BUE WFL  -KR           Strength Comprehensive (MMT)    Comment, General Manual Muscle Testing (MMT) Assessment BUE 3/5  -KR           Activities of Daily Living    BADL Assessment/Intervention bathing;upper body dressing;lower body dressing;grooming;feeding;toileting  -KR           Bathing Assessment/Intervention    [place] Level (Bathing) bathing skills;minimum assist (75% patient effort)  -KR           Upper Body Dressing Assessment/Training    [place] Level (Upper Body Dressing) upper body dressing skills;minimum assist (75% patient effort)  -KR           Lower Body Dressing Assessment/Training    [place] Level (Lower Body Dressing) lower body dressing skills;minimum assist (75% patient effort)  -KR           Grooming Assessment/Training    [place] Level (Grooming) grooming skills;minimum assist (75% patient effort)  -KR           Self-Feeding Assessment/Training    [place] Level (Feeding) feeding skills;set up  -KR           Toileting Assessment/Training    [place] Level (Toileting) toileting skills;minimum assist (75% patient effort)  -KR           Plan of Care Review    Plan of Care Reviewed With patient;daughter  -KR           Therapy Assessment/Plan (OT)    Planned Therapy Interventions (OT) activity tolerance training;BADL retraining;strengthening exercise;cognitive/visual perception retraining  -KR           Therapy Plan Review/Discharge Plan (OT)    Anticipated Discharge Disposition (OT) extended care facility  -KR           OT Goals    Dressing Goal Selection (OT) dressing, OT goal 1  -KR        Strength Goal Selection (OT) strength, OT goal 1  -KR           Dressing Goal 1 (OT)    Activity/Device (Dressing Goal 1, OT) dressing skills, all  -KR        [place]/Cues Needed (Dressing Goal 1, OT) standby assist  -KR        Time Frame (Dressing Goal 1, OT) by  discharge  -KR           Strength Goal 1 (OT)    Strength Goal 1 (OT) BUE increase x 1 to enhance self care/mobility skills  -KR        Time Frame (Strength Goal 1, OT) by discharge  -KR           OT Cognitive Goals    Orientation Goal Selection (OT) orientation, OT goal 1  -KR           Orientation Goal 1 (OT)    Activity (Orientation Goal 1, OT) oriented to person;oriented to place;oriented to situation  -KR        [place]/Cues/Accuracy (Orientation Goal 1, OT) independently  -KR        Time Frame (Orientation Goal 1, OT) by discharge  -KR                  User Key  (r) = Recorded By, (t) = Taken By, (c) = Cosigned By      Initials Name Effective Dates    Gordo Hector OT 06/16/21 -                            OT Recommendation and Plan  Planned Therapy Interventions (OT): activity tolerance training, BADL retraining, strengthening exercise, cognitive/visual perception retraining  Plan of Care Review  Plan of Care Reviewed With: patient, daughter  Plan of Care Reviewed With: patient, daughter        Time Calculation:     Therapy Charges for Today       Code Description Service Date Service Provider Modifiers Qty    [id number]  OT EVAL MOD COMPLEXITY 4 9/29/2023 Gordo Kilpatrick OT GO 1                 Gordo Kilpatrick OT  9/29/2023    Electronically signed by Gordo Kilpatrick OT at 09/29/23 1018

## 2023-11-07 ENCOUNTER — HOSPITAL ENCOUNTER (EMERGENCY)
Facility: HOSPITAL | Age: 78
Discharge: HOME | End: 2023-11-08
Attending: STUDENT IN AN ORGANIZED HEALTH CARE EDUCATION/TRAINING PROGRAM | Admitting: STUDENT IN AN ORGANIZED HEALTH CARE EDUCATION/TRAINING PROGRAM
Payer: MEDICARE

## 2023-11-07 ENCOUNTER — APPOINTMENT (OUTPATIENT)
Dept: CT IMAGING | Facility: HOSPITAL | Age: 78
End: 2023-11-07
Payer: MEDICARE

## 2023-11-07 ENCOUNTER — APPOINTMENT (OUTPATIENT)
Dept: GENERAL RADIOLOGY | Facility: HOSPITAL | Age: 78
End: 2023-11-07
Payer: MEDICARE

## 2023-11-07 DIAGNOSIS — R03.0 ELEVATED BLOOD PRESSURE READING: Primary | ICD-10-CM

## 2023-11-07 LAB
ALBUMIN SERPL-MCNC: 4.1 G/DL (ref 3.5–5.2)
ALBUMIN/GLOB SERPL: 1.5 G/DL
ALP SERPL-CCNC: 70 U/L (ref 39–117)
ALT SERPL W P-5'-P-CCNC: 11 U/L (ref 1–33)
ANION GAP SERPL CALCULATED.3IONS-SCNC: 10.8 MMOL/L (ref 5–15)
AST SERPL-CCNC: 21 U/L (ref 1–32)
BASOPHILS # BLD AUTO: 0.03 10*3/MM3 (ref 0–0.2)
BASOPHILS NFR BLD AUTO: 0.7 % (ref 0–1.5)
BILIRUB SERPL-MCNC: 0.2 MG/DL (ref 0–1.2)
BUN SERPL-MCNC: 23 MG/DL (ref 8–23)
BUN/CREAT SERPL: 14.3 (ref 7–25)
CALCIUM SPEC-SCNC: 9.2 MG/DL (ref 8.6–10.5)
CHLORIDE SERPL-SCNC: 106 MMOL/L (ref 98–107)
CO2 SERPL-SCNC: 26.2 MMOL/L (ref 22–29)
CREAT SERPL-MCNC: 1.61 MG/DL (ref 0.57–1)
D-LACTATE SERPL-SCNC: 1 MMOL/L (ref 0.5–2)
DEPRECATED RDW RBC AUTO: 42.6 FL (ref 37–54)
EGFRCR SERPLBLD CKD-EPI 2021: 32.8 ML/MIN/1.73
EOSINOPHIL # BLD AUTO: 0.12 10*3/MM3 (ref 0–0.4)
EOSINOPHIL NFR BLD AUTO: 2.7 % (ref 0.3–6.2)
ERYTHROCYTE [DISTWIDTH] IN BLOOD BY AUTOMATED COUNT: 13 % (ref 12.3–15.4)
FLUAV RNA RESP QL NAA+PROBE: NOT DETECTED
FLUBV RNA RESP QL NAA+PROBE: NOT DETECTED
GEN 5 2HR TROPONIN T REFLEX: 48 NG/L
GLOBULIN UR ELPH-MCNC: 2.8 GM/DL
GLUCOSE SERPL-MCNC: 118 MG/DL (ref 65–99)
HCT VFR BLD AUTO: 29.8 % (ref 34–46.6)
HGB BLD-MCNC: 9.2 G/DL (ref 12–15.9)
IMM GRANULOCYTES # BLD AUTO: 0.01 10*3/MM3 (ref 0–0.05)
IMM GRANULOCYTES NFR BLD AUTO: 0.2 % (ref 0–0.5)
LYMPHOCYTES # BLD AUTO: 1.57 10*3/MM3 (ref 0.7–3.1)
LYMPHOCYTES NFR BLD AUTO: 34.7 % (ref 19.6–45.3)
MCH RBC QN AUTO: 27.5 PG (ref 26.6–33)
MCHC RBC AUTO-ENTMCNC: 30.9 G/DL (ref 31.5–35.7)
MCV RBC AUTO: 89.2 FL (ref 79–97)
MONOCYTES # BLD AUTO: 0.4 10*3/MM3 (ref 0.1–0.9)
MONOCYTES NFR BLD AUTO: 8.8 % (ref 5–12)
NEUTROPHILS NFR BLD AUTO: 2.39 10*3/MM3 (ref 1.7–7)
NEUTROPHILS NFR BLD AUTO: 52.9 % (ref 42.7–76)
NRBC BLD AUTO-RTO: 0 /100 WBC (ref 0–0.2)
PLATELET # BLD AUTO: 99 10*3/MM3 (ref 140–450)
PMV BLD AUTO: 10.7 FL (ref 6–12)
POTASSIUM SERPL-SCNC: 3.7 MMOL/L (ref 3.5–5.2)
PROT SERPL-MCNC: 6.9 G/DL (ref 6–8.5)
RBC # BLD AUTO: 3.34 10*6/MM3 (ref 3.77–5.28)
SARS-COV-2 RNA RESP QL NAA+PROBE: NOT DETECTED
SODIUM SERPL-SCNC: 143 MMOL/L (ref 136–145)
TROPONIN T DELTA: -1 NG/L
TROPONIN T SERPL HS-MCNC: 49 NG/L
WBC NRBC COR # BLD: 4.52 10*3/MM3 (ref 3.4–10.8)

## 2023-11-07 PROCEDURE — 96367 TX/PROPH/DG ADDL SEQ IV INF: CPT

## 2023-11-07 PROCEDURE — 71045 X-RAY EXAM CHEST 1 VIEW: CPT | Performed by: RADIOLOGY

## 2023-11-07 PROCEDURE — 85025 COMPLETE CBC W/AUTO DIFF WBC: CPT | Performed by: STUDENT IN AN ORGANIZED HEALTH CARE EDUCATION/TRAINING PROGRAM

## 2023-11-07 PROCEDURE — 36415 COLL VENOUS BLD VENIPUNCTURE: CPT

## 2023-11-07 PROCEDURE — 80053 COMPREHEN METABOLIC PANEL: CPT | Performed by: STUDENT IN AN ORGANIZED HEALTH CARE EDUCATION/TRAINING PROGRAM

## 2023-11-07 PROCEDURE — 70450 CT HEAD/BRAIN W/O DYE: CPT

## 2023-11-07 PROCEDURE — 25010000002 AZITHROMYCIN PER 500 MG: Performed by: STUDENT IN AN ORGANIZED HEALTH CARE EDUCATION/TRAINING PROGRAM

## 2023-11-07 PROCEDURE — 25810000003 SODIUM CHLORIDE 0.9 % SOLUTION 250 ML FLEX CONT: Performed by: STUDENT IN AN ORGANIZED HEALTH CARE EDUCATION/TRAINING PROGRAM

## 2023-11-07 PROCEDURE — 96365 THER/PROPH/DIAG IV INF INIT: CPT

## 2023-11-07 PROCEDURE — 70450 CT HEAD/BRAIN W/O DYE: CPT | Performed by: RADIOLOGY

## 2023-11-07 PROCEDURE — 87040 BLOOD CULTURE FOR BACTERIA: CPT | Performed by: STUDENT IN AN ORGANIZED HEALTH CARE EDUCATION/TRAINING PROGRAM

## 2023-11-07 PROCEDURE — 99284 EMERGENCY DEPT VISIT MOD MDM: CPT

## 2023-11-07 PROCEDURE — 87636 SARSCOV2 & INF A&B AMP PRB: CPT | Performed by: STUDENT IN AN ORGANIZED HEALTH CARE EDUCATION/TRAINING PROGRAM

## 2023-11-07 PROCEDURE — 71045 X-RAY EXAM CHEST 1 VIEW: CPT

## 2023-11-07 PROCEDURE — 84484 ASSAY OF TROPONIN QUANT: CPT | Performed by: STUDENT IN AN ORGANIZED HEALTH CARE EDUCATION/TRAINING PROGRAM

## 2023-11-07 PROCEDURE — 93005 ELECTROCARDIOGRAM TRACING: CPT | Performed by: STUDENT IN AN ORGANIZED HEALTH CARE EDUCATION/TRAINING PROGRAM

## 2023-11-07 PROCEDURE — 83605 ASSAY OF LACTIC ACID: CPT | Performed by: STUDENT IN AN ORGANIZED HEALTH CARE EDUCATION/TRAINING PROGRAM

## 2023-11-07 PROCEDURE — 93010 ELECTROCARDIOGRAM REPORT: CPT | Performed by: INTERNAL MEDICINE

## 2023-11-07 PROCEDURE — 25010000002 CEFTRIAXONE PER 250 MG: Performed by: STUDENT IN AN ORGANIZED HEALTH CARE EDUCATION/TRAINING PROGRAM

## 2023-11-07 RX ADMIN — CEFTRIAXONE 1000 MG: 1 INJECTION, POWDER, FOR SOLUTION INTRAMUSCULAR; INTRAVENOUS at 21:30

## 2023-11-07 RX ADMIN — AZITHROMYCIN MONOHYDRATE 500 MG: 500 INJECTION, POWDER, LYOPHILIZED, FOR SOLUTION INTRAVENOUS at 22:03

## 2023-11-08 VITALS
RESPIRATION RATE: 18 BRPM | OXYGEN SATURATION: 93 % | DIASTOLIC BLOOD PRESSURE: 91 MMHG | TEMPERATURE: 97.2 F | HEIGHT: 65 IN | WEIGHT: 152 LBS | SYSTOLIC BLOOD PRESSURE: 175 MMHG | BODY MASS INDEX: 25.33 KG/M2 | HEART RATE: 65 BPM

## 2023-11-08 LAB
QT INTERVAL: 454 MS
QTC INTERVAL: 472 MS

## 2023-11-08 NOTE — ED PROVIDER NOTES
Subjective   History of Present Illness    78-year-old female with past medical history of dementia, hypertension, hyperlipidemia, CKD, bradycardia status post pacemaker, thrombocytopenia presenting for elevated blood pressure and reported chest pain.  Patient states that she did have some brief chest discomfort this evening but this has fully resolved.  Patient is unable to further quantify the chest discomfort.  No shortness of breath, fevers, cough, nausea, vomiting.  Patient's current complaint is headache.  No focal deficits.  Pain is on left side of head behind the eye.  No falls or trauma.    Review of Systems    Past Medical History:   Diagnosis Date    Bradycardia     s/p pacemaker placement    CKD (chronic kidney disease)     baseline renal function unknown    Dementia     Hyperlipidemia     Hypertension     Thrombocytopenia        Allergies   Allergen Reactions    Evolocumab Itching     itching    Statins Hives       Past Surgical History:   Procedure Laterality Date    CARDIAC PACEMAKER PLACEMENT      CATARACT EXTRACTION      CERVICAL FUSION      CHOLECYSTECTOMY      HIATAL HERNIA REPAIR      HYSTERECTOMY      REPLACEMENT TOTAL KNEE Right        Family History   Problem Relation Age of Onset    Stroke Mother     Stroke Father        Social History     Socioeconomic History    Marital status:    Tobacco Use    Smoking status: Never    Smokeless tobacco: Never    Tobacco comments:     Second hand smoke exposure ()   Vaping Use    Vaping Use: Never used   Substance and Sexual Activity    Alcohol use: Never    Drug use: Never    Sexual activity: Defer           Objective   Physical Exam  Vitals and nursing note reviewed.   Constitutional:       General: She is not in acute distress.  HENT:      Head: Normocephalic and atraumatic.      Mouth/Throat:      Mouth: Mucous membranes are moist.   Eyes:      Extraocular Movements: Extraocular movements intact.      Pupils: Pupils are equal, round, and  reactive to light.   Cardiovascular:      Rate and Rhythm: Normal rate and regular rhythm.      Pulses: Normal pulses.   Pulmonary:      Effort: Pulmonary effort is normal.      Breath sounds: Normal breath sounds.   Abdominal:      General: Abdomen is flat. There is no distension.      Palpations: Abdomen is soft.      Tenderness: There is no abdominal tenderness.   Musculoskeletal:         General: Normal range of motion.      Cervical back: Normal range of motion and neck supple.      Right lower leg: No tenderness. No edema.      Left lower leg: No tenderness. No edema.   Skin:     General: Skin is warm and dry.   Neurological:      General: No focal deficit present.      Mental Status: She is alert.      Cranial Nerves: No cranial nerve deficit.      Motor: No weakness.      Comments: Sensation to light touch intact and symmetric in face and extremities, normal finger-to-nose and heel-to-shin, no aphasia, CN II-XII intact, alert and oriented to self, able to identify she is in for a hospital but not which hospital, not oriented to time (baseline mental status)         Procedures           ED Course  ED Course as of 11/08/23 0737   e Nov 07, 2023   1939   EKG ED Interpretation by: Christie Eden MD on 11/07/23 at 19:28  EKG: HR 65, unchanged from previous tracings, atrial paced.     Electronically signed by Christie Eden MD, 11/07/23, 7:40 PM EST.   []   Wed Nov 08, 2023   0043 Patient improved on reassessment.  Discussed results with patient and daughter at bedside.  Suspect symptoms likely related to patient's elevated blood pressure, initial systolics 208/92.  This improved to systolics 170s over 90s without intervention.  Patient does have a longstanding history of elevated blood pressure which appears to be in the 150s to 160s though more recent baseline is unclear.  Daughter states patient gets her blood pressure checked once a day but does not know what these values have been.  Patient does state that  her blood pressure has been running high recently but has not been in the systolics 200s.    Patient to discharge back to nursing facility.  Per daughter provider at the facility should be present tomorrow.  Instructions given for twice daily blood pressure checks and close follow-up with PCP.  Strict return precautions also reviewed. [KH]      ED Course User Index  [KH] Christie Eden MD                                           Medical Decision Making  Problems Addressed:  Elevated blood pressure reading: complicated acute illness or injury    Amount and/or Complexity of Data Reviewed  Labs: ordered.  Radiology: ordered.  ECG/medicine tests: ordered.      CT head negative for acute pathology.  Normal neurologic exam.  Symptoms improved with improvement in blood pressure which did not recur intervention while in the emergency room.  Initial concern for possible pneumonia on chest x-ray and was covered with ceftriaxone and azithromycin, however on further review does appear consistent with prior imaging.  Patient is currently receiving IM antibiotics at nursing home for UTI.  Creatinine at baseline.  Troponin stable and similar to priors.  EKG reassuring.  Lactate negative.    Final diagnoses:   Elevated blood pressure reading       ED Disposition  ED Disposition       ED Disposition   Discharge    Condition   Stable    Comment   --               Trevor Garay MD  [address] [address]  [phone]               Medication List      No changes were made to your prescriptions during this visit.            Christei Eden MD  11/08/23 7571

## 2023-11-08 NOTE — ED NOTES
Called [facility] spoke with Conrado for transport back to [facility]. He will let the crew know.

## 2023-11-10 ENCOUNTER — LAB REQUISITION (OUTPATIENT)
Dept: LAB | Facility: HOSPITAL | Age: 78
End: 2023-11-10
Payer: MEDICARE

## 2023-11-10 DIAGNOSIS — N18.9 CHRONIC KIDNEY DISEASE, UNSPECIFIED: ICD-10-CM

## 2023-11-10 DIAGNOSIS — D50.0 IRON DEFICIENCY ANEMIA SECONDARY TO BLOOD LOSS (CHRONIC): ICD-10-CM

## 2023-11-10 LAB
ALBUMIN SERPL-MCNC: 4.8 G/DL (ref 3.5–5.2)
ALBUMIN/GLOB SERPL: 1.7 G/DL
ALP SERPL-CCNC: 74 U/L (ref 39–117)
ALT SERPL W P-5'-P-CCNC: 12 U/L (ref 1–33)
ANION GAP SERPL CALCULATED.3IONS-SCNC: 10.3 MMOL/L (ref 5–15)
AST SERPL-CCNC: 29 U/L (ref 1–32)
BASOPHILS # BLD AUTO: 0.03 10*3/MM3 (ref 0–0.2)
BASOPHILS NFR BLD AUTO: 0.8 % (ref 0–1.5)
BILIRUB SERPL-MCNC: 0.3 MG/DL (ref 0–1.2)
BUN SERPL-MCNC: 23 MG/DL (ref 8–23)
BUN/CREAT SERPL: 15.6 (ref 7–25)
CALCIUM SPEC-SCNC: 9.8 MG/DL (ref 8.6–10.5)
CHLORIDE SERPL-SCNC: 104 MMOL/L (ref 98–107)
CO2 SERPL-SCNC: 26.7 MMOL/L (ref 22–29)
CREAT SERPL-MCNC: 1.47 MG/DL (ref 0.57–1)
DEPRECATED RDW RBC AUTO: 41.3 FL (ref 37–54)
EGFRCR SERPLBLD CKD-EPI 2021: 36.6 ML/MIN/1.73
EOSINOPHIL # BLD AUTO: 0.14 10*3/MM3 (ref 0–0.4)
EOSINOPHIL NFR BLD AUTO: 3.7 % (ref 0.3–6.2)
ERYTHROCYTE [DISTWIDTH] IN BLOOD BY AUTOMATED COUNT: 12.8 % (ref 12.3–15.4)
GLOBULIN UR ELPH-MCNC: 2.8 GM/DL
GLUCOSE SERPL-MCNC: 108 MG/DL (ref 65–99)
HCT VFR BLD AUTO: 29.8 % (ref 34–46.6)
HGB BLD-MCNC: 9.4 G/DL (ref 12–15.9)
IMM GRANULOCYTES # BLD AUTO: 0.01 10*3/MM3 (ref 0–0.05)
IMM GRANULOCYTES NFR BLD AUTO: 0.3 % (ref 0–0.5)
LYMPHOCYTES # BLD AUTO: 1.39 10*3/MM3 (ref 0.7–3.1)
LYMPHOCYTES NFR BLD AUTO: 36.3 % (ref 19.6–45.3)
MCH RBC QN AUTO: 27.9 PG (ref 26.6–33)
MCHC RBC AUTO-ENTMCNC: 31.5 G/DL (ref 31.5–35.7)
MCV RBC AUTO: 88.4 FL (ref 79–97)
MONOCYTES # BLD AUTO: 0.28 10*3/MM3 (ref 0.1–0.9)
MONOCYTES NFR BLD AUTO: 7.3 % (ref 5–12)
NEUTROPHILS NFR BLD AUTO: 1.98 10*3/MM3 (ref 1.7–7)
NEUTROPHILS NFR BLD AUTO: 51.6 % (ref 42.7–76)
NRBC BLD AUTO-RTO: 0 /100 WBC (ref 0–0.2)
PLATELET # BLD AUTO: 124 10*3/MM3 (ref 140–450)
PMV BLD AUTO: 11.1 FL (ref 6–12)
POTASSIUM SERPL-SCNC: 3.9 MMOL/L (ref 3.5–5.2)
PROT SERPL-MCNC: 7.6 G/DL (ref 6–8.5)
RBC # BLD AUTO: 3.37 10*6/MM3 (ref 3.77–5.28)
SODIUM SERPL-SCNC: 141 MMOL/L (ref 136–145)
WBC NRBC COR # BLD: 3.83 10*3/MM3 (ref 3.4–10.8)

## 2023-11-10 PROCEDURE — 85025 COMPLETE CBC W/AUTO DIFF WBC: CPT | Performed by: INTERNAL MEDICINE

## 2023-11-10 PROCEDURE — 80053 COMPREHEN METABOLIC PANEL: CPT | Performed by: INTERNAL MEDICINE

## 2023-11-12 LAB
BACTERIA SPEC AEROBE CULT: NORMAL
BACTERIA SPEC AEROBE CULT: NORMAL

## 2023-12-08 ENCOUNTER — HOSPITAL ENCOUNTER (EMERGENCY)
Facility: HOSPITAL | Age: 78
Discharge: HOME | End: 2023-12-09
Attending: INTERNAL MEDICINE
Payer: MEDICARE

## 2023-12-08 ENCOUNTER — APPOINTMENT (OUTPATIENT)
Dept: GENERAL RADIOLOGY | Facility: HOSPITAL | Age: 78
End: 2023-12-08
Payer: MEDICARE

## 2023-12-08 ENCOUNTER — APPOINTMENT (OUTPATIENT)
Dept: CT IMAGING | Facility: HOSPITAL | Age: 78
End: 2023-12-08
Payer: MEDICARE

## 2023-12-08 VITALS
TEMPERATURE: 98.9 F | OXYGEN SATURATION: 95 % | SYSTOLIC BLOOD PRESSURE: 163 MMHG | RESPIRATION RATE: 20 BRPM | BODY MASS INDEX: 25.33 KG/M2 | HEIGHT: 65 IN | HEART RATE: 65 BPM | WEIGHT: 152 LBS | DIASTOLIC BLOOD PRESSURE: 78 MMHG

## 2023-12-08 DIAGNOSIS — I10 PRIMARY HYPERTENSION: Primary | ICD-10-CM

## 2023-12-08 LAB
ALBUMIN SERPL-MCNC: 4.3 G/DL (ref 3.5–5.2)
ALBUMIN/GLOB SERPL: 1.5 G/DL
ALP SERPL-CCNC: 86 U/L (ref 39–117)
ALT SERPL W P-5'-P-CCNC: 16 U/L (ref 1–33)
ANION GAP SERPL CALCULATED.3IONS-SCNC: 13.9 MMOL/L (ref 5–15)
AST SERPL-CCNC: 29 U/L (ref 1–32)
BACTERIA UR QL AUTO: ABNORMAL /HPF
BASOPHILS # BLD AUTO: 0.04 10*3/MM3 (ref 0–0.2)
BASOPHILS NFR BLD AUTO: 0.8 % (ref 0–1.5)
BILIRUB SERPL-MCNC: 0.3 MG/DL (ref 0–1.2)
BILIRUB UR QL STRIP: NEGATIVE
BUN SERPL-MCNC: 19 MG/DL (ref 8–23)
BUN/CREAT SERPL: 13.5 (ref 7–25)
CALCIUM SPEC-SCNC: 9.2 MG/DL (ref 8.6–10.5)
CHLORIDE SERPL-SCNC: 107 MMOL/L (ref 98–107)
CLARITY UR: CLEAR
CO2 SERPL-SCNC: 23.1 MMOL/L (ref 22–29)
COLOR UR: YELLOW
CREAT SERPL-MCNC: 1.41 MG/DL (ref 0.57–1)
DEPRECATED RDW RBC AUTO: 42 FL (ref 37–54)
EGFRCR SERPLBLD CKD-EPI 2021: 38.5 ML/MIN/1.73
EOSINOPHIL # BLD AUTO: 0.12 10*3/MM3 (ref 0–0.4)
EOSINOPHIL NFR BLD AUTO: 2.5 % (ref 0.3–6.2)
ERYTHROCYTE [DISTWIDTH] IN BLOOD BY AUTOMATED COUNT: 13 % (ref 12.3–15.4)
GEN 5 2HR TROPONIN T REFLEX: 46 NG/L
GLOBULIN UR ELPH-MCNC: 2.8 GM/DL
GLUCOSE SERPL-MCNC: 108 MG/DL (ref 65–99)
GLUCOSE UR STRIP-MCNC: NEGATIVE MG/DL
HCT VFR BLD AUTO: 32.2 % (ref 34–46.6)
HGB BLD-MCNC: 10.3 G/DL (ref 12–15.9)
HGB UR QL STRIP.AUTO: NEGATIVE
HOLD SPECIMEN: NORMAL
HOLD SPECIMEN: NORMAL
HYALINE CASTS UR QL AUTO: ABNORMAL /LPF
IMM GRANULOCYTES # BLD AUTO: 0.01 10*3/MM3 (ref 0–0.05)
IMM GRANULOCYTES NFR BLD AUTO: 0.2 % (ref 0–0.5)
KETONES UR QL STRIP: NEGATIVE
LEUKOCYTE ESTERASE UR QL STRIP.AUTO: ABNORMAL
LYMPHOCYTES # BLD AUTO: 1.83 10*3/MM3 (ref 0.7–3.1)
LYMPHOCYTES NFR BLD AUTO: 38 % (ref 19.6–45.3)
MCH RBC QN AUTO: 28.3 PG (ref 26.6–33)
MCHC RBC AUTO-ENTMCNC: 32 G/DL (ref 31.5–35.7)
MCV RBC AUTO: 88.5 FL (ref 79–97)
MONOCYTES # BLD AUTO: 0.34 10*3/MM3 (ref 0.1–0.9)
MONOCYTES NFR BLD AUTO: 7.1 % (ref 5–12)
NEUTROPHILS NFR BLD AUTO: 2.47 10*3/MM3 (ref 1.7–7)
NEUTROPHILS NFR BLD AUTO: 51.4 % (ref 42.7–76)
NITRITE UR QL STRIP: NEGATIVE
NRBC BLD AUTO-RTO: 0 /100 WBC (ref 0–0.2)
NT-PROBNP SERPL-MCNC: 3140 PG/ML (ref 0–1800)
PH UR STRIP.AUTO: 7 [PH] (ref 5–8)
PLATELET # BLD AUTO: 134 10*3/MM3 (ref 140–450)
PMV BLD AUTO: 10.7 FL (ref 6–12)
POTASSIUM SERPL-SCNC: 3.8 MMOL/L (ref 3.5–5.2)
PROT SERPL-MCNC: 7.1 G/DL (ref 6–8.5)
PROT UR QL STRIP: ABNORMAL
QT INTERVAL: 448 MS
QTC INTERVAL: 465 MS
RBC # BLD AUTO: 3.64 10*6/MM3 (ref 3.77–5.28)
RBC # UR STRIP: ABNORMAL /HPF
REF LAB TEST METHOD: ABNORMAL
SODIUM SERPL-SCNC: 144 MMOL/L (ref 136–145)
SP GR UR STRIP: 1.01 (ref 1–1.03)
SQUAMOUS #/AREA URNS HPF: ABNORMAL /HPF
TROPONIN T DELTA: -1 NG/L
TROPONIN T SERPL HS-MCNC: 47 NG/L
UROBILINOGEN UR QL STRIP: ABNORMAL
WBC # UR STRIP: ABNORMAL /HPF
WBC NRBC COR # BLD AUTO: 4.81 10*3/MM3 (ref 3.4–10.8)
WHOLE BLOOD HOLD COAG: NORMAL
WHOLE BLOOD HOLD SPECIMEN: NORMAL

## 2023-12-08 PROCEDURE — 70450 CT HEAD/BRAIN W/O DYE: CPT | Performed by: RADIOLOGY

## 2023-12-08 PROCEDURE — 83880 ASSAY OF NATRIURETIC PEPTIDE: CPT | Performed by: PHYSICIAN ASSISTANT

## 2023-12-08 PROCEDURE — 80053 COMPREHEN METABOLIC PANEL: CPT | Performed by: PHYSICIAN ASSISTANT

## 2023-12-08 PROCEDURE — 70450 CT HEAD/BRAIN W/O DYE: CPT

## 2023-12-08 PROCEDURE — 36415 COLL VENOUS BLD VENIPUNCTURE: CPT

## 2023-12-08 PROCEDURE — 25010000002 HYDRALAZINE PER 20 MG: Performed by: PHYSICIAN ASSISTANT

## 2023-12-08 PROCEDURE — 84484 ASSAY OF TROPONIN QUANT: CPT | Performed by: PHYSICIAN ASSISTANT

## 2023-12-08 PROCEDURE — 99284 EMERGENCY DEPT VISIT MOD MDM: CPT

## 2023-12-08 PROCEDURE — 96374 THER/PROPH/DIAG INJ IV PUSH: CPT

## 2023-12-08 PROCEDURE — 81001 URINALYSIS AUTO W/SCOPE: CPT | Performed by: PHYSICIAN ASSISTANT

## 2023-12-08 PROCEDURE — 85025 COMPLETE CBC W/AUTO DIFF WBC: CPT | Performed by: PHYSICIAN ASSISTANT

## 2023-12-08 PROCEDURE — 93005 ELECTROCARDIOGRAM TRACING: CPT | Performed by: PHYSICIAN ASSISTANT

## 2023-12-08 PROCEDURE — 25810000003 SODIUM CHLORIDE 0.9 % SOLUTION: Performed by: PHYSICIAN ASSISTANT

## 2023-12-08 PROCEDURE — 71045 X-RAY EXAM CHEST 1 VIEW: CPT

## 2023-12-08 PROCEDURE — 71045 X-RAY EXAM CHEST 1 VIEW: CPT | Performed by: RADIOLOGY

## 2023-12-08 RX ORDER — SODIUM CHLORIDE 0.9 % (FLUSH) 0.9 %
10 SYRINGE (ML) INJECTION AS NEEDED
Status: DISCONTINUED | OUTPATIENT
Start: 2023-12-08 | End: 2023-12-09 | Stop reason: HOSPADM

## 2023-12-08 RX ORDER — HYDRALAZINE HYDROCHLORIDE 20 MG/ML
10 INJECTION INTRAMUSCULAR; INTRAVENOUS ONCE
Status: COMPLETED | OUTPATIENT
Start: 2023-12-08 | End: 2023-12-08

## 2023-12-08 RX ADMIN — SODIUM CHLORIDE 500 ML: 9 INJECTION, SOLUTION INTRAVENOUS at 20:20

## 2023-12-08 RX ADMIN — HYDRALAZINE HYDROCHLORIDE 10 MG: 20 INJECTION INTRAMUSCULAR; INTRAVENOUS at 21:23

## 2023-12-09 LAB — HOLD SPECIMEN: NORMAL

## 2023-12-09 NOTE — ED PROVIDER NOTES
Subjective   History of Present Illness  78-year-old female with past medical history of bradycardia, CKD, dementia, hyperlipidemia, hypertension, and thrombocytopenia presents to the emergency room from [facility] for hypertension.  Patient does report a headache at this time but denies any visual changes, chest pain, back pain, or shortness of breath.  Per patient's nurse Francisco at the snf patient had her usual hydralazine 25 mg with last dose being at 4 PM, clonidine 0.1 mg at 5 PM, and nitroglycerin 0.4 mg at 620 and despite efforts of lowering her blood pressure none of these worked.  Per EMS patient was given metoprolol 5 mg IV and route and upon arrival patient's blood pressure is 189/98.  Denies any other complaints or concerns at this time.    History provided by:  Nursing home, patient and EMS personnel  History limited by:  Dementia   used: No        Review of Systems   Constitutional: Negative.  Negative for fever.   HENT: Negative.     Respiratory: Negative.     Cardiovascular: Negative.  Negative for chest pain.        (+) HTN   Gastrointestinal: Negative.  Negative for abdominal pain.   Endocrine: Negative.    Genitourinary: Negative.  Negative for dysuria.   Skin: Negative.    Neurological: Negative.    Psychiatric/Behavioral: Negative.     All other systems reviewed and are negative.      Past Medical History:   Diagnosis Date    Bradycardia     s/p pacemaker placement    CKD (chronic kidney disease)     baseline renal function unknown    Dementia     Hyperlipidemia     Hypertension     Thrombocytopenia        Allergies   Allergen Reactions    Evolocumab Itching     itching    Statins Hives       Past Surgical History:   Procedure Laterality Date    CARDIAC PACEMAKER PLACEMENT      CATARACT EXTRACTION      CERVICAL FUSION      CHOLECYSTECTOMY      HIATAL HERNIA REPAIR      HYSTERECTOMY      REPLACEMENT TOTAL KNEE Right        Family History   Problem  Relation Age of Onset    Stroke Mother     Stroke Father        Social History     Socioeconomic History    Marital status:    Tobacco Use    Smoking status: Never    Smokeless tobacco: Never    Tobacco comments:     Second hand smoke exposure ()   Vaping Use    Vaping Use: Never used   Substance and Sexual Activity    Alcohol use: Never    Drug use: Never    Sexual activity: Defer           Objective   Physical Exam  Vitals and nursing note reviewed.   Constitutional:       General: She is not in acute distress.     Appearance: She is well-developed. She is not diaphoretic.   HENT:      Head: Normocephalic and atraumatic.      Right Ear: External ear normal.      Left Ear: External ear normal.      Nose: Nose normal.   Eyes:      Conjunctiva/sclera: Conjunctivae normal.      Pupils: Pupils are equal, round, and reactive to light.   Neck:      Vascular: No JVD.      Trachea: No tracheal deviation.   Cardiovascular:      Rate and Rhythm: Normal rate and regular rhythm.      Heart sounds: Normal heart sounds. No murmur heard.  Pulmonary:      Effort: Pulmonary effort is normal. No respiratory distress.      Breath sounds: Normal breath sounds. No wheezing.   Abdominal:      General: Bowel sounds are normal.      Palpations: Abdomen is soft.      Tenderness: There is no abdominal tenderness.   Musculoskeletal:         General: No deformity. Normal range of motion.      Cervical back: Normal range of motion and neck supple.   Skin:     General: Skin is warm and dry.      Coloration: Skin is not pale.      Findings: No erythema or rash.   Neurological:      Mental Status: She is alert. Mental status is at baseline. She is confused.      Cranial Nerves: No cranial nerve deficit.   Psychiatric:         Behavior: Behavior normal.         Thought Content: Thought content normal.         Procedures           ED Course  ED Course as of 12/08/23 2311   Fri Dec 08, 2023   2108 HS Troponin T(!): 47  Chronic [TK]    2109 Creatinine(!): 1.41  Chronic  [TK]   2117 CT Head Without Contrast [TK]   2218 Patient has a pacemaker, therefore unable to obtain MRI secondary to this. [TK]   2241 EKG demonstrates atrial paced rhythm with  ms and QTc 465.  No ST elevation or depression, no T wave inversion.Electronically signed by Akash Pantoja DO, 12/08/23, 10:41 PM EST.   [TM]   2250 XR Chest 1 View [TK]   2308 Discussed pt with Dr. Pantoja, given patient without any neurological deficits CT findings likely chronic changes.  [TK]   2310 141/75 [TK]      ED Course User Index  [TK] Nadeem Chau PA-C  [TM] Akash Pantoja,                                  Results for orders placed or performed during the hospital encounter of 12/08/23   Comprehensive Metabolic Panel    Specimen: Arm, Left; Blood   Result Value Ref Range    Glucose 108 (H) 65 - 99 mg/dL    BUN 19 8 - 23 mg/dL    Creatinine 1.41 (H) 0.57 - 1.00 mg/dL    Sodium 144 136 - 145 mmol/L    Potassium 3.8 3.5 - 5.2 mmol/L    Chloride 107 98 - 107 mmol/L    CO2 23.1 22.0 - 29.0 mmol/L    Calcium 9.2 8.6 - 10.5 mg/dL    Total Protein 7.1 6.0 - 8.5 g/dL    Albumin 4.3 3.5 - 5.2 g/dL    ALT (SGPT) 16 1 - 33 U/L    AST (SGOT) 29 1 - 32 U/L    Alkaline Phosphatase 86 39 - 117 U/L    Total Bilirubin 0.3 0.0 - 1.2 mg/dL    Globulin 2.8 gm/dL    A/G Ratio 1.5 g/dL    BUN/Creatinine Ratio 13.5 7.0 - 25.0    Anion Gap 13.9 5.0 - 15.0 mmol/L    eGFR 38.5 (L) >60.0 mL/min/1.73   High Sensitivity Troponin T    Specimen: Arm, Left; Blood   Result Value Ref Range    HS Troponin T 47 (H) <14 ng/L   Urinalysis With Microscopic If Indicated (No Culture) - Urine, Clean Catch    Specimen: Urine, Clean Catch   Result Value Ref Range    Color, UA Yellow Yellow, Straw    Appearance, UA Clear Clear    pH, UA 7.0 5.0 - 8.0    Specific Gravity, UA 1.011 1.005 - 1.030    Glucose, UA Negative Negative    Ketones, UA Negative Negative    Bilirubin, UA Negative Negative    Blood,  UA Negative Negative    Protein, UA Trace (A) Negative    Leuk Esterase, UA Trace (A) Negative    Nitrite, UA Negative Negative    Urobilinogen, UA 0.2 E.U./dL 0.2 - 1.0 E.U./dL   CBC Auto Differential    Specimen: Arm, Left; Blood   Result Value Ref Range    WBC 4.81 3.40 - 10.80 10*3/mm3    RBC 3.64 (L) 3.77 - 5.28 10*6/mm3    Hemoglobin 10.3 (L) 12.0 - 15.9 g/dL    Hematocrit 32.2 (L) 34.0 - 46.6 %    MCV 88.5 79.0 - 97.0 fL    MCH 28.3 26.6 - 33.0 pg    MCHC 32.0 31.5 - 35.7 g/dL    RDW 13.0 12.3 - 15.4 %    RDW-SD 42.0 37.0 - 54.0 fl    MPV 10.7 6.0 - 12.0 fL    Platelets 134 (L) 140 - 450 10*3/mm3    Neutrophil % 51.4 42.7 - 76.0 %    Lymphocyte % 38.0 19.6 - 45.3 %    Monocyte % 7.1 5.0 - 12.0 %    Eosinophil % 2.5 0.3 - 6.2 %    Basophil % 0.8 0.0 - 1.5 %    Immature Grans % 0.2 0.0 - 0.5 %    Neutrophils, Absolute 2.47 1.70 - 7.00 10*3/mm3    Lymphocytes, Absolute 1.83 0.70 - 3.10 10*3/mm3    Monocytes, Absolute 0.34 0.10 - 0.90 10*3/mm3    Eosinophils, Absolute 0.12 0.00 - 0.40 10*3/mm3    Basophils, Absolute 0.04 0.00 - 0.20 10*3/mm3    Immature Grans, Absolute 0.01 0.00 - 0.05 10*3/mm3    nRBC 0.0 0.0 - 0.2 /100 WBC   BNP    Specimen: Arm, Left; Blood   Result Value Ref Range    proBNP 3,140.0 (H) 0.0 - 1,800.0 pg/mL   High Sensitivity Troponin T 2Hr    Specimen: Arm, Right; Blood   Result Value Ref Range    HS Troponin T 46 (H) <14 ng/L    Troponin T Delta -1 >=-4 - <+4 ng/L   Urinalysis, Microscopic Only - Urine, Clean Catch    Specimen: Urine, Clean Catch   Result Value Ref Range    RBC, UA 0-2 None Seen, 0-2 /HPF    WBC, UA 6-10 (A) None Seen, 0-2 /HPF    Bacteria, UA None Seen None Seen /HPF    Squamous Epithelial Cells, UA None Seen None Seen, 0-2 /HPF    Hyaline Casts, UA None Seen None Seen /LPF    Methodology Automated Microscopy    ECG 12 Lead Other; HTN   Result Value Ref Range    QT Interval 448 ms    QTC Interval 465 ms   Green Top (Gel)   Result Value Ref Range    Extra Tube Hold for  add-ons.    Lavender Top   Result Value Ref Range    Extra Tube hold for add-on    Gold Top - SST   Result Value Ref Range    Extra Tube Hold for add-ons.    Light Blue Top   Result Value Ref Range    Extra Tube Hold for add-ons.        XR Chest 1 View   Final Result   1. No definite acute cardiopulmonary disease.   2. Chronic interstitial changes again noted.   3. Calcified nodules within the right upper lung as seen previously.       This report was finalized on 12/8/2023 10:17 PM by Jaspreet Adhikari MD.          CT Head Without Contrast   Final Result   1.  No acute intracranial abnormality.  There is no significant change   compared with the 11/7/2023.   2.  Probable chronic small vessel ischemic change with mild ex vacuo   dilatation of the ventricles.  This could be evaluated with MRI.           This report was finalized on 12/8/2023 9:13 PM by Alicia Cavanaugh MD.                        Medical Decision Making  78-year-old female with past medical history of bradycardia, CKD, dementia, hyperlipidemia, hypertension, and thrombocytopenia presents to the emergency room from [facility] for hypertension.  Patient does report a headache at this time but denies any visual changes, chest pain, back pain, or shortness of breath.  Per patient's nurse Francisco at the [facility] patient had her usual hydralazine 25 mg with last dose being at 4 PM, clonidine 0.1 mg at 5 PM, and nitroglycerin 0.4 mg at 620 and despite efforts of lowering her blood pressure none of these worked.  Per EMS patient was given metoprolol 5 mg IV and route and upon arrival patient's blood pressure is 189/98.  Denies any other complaints or concerns at this time.      Problems Addressed:  Primary hypertension: complicated acute illness or injury    Amount and/or Complexity of Data Reviewed  Labs: ordered. Decision-making details documented in ED Course.  Radiology: ordered. Decision-making details documented in ED Course.  ECG/medicine  tests: ordered. Decision-making details documented in ED Course.  Discussion of management or test interpretation with external provider(s): Kit Lantigua  Prescription drug management.        Final diagnoses:   Primary hypertension       ED Disposition  ED Disposition       ED Disposition   Discharge    Condition   Stable    Comment   --               Trevor Garay MD  [address] [address]  [phone]    In 2 days           Medication List      No changes were made to your prescriptions during this visit.            Nadeem Chau, PA-C  12/08/23 3956

## 2023-12-18 ENCOUNTER — OFFICE VISIT (OUTPATIENT)
Dept: UROLOGY | Facility: CLINIC | Age: 78
End: 2023-12-18
Payer: COMMERCIAL

## 2023-12-18 VITALS
WEIGHT: 153.4 LBS | HEART RATE: 69 BPM | HEIGHT: 65 IN | BODY MASS INDEX: 25.56 KG/M2 | DIASTOLIC BLOOD PRESSURE: 87 MMHG | SYSTOLIC BLOOD PRESSURE: 190 MMHG

## 2023-12-18 DIAGNOSIS — N28.89 RENAL MASS: ICD-10-CM

## 2023-12-18 DIAGNOSIS — R33.9 INCOMPLETE EMPTYING OF BLADDER: Primary | ICD-10-CM

## 2023-12-18 RX ORDER — DOXAZOSIN MESYLATE 4 MG/1
4 TABLET ORAL NIGHTLY
Qty: 30 TABLET | Refills: 1 | Status: SHIPPED | OUTPATIENT
Start: 2023-12-18

## 2023-12-18 RX ORDER — HYDRALAZINE HYDROCHLORIDE 25 MG/1
25 TABLET, FILM COATED ORAL 3 TIMES DAILY
COMMUNITY

## 2023-12-18 NOTE — PROGRESS NOTES
"Chief Complaint:    Chief Complaint   Patient presents with    RENAL MASS     NEW PATIENT        Vital Signs:   BP (!) 190/87   Pulse 69   Ht 165.1 cm (65\")   Wt 69.6 kg (153 lb 6.4 oz)   BMI 25.53 kg/m²   Body mass index is 25.53 kg/m².      HPI:  Susan Us is a 78 y.o. female who presents today for follow up    History of Present Illness  Ms. Us presents to the clinic for evaluation of a right renal mass.  She has a past medical history significant for chronic kidney disease stage IIIb, dementia, hyperlipidemia, hypertension, and thrombocytopenia.  She is a current nursing home resident and presented to the clinic today with daughter.  She has been referred to us by Dr. Tarik Soliman MD. patient was recently hospitalized in September of this year for acute kidney injury on chronic kidney disease as well as concerns for urinary tract infection.  Urine culture taken at that time showed 50,000 colony-forming units of gram-positive mixed misbah.  Daughter does report the patient has had an increase in difficulty urinating as well as incomplete emptying for roughly 1 year now.  Patient does complain of some difficulty urinating but denies any frequency, urgency, or dysuria.  Patient reports she goes roughly 1-2 times every 8-12 hours.  On CT scan that she had in September of this year no significant renal mass was noted.  There is an abnormality in the mid right kidney that could be concerning for renal mass however not well identified on scan at that time.  Her bladder was slightly distended.  She did not have a Chinchilla catheter placed during that hospitalization.  She did have a recent ultrasound of the kidneys bilaterally completed on 11/29/2023.  Does show concerns for a roughly 3 x 2.5 x 2.5 mid right renal pole mass.  No other abnormalities were noted.  She did have a CMP completed on 12/8/2023 that showed a significant decrease in her creatinine to 1.41 with a GFR around 38 and a decrease in BUN to 19.  " She has no other complaints at this time.      Past Medical History:  Past Medical History:   Diagnosis Date    Bradycardia     s/p pacemaker placement    CKD (chronic kidney disease)     baseline renal function unknown    Dementia     Hyperlipidemia     Hypertension     Thrombocytopenia        Current Meds:  Current Outpatient Medications   Medication Sig Dispense Refill    ARIPiprazole (ABILIFY) 2 MG tablet Take 1 tablet by mouth Daily.      aspirin 81 MG EC tablet Take 1 tablet by mouth Daily.      Bempedoic Acid-Ezetimibe (Nexlizet) 180-10 MG tablet Take 1 tablet by mouth Every Night.      Calcium Carbonate-Vitamin D (calcium 500 mg vitamin D 5 mcg, 200 UT,) 500-5 MG-MCG tablet per tablet Take 1 tablet by mouth 2 (Two) Times a Day.      Diclofenac Sodium (VOLTAREN) 1 % gel gel Apply 2 g topically to the appropriate area as directed 4 (Four) Times a Day As Needed (joint pain). Knees, neck and shoulders      docusate sodium (COLACE) 250 MG capsule Take 1 capsule by mouth Daily As Needed for Constipation.      donepezil (ARICEPT) 5 MG tablet Take 1 tablet by mouth Every Night.      ferrous sulfate 325 (65 FE) MG tablet Take 1 tablet by mouth Every Other Day. Patient instructed to take every other day      gabapentin (NEURONTIN) 300 MG capsule Take 1 capsule by mouth 2 (Two) Times a Day. 6 capsule 0    hydrALAZINE (APRESOLINE) 25 MG tablet Take 1 tablet by mouth 3 (Three) Times a Day.      isosorbide mononitrate (IMDUR) 30 MG 24 hr tablet Take 1 tablet by mouth Daily.      LORazepam (ATIVAN) 0.5 MG tablet Take 1 tablet by mouth 2 (Two) Times a Day As Needed for Anxiety. 3 tablet 0    modafinil (PROVIGIL) 200 MG tablet Take 1 tablet by mouth Daily. 3 tablet 0    Multiple Vitamins-Minerals (ICAPS AREDS 2 PO) Take 1 tablet by mouth 2 (Two) Times a Day.      NIFEdipine CC (ADALAT CC) 30 MG 24 hr tablet Take 1 tablet by mouth Daily. Hold for SBP<110 30 tablet 0    ondansetron (ZOFRAN) 4 MG tablet Take 1 tablet by mouth  Every 8 (Eight) Hours As Needed for Nausea or Vomiting.      pantoprazole (PROTONIX) 40 MG EC tablet Take 1 tablet by mouth Every Night.      potassium chloride 10 MEQ CR tablet Take 1 tablet by mouth 3 times a day.      sertraline (ZOLOFT) 100 MG tablet Take 2 tablets by mouth Every Night.      torsemide (DEMADEX) 100 MG tablet Take 0.5 tablets by mouth Daily.      vitamin B-12 (CYANOCOBALAMIN) 1000 MCG tablet Take 1 tablet by mouth Daily.      doxazosin (CARDURA) 4 MG tablet Take 1 tablet by mouth Every Night. 30 tablet 1     No current facility-administered medications for this visit.        Allergies:   Allergies   Allergen Reactions    Evolocumab Itching     itching    Statins Hives        Past Surgical History:  Past Surgical History:   Procedure Laterality Date    CARDIAC PACEMAKER PLACEMENT      CATARACT EXTRACTION      CERVICAL FUSION      CHOLECYSTECTOMY      HIATAL HERNIA REPAIR      HYSTERECTOMY      REPLACEMENT TOTAL KNEE Right        Social History:  Social History     Socioeconomic History    Marital status:    Tobacco Use    Smoking status: Never    Smokeless tobacco: Never    Tobacco comments:     Second hand smoke exposure ()   Vaping Use    Vaping Use: Never used   Substance and Sexual Activity    Alcohol use: Never    Drug use: Never    Sexual activity: Defer       Family History:  Family History   Problem Relation Age of Onset    Stroke Mother     Stroke Father        Review of Systems:  Review of Systems   Constitutional:  Positive for fatigue. Negative for chills, fever and unexpected weight change.   HENT:  Negative for congestion and sinus pressure.    Respiratory:  Negative for chest tightness and shortness of breath.    Cardiovascular:  Negative for chest pain.   Gastrointestinal:  Positive for constipation. Negative for abdominal pain, diarrhea, nausea and vomiting.   Genitourinary:  Positive for difficulty urinating and flank pain. Negative for dysuria, frequency, hematuria  and urgency.   Musculoskeletal:  Positive for back pain. Negative for neck pain.   Skin:  Negative for rash.   Allergic/Immunologic: Negative for food allergies.   Neurological:  Negative for dizziness and headaches.   Hematological:  Bruises/bleeds easily.   Psychiatric/Behavioral:  Negative for confusion and suicidal ideas. The patient is nervous/anxious.        Physical Exam:  Physical Exam  Constitutional:       General: She is not in acute distress.     Appearance: Normal appearance.   HENT:      Head: Normocephalic and atraumatic.      Nose: Nose normal.      Mouth/Throat:      Mouth: Mucous membranes are moist.   Eyes:      Conjunctiva/sclera: Conjunctivae normal.   Cardiovascular:      Rate and Rhythm: Normal rate and regular rhythm.      Pulses: Normal pulses.      Heart sounds: Normal heart sounds.   Pulmonary:      Effort: Pulmonary effort is normal.      Breath sounds: Normal breath sounds.   Abdominal:      General: Bowel sounds are normal.      Palpations: Abdomen is soft.   Musculoskeletal:         General: Normal range of motion.      Cervical back: Normal range of motion.   Skin:     General: Skin is warm.   Neurological:      General: No focal deficit present.      Mental Status: She is alert and oriented to person, place, and time.   Psychiatric:         Mood and Affect: Mood normal.         Behavior: Behavior normal.         Thought Content: Thought content normal.         Judgment: Judgment normal.           Recent Image (CT and/or KUB):   CT Abdomen and Pelvis: No results found for this or any previous visit.     CT Stone Protocol: No results found for this or any previous visit.     KUB: No results found for this or any previous visit.       Labs:  Brief Urine Lab Results  (Last result in the past 365 days)        Color   Clarity   Blood   Leuk Est   Nitrite   Protein   CREAT   Urine HCG        12/08/23 2127 Yellow   Clear   Negative   Trace   Negative   Trace                 Admission on  12/08/2023, Discharged on 12/09/2023   Component Date Value Ref Range Status    QT Interval 12/08/2023 448  ms Final    QTC Interval 12/08/2023 465  ms Final    Glucose 12/08/2023 108 (H)  65 - 99 mg/dL Final    BUN 12/08/2023 19  8 - 23 mg/dL Final    Creatinine 12/08/2023 1.41 (H)  0.57 - 1.00 mg/dL Final    Sodium 12/08/2023 144  136 - 145 mmol/L Final    Potassium 12/08/2023 3.8  3.5 - 5.2 mmol/L Final    Chloride 12/08/2023 107  98 - 107 mmol/L Final    CO2 12/08/2023 23.1  22.0 - 29.0 mmol/L Final    Calcium 12/08/2023 9.2  8.6 - 10.5 mg/dL Final    Total Protein 12/08/2023 7.1  6.0 - 8.5 g/dL Final    Albumin 12/08/2023 4.3  3.5 - 5.2 g/dL Final    ALT (SGPT) 12/08/2023 16  1 - 33 U/L Final    AST (SGOT) 12/08/2023 29  1 - 32 U/L Final    Alkaline Phosphatase 12/08/2023 86  39 - 117 U/L Final    Total Bilirubin 12/08/2023 0.3  0.0 - 1.2 mg/dL Final    Globulin 12/08/2023 2.8  gm/dL Final    A/G Ratio 12/08/2023 1.5  g/dL Final    BUN/Creatinine Ratio 12/08/2023 13.5  7.0 - 25.0 Final    Anion Gap 12/08/2023 13.9  5.0 - 15.0 mmol/L Final    eGFR 12/08/2023 38.5 (L)  >60.0 mL/min/1.73 Final    HS Troponin T 12/08/2023 47 (H)  <14 ng/L Final    Color, UA 12/08/2023 Yellow  Yellow, Straw Final    Appearance, UA 12/08/2023 Clear  Clear Final    pH, UA 12/08/2023 7.0  5.0 - 8.0 Final    Specific Gravity, UA 12/08/2023 1.011  1.005 - 1.030 Final    Glucose, UA 12/08/2023 Negative  Negative Final    Ketones, UA 12/08/2023 Negative  Negative Final    Bilirubin, UA 12/08/2023 Negative  Negative Final    Blood, UA 12/08/2023 Negative  Negative Final    Protein, UA 12/08/2023 Trace (A)  Negative Final    Leuk Esterase, UA 12/08/2023 Trace (A)  Negative Final    Nitrite, UA 12/08/2023 Negative  Negative Final    Urobilinogen, UA 12/08/2023 0.2 E.U./dL  0.2 - 1.0 E.U./dL Final    Extra Tube 12/08/2023 Hold for add-ons.   Final    Auto resulted.    Extra Tube 12/08/2023 hold for add-on   Final    Auto resulted    Extra Tube  12/08/2023 Hold for add-ons.   Final    Auto resulted.    Extra Tube 12/08/2023 Hold for add-ons.   Final    Auto resulted    WBC 12/08/2023 4.81  3.40 - 10.80 10*3/mm3 Final    RBC 12/08/2023 3.64 (L)  3.77 - 5.28 10*6/mm3 Final    Hemoglobin 12/08/2023 10.3 (L)  12.0 - 15.9 g/dL Final    Hematocrit 12/08/2023 32.2 (L)  34.0 - 46.6 % Final    MCV 12/08/2023 88.5  79.0 - 97.0 fL Final    MCH 12/08/2023 28.3  26.6 - 33.0 pg Final    MCHC 12/08/2023 32.0  31.5 - 35.7 g/dL Final    RDW 12/08/2023 13.0  12.3 - 15.4 % Final    RDW-SD 12/08/2023 42.0  37.0 - 54.0 fl Final    MPV 12/08/2023 10.7  6.0 - 12.0 fL Final    Platelets 12/08/2023 134 (L)  140 - 450 10*3/mm3 Final    Neutrophil % 12/08/2023 51.4  42.7 - 76.0 % Final    Lymphocyte % 12/08/2023 38.0  19.6 - 45.3 % Final    Monocyte % 12/08/2023 7.1  5.0 - 12.0 % Final    Eosinophil % 12/08/2023 2.5  0.3 - 6.2 % Final    Basophil % 12/08/2023 0.8  0.0 - 1.5 % Final    Immature Grans % 12/08/2023 0.2  0.0 - 0.5 % Final    Neutrophils, Absolute 12/08/2023 2.47  1.70 - 7.00 10*3/mm3 Final    Lymphocytes, Absolute 12/08/2023 1.83  0.70 - 3.10 10*3/mm3 Final    Monocytes, Absolute 12/08/2023 0.34  0.10 - 0.90 10*3/mm3 Final    Eosinophils, Absolute 12/08/2023 0.12  0.00 - 0.40 10*3/mm3 Final    Basophils, Absolute 12/08/2023 0.04  0.00 - 0.20 10*3/mm3 Final    Immature Grans, Absolute 12/08/2023 0.01  0.00 - 0.05 10*3/mm3 Final    nRBC 12/08/2023 0.0  0.0 - 0.2 /100 WBC Final    proBNP 12/08/2023 3,140.0 (H)  0.0 - 1,800.0 pg/mL Final    HS Troponin T 12/08/2023 46 (H)  <14 ng/L Final    Troponin T Delta 12/08/2023 -1  >=-4 - <+4 ng/L Final    RBC, UA 12/08/2023 0-2  None Seen, 0-2 /HPF Final    WBC, UA 12/08/2023 6-10 (A)  None Seen, 0-2 /HPF Final    Bacteria, UA 12/08/2023 None Seen  None Seen /HPF Final    Squamous Epithelial Cells, UA 12/08/2023 None Seen  None Seen, 0-2 /HPF Final    Hyaline Casts, UA 12/08/2023 None Seen  None Seen /LPF Final    Methodology  12/08/2023 Automated Microscopy   Final    Extra Tube 12/08/2023 Hold for add-ons.   Final    Auto resulted.   Lab Requisition on 11/10/2023   Component Date Value Ref Range Status    Glucose 11/10/2023 108 (H)  65 - 99 mg/dL Final    BUN 11/10/2023 23  8 - 23 mg/dL Final    Creatinine 11/10/2023 1.47 (H)  0.57 - 1.00 mg/dL Final    Sodium 11/10/2023 141  136 - 145 mmol/L Final    Potassium 11/10/2023 3.9  3.5 - 5.2 mmol/L Final    Chloride 11/10/2023 104  98 - 107 mmol/L Final    CO2 11/10/2023 26.7  22.0 - 29.0 mmol/L Final    Calcium 11/10/2023 9.8  8.6 - 10.5 mg/dL Final    Total Protein 11/10/2023 7.6  6.0 - 8.5 g/dL Final    Albumin 11/10/2023 4.8  3.5 - 5.2 g/dL Final    ALT (SGPT) 11/10/2023 12  1 - 33 U/L Final    AST (SGOT) 11/10/2023 29  1 - 32 U/L Final    Alkaline Phosphatase 11/10/2023 74  39 - 117 U/L Final    Total Bilirubin 11/10/2023 0.3  0.0 - 1.2 mg/dL Final    Globulin 11/10/2023 2.8  gm/dL Final    A/G Ratio 11/10/2023 1.7  g/dL Final    BUN/Creatinine Ratio 11/10/2023 15.6  7.0 - 25.0 Final    Anion Gap 11/10/2023 10.3  5.0 - 15.0 mmol/L Final    eGFR 11/10/2023 36.6 (L)  >60.0 mL/min/1.73 Final    WBC 11/10/2023 3.83  3.40 - 10.80 10*3/mm3 Final    RBC 11/10/2023 3.37 (L)  3.77 - 5.28 10*6/mm3 Final    Hemoglobin 11/10/2023 9.4 (L)  12.0 - 15.9 g/dL Final    Hematocrit 11/10/2023 29.8 (L)  34.0 - 46.6 % Final    MCV 11/10/2023 88.4  79.0 - 97.0 fL Final    MCH 11/10/2023 27.9  26.6 - 33.0 pg Final    MCHC 11/10/2023 31.5  31.5 - 35.7 g/dL Final    RDW 11/10/2023 12.8  12.3 - 15.4 % Final    RDW-SD 11/10/2023 41.3  37.0 - 54.0 fl Final    MPV 11/10/2023 11.1  6.0 - 12.0 fL Final    Platelets 11/10/2023 124 (L)  140 - 450 10*3/mm3 Final    Neutrophil % 11/10/2023 51.6  42.7 - 76.0 % Final    Lymphocyte % 11/10/2023 36.3  19.6 - 45.3 % Final    Monocyte % 11/10/2023 7.3  5.0 - 12.0 % Final    Eosinophil % 11/10/2023 3.7  0.3 - 6.2 % Final    Basophil % 11/10/2023 0.8  0.0 - 1.5 % Final     Immature Grans % 11/10/2023 0.3  0.0 - 0.5 % Final    Neutrophils, Absolute 11/10/2023 1.98  1.70 - 7.00 10*3/mm3 Final    Lymphocytes, Absolute 11/10/2023 1.39  0.70 - 3.10 10*3/mm3 Final    Monocytes, Absolute 11/10/2023 0.28  0.10 - 0.90 10*3/mm3 Final    Eosinophils, Absolute 11/10/2023 0.14  0.00 - 0.40 10*3/mm3 Final    Basophils, Absolute 11/10/2023 0.03  0.00 - 0.20 10*3/mm3 Final    Immature Grans, Absolute 11/10/2023 0.01  0.00 - 0.05 10*3/mm3 Final    nRBC 11/10/2023 0.0  0.0 - 0.2 /100 WBC Final   Admission on 11/07/2023, Discharged on 11/08/2023   Component Date Value Ref Range Status    QT Interval 11/07/2023 454  ms Final    QTC Interval 11/07/2023 472  ms Final    HS Troponin T 11/07/2023 49 (H)  <14 ng/L Final    Glucose 11/07/2023 118 (H)  65 - 99 mg/dL Final    BUN 11/07/2023 23  8 - 23 mg/dL Final    Creatinine 11/07/2023 1.61 (H)  0.57 - 1.00 mg/dL Final    Sodium 11/07/2023 143  136 - 145 mmol/L Final    Potassium 11/07/2023 3.7  3.5 - 5.2 mmol/L Final    Chloride 11/07/2023 106  98 - 107 mmol/L Final    CO2 11/07/2023 26.2  22.0 - 29.0 mmol/L Final    Calcium 11/07/2023 9.2  8.6 - 10.5 mg/dL Final    Total Protein 11/07/2023 6.9  6.0 - 8.5 g/dL Final    Albumin 11/07/2023 4.1  3.5 - 5.2 g/dL Final    ALT (SGPT) 11/07/2023 11  1 - 33 U/L Final    AST (SGOT) 11/07/2023 21  1 - 32 U/L Final    Alkaline Phosphatase 11/07/2023 70  39 - 117 U/L Final    Total Bilirubin 11/07/2023 0.2  0.0 - 1.2 mg/dL Final    Globulin 11/07/2023 2.8  gm/dL Final    A/G Ratio 11/07/2023 1.5  g/dL Final    BUN/Creatinine Ratio 11/07/2023 14.3  7.0 - 25.0 Final    Anion Gap 11/07/2023 10.8  5.0 - 15.0 mmol/L Final    eGFR 11/07/2023 32.8 (L)  >60.0 mL/min/1.73 Final    WBC 11/07/2023 4.52  3.40 - 10.80 10*3/mm3 Final    RBC 11/07/2023 3.34 (L)  3.77 - 5.28 10*6/mm3 Final    Hemoglobin 11/07/2023 9.2 (L)  12.0 - 15.9 g/dL Final    Hematocrit 11/07/2023 29.8 (L)  34.0 - 46.6 % Final    MCV 11/07/2023 89.2  79.0 - 97.0 fL  Final    MCH 11/07/2023 27.5  26.6 - 33.0 pg Final    MCHC 11/07/2023 30.9 (L)  31.5 - 35.7 g/dL Final    RDW 11/07/2023 13.0  12.3 - 15.4 % Final    RDW-SD 11/07/2023 42.6  37.0 - 54.0 fl Final    MPV 11/07/2023 10.7  6.0 - 12.0 fL Final    Platelets 11/07/2023 99 (L)  140 - 450 10*3/mm3 Final    Neutrophil % 11/07/2023 52.9  42.7 - 76.0 % Final    Lymphocyte % 11/07/2023 34.7  19.6 - 45.3 % Final    Monocyte % 11/07/2023 8.8  5.0 - 12.0 % Final    Eosinophil % 11/07/2023 2.7  0.3 - 6.2 % Final    Basophil % 11/07/2023 0.7  0.0 - 1.5 % Final    Immature Grans % 11/07/2023 0.2  0.0 - 0.5 % Final    Neutrophils, Absolute 11/07/2023 2.39  1.70 - 7.00 10*3/mm3 Final    Lymphocytes, Absolute 11/07/2023 1.57  0.70 - 3.10 10*3/mm3 Final    Monocytes, Absolute 11/07/2023 0.40  0.10 - 0.90 10*3/mm3 Final    Eosinophils, Absolute 11/07/2023 0.12  0.00 - 0.40 10*3/mm3 Final    Basophils, Absolute 11/07/2023 0.03  0.00 - 0.20 10*3/mm3 Final    Immature Grans, Absolute 11/07/2023 0.01  0.00 - 0.05 10*3/mm3 Final    nRBC 11/07/2023 0.0  0.0 - 0.2 /100 WBC Final    COVID19 11/07/2023 Not Detected  Not Detected - Ref. Range Final    Influenza A PCR 11/07/2023 Not Detected  Not Detected Final    Influenza B PCR 11/07/2023 Not Detected  Not Detected Final    Lactate 11/07/2023 1.0  0.5 - 2.0 mmol/L Final    Blood Culture 11/07/2023 No growth at 5 days   Final    Blood Culture 11/07/2023 No growth at 5 days   Final    HS Troponin T 11/07/2023 48 (H)  <14 ng/L Final    Troponin T Delta 11/07/2023 -1  >=-4 - <+4 ng/L Final   Admission on 09/27/2023, Discharged on 10/02/2023   Component Date Value Ref Range Status    Glucose 09/27/2023 108 (H)  65 - 99 mg/dL Final    BUN 09/27/2023 41 (H)  8 - 23 mg/dL Final    Creatinine 09/27/2023 2.23 (H)  0.57 - 1.00 mg/dL Final    Sodium 09/27/2023 142  136 - 145 mmol/L Final    Potassium 09/27/2023 4.0  3.5 - 5.2 mmol/L Final    Chloride 09/27/2023 103  98 - 107 mmol/L Final    CO2 09/27/2023 28.3   22.0 - 29.0 mmol/L Final    Calcium 09/27/2023 9.0  8.6 - 10.5 mg/dL Final    Total Protein 09/27/2023 7.1  6.0 - 8.5 g/dL Final    Albumin 09/27/2023 4.5  3.5 - 5.2 g/dL Final    ALT (SGPT) 09/27/2023 10  1 - 33 U/L Final    AST (SGOT) 09/27/2023 22  1 - 32 U/L Final    Alkaline Phosphatase 09/27/2023 61  39 - 117 U/L Final    Total Bilirubin 09/27/2023 0.4  0.0 - 1.2 mg/dL Final    Globulin 09/27/2023 2.6  gm/dL Final    A/G Ratio 09/27/2023 1.7  g/dL Final    BUN/Creatinine Ratio 09/27/2023 18.4  7.0 - 25.0 Final    Anion Gap 09/27/2023 10.7  5.0 - 15.0 mmol/L Final    eGFR 09/27/2023 22.2 (L)  >60.0 mL/min/1.73 Final    Color, UA 09/27/2023 Yellow  Yellow, Straw Final    Appearance, UA 09/27/2023 Clear  Clear Final    pH, UA 09/27/2023 5.5  5.0 - 8.0 Final    Specific Gravity, UA 09/27/2023 1.009  1.005 - 1.030 Final    Glucose, UA 09/27/2023 Negative  Negative Final    Ketones, UA 09/27/2023 Negative  Negative Final    Bilirubin, UA 09/27/2023 Negative  Negative Final    Blood, UA 09/27/2023 Negative  Negative Final    Protein, UA 09/27/2023 Negative  Negative Final    Leuk Esterase, UA 09/27/2023 Trace (A)  Negative Final    Nitrite, UA 09/27/2023 Negative  Negative Final    Urobilinogen, UA 09/27/2023 0.2 E.U./dL  0.2 - 1.0 E.U./dL Final    Sed Rate 09/27/2023 12  0 - 30 mm/hr Final    C-Reactive Protein 09/27/2023 1.40 (H)  0.00 - 0.50 mg/dL Final    HS Troponin T 09/27/2023 66 (C)  <10 ng/L Final    proBNP 09/27/2023 1,668.0  0.0 - 1,800.0 pg/mL Final    QT Interval 09/27/2023 438  ms Final    QTC Interval 09/27/2023 455  ms Final    Free T4 09/27/2023 0.90 (L)  0.93 - 1.70 ng/dL Final    TSH 09/27/2023 0.454  0.270 - 4.200 uIU/mL Final    Magnesium 09/27/2023 2.0  1.6 - 2.4 mg/dL Final    WBC 09/27/2023 8.77  3.40 - 10.80 10*3/mm3 Final    RBC 09/27/2023 3.13 (L)  3.77 - 5.28 10*6/mm3 Final    Hemoglobin 09/27/2023 8.9 (L)  12.0 - 15.9 g/dL Final    Hematocrit 09/27/2023 28.5 (L)  34.0 - 46.6 % Final     MCV 09/27/2023 91.1  79.0 - 97.0 fL Final    MCH 09/27/2023 28.4  26.6 - 33.0 pg Final    MCHC 09/27/2023 31.2 (L)  31.5 - 35.7 g/dL Final    RDW 09/27/2023 12.6  12.3 - 15.4 % Final    RDW-SD 09/27/2023 41.7  37.0 - 54.0 fl Final    MPV 09/27/2023 10.4  6.0 - 12.0 fL Final    Platelets 09/27/2023 80 (L)  140 - 450 10*3/mm3 Final    Neutrophil % 09/27/2023 87.5 (H)  42.7 - 76.0 % Final    Lymphocyte % 09/27/2023 4.7 (L)  19.6 - 45.3 % Final    Monocyte % 09/27/2023 5.7  5.0 - 12.0 % Final    Eosinophil % 09/27/2023 1.4  0.3 - 6.2 % Final    Basophil % 09/27/2023 0.2  0.0 - 1.5 % Final    Immature Grans % 09/27/2023 0.5  0.0 - 0.5 % Final    Neutrophils, Absolute 09/27/2023 7.68 (H)  1.70 - 7.00 10*3/mm3 Final    Lymphocytes, Absolute 09/27/2023 0.41 (L)  0.70 - 3.10 10*3/mm3 Final    Monocytes, Absolute 09/27/2023 0.50  0.10 - 0.90 10*3/mm3 Final    Eosinophils, Absolute 09/27/2023 0.12  0.00 - 0.40 10*3/mm3 Final    Basophils, Absolute 09/27/2023 0.02  0.00 - 0.20 10*3/mm3 Final    Immature Grans, Absolute 09/27/2023 0.04  0.00 - 0.05 10*3/mm3 Final    nRBC 09/27/2023 0.0  0.0 - 0.2 /100 WBC Final    Blood Culture 09/27/2023 No growth at 5 days   Final    Blood Culture 09/27/2023 No growth at 5 days   Final    Lactate 09/27/2023 1.2  0.5 - 2.0 mmol/L Final    Procalcitonin 09/27/2023 0.44 (H)  0.00 - 0.25 ng/mL Final    HS Troponin T 09/27/2023 62 (C)  <10 ng/L Final    Troponin T Delta 09/27/2023 -4 (L)  >=-4 - <+4 ng/L Final    RBC, UA 09/27/2023 0-2  None Seen, 0-2 /HPF Final    WBC, UA 09/27/2023 6-12 (A)  None Seen, 0-2 /HPF Final    Bacteria, UA 09/27/2023 None Seen  None Seen /HPF Final    Squamous Epithelial Cells, UA 09/27/2023 0-2  None Seen, 0-2 /HPF Final    Hyaline Casts, UA 09/27/2023 None Seen  None Seen /LPF Final    Methodology 09/27/2023 Automated Microscopy   Final    Urine Culture 09/27/2023 50,000 CFU/mL Mixed Gram Positive [place] (A)   Final    PTT 09/28/2023 35.5 (H)  26.5 - 34.5 seconds Final     Protime 09/28/2023 15.1 (H)  12.1 - 14.7 Seconds Final    INR 09/28/2023 1.14 (H)  0.90 - 1.10 Final    WBC 09/28/2023 8.49  3.40 - 10.80 10*3/mm3 Final    RBC 09/28/2023 3.06 (L)  3.77 - 5.28 10*6/mm3 Final    Hemoglobin 09/28/2023 8.8 (L)  12.0 - 15.9 g/dL Final    Hematocrit 09/28/2023 27.6 (L)  34.0 - 46.6 % Final    MCV 09/28/2023 90.2  79.0 - 97.0 fL Final    MCH 09/28/2023 28.8  26.6 - 33.0 pg Final    MCHC 09/28/2023 31.9  31.5 - 35.7 g/dL Final    RDW 09/28/2023 12.5  12.3 - 15.4 % Final    RDW-SD 09/28/2023 41.1  37.0 - 54.0 fl Final    MPV 09/28/2023 11.0  6.0 - 12.0 fL Final    Platelets 09/28/2023 78 (L)  140 - 450 10*3/mm3 Final    Neutrophil % 09/28/2023 86.0 (H)  42.7 - 76.0 % Final    Lymphocyte % 09/28/2023 5.5 (L)  19.6 - 45.3 % Final    Monocyte % 09/28/2023 5.3  5.0 - 12.0 % Final    Eosinophil % 09/28/2023 2.6  0.3 - 6.2 % Final    Basophil % 09/28/2023 0.2  0.0 - 1.5 % Final    Immature Grans % 09/28/2023 0.4  0.0 - 0.5 % Final    Neutrophils, Absolute 09/28/2023 7.30 (H)  1.70 - 7.00 10*3/mm3 Final    Lymphocytes, Absolute 09/28/2023 0.47 (L)  0.70 - 3.10 10*3/mm3 Final    Monocytes, Absolute 09/28/2023 0.45  0.10 - 0.90 10*3/mm3 Final    Eosinophils, Absolute 09/28/2023 0.22  0.00 - 0.40 10*3/mm3 Final    Basophils, Absolute 09/28/2023 0.02  0.00 - 0.20 10*3/mm3 Final    Immature Grans, Absolute 09/28/2023 0.03  0.00 - 0.05 10*3/mm3 Final    nRBC 09/28/2023 0.0  0.0 - 0.2 /100 WBC Final    Glucose 09/28/2023 100 (H)  65 - 99 mg/dL Final    BUN 09/28/2023 36 (H)  8 - 23 mg/dL Final    Creatinine 09/28/2023 1.86 (H)  0.57 - 1.00 mg/dL Final    Sodium 09/28/2023 142  136 - 145 mmol/L Final    Potassium 09/28/2023 3.5  3.5 - 5.2 mmol/L Final    Chloride 09/28/2023 103  98 - 107 mmol/L Final    CO2 09/28/2023 26.6  22.0 - 29.0 mmol/L Final    Calcium 09/28/2023 8.9  8.6 - 10.5 mg/dL Final    Total Protein 09/28/2023 6.8  6.0 - 8.5 g/dL Final    Albumin 09/28/2023 4.0  3.5 - 5.2 g/dL Final     ALT (SGPT) 09/28/2023 10  1 - 33 U/L Final    AST (SGOT) 09/28/2023 21  1 - 32 U/L Final    Alkaline Phosphatase 09/28/2023 55  39 - 117 U/L Final    Total Bilirubin 09/28/2023 0.5  0.0 - 1.2 mg/dL Final    Globulin 09/28/2023 2.8  gm/dL Final    A/G Ratio 09/28/2023 1.4  g/dL Final    BUN/Creatinine Ratio 09/28/2023 19.4  7.0 - 25.0 Final    Anion Gap 09/28/2023 12.4  5.0 - 15.0 mmol/L Final    eGFR 09/28/2023 27.6 (L)  >60.0 mL/min/1.73 Final    Glucose 09/29/2023 95  65 - 99 mg/dL Final    BUN 09/29/2023 27 (H)  8 - 23 mg/dL Final    Creatinine 09/29/2023 1.52 (H)  0.57 - 1.00 mg/dL Final    Sodium 09/29/2023 141  136 - 145 mmol/L Final    Potassium 09/29/2023 3.6  3.5 - 5.2 mmol/L Final    Chloride 09/29/2023 107  98 - 107 mmol/L Final    CO2 09/29/2023 24.1  22.0 - 29.0 mmol/L Final    Calcium 09/29/2023 9.5  8.6 - 10.5 mg/dL Final    BUN/Creatinine Ratio 09/29/2023 17.8  7.0 - 25.0 Final    Anion Gap 09/29/2023 9.9  5.0 - 15.0 mmol/L Final    eGFR 09/29/2023 35.2 (L)  >60.0 mL/min/1.73 Final    WBC 09/29/2023 5.82  3.40 - 10.80 10*3/mm3 Final    RBC 09/29/2023 3.24 (L)  3.77 - 5.28 10*6/mm3 Final    Hemoglobin 09/29/2023 9.4 (L)  12.0 - 15.9 g/dL Final    Hematocrit 09/29/2023 29.3 (L)  34.0 - 46.6 % Final    MCV 09/29/2023 90.4  79.0 - 97.0 fL Final    MCH 09/29/2023 29.0  26.6 - 33.0 pg Final    MCHC 09/29/2023 32.1  31.5 - 35.7 g/dL Final    RDW 09/29/2023 12.4  12.3 - 15.4 % Final    RDW-SD 09/29/2023 41.1  37.0 - 54.0 fl Final    MPV 09/29/2023 11.4  6.0 - 12.0 fL Final    Platelets 09/29/2023 87 (L)  140 - 450 10*3/mm3 Final    Glucose 09/30/2023 95  65 - 99 mg/dL Final    BUN 09/30/2023 30 (H)  8 - 23 mg/dL Final    Creatinine 09/30/2023 1.65 (H)  0.57 - 1.00 mg/dL Final    Sodium 09/30/2023 141  136 - 145 mmol/L Final    Potassium 09/30/2023 3.4 (L)  3.5 - 5.2 mmol/L Final    Chloride 09/30/2023 105  98 - 107 mmol/L Final    CO2 09/30/2023 24.3  22.0 - 29.0 mmol/L Final    Calcium 09/30/2023 9.1  8.6 -  10.5 mg/dL Final    BUN/Creatinine Ratio 09/30/2023 18.2  7.0 - 25.0 Final    Anion Gap 09/30/2023 11.7  5.0 - 15.0 mmol/L Final    eGFR 09/30/2023 31.9 (L)  >60.0 mL/min/1.73 Final    Glucose 10/01/2023 108 (H)  65 - 99 mg/dL Final    BUN 10/01/2023 34 (H)  8 - 23 mg/dL Final    Creatinine 10/01/2023 1.66 (H)  0.57 - 1.00 mg/dL Final    Sodium 10/01/2023 143  136 - 145 mmol/L Final    Potassium 10/01/2023 3.8  3.5 - 5.2 mmol/L Final    Chloride 10/01/2023 104  98 - 107 mmol/L Final    CO2 10/01/2023 25.5  22.0 - 29.0 mmol/L Final    Calcium 10/01/2023 9.3  8.6 - 10.5 mg/dL Final    BUN/Creatinine Ratio 10/01/2023 20.5  7.0 - 25.0 Final    Anion Gap 10/01/2023 13.5  5.0 - 15.0 mmol/L Final    eGFR 10/01/2023 31.6 (L)  >60.0 mL/min/1.73 Final        Procedure: None  Procedures     I have reviewed and agree with the above PMH, PSH, FMH, social history, medications, allergies, and labs.     Assessment/Plan:   Problem List Items Addressed This Visit    None  Visit Diagnoses       Incomplete emptying of bladder    -  Primary    Relevant Medications    doxazosin (CARDURA) 4 MG tablet    Renal mass        Relevant Orders    US Renal Bilateral            Health Maintenance:   Health Maintenance Due   Topic Date Due    DXA SCAN  Never done    BMI FOLLOWUP  Never done    Pneumococcal Vaccine 65+ (1 - PCV) Never done    TDAP/TD VACCINES (1 - Tdap) Never done    ZOSTER VACCINE (1 of 2) Never done    INFLUENZA VACCINE  Never done    HEPATITIS C SCREENING  Never done    ANNUAL WELLNESS VISIT  Never done    COVID-19 Vaccine (3 - 2023-24 season) 09/01/2023        Smoking Counseling: Never smoked or use smokeless tobacco.  Does report secondhand exposure by her .    Urine Incontinence: Patient reports that she is not currently experiencing any symptoms of urinary incontinence.    Patient was given instructions and counseling regarding her condition or for health maintenance advice. Please see specific information pulled into  the AVS if appropriate.    Patient Education:   Renal mass/cyst -discussed with the patient the classifications of renal cyst with her, and Bosniak type I-IV.  Advised patient that Bosniak type I and II was low risk for renal cell carcinoma at less than 15% chance in about 3-4 is high risk for renal cell carcinoma at roughly 50 to 100% chance.  Did discuss the use of CT scan with and without contrast however given patient's chronic kidney disease stage IIIa would recommend avoiding contrast at all cost.  I also discussed the use of an MRI however patient currently has a pacemaker in place causing significant contraindication.  Given patient's current age and dementia I am recommending a repeat ultrasound within the next month.  Did discuss with the patient the use of CT-guided biopsy however given age and other comorbidities we will hold off until a repeat CT scan can be completed here at [facility].  I will review these imaging personally  Incomplete emptying -patient does have concerns for incomplete emptying.  At this time recommend trial of alpha blockade.  Discussed the risk and benefits of this medication with the patient.  I will start the patient on doxazosin 4 mg once nightly.  Advised patient to discontinue medication if she begins to experience lightheadedness, dizziness, blurry vision, chest pain, shortness of breath, or syncope.  Otherwise I will schedule the patient for a 1 month follow-up for reevaluation of symptoms as well as ultrasound results.    Visit Diagnoses:    ICD-10-CM ICD-9-CM   1. Incomplete emptying of bladder  R33.9 788.21   2. Renal mass  N28.89 593.9       Meds Ordered During Visit:  New Medications Ordered This Visit   Medications    doxazosin (CARDURA) 4 MG tablet     Sig: Take 1 tablet by mouth Every Night.     Dispense:  30 tablet     Refill:  1       Follow Up Appointment: 1 month  No follow-ups on file.      This document has been electronically signed by Gonzalo MÉNDEZ  DAVID Andrade   December 18, 2023 16:51 EST    Part of this note may be an electronic transcription/translation of spoken language to printed text using the Dragon Dictation System.

## 2023-12-20 ENCOUNTER — PATIENT ROUNDING (BHMG ONLY) (OUTPATIENT)
Dept: UROLOGY | Facility: CLINIC | Age: 78
End: 2023-12-20
Payer: COMMERCIAL

## 2024-01-02 ENCOUNTER — TRANSCRIBE ORDERS (OUTPATIENT)
Dept: ADMINISTRATIVE | Facility: HOSPITAL | Age: 79
End: 2024-01-02
Payer: MEDICAID

## 2024-01-02 DIAGNOSIS — Z12.31 VISIT FOR SCREENING MAMMOGRAM: Primary | ICD-10-CM

## 2024-01-03 ENCOUNTER — HOSPITAL ENCOUNTER (OUTPATIENT)
Facility: HOSPITAL | Age: 79
Discharge: HOME | End: 2024-01-03
Payer: MEDICARE

## 2024-01-03 DIAGNOSIS — N28.89 RENAL MASS: ICD-10-CM

## 2024-01-03 PROCEDURE — 76775 US EXAM ABDO BACK WALL LIM: CPT

## 2024-01-12 ENCOUNTER — HOSPITAL ENCOUNTER (OUTPATIENT)
Facility: HOSPITAL | Age: 79
Discharge: HOME | End: 2024-01-12
Admitting: INTERNAL MEDICINE
Payer: MEDICARE

## 2024-01-12 DIAGNOSIS — Z12.31 VISIT FOR SCREENING MAMMOGRAM: ICD-10-CM

## 2024-01-12 PROCEDURE — 77067 SCR MAMMO BI INCL CAD: CPT

## 2024-01-12 PROCEDURE — 77063 BREAST TOMOSYNTHESIS BI: CPT

## 2024-01-17 ENCOUNTER — LAB REQUISITION (OUTPATIENT)
Dept: LAB | Facility: HOSPITAL | Age: 79
End: 2024-01-17
Payer: MEDICARE

## 2024-01-17 DIAGNOSIS — R53.83 OTHER FATIGUE: ICD-10-CM

## 2024-01-17 LAB
ALBUMIN SERPL-MCNC: 4 G/DL (ref 3.5–5.2)
ALBUMIN/GLOB SERPL: 1.4 G/DL
ALP SERPL-CCNC: 64 U/L (ref 39–117)
ALT SERPL W P-5'-P-CCNC: 11 U/L (ref 1–33)
ANION GAP SERPL CALCULATED.3IONS-SCNC: 19.6 MMOL/L (ref 5–15)
AST SERPL-CCNC: 20 U/L (ref 1–32)
B PARAPERT DNA SPEC QL NAA+PROBE: NOT DETECTED
B PERT DNA SPEC QL NAA+PROBE: NOT DETECTED
BASOPHILS # BLD AUTO: 0.01 10*3/MM3 (ref 0–0.2)
BASOPHILS NFR BLD AUTO: 0.1 % (ref 0–1.5)
BILIRUB SERPL-MCNC: 0.4 MG/DL (ref 0–1.2)
BUN SERPL-MCNC: 24 MG/DL (ref 8–23)
BUN/CREAT SERPL: 15.7 (ref 7–25)
C PNEUM DNA NPH QL NAA+NON-PROBE: NOT DETECTED
CALCIUM SPEC-SCNC: 9.1 MG/DL (ref 8.6–10.5)
CHLORIDE SERPL-SCNC: 101 MMOL/L (ref 98–107)
CO2 SERPL-SCNC: 22.4 MMOL/L (ref 22–29)
CREAT SERPL-MCNC: 1.53 MG/DL (ref 0.57–1)
DEPRECATED RDW RBC AUTO: 41.2 FL (ref 37–54)
EGFRCR SERPLBLD CKD-EPI 2021: 34.9 ML/MIN/1.73
EOSINOPHIL # BLD AUTO: 0.01 10*3/MM3 (ref 0–0.4)
EOSINOPHIL NFR BLD AUTO: 0.1 % (ref 0.3–6.2)
ERYTHROCYTE [DISTWIDTH] IN BLOOD BY AUTOMATED COUNT: 13.1 % (ref 12.3–15.4)
FLUAV SUBTYP SPEC NAA+PROBE: NOT DETECTED
FLUBV RNA ISLT QL NAA+PROBE: NOT DETECTED
GLOBULIN UR ELPH-MCNC: 2.9 GM/DL
GLUCOSE SERPL-MCNC: 107 MG/DL (ref 65–99)
HADV DNA SPEC NAA+PROBE: NOT DETECTED
HCOV 229E RNA SPEC QL NAA+PROBE: NOT DETECTED
HCOV HKU1 RNA SPEC QL NAA+PROBE: NOT DETECTED
HCOV NL63 RNA SPEC QL NAA+PROBE: NOT DETECTED
HCOV OC43 RNA SPEC QL NAA+PROBE: NOT DETECTED
HCT VFR BLD AUTO: 31.5 % (ref 34–46.6)
HGB BLD-MCNC: 10.1 G/DL (ref 12–15.9)
HMPV RNA NPH QL NAA+NON-PROBE: NOT DETECTED
HPIV1 RNA ISLT QL NAA+PROBE: NOT DETECTED
HPIV2 RNA SPEC QL NAA+PROBE: NOT DETECTED
HPIV3 RNA NPH QL NAA+PROBE: NOT DETECTED
HPIV4 P GENE NPH QL NAA+PROBE: NOT DETECTED
IMM GRANULOCYTES # BLD AUTO: 0.03 10*3/MM3 (ref 0–0.05)
IMM GRANULOCYTES NFR BLD AUTO: 0.4 % (ref 0–0.5)
LYMPHOCYTES # BLD AUTO: 0.24 10*3/MM3 (ref 0.7–3.1)
LYMPHOCYTES NFR BLD AUTO: 3.3 % (ref 19.6–45.3)
M PNEUMO IGG SER IA-ACNC: NOT DETECTED
MCH RBC QN AUTO: 27.7 PG (ref 26.6–33)
MCHC RBC AUTO-ENTMCNC: 32.1 G/DL (ref 31.5–35.7)
MCV RBC AUTO: 86.5 FL (ref 79–97)
MONOCYTES # BLD AUTO: 0.44 10*3/MM3 (ref 0.1–0.9)
MONOCYTES NFR BLD AUTO: 6 % (ref 5–12)
NEUTROPHILS NFR BLD AUTO: 6.59 10*3/MM3 (ref 1.7–7)
NEUTROPHILS NFR BLD AUTO: 90.1 % (ref 42.7–76)
NRBC BLD AUTO-RTO: 0 /100 WBC (ref 0–0.2)
PLATELET # BLD AUTO: 112 10*3/MM3 (ref 140–450)
PMV BLD AUTO: 11.9 FL (ref 6–12)
POTASSIUM SERPL-SCNC: 3.9 MMOL/L (ref 3.5–5.2)
PROT SERPL-MCNC: 6.9 G/DL (ref 6–8.5)
RBC # BLD AUTO: 3.64 10*6/MM3 (ref 3.77–5.28)
RHINOVIRUS RNA SPEC NAA+PROBE: NOT DETECTED
RSV RNA NPH QL NAA+NON-PROBE: NOT DETECTED
SARS-COV-2 RNA NPH QL NAA+NON-PROBE: NOT DETECTED
SODIUM SERPL-SCNC: 143 MMOL/L (ref 136–145)
WBC NRBC COR # BLD AUTO: 7.32 10*3/MM3 (ref 3.4–10.8)

## 2024-01-17 PROCEDURE — 85025 COMPLETE CBC W/AUTO DIFF WBC: CPT | Performed by: INTERNAL MEDICINE

## 2024-01-17 PROCEDURE — 80053 COMPREHEN METABOLIC PANEL: CPT | Performed by: INTERNAL MEDICINE

## 2024-01-17 PROCEDURE — 0202U NFCT DS 22 TRGT SARS-COV-2: CPT | Performed by: INTERNAL MEDICINE

## 2024-01-26 ENCOUNTER — OFFICE VISIT (OUTPATIENT)
Dept: UROLOGY | Facility: CLINIC | Age: 79
End: 2024-01-26
Payer: MEDICARE

## 2024-01-26 VITALS
SYSTOLIC BLOOD PRESSURE: 190 MMHG | HEIGHT: 65 IN | HEART RATE: 73 BPM | BODY MASS INDEX: 26.26 KG/M2 | DIASTOLIC BLOOD PRESSURE: 74 MMHG | WEIGHT: 157.6 LBS

## 2024-01-26 DIAGNOSIS — N39.0 FREQUENT URINARY TRACT INFECTIONS: ICD-10-CM

## 2024-01-26 DIAGNOSIS — N18.32 STAGE 3B CHRONIC KIDNEY DISEASE: ICD-10-CM

## 2024-01-26 DIAGNOSIS — N28.1 BILATERAL RENAL CYSTS: Primary | ICD-10-CM

## 2024-01-26 NOTE — PROGRESS NOTES
"Chief Complaint:    Chief Complaint   Patient presents with    Renal Mass      Follow up        Vital Signs:   BP (!) 190/74   Pulse 73   Ht 165.1 cm (65\")   Wt 71.5 kg (157 lb 9.6 oz)   BMI 26.23 kg/m²   Body mass index is 26.23 kg/m².      HPI:  Susan Us is a 78 y.o. female who presents today for follow up    History of Present Illness  Ms. Us presents to the clinic for follow-up for chronic kidney disease stage IIIb, recurrent urinary tract infections, and concerns of a right renal mass.  Patient had ultrasound completed in November 2023 that showed concerns for a intrarenal right mass.  It is noted that the patient had a CT scan of of the abdomen pelvis without contrast prior to this that showed no concerns of any significant large renal mass.  I did recommend to have a repeat ultrasound completed.  She returns today to go over test results.  She did have an ultrasound completed on 1/3/2024 that showed no solid mass on study.  There were small bilateral renal cysts noted.  These were present on CT in September 2023 as well.  Other than frequency she denies any dysuria, nausea, vomiting, pelvic pain, gross hematuria, or abnormal back/flank pain.  She did have a BMP completed recently that shows stable kidney function around a GFR of 35 and creatinine of 1.5.      Past Medical History:  Past Medical History:   Diagnosis Date    Bradycardia     s/p pacemaker placement    CKD (chronic kidney disease)     baseline renal function unknown    Dementia     Hyperlipidemia     Hypertension     Thrombocytopenia        Current Meds:  Current Outpatient Medications   Medication Sig Dispense Refill    ARIPiprazole (ABILIFY) 2 MG tablet Take 1 tablet by mouth Daily.      aspirin 81 MG EC tablet Take 1 tablet by mouth Daily.      Bempedoic Acid-Ezetimibe (Nexlizet) 180-10 MG tablet Take 1 tablet by mouth Every Night.      Calcium Carbonate-Vitamin D (calcium 500 mg vitamin D 5 mcg, 200 UT,) 500-5 MG-MCG tablet per " tablet Take 1 tablet by mouth 2 (Two) Times a Day.      Diclofenac Sodium (VOLTAREN) 1 % gel gel Apply 2 g topically to the appropriate area as directed 4 (Four) Times a Day As Needed (joint pain). Knees, neck and shoulders      docusate sodium (COLACE) 250 MG capsule Take 1 capsule by mouth Daily As Needed for Constipation.      donepezil (ARICEPT) 5 MG tablet Take 1 tablet by mouth Every Night.      doxazosin (CARDURA) 4 MG tablet Take 1 tablet by mouth Every Night. 30 tablet 1    ferrous sulfate 325 (65 FE) MG tablet Take 1 tablet by mouth Every Other Day. Patient instructed to take every other day      gabapentin (NEURONTIN) 300 MG capsule Take 1 capsule by mouth 2 (Two) Times a Day. 6 capsule 0    hydrALAZINE (APRESOLINE) 25 MG tablet Take 1 tablet by mouth 3 (Three) Times a Day.      isosorbide mononitrate (IMDUR) 30 MG 24 hr tablet Take 1 tablet by mouth Daily.      LORazepam (ATIVAN) 0.5 MG tablet Take 1 tablet by mouth 2 (Two) Times a Day As Needed for Anxiety. 3 tablet 0    modafinil (PROVIGIL) 200 MG tablet Take 1 tablet by mouth Daily. 3 tablet 0    Multiple Vitamins-Minerals (ICAPS AREDS 2 PO) Take 1 tablet by mouth 2 (Two) Times a Day.      NIFEdipine CC (ADALAT CC) 30 MG 24 hr tablet Take 1 tablet by mouth Daily. Hold for SBP<110 30 tablet 0    ondansetron (ZOFRAN) 4 MG tablet Take 1 tablet by mouth Every 8 (Eight) Hours As Needed for Nausea or Vomiting.      pantoprazole (PROTONIX) 40 MG EC tablet Take 1 tablet by mouth Every Night.      potassium chloride 10 MEQ CR tablet Take 1 tablet by mouth 3 times a day.      sertraline (ZOLOFT) 100 MG tablet Take 2 tablets by mouth Every Night.      torsemide (DEMADEX) 100 MG tablet Take 0.5 tablets by mouth Daily.      vitamin B-12 (CYANOCOBALAMIN) 1000 MCG tablet Take 1 tablet by mouth Daily.       No current facility-administered medications for this visit.        Allergies:   Allergies   Allergen Reactions    Evolocumab Itching     itching    Statins Hives         Past Surgical History:  Past Surgical History:   Procedure Laterality Date    BREAST BIOPSY      benign, yrs ago    CARDIAC PACEMAKER PLACEMENT      CATARACT EXTRACTION      CERVICAL FUSION      CHOLECYSTECTOMY      HIATAL HERNIA REPAIR      HYSTERECTOMY      REPLACEMENT TOTAL KNEE Right        Social History:  Social History     Socioeconomic History    Marital status:    Tobacco Use    Smoking status: Never    Smokeless tobacco: Never    Tobacco comments:     Second hand smoke exposure ()   Vaping Use    Vaping Use: Never used   Substance and Sexual Activity    Alcohol use: Never    Drug use: Never    Sexual activity: Defer       Family History:  Family History   Problem Relation Age of Onset    Stroke Mother     Stroke Father     Breast cancer Sister        Review of Systems:  Review of Systems   Constitutional:  Negative for chills, fatigue and fever.   HENT:  Negative for congestion and sinus pressure.    Respiratory:  Negative for chest tightness and shortness of breath.    Cardiovascular:  Negative for chest pain.   Gastrointestinal:  Negative for abdominal pain, constipation, diarrhea, nausea and vomiting.   Genitourinary:  Positive for frequency. Negative for hematuria and urgency.   Musculoskeletal:  Negative for back pain and neck pain.   Allergic/Immunologic: Negative for food allergies.   Neurological:  Negative for dizziness and headaches.   Hematological:  Bruises/bleeds easily.   Psychiatric/Behavioral:  The patient is not nervous/anxious.        Physical Exam:  Physical Exam  Constitutional:       General: She is not in acute distress.     Appearance: Normal appearance.   HENT:      Head: Normocephalic and atraumatic.      Nose: Nose normal.      Mouth/Throat:      Mouth: Mucous membranes are moist.   Eyes:      Conjunctiva/sclera: Conjunctivae normal.   Cardiovascular:      Rate and Rhythm: Normal rate and regular rhythm.      Pulses: Normal pulses.      Heart sounds: Normal  heart sounds.   Pulmonary:      Effort: Pulmonary effort is normal.      Breath sounds: Normal breath sounds.   Abdominal:      General: Bowel sounds are normal.      Palpations: Abdomen is soft.   Musculoskeletal:         General: Normal range of motion.      Cervical back: Normal range of motion.   Skin:     General: Skin is warm.   Neurological:      General: No focal deficit present.      Mental Status: She is alert and oriented to person, place, and time.   Psychiatric:         Mood and Affect: Mood normal.         Behavior: Behavior normal.         Thought Content: Thought content normal.         Judgment: Judgment normal.         IPSS Questionnaire (AUA-7):  IPSS Questionnaire (AUA-7):                  Recent Image (CT and/or KUB):   CT Abdomen and Pelvis: No results found for this or any previous visit.     CT Stone Protocol: No results found for this or any previous visit.     KUB: No results found for this or any previous visit.       Labs:  Brief Urine Lab Results  (Last result in the past 365 days)        Color   Clarity   Blood   Leuk Est   Nitrite   Protein   CREAT   Urine HCG        12/08/23 2127 Yellow   Clear   Negative   Trace   Negative   Trace                 Lab Requisition on 01/17/2024   Component Date Value Ref Range Status    Glucose 01/17/2024 107 (H)  65 - 99 mg/dL Final    BUN 01/17/2024 24 (H)  8 - 23 mg/dL Final    Creatinine 01/17/2024 1.53 (H)  0.57 - 1.00 mg/dL Final    Sodium 01/17/2024 143  136 - 145 mmol/L Final    Potassium 01/17/2024 3.9  3.5 - 5.2 mmol/L Final    Chloride 01/17/2024 101  98 - 107 mmol/L Final    CO2 01/17/2024 22.4  22.0 - 29.0 mmol/L Final    Calcium 01/17/2024 9.1  8.6 - 10.5 mg/dL Final    Total Protein 01/17/2024 6.9  6.0 - 8.5 g/dL Final    Albumin 01/17/2024 4.0  3.5 - 5.2 g/dL Final    ALT (SGPT) 01/17/2024 11  1 - 33 U/L Final    AST (SGOT) 01/17/2024 20  1 - 32 U/L Final    Alkaline Phosphatase 01/17/2024 64  39 - 117 U/L Final    Total Bilirubin  01/17/2024 0.4  0.0 - 1.2 mg/dL Final    Globulin 01/17/2024 2.9  gm/dL Final    A/G Ratio 01/17/2024 1.4  g/dL Final    BUN/Creatinine Ratio 01/17/2024 15.7  7.0 - 25.0 Final    Anion Gap 01/17/2024 19.6 (H)  5.0 - 15.0 mmol/L Final    eGFR 01/17/2024 34.9 (L)  >60.0 mL/min/1.73 Final    ADENOVIRUS, PCR 01/17/2024 Not Detected  Not Detected Final    Coronavirus 229E 01/17/2024 Not Detected  Not Detected Final    Coronavirus HKU1 01/17/2024 Not Detected  Not Detected Final    Coronavirus NL63 01/17/2024 Not Detected  Not Detected Final    Coronavirus OC43 01/17/2024 Not Detected  Not Detected Final    COVID19 01/17/2024 Not Detected  Not Detected - Ref. Range Final    Human Metapneumovirus 01/17/2024 Not Detected  Not Detected Final    Human Rhinovirus/Enterovirus 01/17/2024 Not Detected  Not Detected Final    Influenza A PCR 01/17/2024 Not Detected  Not Detected Final    Influenza B PCR 01/17/2024 Not Detected  Not Detected Final    Parainfluenza Virus 1 01/17/2024 Not Detected  Not Detected Final    Parainfluenza Virus 2 01/17/2024 Not Detected  Not Detected Final    Parainfluenza Virus 3 01/17/2024 Not Detected  Not Detected Final    Parainfluenza Virus 4 01/17/2024 Not Detected  Not Detected Final    RSV, PCR 01/17/2024 Not Detected  Not Detected Final    Bordetella pertussis pcr 01/17/2024 Not Detected  Not Detected Final    Bordetella parapertussis PCR 01/17/2024 Not Detected  Not Detected Final    Chlamydophila pneumoniae PCR 01/17/2024 Not Detected  Not Detected Final    Mycoplasma pneumo by PCR 01/17/2024 Not Detected  Not Detected Final    WBC 01/17/2024 7.32  3.40 - 10.80 10*3/mm3 Final    RBC 01/17/2024 3.64 (L)  3.77 - 5.28 10*6/mm3 Final    Hemoglobin 01/17/2024 10.1 (L)  12.0 - 15.9 g/dL Final    Hematocrit 01/17/2024 31.5 (L)  34.0 - 46.6 % Final    MCV 01/17/2024 86.5  79.0 - 97.0 fL Final    MCH 01/17/2024 27.7  26.6 - 33.0 pg Final    MCHC 01/17/2024 32.1  31.5 - 35.7 g/dL Final    RDW 01/17/2024  13.1  12.3 - 15.4 % Final    RDW-SD 01/17/2024 41.2  37.0 - 54.0 fl Final    MPV 01/17/2024 11.9  6.0 - 12.0 fL Final    Platelets 01/17/2024 112 (L)  140 - 450 10*3/mm3 Final    Neutrophil % 01/17/2024 90.1 (H)  42.7 - 76.0 % Final    Lymphocyte % 01/17/2024 3.3 (L)  19.6 - 45.3 % Final    Monocyte % 01/17/2024 6.0  5.0 - 12.0 % Final    Eosinophil % 01/17/2024 0.1 (L)  0.3 - 6.2 % Final    Basophil % 01/17/2024 0.1  0.0 - 1.5 % Final    Immature Grans % 01/17/2024 0.4  0.0 - 0.5 % Final    Neutrophils, Absolute 01/17/2024 6.59  1.70 - 7.00 10*3/mm3 Final    Lymphocytes, Absolute 01/17/2024 0.24 (L)  0.70 - 3.10 10*3/mm3 Final    Monocytes, Absolute 01/17/2024 0.44  0.10 - 0.90 10*3/mm3 Final    Eosinophils, Absolute 01/17/2024 0.01  0.00 - 0.40 10*3/mm3 Final    Basophils, Absolute 01/17/2024 0.01  0.00 - 0.20 10*3/mm3 Final    Immature Grans, Absolute 01/17/2024 0.03  0.00 - 0.05 10*3/mm3 Final    nRBC 01/17/2024 0.0  0.0 - 0.2 /100 WBC Final   Admission on 12/08/2023, Discharged on 12/09/2023   Component Date Value Ref Range Status    QT Interval 12/08/2023 448  ms Final    QTC Interval 12/08/2023 465  ms Final    Glucose 12/08/2023 108 (H)  65 - 99 mg/dL Final    BUN 12/08/2023 19  8 - 23 mg/dL Final    Creatinine 12/08/2023 1.41 (H)  0.57 - 1.00 mg/dL Final    Sodium 12/08/2023 144  136 - 145 mmol/L Final    Potassium 12/08/2023 3.8  3.5 - 5.2 mmol/L Final    Chloride 12/08/2023 107  98 - 107 mmol/L Final    CO2 12/08/2023 23.1  22.0 - 29.0 mmol/L Final    Calcium 12/08/2023 9.2  8.6 - 10.5 mg/dL Final    Total Protein 12/08/2023 7.1  6.0 - 8.5 g/dL Final    Albumin 12/08/2023 4.3  3.5 - 5.2 g/dL Final    ALT (SGPT) 12/08/2023 16  1 - 33 U/L Final    AST (SGOT) 12/08/2023 29  1 - 32 U/L Final    Alkaline Phosphatase 12/08/2023 86  39 - 117 U/L Final    Total Bilirubin 12/08/2023 0.3  0.0 - 1.2 mg/dL Final    Globulin 12/08/2023 2.8  gm/dL Final    A/G Ratio 12/08/2023 1.5  g/dL Final    BUN/Creatinine Ratio  12/08/2023 13.5  7.0 - 25.0 Final    Anion Gap 12/08/2023 13.9  5.0 - 15.0 mmol/L Final    eGFR 12/08/2023 38.5 (L)  >60.0 mL/min/1.73 Final    HS Troponin T 12/08/2023 47 (H)  <14 ng/L Final    Color, UA 12/08/2023 Yellow  Yellow, Straw Final    Appearance, UA 12/08/2023 Clear  Clear Final    pH, UA 12/08/2023 7.0  5.0 - 8.0 Final    Specific Gravity, UA 12/08/2023 1.011  1.005 - 1.030 Final    Glucose, UA 12/08/2023 Negative  Negative Final    Ketones, UA 12/08/2023 Negative  Negative Final    Bilirubin, UA 12/08/2023 Negative  Negative Final    Blood, UA 12/08/2023 Negative  Negative Final    Protein, UA 12/08/2023 Trace (A)  Negative Final    Leuk Esterase, UA 12/08/2023 Trace (A)  Negative Final    Nitrite, UA 12/08/2023 Negative  Negative Final    Urobilinogen, UA 12/08/2023 0.2 E.U./dL  0.2 - 1.0 E.U./dL Final    Extra Tube 12/08/2023 Hold for add-ons.   Final    Auto resulted.    Extra Tube 12/08/2023 hold for add-on   Final    Auto resulted    Extra Tube 12/08/2023 Hold for add-ons.   Final    Auto resulted.    Extra Tube 12/08/2023 Hold for add-ons.   Final    Auto resulted    WBC 12/08/2023 4.81  3.40 - 10.80 10*3/mm3 Final    RBC 12/08/2023 3.64 (L)  3.77 - 5.28 10*6/mm3 Final    Hemoglobin 12/08/2023 10.3 (L)  12.0 - 15.9 g/dL Final    Hematocrit 12/08/2023 32.2 (L)  34.0 - 46.6 % Final    MCV 12/08/2023 88.5  79.0 - 97.0 fL Final    MCH 12/08/2023 28.3  26.6 - 33.0 pg Final    MCHC 12/08/2023 32.0  31.5 - 35.7 g/dL Final    RDW 12/08/2023 13.0  12.3 - 15.4 % Final    RDW-SD 12/08/2023 42.0  37.0 - 54.0 fl Final    MPV 12/08/2023 10.7  6.0 - 12.0 fL Final    Platelets 12/08/2023 134 (L)  140 - 450 10*3/mm3 Final    Neutrophil % 12/08/2023 51.4  42.7 - 76.0 % Final    Lymphocyte % 12/08/2023 38.0  19.6 - 45.3 % Final    Monocyte % 12/08/2023 7.1  5.0 - 12.0 % Final    Eosinophil % 12/08/2023 2.5  0.3 - 6.2 % Final    Basophil % 12/08/2023 0.8  0.0 - 1.5 % Final    Immature Grans % 12/08/2023 0.2  0.0 -  0.5 % Final    Neutrophils, Absolute 12/08/2023 2.47  1.70 - 7.00 10*3/mm3 Final    Lymphocytes, Absolute 12/08/2023 1.83  0.70 - 3.10 10*3/mm3 Final    Monocytes, Absolute 12/08/2023 0.34  0.10 - 0.90 10*3/mm3 Final    Eosinophils, Absolute 12/08/2023 0.12  0.00 - 0.40 10*3/mm3 Final    Basophils, Absolute 12/08/2023 0.04  0.00 - 0.20 10*3/mm3 Final    Immature Grans, Absolute 12/08/2023 0.01  0.00 - 0.05 10*3/mm3 Final    nRBC 12/08/2023 0.0  0.0 - 0.2 /100 WBC Final    proBNP 12/08/2023 3,140.0 (H)  0.0 - 1,800.0 pg/mL Final    HS Troponin T 12/08/2023 46 (H)  <14 ng/L Final    Troponin T Delta 12/08/2023 -1  >=-4 - <+4 ng/L Final    RBC, UA 12/08/2023 0-2  None Seen, 0-2 /HPF Final    WBC, UA 12/08/2023 6-10 (A)  None Seen, 0-2 /HPF Final    Bacteria, UA 12/08/2023 None Seen  None Seen /HPF Final    Squamous Epithelial Cells, UA 12/08/2023 None Seen  None Seen, 0-2 /HPF Final    Hyaline Casts, UA 12/08/2023 None Seen  None Seen /LPF Final    Methodology 12/08/2023 Automated Microscopy   Final    Extra Tube 12/08/2023 Hold for add-ons.   Final    Auto resulted.   Lab Requisition on 11/10/2023   Component Date Value Ref Range Status    Glucose 11/10/2023 108 (H)  65 - 99 mg/dL Final    BUN 11/10/2023 23  8 - 23 mg/dL Final    Creatinine 11/10/2023 1.47 (H)  0.57 - 1.00 mg/dL Final    Sodium 11/10/2023 141  136 - 145 mmol/L Final    Potassium 11/10/2023 3.9  3.5 - 5.2 mmol/L Final    Chloride 11/10/2023 104  98 - 107 mmol/L Final    CO2 11/10/2023 26.7  22.0 - 29.0 mmol/L Final    Calcium 11/10/2023 9.8  8.6 - 10.5 mg/dL Final    Total Protein 11/10/2023 7.6  6.0 - 8.5 g/dL Final    Albumin 11/10/2023 4.8  3.5 - 5.2 g/dL Final    ALT (SGPT) 11/10/2023 12  1 - 33 U/L Final    AST (SGOT) 11/10/2023 29  1 - 32 U/L Final    Alkaline Phosphatase 11/10/2023 74  39 - 117 U/L Final    Total Bilirubin 11/10/2023 0.3  0.0 - 1.2 mg/dL Final    Globulin 11/10/2023 2.8  gm/dL Final    A/G Ratio 11/10/2023 1.7  g/dL Final     BUN/Creatinine Ratio 11/10/2023 15.6  7.0 - 25.0 Final    Anion Gap 11/10/2023 10.3  5.0 - 15.0 mmol/L Final    eGFR 11/10/2023 36.6 (L)  >60.0 mL/min/1.73 Final    WBC 11/10/2023 3.83  3.40 - 10.80 10*3/mm3 Final    RBC 11/10/2023 3.37 (L)  3.77 - 5.28 10*6/mm3 Final    Hemoglobin 11/10/2023 9.4 (L)  12.0 - 15.9 g/dL Final    Hematocrit 11/10/2023 29.8 (L)  34.0 - 46.6 % Final    MCV 11/10/2023 88.4  79.0 - 97.0 fL Final    MCH 11/10/2023 27.9  26.6 - 33.0 pg Final    MCHC 11/10/2023 31.5  31.5 - 35.7 g/dL Final    RDW 11/10/2023 12.8  12.3 - 15.4 % Final    RDW-SD 11/10/2023 41.3  37.0 - 54.0 fl Final    MPV 11/10/2023 11.1  6.0 - 12.0 fL Final    Platelets 11/10/2023 124 (L)  140 - 450 10*3/mm3 Final    Neutrophil % 11/10/2023 51.6  42.7 - 76.0 % Final    Lymphocyte % 11/10/2023 36.3  19.6 - 45.3 % Final    Monocyte % 11/10/2023 7.3  5.0 - 12.0 % Final    Eosinophil % 11/10/2023 3.7  0.3 - 6.2 % Final    Basophil % 11/10/2023 0.8  0.0 - 1.5 % Final    Immature Grans % 11/10/2023 0.3  0.0 - 0.5 % Final    Neutrophils, Absolute 11/10/2023 1.98  1.70 - 7.00 10*3/mm3 Final    Lymphocytes, Absolute 11/10/2023 1.39  0.70 - 3.10 10*3/mm3 Final    Monocytes, Absolute 11/10/2023 0.28  0.10 - 0.90 10*3/mm3 Final    Eosinophils, Absolute 11/10/2023 0.14  0.00 - 0.40 10*3/mm3 Final    Basophils, Absolute 11/10/2023 0.03  0.00 - 0.20 10*3/mm3 Final    Immature Grans, Absolute 11/10/2023 0.01  0.00 - 0.05 10*3/mm3 Final    nRBC 11/10/2023 0.0  0.0 - 0.2 /100 WBC Final   Admission on 11/07/2023, Discharged on 11/08/2023   Component Date Value Ref Range Status    QT Interval 11/07/2023 454  ms Final    QTC Interval 11/07/2023 472  ms Final    HS Troponin T 11/07/2023 49 (H)  <14 ng/L Final    Glucose 11/07/2023 118 (H)  65 - 99 mg/dL Final    BUN 11/07/2023 23  8 - 23 mg/dL Final    Creatinine 11/07/2023 1.61 (H)  0.57 - 1.00 mg/dL Final    Sodium 11/07/2023 143  136 - 145 mmol/L Final    Potassium 11/07/2023 3.7  3.5 - 5.2  mmol/L Final    Chloride 11/07/2023 106  98 - 107 mmol/L Final    CO2 11/07/2023 26.2  22.0 - 29.0 mmol/L Final    Calcium 11/07/2023 9.2  8.6 - 10.5 mg/dL Final    Total Protein 11/07/2023 6.9  6.0 - 8.5 g/dL Final    Albumin 11/07/2023 4.1  3.5 - 5.2 g/dL Final    ALT (SGPT) 11/07/2023 11  1 - 33 U/L Final    AST (SGOT) 11/07/2023 21  1 - 32 U/L Final    Alkaline Phosphatase 11/07/2023 70  39 - 117 U/L Final    Total Bilirubin 11/07/2023 0.2  0.0 - 1.2 mg/dL Final    Globulin 11/07/2023 2.8  gm/dL Final    A/G Ratio 11/07/2023 1.5  g/dL Final    BUN/Creatinine Ratio 11/07/2023 14.3  7.0 - 25.0 Final    Anion Gap 11/07/2023 10.8  5.0 - 15.0 mmol/L Final    eGFR 11/07/2023 32.8 (L)  >60.0 mL/min/1.73 Final    WBC 11/07/2023 4.52  3.40 - 10.80 10*3/mm3 Final    RBC 11/07/2023 3.34 (L)  3.77 - 5.28 10*6/mm3 Final    Hemoglobin 11/07/2023 9.2 (L)  12.0 - 15.9 g/dL Final    Hematocrit 11/07/2023 29.8 (L)  34.0 - 46.6 % Final    MCV 11/07/2023 89.2  79.0 - 97.0 fL Final    MCH 11/07/2023 27.5  26.6 - 33.0 pg Final    MCHC 11/07/2023 30.9 (L)  31.5 - 35.7 g/dL Final    RDW 11/07/2023 13.0  12.3 - 15.4 % Final    RDW-SD 11/07/2023 42.6  37.0 - 54.0 fl Final    MPV 11/07/2023 10.7  6.0 - 12.0 fL Final    Platelets 11/07/2023 99 (L)  140 - 450 10*3/mm3 Final    Neutrophil % 11/07/2023 52.9  42.7 - 76.0 % Final    Lymphocyte % 11/07/2023 34.7  19.6 - 45.3 % Final    Monocyte % 11/07/2023 8.8  5.0 - 12.0 % Final    Eosinophil % 11/07/2023 2.7  0.3 - 6.2 % Final    Basophil % 11/07/2023 0.7  0.0 - 1.5 % Final    Immature Grans % 11/07/2023 0.2  0.0 - 0.5 % Final    Neutrophils, Absolute 11/07/2023 2.39  1.70 - 7.00 10*3/mm3 Final    Lymphocytes, Absolute 11/07/2023 1.57  0.70 - 3.10 10*3/mm3 Final    Monocytes, Absolute 11/07/2023 0.40  0.10 - 0.90 10*3/mm3 Final    Eosinophils, Absolute 11/07/2023 0.12  0.00 - 0.40 10*3/mm3 Final    Basophils, Absolute 11/07/2023 0.03  0.00 - 0.20 10*3/mm3 Final    Immature Grans, Absolute  11/07/2023 0.01  0.00 - 0.05 10*3/mm3 Final    nRBC 11/07/2023 0.0  0.0 - 0.2 /100 WBC Final    COVID19 11/07/2023 Not Detected  Not Detected - Ref. Range Final    Influenza A PCR 11/07/2023 Not Detected  Not Detected Final    Influenza B PCR 11/07/2023 Not Detected  Not Detected Final    Lactate 11/07/2023 1.0  0.5 - 2.0 mmol/L Final    Blood Culture 11/07/2023 No growth at 5 days   Final    Blood Culture 11/07/2023 No growth at 5 days   Final    HS Troponin T 11/07/2023 48 (H)  <14 ng/L Final    Troponin T Delta 11/07/2023 -1  >=-4 - <+4 ng/L Final        Procedure: None  Procedures     I have reviewed and agree with the above PMH, PSH, FMH, social history, medications, allergies, and labs.  No    Assessment/Plan:   Problem List Items Addressed This Visit          Genitourinary and Reproductive     Frequent urinary tract infections    Bilateral renal cysts - Primary     Other Visit Diagnoses       Stage 3b chronic kidney disease                Health Maintenance:   Health Maintenance Due   Topic Date Due    DXA SCAN  Never done    BMI FOLLOWUP  Never done    Pneumococcal Vaccine 65+ (1 of 2 - PCV) Never done    TDAP/TD VACCINES (1 - Tdap) Never done    ZOSTER VACCINE (1 of 2) Never done    INFLUENZA VACCINE  Never done    HEPATITIS C SCREENING  Never done    ANNUAL WELLNESS VISIT  Never done    COVID-19 Vaccine (3 - 2023-24 season) 09/01/2023        Smoking Counseling: Never smoked or used smokeless tobacco.    Urine Incontinence: Patient reports that she is not currently experiencing any symptoms of urinary incontinence.    Patient was given instructions and counseling regarding her condition or for health maintenance advice. Please see specific information pulled into the AVS if appropriate.    Patient Education:   Bilateral renal cysts -discussed with the patient the pathophysiology of renal cyst.  Discussed with patient classifications of renal cyst including a Bosniak type I-IV.  Advising type I and II was  low risk for renal cell carcinoma at less than 10 to 15% respectively.  Advised patient that a type III and IV are high risk for renal cell carcinoma at roughly 50 to 100%.  Did discuss further work-up with a CT scan with and without contrast versus biopsy when indicated.  Given negative ultrasound in office today and recommend to continue with observations.  No significant renal mass was noted.  Patient may repeat an ultrasound in roughly 1 year for reevaluation of her cyst and concerns for mass.  Stage IIIb chronic kidney disease -patient's kidney function remained stable and she will follow-up with her nephrologist for monitoring.  Frequent urinary tract infection -patient denies any lower urinary tract symptoms at this time.  Recommend to continue with the probiotic daily.  Given chronic kidney disease we will hold off on prophylactic antibiotic.  Otherwise we can follow-up with her in roughly 3 months for reevaluation.    Visit Diagnoses:    ICD-10-CM ICD-9-CM   1. Bilateral renal cysts  N28.1 753.10   2. Stage 3b chronic kidney disease  N18.32 585.3   3. Frequent urinary tract infections  N39.0 599.0       Meds Ordered During Visit:  No orders of the defined types were placed in this encounter.      Follow Up Appointment: 3 months  No follow-ups on file.      This document has been electronically signed by Gonzalo Andrade PA-C   January 26, 2024 16:47 EST    Part of this note may be an electronic transcription/translation of spoken language to printed text using the Dragon Dictation System.

## 2024-02-29 ENCOUNTER — LAB REQUISITION (OUTPATIENT)
Dept: LAB | Facility: HOSPITAL | Age: 79
End: 2024-02-29
Payer: MEDICARE

## 2024-02-29 DIAGNOSIS — R09.81 NASAL CONGESTION: ICD-10-CM

## 2024-02-29 PROCEDURE — 0202U NFCT DS 22 TRGT SARS-COV-2: CPT | Performed by: INTERNAL MEDICINE

## 2024-03-01 ENCOUNTER — TRANSCRIBE ORDERS (OUTPATIENT)
Dept: ADMINISTRATIVE | Facility: HOSPITAL | Age: 79
End: 2024-03-01
Payer: MEDICAID

## 2024-03-01 DIAGNOSIS — I10 UNCONTROLLED HYPERTENSION: Primary | ICD-10-CM

## 2024-03-01 DIAGNOSIS — R07.89 TIGHTNESS IN CHEST: ICD-10-CM

## 2024-03-25 ENCOUNTER — HOSPITAL ENCOUNTER (OUTPATIENT)
Facility: HOSPITAL | Age: 79
Discharge: HOME | End: 2024-03-25
Admitting: INTERNAL MEDICINE
Payer: MEDICARE

## 2024-03-25 DIAGNOSIS — I10 UNCONTROLLED HYPERTENSION: ICD-10-CM

## 2024-03-25 DIAGNOSIS — R07.89 TIGHTNESS IN CHEST: ICD-10-CM

## 2024-03-25 LAB
BH CV ECHO MEAS - ACS: 1.71 CM
BH CV ECHO MEAS - AO MAX PG: 14.1 MMHG
BH CV ECHO MEAS - AO MEAN PG: 8.5 MMHG
BH CV ECHO MEAS - AO V2 MAX: 188 CM/SEC
BH CV ECHO MEAS - AO V2 VTI: 51 CM
BH CV ECHO MEAS - EDV(CUBED): 155.7 ML
BH CV ECHO MEAS - EDV(MOD-SP4): 121.6 ML
BH CV ECHO MEAS - EF(MOD-BP): 58 %
BH CV ECHO MEAS - EF(MOD-SP4): 55.8 %
BH CV ECHO MEAS - ESV(CUBED): 78.4 ML
BH CV ECHO MEAS - ESV(MOD-SP4): 53.8 ML
BH CV ECHO MEAS - FS: 20.4 %
BH CV ECHO MEAS - IVS/LVPW: 1.19 CM
BH CV ECHO MEAS - IVSD: 1.12 CM
BH CV ECHO MEAS - LA DIMENSION: 4.9 CM
BH CV ECHO MEAS - LV DIASTOLIC VOL/BSA (35-75): 68.2 CM2
BH CV ECHO MEAS - LV MASS(C)D: 213.7 GRAMS
BH CV ECHO MEAS - LV SYSTOLIC VOL/BSA (12-30): 30.2 CM2
BH CV ECHO MEAS - LVIDD: 5.4 CM
BH CV ECHO MEAS - LVIDS: 4.3 CM
BH CV ECHO MEAS - LVPWD: 0.94 CM
BH CV ECHO MEAS - PA ACC TIME: 0.09 SEC
BH CV ECHO MEAS - SI(MOD-SP4): 38 ML/M2
BH CV ECHO MEAS - SV(MOD-SP4): 67.8 ML

## 2024-03-25 PROCEDURE — 93306 TTE W/DOPPLER COMPLETE: CPT | Performed by: INTERNAL MEDICINE

## 2024-03-25 PROCEDURE — 93306 TTE W/DOPPLER COMPLETE: CPT

## 2024-04-05 ENCOUNTER — LAB REQUISITION (OUTPATIENT)
Dept: LAB | Facility: HOSPITAL | Age: 79
End: 2024-04-05
Payer: MEDICARE

## 2024-04-05 ENCOUNTER — TRANSCRIBE ORDERS (OUTPATIENT)
Dept: ADMINISTRATIVE | Facility: HOSPITAL | Age: 79
End: 2024-04-05
Payer: MEDICAID

## 2024-04-05 DIAGNOSIS — R52 PAIN: Primary | ICD-10-CM

## 2024-04-05 DIAGNOSIS — N39.0 URINARY TRACT INFECTION, SITE NOT SPECIFIED: ICD-10-CM

## 2024-04-05 LAB
BACTERIA UR QL AUTO: ABNORMAL /HPF
BASOPHILS # BLD AUTO: 0.03 10*3/MM3 (ref 0–0.2)
BASOPHILS NFR BLD AUTO: 0.8 % (ref 0–1.5)
BILIRUB UR QL STRIP: NEGATIVE
CLARITY UR: CLEAR
COLOR UR: YELLOW
DEPRECATED RDW RBC AUTO: 42 FL (ref 37–54)
EOSINOPHIL # BLD AUTO: 0.08 10*3/MM3 (ref 0–0.4)
EOSINOPHIL NFR BLD AUTO: 2 % (ref 0.3–6.2)
ERYTHROCYTE [DISTWIDTH] IN BLOOD BY AUTOMATED COUNT: 13.5 % (ref 12.3–15.4)
GLUCOSE UR STRIP-MCNC: NEGATIVE MG/DL
HCT VFR BLD AUTO: 29.4 % (ref 34–46.6)
HGB BLD-MCNC: 9.4 G/DL (ref 12–15.9)
HGB UR QL STRIP.AUTO: NEGATIVE
HYALINE CASTS UR QL AUTO: ABNORMAL /LPF
IMM GRANULOCYTES # BLD AUTO: 0.01 10*3/MM3 (ref 0–0.05)
IMM GRANULOCYTES NFR BLD AUTO: 0.3 % (ref 0–0.5)
KETONES UR QL STRIP: ABNORMAL
LEUKOCYTE ESTERASE UR QL STRIP.AUTO: ABNORMAL
LYMPHOCYTES # BLD AUTO: 0.94 10*3/MM3 (ref 0.7–3.1)
LYMPHOCYTES NFR BLD AUTO: 23.9 % (ref 19.6–45.3)
MCH RBC QN AUTO: 27.2 PG (ref 26.6–33)
MCHC RBC AUTO-ENTMCNC: 32 G/DL (ref 31.5–35.7)
MCV RBC AUTO: 85 FL (ref 79–97)
MONOCYTES # BLD AUTO: 0.28 10*3/MM3 (ref 0.1–0.9)
MONOCYTES NFR BLD AUTO: 7.1 % (ref 5–12)
NEUTROPHILS NFR BLD AUTO: 2.59 10*3/MM3 (ref 1.7–7)
NEUTROPHILS NFR BLD AUTO: 65.9 % (ref 42.7–76)
NITRITE UR QL STRIP: NEGATIVE
NRBC BLD AUTO-RTO: 0 /100 WBC (ref 0–0.2)
PH UR STRIP.AUTO: 6 [PH] (ref 5–8)
PLATELET # BLD AUTO: 99 10*3/MM3 (ref 140–450)
PMV BLD AUTO: 11.4 FL (ref 6–12)
PROT UR QL STRIP: ABNORMAL
RBC # BLD AUTO: 3.46 10*6/MM3 (ref 3.77–5.28)
RBC # UR STRIP: ABNORMAL /HPF
REF LAB TEST METHOD: ABNORMAL
SP GR UR STRIP: 1.02 (ref 1–1.03)
SQUAMOUS #/AREA URNS HPF: ABNORMAL /HPF
UROBILINOGEN UR QL STRIP: ABNORMAL
WBC # UR STRIP: ABNORMAL /HPF
WBC NRBC COR # BLD AUTO: 3.93 10*3/MM3 (ref 3.4–10.8)

## 2024-04-05 PROCEDURE — 87086 URINE CULTURE/COLONY COUNT: CPT | Performed by: INTERNAL MEDICINE

## 2024-04-05 PROCEDURE — 85025 COMPLETE CBC W/AUTO DIFF WBC: CPT | Performed by: INTERNAL MEDICINE

## 2024-04-05 PROCEDURE — 81001 URINALYSIS AUTO W/SCOPE: CPT | Performed by: INTERNAL MEDICINE

## 2024-04-06 LAB — BACTERIA SPEC AEROBE CULT: NO GROWTH

## 2024-04-09 NOTE — PROGRESS NOTES
Subjective     Date: 4/18/2024    Referring Provider  Bruna Smith MD    Chief Complaint  Bicytopenia (anemia and thrombocytopenia)    Subjective          Susan Us is a 78 y.o. female who presents today to [facility] for initial evaluation .    HPI:   78 y.o. female with past medical history of hypertension, hyperlipidemia, dementia, chronic kidney disease, PPM s/p bradycardia who presents for consultation regarding bicytopenia (normocytic anemia and thrombocytopenia) from nursing home.    She presents today with her daughter, Leslie, who assists in history. She was seen by a hematologist, Dr. Luna in [place], [place] several years ago. Did not go through a bone marrow biopsy. Had U/S of the abdomen performed. She was placed on oral iron, which she is currently on. No other intervention was done.     Ms. Us denies any new symptoms including fever, chills, night sweats, unintentional weight loss, or fatigue. Denies any recent lumps/bumps. Underwent a colonoscopy 3-4 years ago by someone in [place], unable to recall whom. Was found to have polyps, no bleeding was noted. Currently denies any bleeding.    CBC from 4/16 show normal WBC and differential, Hgb 10, hct 31, Plt 120K.     Denies smoking, alcohol  use, drug use. Worked as a , , and . Family history significant for sister with breast cancer requiring bilateral mastectomy.     The following portions of the patient's history were reviewed and updated as appropriate: allergies, current medications, past family history, past medical history, past social history, past surgical history and problem list.    Objective     Objective     Allergy:   Allergies   Allergen Reactions    Evolocumab Itching     itching    Statins Hives        Current Medications:   Current Outpatient Medications   Medication Sig Dispense Refill    albuterol sulfate  (90 Base) MCG/ACT inhaler        ARIPiprazole (ABILIFY) 2 MG tablet Take 1 tablet by mouth Daily.      aspirin 81 MG EC tablet Take 1 tablet by mouth Daily.      Bempedoic Acid-Ezetimibe (Nexlizet) 180-10 MG tablet Take 1 tablet by mouth Every Night.      Calcium Carbonate-Vitamin D (calcium 500 mg vitamin D 5 mcg, 200 UT,) 500-5 MG-MCG tablet per tablet Take 1 tablet by mouth 2 (Two) Times a Day.      Diclofenac Sodium (VOLTAREN) 1 % gel gel Apply 2 g topically to the appropriate area as directed 4 (Four) Times a Day As Needed (joint pain). Knees, neck and shoulders      docusate sodium (COLACE) 250 MG capsule Take 1 capsule by mouth Daily As Needed for Constipation.      donepezil (ARICEPT) 5 MG tablet Take 1 tablet by mouth Every Night.      doxazosin (CARDURA) 4 MG tablet Take 1 tablet by mouth Every Night. 30 tablet 1    ferrous sulfate 325 (65 FE) MG tablet Take 1 tablet by mouth Every Other Day. Patient instructed to take every other day      gabapentin (NEURONTIN) 300 MG capsule Take 1 capsule by mouth 2 (Two) Times a Day. 6 capsule 0    hydrALAZINE (APRESOLINE) 25 MG tablet Take 1 tablet by mouth 3 (Three) Times a Day.      isosorbide mononitrate (IMDUR) 30 MG 24 hr tablet Take 1 tablet by mouth Daily.      Lactobacillus (ACIDOPHILUS/PECTIN PO) Take  by mouth.      LORazepam (ATIVAN) 0.5 MG tablet Take 1 tablet by mouth 2 (Two) Times a Day As Needed for Anxiety. 3 tablet 0    modafinil (PROVIGIL) 200 MG tablet Take 1 tablet by mouth Daily. 3 tablet 0    Multiple Vitamins-Minerals (ICAPS AREDS 2 PO) Take 1 tablet by mouth 2 (Two) Times a Day.      NIFEdipine CC (ADALAT CC) 30 MG 24 hr tablet Take 1 tablet by mouth Daily. Hold for SBP<110 30 tablet 0    ondansetron (ZOFRAN) 4 MG tablet Take 1 tablet by mouth Every 8 (Eight) Hours As Needed for Nausea or Vomiting.      pantoprazole (PROTONIX) 40 MG EC tablet Take 1 tablet by mouth Every Night.      potassium chloride 10 MEQ CR tablet Take 1 tablet by mouth 3 times a day.      sertraline  "(ZOLOFT) 100 MG tablet Take 2 tablets by mouth Every Night.      torsemide (DEMADEX) 100 MG tablet Take 0.5 tablets by mouth Daily.      traMADol (ULTRAM) 50 MG tablet       Trelegy Ellipta 200-62.5-25 MCG/ACT inhaler       vitamin B-12 (CYANOCOBALAMIN) 1000 MCG tablet Take 1 tablet by mouth Daily.       No current facility-administered medications for this visit.       Past Medical History:  Past Medical History:   Diagnosis Date    Anxiety     Bradycardia     s/p pacemaker placement    CKD (chronic kidney disease)     baseline renal function unknown    COPD (chronic obstructive pulmonary disease)     Dementia     GERD (gastroesophageal reflux disease)     Hyperlipidemia     Hypertension     Iron deficiency anemia     Thrombocytopenia        Past Surgical History:  Past Surgical History:   Procedure Laterality Date    BREAST BIOPSY      benign, yrs ago    CARDIAC PACEMAKER PLACEMENT      CATARACT EXTRACTION      CERVICAL FUSION      CHOLECYSTECTOMY      HIATAL HERNIA REPAIR      HYSTERECTOMY      REPLACEMENT TOTAL KNEE Right        Social History:  Social History     Socioeconomic History    Marital status:    Tobacco Use    Smoking status: Never    Smokeless tobacco: Never    Tobacco comments:     Second hand smoke exposure ()   Vaping Use    Vaping status: Never Used   Substance and Sexual Activity    Alcohol use: Never    Drug use: Never    Sexual activity: Defer         Family History:  Family History   Problem Relation Age of Onset    Stroke Mother     Stroke Father     Breast cancer Sister        Review of Systems:  Review of Systems   All other systems reviewed and are negative.      Vital Signs:   /61   Pulse 65   Temp 96.9 °F (36.1 °C) (Temporal)   Resp 18   Ht 165.1 cm (65\")   Wt 68.7 kg (151 lb 6.4 oz)   SpO2 98%   BMI 25.19 kg/m²      Physical Exam:  Physical Exam  Vitals reviewed.   Constitutional:       General: She is not in acute distress.     Appearance: Normal " "appearance. She is not ill-appearing.      Comments: In a wheelchair   HENT:      Head: Normocephalic and atraumatic.      Mouth/Throat:      Mouth: Mucous membranes are moist.      Pharynx: Oropharynx is clear.   Eyes:      Conjunctiva/sclera: Conjunctivae normal.      Pupils: Pupils are equal, round, and reactive to light.   Cardiovascular:      Rate and Rhythm: Normal rate and regular rhythm.      Heart sounds: No murmur heard.  Pulmonary:      Effort: Pulmonary effort is normal. No respiratory distress.      Breath sounds: Normal breath sounds. No wheezing.   Abdominal:      General: Abdomen is flat. Bowel sounds are normal. There is no distension.      Palpations: Abdomen is soft. There is no mass.      Tenderness: There is no abdominal tenderness. There is no guarding.   Musculoskeletal:         General: No swelling. Normal range of motion.      Cervical back: Normal range of motion.   Lymphadenopathy:      Cervical: No cervical adenopathy.   Skin:     General: Skin is warm and dry.   Neurological:      General: No focal deficit present.      Mental Status: She is alert and oriented to person, place, and time. Mental status is at baseline.   Psychiatric:         Mood and Affect: Mood normal.         PHQ-9 Score  PHQ-9 Total Score: 1     Pain Score  Vitals:    04/18/24 1107   BP: 114/61   Pulse: 65   Resp: 18   Temp: 96.9 °F (36.1 °C)   TempSrc: Temporal   SpO2: 98%   Weight: 68.7 kg (151 lb 6.4 oz)   Height: 165.1 cm (65\")   PainSc:   4       ECOG score: 0           PAINSCOREQUALITYMETRIC:   Vitals:    04/18/24 1107   PainSc:   4          Lab Review  Lab Results   Component Value Date    WBC 4.57 04/16/2024    HGB 10.0 (L) 04/16/2024    HCT 31.0 (L) 04/16/2024    MCV 85.4 04/16/2024    RDW 13.6 04/16/2024     (L) 04/16/2024    NEUTRORELPCT 62.8 04/16/2024    LYMPHORELPCT 25.6 04/16/2024    MONORELPCT 9.0 04/16/2024    EOSRELPCT 1.8 04/16/2024    BASORELPCT 0.4 04/16/2024    NEUTROABS 2.87 04/16/2024    " LYMPHSABS 1.17 04/16/2024     Lab Results   Component Value Date     04/16/2024    K 4.3 04/16/2024    CO2 26.0 04/16/2024     04/16/2024    BUN 26 (H) 04/16/2024    CREATININE 1.56 (H) 04/16/2024    GLUCOSE 109 (H) 04/16/2024    CALCIUM 9.3 04/16/2024    ALKPHOS 74 04/16/2024    AST 24 04/16/2024    ALT 13 04/16/2024    BILITOT 0.2 04/16/2024    ALBUMIN 4.6 04/16/2024    PROTEINTOT 7.2 04/16/2024    MG 2.0 09/27/2023               Radiology Results  CT Abdomen Pelvis Without Contrast (09/27/2023 23:02)   FINDINGS:   Included portions of the lung bases are normal.  The liver and spleen are normal.  There is no biliary dilation.  The  gallbladder has been removed.  The pancreas is normal.  The adrenal glands are normal.  Kidneys are normal.  There is no hydronephrosis or renal or ureteral  calculi.  The bowel loops are normal in course and caliber.  There is  diverticulosis of the descending and sigmoid colon.  There is no pelvic mass or free fluid or lymphadenopathy.  Degenerative changes involve the lumbar spine.     IMPRESSION:  No CT evidence of an acute intra-abdominal or intrapelvic process.      Assessment / Plan         Assessment and Plan   Susan Us is a 78 y.o.  presents for     Normocytic Anemia  Thrombocytopenia   -Patient reportedly has had anemia and thrombocytopenia for many years, in our system dating back ~9 months; baseline Hgb 9.1-10, Hct 28-31, normal MCV, platelets 80-120K  -We discussed that the differential for anemia is pretty broad.  It could be related to iron deficiency, chronic inflammation or due to chronic disease processes, vitamin deficiencies, hemolysis, or other underlying bone marrow disorder such as myeloproliferative neoplasm or myelodysplastic syndromes or plasma cell dyscrasias.  -Check CBC with differential, peripheral smear, iron profile, ferritin, folate level, vitamin B12, methylmalonic acid, LDH, reticulocytosis, haptoglobin, TSH, serum protein  electrophoresis, immunofixation, immunoglobulin free light chains  -Pt without symptoms including easy bruising or bleeding. Does not take additional blood thinners. Denies alcohol use.   -We dicussed thrombocytopenia could be multifactorial including due to medications, autoimmune conditions (SLE), infections (HIV or Hepatitis C),  hemolysis such as DIC, nutritional deficiencies such as folate, b12, liver abnormality, splenomegaly, or Immune thrombocytopenia purpura.  -Will check CBC with peripheral smear today, PT, INR, PTT, HIV, Hepatitis C Ab, MANDY, RF. Also ordered for U/S of the abdomen to assess for hepato-splenomegaly.   -Patient records are requested from Dr. Luna's office including previous US of abdomen. Asked patient to provide more information regarding previous colonoscopy/physician's name/or healthcare setting to be able to obtain those records       Discussed possible differential diagnoses, testing, treatment, recommended non-pharmacological interventions, risks, warning signs to monitor for that would indicate need for follow-up in clinic or ER. If no improvement with these regimens or you have new or worsening symptoms follow-up. Patient verbalizes understanding and agreement with plan of care. Denies further needs or concerns.     Patient was given instructions and counseling regarding her condition and for health maintenance advised.       All questions were answered to her satisfaction.        ACO / SHERRI/Other  Quality measures  -  Susan Us did not receive 2023 flu vaccine.  This was recommended.  -  Susan Us reports a pain score of 4.  Given her pain assessment as noted, treatment options were discussed and the following options were decided upon as a follow-up plan to address the patient's pain: continuation of current treatment plan for pain.  -  Current outpatient and discharge medications have been reconciled for the patient.  Reviewed by: Sylwia Hua MD        Meds ordered  during this visit  No orders of the defined types were placed in this encounter.      Visit Diagnoses    ICD-10-CM ICD-9-CM   1. Anemia, unspecified type  D64.9 285.9   2. Thrombocytopenia  D69.6 287.5   3. Elevation of levels of liver transaminase levels  R74.01 790.4       Follow Up   In ~4 weeks to review the above work up      This document has been electronically signed by Sylwia Hua MD   April 18, 2024 11:48 EDT    Dictated Utilizing Dragon Dictation: Part of this note may be an electronic transcription/translation of spoken language to printed text using the Dragon Dictation System.

## 2024-04-12 ENCOUNTER — HOSPITAL ENCOUNTER (OUTPATIENT)
Facility: HOSPITAL | Age: 79
Discharge: HOME | End: 2024-04-12
Payer: MEDICARE

## 2024-04-12 DIAGNOSIS — R52 PAIN: ICD-10-CM

## 2024-04-12 PROCEDURE — 72131 CT LUMBAR SPINE W/O DYE: CPT

## 2024-04-16 ENCOUNTER — APPOINTMENT (OUTPATIENT)
Dept: CT IMAGING | Facility: HOSPITAL | Age: 79
End: 2024-04-16
Payer: MEDICARE

## 2024-04-16 ENCOUNTER — HOSPITAL ENCOUNTER (EMERGENCY)
Facility: HOSPITAL | Age: 79
Discharge: HOME | End: 2024-04-17
Attending: STUDENT IN AN ORGANIZED HEALTH CARE EDUCATION/TRAINING PROGRAM
Payer: MEDICARE

## 2024-04-16 ENCOUNTER — TRANSCRIBE ORDERS (OUTPATIENT)
Dept: ADMINISTRATIVE | Facility: HOSPITAL | Age: 79
End: 2024-04-16
Payer: MEDICAID

## 2024-04-16 ENCOUNTER — APPOINTMENT (OUTPATIENT)
Dept: GENERAL RADIOLOGY | Facility: HOSPITAL | Age: 79
End: 2024-04-16
Payer: MEDICARE

## 2024-04-16 DIAGNOSIS — R07.9 CHEST PAIN, UNSPECIFIED TYPE: ICD-10-CM

## 2024-04-16 DIAGNOSIS — I10 HYPERTENSION, UNSPECIFIED TYPE: Primary | ICD-10-CM

## 2024-04-16 DIAGNOSIS — M54.2 NECK PAIN: Primary | ICD-10-CM

## 2024-04-16 DIAGNOSIS — R51.9 ACUTE NONINTRACTABLE HEADACHE, UNSPECIFIED HEADACHE TYPE: ICD-10-CM

## 2024-04-16 LAB
ALBUMIN SERPL-MCNC: 4.6 G/DL (ref 3.5–5.2)
ALBUMIN/GLOB SERPL: 1.8 G/DL
ALP SERPL-CCNC: 74 U/L (ref 39–117)
ALT SERPL W P-5'-P-CCNC: 13 U/L (ref 1–33)
ANION GAP SERPL CALCULATED.3IONS-SCNC: 11 MMOL/L (ref 5–15)
AST SERPL-CCNC: 24 U/L (ref 1–32)
BASOPHILS # BLD AUTO: 0.02 10*3/MM3 (ref 0–0.2)
BASOPHILS NFR BLD AUTO: 0.4 % (ref 0–1.5)
BILIRUB SERPL-MCNC: 0.2 MG/DL (ref 0–1.2)
BUN SERPL-MCNC: 26 MG/DL (ref 8–23)
BUN/CREAT SERPL: 16.7 (ref 7–25)
CALCIUM SPEC-SCNC: 9.3 MG/DL (ref 8.6–10.5)
CHLORIDE SERPL-SCNC: 106 MMOL/L (ref 98–107)
CO2 SERPL-SCNC: 26 MMOL/L (ref 22–29)
CREAT SERPL-MCNC: 1.56 MG/DL (ref 0.57–1)
DEPRECATED RDW RBC AUTO: 42.6 FL (ref 37–54)
EGFRCR SERPLBLD CKD-EPI 2021: 34.1 ML/MIN/1.73
EOSINOPHIL # BLD AUTO: 0.08 10*3/MM3 (ref 0–0.4)
EOSINOPHIL NFR BLD AUTO: 1.8 % (ref 0.3–6.2)
ERYTHROCYTE [DISTWIDTH] IN BLOOD BY AUTOMATED COUNT: 13.6 % (ref 12.3–15.4)
GEN 5 2HR TROPONIN T REFLEX: 33 NG/L
GLOBULIN UR ELPH-MCNC: 2.6 GM/DL
GLUCOSE SERPL-MCNC: 109 MG/DL (ref 65–99)
HCT VFR BLD AUTO: 31 % (ref 34–46.6)
HGB BLD-MCNC: 10 G/DL (ref 12–15.9)
HOLD SPECIMEN: NORMAL
HOLD SPECIMEN: NORMAL
IMM GRANULOCYTES # BLD AUTO: 0.02 10*3/MM3 (ref 0–0.05)
IMM GRANULOCYTES NFR BLD AUTO: 0.4 % (ref 0–0.5)
LYMPHOCYTES # BLD AUTO: 1.17 10*3/MM3 (ref 0.7–3.1)
LYMPHOCYTES NFR BLD AUTO: 25.6 % (ref 19.6–45.3)
MCH RBC QN AUTO: 27.5 PG (ref 26.6–33)
MCHC RBC AUTO-ENTMCNC: 32.3 G/DL (ref 31.5–35.7)
MCV RBC AUTO: 85.4 FL (ref 79–97)
MONOCYTES # BLD AUTO: 0.41 10*3/MM3 (ref 0.1–0.9)
MONOCYTES NFR BLD AUTO: 9 % (ref 5–12)
NEUTROPHILS NFR BLD AUTO: 2.87 10*3/MM3 (ref 1.7–7)
NEUTROPHILS NFR BLD AUTO: 62.8 % (ref 42.7–76)
NRBC BLD AUTO-RTO: 0 /100 WBC (ref 0–0.2)
PLATELET # BLD AUTO: 120 10*3/MM3 (ref 140–450)
PMV BLD AUTO: 10.9 FL (ref 6–12)
POTASSIUM SERPL-SCNC: 4.3 MMOL/L (ref 3.5–5.2)
PROT SERPL-MCNC: 7.2 G/DL (ref 6–8.5)
RBC # BLD AUTO: 3.63 10*6/MM3 (ref 3.77–5.28)
SODIUM SERPL-SCNC: 143 MMOL/L (ref 136–145)
TROPONIN T DELTA: -2 NG/L
TROPONIN T SERPL HS-MCNC: 35 NG/L
WBC NRBC COR # BLD AUTO: 4.57 10*3/MM3 (ref 3.4–10.8)
WHOLE BLOOD HOLD COAG: NORMAL
WHOLE BLOOD HOLD SPECIMEN: NORMAL

## 2024-04-16 PROCEDURE — 36415 COLL VENOUS BLD VENIPUNCTURE: CPT

## 2024-04-16 PROCEDURE — 93005 ELECTROCARDIOGRAM TRACING: CPT | Performed by: EMERGENCY MEDICINE

## 2024-04-16 PROCEDURE — 96374 THER/PROPH/DIAG INJ IV PUSH: CPT

## 2024-04-16 PROCEDURE — 80053 COMPREHEN METABOLIC PANEL: CPT | Performed by: STUDENT IN AN ORGANIZED HEALTH CARE EDUCATION/TRAINING PROGRAM

## 2024-04-16 PROCEDURE — 71045 X-RAY EXAM CHEST 1 VIEW: CPT | Performed by: RADIOLOGY

## 2024-04-16 PROCEDURE — 25010000002 HYDRALAZINE PER 20 MG: Performed by: STUDENT IN AN ORGANIZED HEALTH CARE EDUCATION/TRAINING PROGRAM

## 2024-04-16 PROCEDURE — 99284 EMERGENCY DEPT VISIT MOD MDM: CPT

## 2024-04-16 PROCEDURE — 93010 ELECTROCARDIOGRAM REPORT: CPT | Performed by: INTERNAL MEDICINE

## 2024-04-16 PROCEDURE — 71045 X-RAY EXAM CHEST 1 VIEW: CPT

## 2024-04-16 PROCEDURE — 93005 ELECTROCARDIOGRAM TRACING: CPT | Performed by: STUDENT IN AN ORGANIZED HEALTH CARE EDUCATION/TRAINING PROGRAM

## 2024-04-16 PROCEDURE — 70450 CT HEAD/BRAIN W/O DYE: CPT

## 2024-04-16 PROCEDURE — 85025 COMPLETE CBC W/AUTO DIFF WBC: CPT | Performed by: STUDENT IN AN ORGANIZED HEALTH CARE EDUCATION/TRAINING PROGRAM

## 2024-04-16 PROCEDURE — 70450 CT HEAD/BRAIN W/O DYE: CPT | Performed by: RADIOLOGY

## 2024-04-16 PROCEDURE — 84484 ASSAY OF TROPONIN QUANT: CPT | Performed by: STUDENT IN AN ORGANIZED HEALTH CARE EDUCATION/TRAINING PROGRAM

## 2024-04-16 RX ORDER — ASPIRIN 81 MG/1
324 TABLET, CHEWABLE ORAL ONCE
Status: DISCONTINUED | OUTPATIENT
Start: 2024-04-16 | End: 2024-04-17 | Stop reason: HOSPADM

## 2024-04-16 RX ORDER — HYDRALAZINE HYDROCHLORIDE 20 MG/ML
10 INJECTION INTRAMUSCULAR; INTRAVENOUS ONCE
Status: COMPLETED | OUTPATIENT
Start: 2024-04-16 | End: 2024-04-16

## 2024-04-16 RX ORDER — CLONIDINE HYDROCHLORIDE 0.1 MG/1
0.1 TABLET ORAL ONCE
Status: COMPLETED | OUTPATIENT
Start: 2024-04-16 | End: 2024-04-16

## 2024-04-16 RX ORDER — SODIUM CHLORIDE 0.9 % (FLUSH) 0.9 %
10 SYRINGE (ML) INJECTION AS NEEDED
Status: DISCONTINUED | OUTPATIENT
Start: 2024-04-16 | End: 2024-04-17 | Stop reason: HOSPADM

## 2024-04-16 RX ADMIN — HYDRALAZINE HYDROCHLORIDE 10 MG: 20 INJECTION INTRAMUSCULAR; INTRAVENOUS at 18:43

## 2024-04-16 RX ADMIN — CLONIDINE HYDROCHLORIDE 0.1 MG: 0.1 TABLET ORAL at 20:55

## 2024-04-16 NOTE — ED PROVIDER NOTES
Subjective   History of Present Illness  78-year-old female with a past medical history of chronic kidney disease, hypertension, and dementia presents today with primary complaint of increasing chest pressure.  Patient noted chest pressure occurred earlier today.  However, she has difficulty remembering when she had this chest pain episode.  Patient also has difficulty remembering if she was short of breath or nauseated.  No fever or chills.  No significant cough.  No obvious alleviating factors.  Elevated blood pressures noted.      Review of Systems   Cardiovascular:  Positive for chest pain.   All other systems reviewed and are negative.      Past Medical History:   Diagnosis Date    Bradycardia     s/p pacemaker placement    CKD (chronic kidney disease)     baseline renal function unknown    Dementia     Hyperlipidemia     Hypertension     Thrombocytopenia        Allergies   Allergen Reactions    Evolocumab Itching     itching    Statins Hives       Past Surgical History:   Procedure Laterality Date    BREAST BIOPSY      benign, yrs ago    CARDIAC PACEMAKER PLACEMENT      CATARACT EXTRACTION      CERVICAL FUSION      CHOLECYSTECTOMY      HIATAL HERNIA REPAIR      HYSTERECTOMY      REPLACEMENT TOTAL KNEE Right        Family History   Problem Relation Age of Onset    Stroke Mother     Stroke Father     Breast cancer Sister        Social History     Socioeconomic History    Marital status:    Tobacco Use    Smoking status: Never    Smokeless tobacco: Never    Tobacco comments:     Second hand smoke exposure ()   Vaping Use    Vaping status: Never Used   Substance and Sexual Activity    Alcohol use: Never    Drug use: Never    Sexual activity: Defer           Objective   Physical Exam  Constitutional:       General: She is not in acute distress.     Appearance: Normal appearance. She is not ill-appearing.   HENT:      Head: Normocephalic and atraumatic.      Right Ear: External ear normal.      Left  Ear: External ear normal.      Nose: Nose normal.      Mouth/Throat:      Mouth: Mucous membranes are moist.   Eyes:      Extraocular Movements: Extraocular movements intact.      Pupils: Pupils are equal, round, and reactive to light.   Cardiovascular:      Rate and Rhythm: Normal rate and regular rhythm.      Heart sounds: No murmur heard.  Pulmonary:      Effort: Pulmonary effort is normal. No respiratory distress.      Breath sounds: Normal breath sounds. No wheezing.   Abdominal:      General: Bowel sounds are normal.      Palpations: Abdomen is soft.      Tenderness: There is no abdominal tenderness.   Musculoskeletal:         General: No deformity or signs of injury. Normal range of motion.      Cervical back: Normal range of motion and neck supple.   Skin:     General: Skin is warm and dry.      Findings: No erythema.   Neurological:      General: No focal deficit present.      Mental Status: She is alert and oriented to person, place, and time. Mental status is at baseline.      Cranial Nerves: No cranial nerve deficit.   Psychiatric:         Mood and Affect: Mood normal.         Behavior: Behavior normal.         Thought Content: Thought content normal.         Procedures  EKG at 2101 shows an atrial paced rhythm, prolonged AV conduction.  Ventricular rate 65, LA interval 228, QRS duration 116, QTc 478 ms.  Evidence of LVH.  No apparent acute ischemia.  No evidence for STEMI.  CT Head Without Contrast   Final Result   Impression:       No acute intracranial abnormality.       This report was finalized on 4/17/2024 12:02 AM by Meliton Allred MD.          XR Chest 1 View   Final Result   Impression:   1. Stable chest.           This report was finalized on 4/16/2024 6:14 PM by Nicholas Prather DO.            Results for orders placed or performed during the hospital encounter of 04/16/24   Comprehensive Metabolic Panel    Specimen: Arm, Right; Blood   Result Value Ref Range    Glucose 109 (H) 65 - 99 mg/dL    BUN  26 (H) 8 - 23 mg/dL    Creatinine 1.56 (H) 0.57 - 1.00 mg/dL    Sodium 143 136 - 145 mmol/L    Potassium 4.3 3.5 - 5.2 mmol/L    Chloride 106 98 - 107 mmol/L    CO2 26.0 22.0 - 29.0 mmol/L    Calcium 9.3 8.6 - 10.5 mg/dL    Total Protein 7.2 6.0 - 8.5 g/dL    Albumin 4.6 3.5 - 5.2 g/dL    ALT (SGPT) 13 1 - 33 U/L    AST (SGOT) 24 1 - 32 U/L    Alkaline Phosphatase 74 39 - 117 U/L    Total Bilirubin 0.2 0.0 - 1.2 mg/dL    Globulin 2.6 gm/dL    A/G Ratio 1.8 g/dL    BUN/Creatinine Ratio 16.7 7.0 - 25.0    Anion Gap 11.0 5.0 - 15.0 mmol/L    eGFR 34.1 (L) >60.0 mL/min/1.73   High Sensitivity Troponin T    Specimen: Arm, Right; Blood   Result Value Ref Range    HS Troponin T 35 (H) <14 ng/L   CBC Auto Differential    Specimen: Arm, Right; Blood   Result Value Ref Range    WBC 4.57 3.40 - 10.80 10*3/mm3    RBC 3.63 (L) 3.77 - 5.28 10*6/mm3    Hemoglobin 10.0 (L) 12.0 - 15.9 g/dL    Hematocrit 31.0 (L) 34.0 - 46.6 %    MCV 85.4 79.0 - 97.0 fL    MCH 27.5 26.6 - 33.0 pg    MCHC 32.3 31.5 - 35.7 g/dL    RDW 13.6 12.3 - 15.4 %    RDW-SD 42.6 37.0 - 54.0 fl    MPV 10.9 6.0 - 12.0 fL    Platelets 120 (L) 140 - 450 10*3/mm3    Neutrophil % 62.8 42.7 - 76.0 %    Lymphocyte % 25.6 19.6 - 45.3 %    Monocyte % 9.0 5.0 - 12.0 %    Eosinophil % 1.8 0.3 - 6.2 %    Basophil % 0.4 0.0 - 1.5 %    Immature Grans % 0.4 0.0 - 0.5 %    Neutrophils, Absolute 2.87 1.70 - 7.00 10*3/mm3    Lymphocytes, Absolute 1.17 0.70 - 3.10 10*3/mm3    Monocytes, Absolute 0.41 0.10 - 0.90 10*3/mm3    Eosinophils, Absolute 0.08 0.00 - 0.40 10*3/mm3    Basophils, Absolute 0.02 0.00 - 0.20 10*3/mm3    Immature Grans, Absolute 0.02 0.00 - 0.05 10*3/mm3    nRBC 0.0 0.0 - 0.2 /100 WBC   High Sensitivity Troponin T 2Hr    Specimen: Blood   Result Value Ref Range    HS Troponin T 33 (H) <14 ng/L    Troponin T Delta -2 >=-4 - <+4 ng/L   ECG 12 Lead Chest Pain   Result Value Ref Range    QT Interval 434 ms    QTC Interval 451 ms   Green Top (Gel)   Result Value Ref  Range    Extra Tube Hold for add-ons.    Lavender Top   Result Value Ref Range    Extra Tube hold for add-on    Gold Top - SST   Result Value Ref Range    Extra Tube Hold for add-ons.    Light Blue Top   Result Value Ref Range    Extra Tube Hold for add-ons.                 ED Course  ED Course as of 04/17/24 0557   Tue Apr 16, 2024   1657 EKG notes atrial paced rhythm.  65 bpm.  No acute ST elevation.  QTc 451.  Electronically signed by Delgado Henderson DO, 04/16/24, 4:57 PM EDT.   [SF]   1839 Care of this patient was transferred to the next attending physician at shift change.  Complete discussion of presentation, labs, imaging, care, and expected course occurred during transition of providers.  Vitals stable at transfer of care.    Electronically signed by Delgado Henderson DO, 04/16/24, 6:39 PM EDT.   [SF]   Wed Apr 17, 2024   0018 I assumed patient's care from Dr. Henderson this evening at shift change.  Please see his documentation above.  Patient's emergency department stay has not been complicated.  She initially had IV hydralazine per Dr. Henderson, I gave her a clonidine a bit later.  It was looking as though her blood pressure was going to stay elevated and I was about to start her on a Cardene drip, but her blood pressure has been much better over the past couple of hours.  Currently 164/76.  I have paged the hospitalist for consultation. [CM]   0051 Patient's blood pressure continues to be better, currently 167/76.  She is resting comfortably and in no distress.  The daughter tells me that previously she had done well on a clonidine TTS 1 patch, but her blood pressure started getting higher so they changed her to a TTS 2 patch, after which she got very confused.  This patch was discontinued and her mental status returned to baseline, they then put the TTS 2 patch back on, she started to get confused again, so the TTS patch was removed on this past Friday.  She has clonidine 0.1 mg tablets written at the [facility]  as needed.  The nursing home paperwork also indicates that she is on hydralazine 75 mg 3 times daily (3 of the 25 mg tablets 3 times daily).  Daughter feels that the patient would probably do well if we resumed the clonidine TTS 1.  Also, the patient has missed her evening dose of hydralazine today.  I discussed the case with Dr. Shin, she agrees with resuming the Catapres TTS 1 patch, advises to be sure to give her a dose of p.o. hydralazine before she returns to the nursing home tonight.  We are placing a TTS 1 patch, giving her 50 mg of p.o. hydralazine before she goes back to the nursing home.  She is to follow-up closely with her PCP there.  She is to return to the emergency department right away if symptoms worsen or any problems.  Daughter is in agreement with this plan. [CM]      ED Course User Index  [CM] Julio Cesar Grady MD  [SF] Delgado Henderson, DO                                             Medical Decision Making  Amount and/or Complexity of Data Reviewed  Labs: ordered. Decision-making details documented in ED Course.  Radiology: ordered. Decision-making details documented in ED Course.  ECG/medicine tests: ordered. Decision-making details documented in ED Course.    Risk  OTC drugs.  Prescription drug management.        Final diagnoses:   Hypertension, unspecified type   Chest pain, unspecified type   Acute nonintractable headache, unspecified headache type       ED Disposition  ED Disposition       ED Disposition   Discharge    Condition   Stable    Comment   --               Bruna Smith MD  [address] [facility] [address]  [phone]    Go to   1 to 2 days    [facility] EMERGENCY DEPARTMENT  [address] [facility] [address] [phone]  Go to   If symptoms worsen         Medication List      No changes were made to your prescriptions during this visit.       Please note that portions of this note were completed with a voice recognition program.        Ysabel  Julio Cesar Palacios MD  04/17/24 0558

## 2024-04-17 VITALS
HEIGHT: 65 IN | TEMPERATURE: 98.1 F | DIASTOLIC BLOOD PRESSURE: 73 MMHG | HEART RATE: 65 BPM | RESPIRATION RATE: 18 BRPM | WEIGHT: 153 LBS | OXYGEN SATURATION: 98 % | SYSTOLIC BLOOD PRESSURE: 150 MMHG | BODY MASS INDEX: 25.49 KG/M2

## 2024-04-17 LAB
QT INTERVAL: 434 MS
QT INTERVAL: 460 MS
QTC INTERVAL: 451 MS
QTC INTERVAL: 478 MS

## 2024-04-17 RX ORDER — CLONIDINE 0.1 MG/24H
1 PATCH, EXTENDED RELEASE TRANSDERMAL ONCE
Status: DISCONTINUED | OUTPATIENT
Start: 2024-04-17 | End: 2024-04-17 | Stop reason: HOSPADM

## 2024-04-17 RX ORDER — HYDRALAZINE HYDROCHLORIDE 25 MG/1
50 TABLET, FILM COATED ORAL ONCE
Status: COMPLETED | OUTPATIENT
Start: 2024-04-17 | End: 2024-04-17

## 2024-04-17 RX ADMIN — HYDRALAZINE HYDROCHLORIDE 50 MG: 25 TABLET ORAL at 01:32

## 2024-04-17 RX ADMIN — CLONIDINE 1 PATCH: 0.1 PATCH TRANSDERMAL at 01:32

## 2024-04-17 NOTE — ED NOTES
Called [facility] to transport patient back to [facility]

## 2024-04-17 NOTE — DISCHARGE INSTRUCTIONS
Home to nursing home.  Follow-up with Dr. Smith in 1 to 2 days at the nursing home.  We have resumed the Catapres TTS 1 patch weekly.  Call Dr. Smith tomorrow morning to see if she wants to make any adjustment to the patient's hydralazine dosage.  Return to the emergency department right away if symptoms worsen/any problems.

## 2024-04-18 ENCOUNTER — TELEPHONE (OUTPATIENT)
Dept: CARDIOLOGY | Facility: CLINIC | Age: 79
End: 2024-04-18

## 2024-04-18 ENCOUNTER — LAB (OUTPATIENT)
Dept: ONCOLOGY | Facility: CLINIC | Age: 79
End: 2024-04-18
Payer: MEDICARE

## 2024-04-18 ENCOUNTER — CONSULT (OUTPATIENT)
Dept: ONCOLOGY | Facility: CLINIC | Age: 79
End: 2024-04-18
Payer: MEDICARE

## 2024-04-18 VITALS
BODY MASS INDEX: 25.22 KG/M2 | RESPIRATION RATE: 18 BRPM | OXYGEN SATURATION: 98 % | TEMPERATURE: 96.9 F | HEART RATE: 65 BPM | HEIGHT: 65 IN | SYSTOLIC BLOOD PRESSURE: 114 MMHG | WEIGHT: 151.4 LBS | DIASTOLIC BLOOD PRESSURE: 61 MMHG

## 2024-04-18 DIAGNOSIS — R74.01 ELEVATION OF LEVELS OF LIVER TRANSAMINASE LEVELS: ICD-10-CM

## 2024-04-18 DIAGNOSIS — D69.6 THROMBOCYTOPENIA: ICD-10-CM

## 2024-04-18 DIAGNOSIS — D64.9 ANEMIA, UNSPECIFIED TYPE: ICD-10-CM

## 2024-04-18 DIAGNOSIS — D64.9 ANEMIA, UNSPECIFIED TYPE: Primary | ICD-10-CM

## 2024-04-18 LAB
ALBUMIN SERPL-MCNC: 5 G/DL (ref 3.5–5.2)
ALBUMIN/GLOB SERPL: 1.8 G/DL
ALP SERPL-CCNC: 76 U/L (ref 39–117)
ALT SERPL W P-5'-P-CCNC: 12 U/L (ref 1–33)
ANION GAP SERPL CALCULATED.3IONS-SCNC: 13.6 MMOL/L (ref 5–15)
APTT PPP: 28.9 SECONDS (ref 26.5–34.5)
AST SERPL-CCNC: 25 U/L (ref 1–32)
BASOPHILS # BLD AUTO: 0.02 10*3/MM3 (ref 0–0.2)
BASOPHILS NFR BLD AUTO: 0.4 % (ref 0–1.5)
BILIRUB SERPL-MCNC: 0.3 MG/DL (ref 0–1.2)
BUN SERPL-MCNC: 28 MG/DL (ref 8–23)
BUN/CREAT SERPL: 17.7 (ref 7–25)
CALCIUM SPEC-SCNC: 9.5 MG/DL (ref 8.6–10.5)
CHLORIDE SERPL-SCNC: 104 MMOL/L (ref 98–107)
CO2 SERPL-SCNC: 26.4 MMOL/L (ref 22–29)
CREAT SERPL-MCNC: 1.58 MG/DL (ref 0.57–1)
CRP SERPL-MCNC: <0.3 MG/DL (ref 0–0.5)
DEPRECATED RDW RBC AUTO: 42.6 FL (ref 37–54)
EGFRCR SERPLBLD CKD-EPI 2021: 33.6 ML/MIN/1.73
EOSINOPHIL # BLD AUTO: 0.08 10*3/MM3 (ref 0–0.4)
EOSINOPHIL NFR BLD AUTO: 1.7 % (ref 0.3–6.2)
ERYTHROCYTE [DISTWIDTH] IN BLOOD BY AUTOMATED COUNT: 13.7 % (ref 12.3–15.4)
ERYTHROCYTE [SEDIMENTATION RATE] IN BLOOD: 30 MM/HR (ref 0–30)
FERRITIN SERPL-MCNC: 97.01 NG/ML (ref 13–150)
GLOBULIN UR ELPH-MCNC: 2.8 GM/DL
GLUCOSE SERPL-MCNC: 112 MG/DL (ref 65–99)
HAV IGM SERPL QL IA: NORMAL
HBV CORE IGM SERPL QL IA: NORMAL
HBV SURFACE AG SERPL QL IA: NORMAL
HCT VFR BLD AUTO: 32.3 % (ref 34–46.6)
HCV AB SER DONR QL: NORMAL
HGB BLD-MCNC: 10.4 G/DL (ref 12–15.9)
IMM GRANULOCYTES # BLD AUTO: 0.01 10*3/MM3 (ref 0–0.05)
IMM GRANULOCYTES NFR BLD AUTO: 0.2 % (ref 0–0.5)
INR PPP: 1.02 (ref 0.9–1.1)
IRON 24H UR-MRATE: 59 MCG/DL (ref 37–145)
IRON SATN MFR SERPL: 15 % (ref 20–50)
LDH SERPL-CCNC: 173 U/L (ref 135–214)
LYMPHOCYTES # BLD AUTO: 0.97 10*3/MM3 (ref 0.7–3.1)
LYMPHOCYTES NFR BLD AUTO: 20.2 % (ref 19.6–45.3)
MCH RBC QN AUTO: 27.7 PG (ref 26.6–33)
MCHC RBC AUTO-ENTMCNC: 32.2 G/DL (ref 31.5–35.7)
MCV RBC AUTO: 86.1 FL (ref 79–97)
MONOCYTES # BLD AUTO: 0.38 10*3/MM3 (ref 0.1–0.9)
MONOCYTES NFR BLD AUTO: 7.9 % (ref 5–12)
NEUTROPHILS NFR BLD AUTO: 3.35 10*3/MM3 (ref 1.7–7)
NEUTROPHILS NFR BLD AUTO: 69.6 % (ref 42.7–76)
NRBC BLD AUTO-RTO: 0 /100 WBC (ref 0–0.2)
PLATELET # BLD AUTO: 119 10*3/MM3 (ref 140–450)
PMV BLD AUTO: 10.1 FL (ref 6–12)
POTASSIUM SERPL-SCNC: 3.8 MMOL/L (ref 3.5–5.2)
PROT SERPL-MCNC: 7.8 G/DL (ref 6–8.5)
PROTHROMBIN TIME: 13.9 SECONDS (ref 12.1–14.7)
RBC # BLD AUTO: 3.75 10*6/MM3 (ref 3.77–5.28)
RETICS # AUTO: 0.03 10*6/MM3 (ref 0.02–0.13)
RETICS/RBC NFR AUTO: 0.93 % (ref 0.7–1.9)
SODIUM SERPL-SCNC: 144 MMOL/L (ref 136–145)
TIBC SERPL-MCNC: 383 MCG/DL (ref 298–536)
TRANSFERRIN SERPL-MCNC: 257 MG/DL (ref 200–360)
TSH SERPL DL<=0.05 MIU/L-ACNC: 0.97 UIU/ML (ref 0.27–4.2)
WBC NRBC COR # BLD AUTO: 4.81 10*3/MM3 (ref 3.4–10.8)

## 2024-04-18 PROCEDURE — 85045 AUTOMATED RETICULOCYTE COUNT: CPT | Performed by: INTERNAL MEDICINE

## 2024-04-18 PROCEDURE — 82746 ASSAY OF FOLIC ACID SERUM: CPT | Performed by: INTERNAL MEDICINE

## 2024-04-18 PROCEDURE — 80053 COMPREHEN METABOLIC PANEL: CPT | Performed by: INTERNAL MEDICINE

## 2024-04-18 PROCEDURE — 85025 COMPLETE CBC W/AUTO DIFF WBC: CPT | Performed by: INTERNAL MEDICINE

## 2024-04-18 PROCEDURE — 86038 ANTINUCLEAR ANTIBODIES: CPT | Performed by: INTERNAL MEDICINE

## 2024-04-18 PROCEDURE — 85652 RBC SED RATE AUTOMATED: CPT | Performed by: INTERNAL MEDICINE

## 2024-04-18 PROCEDURE — 80074 ACUTE HEPATITIS PANEL: CPT | Performed by: INTERNAL MEDICINE

## 2024-04-18 PROCEDURE — 86140 C-REACTIVE PROTEIN: CPT | Performed by: INTERNAL MEDICINE

## 2024-04-18 PROCEDURE — 84466 ASSAY OF TRANSFERRIN: CPT | Performed by: INTERNAL MEDICINE

## 2024-04-18 PROCEDURE — 84165 PROTEIN E-PHORESIS SERUM: CPT | Performed by: INTERNAL MEDICINE

## 2024-04-18 PROCEDURE — 84238 ASSAY NONENDOCRINE RECEPTOR: CPT | Performed by: INTERNAL MEDICINE

## 2024-04-18 PROCEDURE — 82784 ASSAY IGA/IGD/IGG/IGM EACH: CPT | Performed by: INTERNAL MEDICINE

## 2024-04-18 PROCEDURE — 86334 IMMUNOFIX E-PHORESIS SERUM: CPT | Performed by: INTERNAL MEDICINE

## 2024-04-18 PROCEDURE — 83010 ASSAY OF HAPTOGLOBIN QUANT: CPT | Performed by: INTERNAL MEDICINE

## 2024-04-18 PROCEDURE — 82728 ASSAY OF FERRITIN: CPT | Performed by: INTERNAL MEDICINE

## 2024-04-18 PROCEDURE — 85610 PROTHROMBIN TIME: CPT | Performed by: INTERNAL MEDICINE

## 2024-04-18 PROCEDURE — 83615 LACTATE (LD) (LDH) ENZYME: CPT | Performed by: INTERNAL MEDICINE

## 2024-04-18 PROCEDURE — G0432 EIA HIV-1/HIV-2 SCREEN: HCPCS | Performed by: INTERNAL MEDICINE

## 2024-04-18 PROCEDURE — 82607 VITAMIN B-12: CPT | Performed by: INTERNAL MEDICINE

## 2024-04-18 PROCEDURE — 83921 ORGANIC ACID SINGLE QUANT: CPT | Performed by: INTERNAL MEDICINE

## 2024-04-18 PROCEDURE — 83521 IG LIGHT CHAINS FREE EACH: CPT | Performed by: INTERNAL MEDICINE

## 2024-04-18 PROCEDURE — 83540 ASSAY OF IRON: CPT | Performed by: INTERNAL MEDICINE

## 2024-04-18 PROCEDURE — 85730 THROMBOPLASTIN TIME PARTIAL: CPT | Performed by: INTERNAL MEDICINE

## 2024-04-18 PROCEDURE — 86431 RHEUMATOID FACTOR QUANT: CPT | Performed by: INTERNAL MEDICINE

## 2024-04-18 PROCEDURE — 84443 ASSAY THYROID STIM HORMONE: CPT | Performed by: INTERNAL MEDICINE

## 2024-04-18 RX ORDER — ALBUTEROL SULFATE 90 UG/1
AEROSOL, METERED RESPIRATORY (INHALATION)
COMMUNITY
Start: 2024-04-01

## 2024-04-18 RX ORDER — TRAMADOL HYDROCHLORIDE 50 MG/1
TABLET ORAL
COMMUNITY
Start: 2024-01-08

## 2024-04-18 RX ORDER — FLUTICASONE FUROATE, UMECLIDINIUM BROMIDE AND VILANTEROL TRIFENATATE 200; 62.5; 25 UG/1; UG/1; UG/1
POWDER RESPIRATORY (INHALATION)
COMMUNITY
Start: 2024-04-01

## 2024-04-18 NOTE — TELEPHONE ENCOUNTER
Caller: [facility]    Relationship to patient: Provider    Best call back number: [phone]     Chief complaint: CHEST PAIN/ HTN. PACEMAKER CHECKED.     Type of visit: F/U     Requested date: ANYTIME      Additional notes:LAST SEEN 8/28/23, PN STATES Return in about 3 months (around 11/28/2023) for Next scheduled follow up in pacemaker interrogation.     NOT ACTIVE AS OF PHONE CALL

## 2024-04-18 NOTE — PROGRESS NOTES
Venipuncture Blood Specimen Collection  Venipuncture performed in right arm by Diego Segundo MA with good hemostasis. Patient tolerated the procedure well without complications.   04/18/24   Diego Segundo MA

## 2024-04-19 LAB
ALBUMIN SERPL ELPH-MCNC: 4.3 G/DL (ref 2.9–4.4)
ALBUMIN/GLOB SERPL: 1.4 {RATIO} (ref 0.7–1.7)
ALPHA1 GLOB SERPL ELPH-MCNC: 0.2 G/DL (ref 0–0.4)
ALPHA2 GLOB SERPL ELPH-MCNC: 0.7 G/DL (ref 0.4–1)
ANA SER QL: NEGATIVE
B-GLOBULIN SERPL ELPH-MCNC: 0.9 G/DL (ref 0.7–1.3)
CHROMATIN AB SERPL-ACNC: <10 IU/ML (ref 0–14)
FOLATE SERPL-MCNC: 16.3 NG/ML (ref 4.78–24.2)
GAMMA GLOB SERPL ELPH-MCNC: 1.2 G/DL (ref 0.4–1.8)
GLOBULIN SER-MCNC: 3.2 G/DL (ref 2.2–3.9)
HAPTOGLOB SERPL-MCNC: 74 MG/DL (ref 30–200)
HIV 1+2 AB+HIV1 P24 AG SERPL QL IA: NORMAL
IGA SERPL-MCNC: 134 MG/DL (ref 64–422)
IGG SERPL-MCNC: 1234 MG/DL (ref 586–1602)
IGM SERPL-MCNC: 119 MG/DL (ref 26–217)
INTERPRETATION SERPL IEP-IMP: NORMAL
KAPPA LC FREE SER-MCNC: 51.9 MG/L (ref 3.3–19.4)
KAPPA LC FREE/LAMBDA FREE SER: 1.83 {RATIO} (ref 0.26–1.65)
LABORATORY COMMENT REPORT: NORMAL
LAMBDA LC FREE SERPL-MCNC: 28.3 MG/L (ref 5.7–26.3)
M PROTEIN SERPL ELPH-MCNC: NORMAL G/DL
PROT SERPL-MCNC: 7.5 G/DL (ref 6–8.5)
REF LAB TEST METHOD: NORMAL
VIT B12 BLD-MCNC: 1602 PG/ML (ref 211–946)

## 2024-04-21 LAB — STFR SERPL-SCNC: 16.1 NMOL/L (ref 12.2–27.3)

## 2024-04-22 ENCOUNTER — LAB REQUISITION (OUTPATIENT)
Dept: LAB | Facility: HOSPITAL | Age: 79
End: 2024-04-22
Payer: MEDICARE

## 2024-04-22 DIAGNOSIS — R10.9 UNSPECIFIED ABDOMINAL PAIN: ICD-10-CM

## 2024-04-22 LAB
AMYLASE SERPL-CCNC: 84 U/L (ref 28–100)
BASOPHILS # BLD AUTO: 0.02 10*3/MM3 (ref 0–0.2)
BASOPHILS NFR BLD AUTO: 0.3 % (ref 0–1.5)
CRP SERPL-MCNC: <0.3 MG/DL (ref 0–0.5)
DEPRECATED RDW RBC AUTO: 42.3 FL (ref 37–54)
EOSINOPHIL # BLD AUTO: 0.07 10*3/MM3 (ref 0–0.4)
EOSINOPHIL NFR BLD AUTO: 1 % (ref 0.3–6.2)
ERYTHROCYTE [DISTWIDTH] IN BLOOD BY AUTOMATED COUNT: 13.3 % (ref 12.3–15.4)
H PYLORI IGG SER IA-ACNC: NEGATIVE
HCT VFR BLD AUTO: 28.5 % (ref 34–46.6)
HGB BLD-MCNC: 8.9 G/DL (ref 12–15.9)
IMM GRANULOCYTES # BLD AUTO: 0.03 10*3/MM3 (ref 0–0.05)
IMM GRANULOCYTES NFR BLD AUTO: 0.4 % (ref 0–0.5)
LIPASE SERPL-CCNC: 41 U/L (ref 13–60)
LYMPHOCYTES # BLD AUTO: 0.84 10*3/MM3 (ref 0.7–3.1)
LYMPHOCYTES NFR BLD AUTO: 11.9 % (ref 19.6–45.3)
MCH RBC QN AUTO: 27.1 PG (ref 26.6–33)
MCHC RBC AUTO-ENTMCNC: 31.2 G/DL (ref 31.5–35.7)
MCV RBC AUTO: 86.6 FL (ref 79–97)
MONOCYTES # BLD AUTO: 0.55 10*3/MM3 (ref 0.1–0.9)
MONOCYTES NFR BLD AUTO: 7.8 % (ref 5–12)
NEUTROPHILS NFR BLD AUTO: 5.52 10*3/MM3 (ref 1.7–7)
NEUTROPHILS NFR BLD AUTO: 78.6 % (ref 42.7–76)
NRBC BLD AUTO-RTO: 0 /100 WBC (ref 0–0.2)
PLATELET # BLD AUTO: 107 10*3/MM3 (ref 140–450)
PMV BLD AUTO: 11.5 FL (ref 6–12)
RBC # BLD AUTO: 3.29 10*6/MM3 (ref 3.77–5.28)
WBC NRBC COR # BLD AUTO: 7.03 10*3/MM3 (ref 3.4–10.8)

## 2024-04-22 PROCEDURE — 83690 ASSAY OF LIPASE: CPT | Performed by: NURSE PRACTITIONER

## 2024-04-22 PROCEDURE — 86140 C-REACTIVE PROTEIN: CPT | Performed by: NURSE PRACTITIONER

## 2024-04-22 PROCEDURE — 86677 HELICOBACTER PYLORI ANTIBODY: CPT | Performed by: NURSE PRACTITIONER

## 2024-04-22 PROCEDURE — 82150 ASSAY OF AMYLASE: CPT | Performed by: NURSE PRACTITIONER

## 2024-04-22 PROCEDURE — 85025 COMPLETE CBC W/AUTO DIFF WBC: CPT | Performed by: NURSE PRACTITIONER

## 2024-04-24 LAB — METHYLMALONATE SERPL-SCNC: 355 NMOL/L (ref 0–378)

## 2024-04-26 ENCOUNTER — OFFICE VISIT (OUTPATIENT)
Dept: UROLOGY | Facility: CLINIC | Age: 79
End: 2024-04-26
Payer: MEDICARE

## 2024-04-26 VITALS
BODY MASS INDEX: 25.16 KG/M2 | HEIGHT: 65 IN | HEART RATE: 66 BPM | DIASTOLIC BLOOD PRESSURE: 69 MMHG | SYSTOLIC BLOOD PRESSURE: 164 MMHG | WEIGHT: 151 LBS

## 2024-04-26 DIAGNOSIS — N28.1 BILATERAL RENAL CYSTS: Primary | ICD-10-CM

## 2024-04-26 DIAGNOSIS — N18.32 STAGE 3B CHRONIC KIDNEY DISEASE: ICD-10-CM

## 2024-04-26 PROBLEM — N18.9 CHRONIC KIDNEY DISEASE: Status: ACTIVE | Noted: 2023-09-27

## 2024-04-26 RX ORDER — CLONIDINE 0.2 MG/24H
1 PATCH, EXTENDED RELEASE TRANSDERMAL WEEKLY
COMMUNITY
Start: 2024-04-05

## 2024-04-26 RX ORDER — CLONIDINE HYDROCHLORIDE 0.1 MG/1
0.1 TABLET ORAL
COMMUNITY

## 2024-04-26 NOTE — PROGRESS NOTES
"Chief Complaint:    Chief Complaint   Patient presents with    Renal Mass      3 Month        Vital Signs:   /69 (BP Location: Right arm, Patient Position: Sitting)   Pulse 66   Ht 165.1 cm (65\")   Wt 68.5 kg (151 lb)   BMI 25.13 kg/m²   Body mass index is 25.13 kg/m².      HPI:  Susan Us is a 79 y.o. female who presents today for follow up    History of Present Illness  Ms. Us presents to the clinic for follow-up for concerns of a renal mass.  She was initially seen in office and an ultrasound completed that showed concerns of a lobulated mass in the right pole of the kidney.  She followed up with a repeat ultrasound that revealed noes concerns of any solid masses at that time.  Patient has had a CT scan of the abdomen pelvis completed in September 2023 that did not reveal any concerns of a mass at that time as well.  She does have stage IIIb chronic kidney disease.  She reports that she has had some significant cervical neck pain but denies any lower urinary tract symptoms or increase in back or flank pain.  She has also had issues with her blood pressure and complains of elevation of blood pressure early in the mornings and late in the evenings.  She did have recent blood work completed that showed slight worsening of her chronic anemia.  She did have blood work completed today that showed overall improvement in her H&H.  Patient's overall kidney function remained stable with the most recent creatinine around 1.58 and GFR 33.6.  She had a urine analysis roughly 1 week ago that showed protein and leukocytes with no signs of microscopic or gross hematuria.  Urine culture showed no growth.  She is scheduled to have an abdominal ultrasound completed this Monday.      Past Medical History:  Past Medical History:   Diagnosis Date    Anxiety     Bradycardia     s/p pacemaker placement    CKD (chronic kidney disease)     baseline renal function unknown    COPD (chronic obstructive pulmonary disease)  "    Dementia     GERD (gastroesophageal reflux disease)     Hyperlipidemia     Hypertension     Iron deficiency anemia     Thrombocytopenia        Current Meds:  Current Outpatient Medications   Medication Sig Dispense Refill    albuterol sulfate  (90 Base) MCG/ACT inhaler       ARIPiprazole (ABILIFY) 2 MG tablet Take 1 tablet by mouth Daily.      aspirin 81 MG EC tablet Take 1 tablet by mouth Daily.      Bempedoic Acid-Ezetimibe (Nexlizet) 180-10 MG tablet Take 1 tablet by mouth Every Night.      Calcium Carbonate-Vitamin D (calcium 500 mg vitamin D 5 mcg, 200 UT,) 500-5 MG-MCG tablet per tablet Take 1 tablet by mouth 2 (Two) Times a Day.      cloNIDine (CATAPRES) 0.1 MG tablet Take 1 tablet by mouth.      cloNIDine (CATAPRES-TTS) 0.2 MG/24HR patch Place 1 patch on the skin as directed by provider 1 (One) Time Per Week.      Diclofenac Sodium (VOLTAREN) 1 % gel gel Apply 2 g topically to the appropriate area as directed 4 (Four) Times a Day As Needed (joint pain). Knees, neck and shoulders      docusate sodium (COLACE) 250 MG capsule Take 1 capsule by mouth Daily As Needed for Constipation.      donepezil (ARICEPT) 5 MG tablet Take 1 tablet by mouth Every Night.      doxazosin (CARDURA) 4 MG tablet Take 1 tablet by mouth Every Night. 30 tablet 1    ferrous sulfate 325 (65 FE) MG tablet Take 1 tablet by mouth Every Other Day. Patient instructed to take every other day      gabapentin (NEURONTIN) 300 MG capsule Take 1 capsule by mouth 2 (Two) Times a Day. 6 capsule 0    hydrALAZINE (APRESOLINE) 25 MG tablet Take 1 tablet by mouth 3 (Three) Times a Day.      isosorbide mononitrate (IMDUR) 30 MG 24 hr tablet Take 1 tablet by mouth Daily.      Lactobacillus (ACIDOPHILUS/PECTIN PO) Take  by mouth.      LORazepam (ATIVAN) 0.5 MG tablet Take 1 tablet by mouth 2 (Two) Times a Day As Needed for Anxiety. 3 tablet 0    modafinil (PROVIGIL) 200 MG tablet Take 1 tablet by mouth Daily. 3 tablet 0    Multiple  Vitamins-Minerals (ICAPS AREDS 2 PO) Take 1 tablet by mouth 2 (Two) Times a Day.      NIFEdipine CC (ADALAT CC) 30 MG 24 hr tablet Take 1 tablet by mouth Daily. Hold for SBP<110 30 tablet 0    ondansetron (ZOFRAN) 4 MG tablet Take 1 tablet by mouth Every 8 (Eight) Hours As Needed for Nausea or Vomiting.      pantoprazole (PROTONIX) 40 MG EC tablet Take 1 tablet by mouth Every Night.      potassium chloride 10 MEQ CR tablet Take 1 tablet by mouth 3 times a day.      sertraline (ZOLOFT) 100 MG tablet Take 2 tablets by mouth Every Night.      traMADol (ULTRAM) 50 MG tablet       Trelegy Ellipta 200-62.5-25 MCG/ACT inhaler       torsemide (DEMADEX) 100 MG tablet Take 0.5 tablets by mouth Daily.      vitamin B-12 (CYANOCOBALAMIN) 1000 MCG tablet Take 1 tablet by mouth Daily.       No current facility-administered medications for this visit.        Allergies:   Allergies   Allergen Reactions    Evolocumab Itching     itching    Statins Hives        Past Surgical History:  Past Surgical History:   Procedure Laterality Date    BREAST BIOPSY      benign, yrs ago    CARDIAC PACEMAKER PLACEMENT      CATARACT EXTRACTION      CERVICAL FUSION      CHOLECYSTECTOMY      HIATAL HERNIA REPAIR      HYSTERECTOMY      REPLACEMENT TOTAL KNEE Right        Social History:  Social History     Socioeconomic History    Marital status:    Tobacco Use    Smoking status: Never    Smokeless tobacco: Never    Tobacco comments:     Second hand smoke exposure ()   Vaping Use    Vaping status: Never Used   Substance and Sexual Activity    Alcohol use: Never    Drug use: Never    Sexual activity: Defer       Family History:  Family History   Problem Relation Age of Onset    Stroke Mother     Stroke Father     Breast cancer Sister        Review of Systems:  Review of Systems   Constitutional:  Negative for chills, fatigue and fever.   Respiratory:  Negative for cough, shortness of breath and wheezing.    Cardiovascular:  Negative for leg  swelling.   Gastrointestinal:  Negative for abdominal pain and vomiting.   Genitourinary:  Negative for difficulty urinating, dysuria, frequency, hematuria, pelvic pain and urgency.   Musculoskeletal:  Positive for back pain, joint swelling and neck pain.   Neurological:  Positive for dizziness and headaches.   Psychiatric/Behavioral:  Positive for confusion.        Physical Exam:  Physical Exam  Constitutional:       General: She is not in acute distress.     Appearance: Normal appearance.   HENT:      Head: Normocephalic and atraumatic.      Nose: Nose normal.      Mouth/Throat:      Mouth: Mucous membranes are moist.   Eyes:      Conjunctiva/sclera: Conjunctivae normal.   Cardiovascular:      Rate and Rhythm: Normal rate and regular rhythm.      Pulses: Normal pulses.      Heart sounds: Normal heart sounds.   Pulmonary:      Effort: Pulmonary effort is normal.      Breath sounds: Normal breath sounds.   Abdominal:      General: Bowel sounds are normal.      Palpations: Abdomen is soft.   Musculoskeletal:         General: Normal range of motion.      Cervical back: Normal range of motion.      Comments: Uses a walker for ambulation   Skin:     General: Skin is warm.   Neurological:      General: No focal deficit present.      Mental Status: She is alert and oriented to person, place, and time.   Psychiatric:         Mood and Affect: Mood normal.         Behavior: Behavior normal.         Thought Content: Thought content normal.         Judgment: Judgment normal.           Recent Image (CT and/or KUB):   CT Abdomen and Pelvis: No results found for this or any previous visit.     CT Stone Protocol: No results found for this or any previous visit.     KUB: No results found for this or any previous visit.       Labs:  Brief Urine Lab Results  (Last result in the past 365 days)        Color   Clarity   Blood   Leuk Est   Nitrite   Protein   CREAT   Urine HCG        04/05/24 1529 Yellow   Clear   Negative   Moderate  (2+)   Negative   30 mg/dL (1+)                 Lab Requisition on 04/22/2024   Component Date Value Ref Range Status    Amylase 04/22/2024 84  28 - 100 U/L Final    Lipase 04/22/2024 41  13 - 60 U/L Final    C-Reactive Protein 04/22/2024 <0.30  0.00 - 0.50 mg/dL Final    H. pylori IgG 04/22/2024 Negative  Negative, Equivocal Final    WBC 04/22/2024 7.03  3.40 - 10.80 10*3/mm3 Final    RBC 04/22/2024 3.29 (L)  3.77 - 5.28 10*6/mm3 Final    Hemoglobin 04/22/2024 8.9 (L)  12.0 - 15.9 g/dL Final    Hematocrit 04/22/2024 28.5 (L)  34.0 - 46.6 % Final    MCV 04/22/2024 86.6  79.0 - 97.0 fL Final    MCH 04/22/2024 27.1  26.6 - 33.0 pg Final    MCHC 04/22/2024 31.2 (L)  31.5 - 35.7 g/dL Final    RDW 04/22/2024 13.3  12.3 - 15.4 % Final    RDW-SD 04/22/2024 42.3  37.0 - 54.0 fl Final    MPV 04/22/2024 11.5  6.0 - 12.0 fL Final    Platelets 04/22/2024 107 (L)  140 - 450 10*3/mm3 Final    Neutrophil % 04/22/2024 78.6 (H)  42.7 - 76.0 % Final    Lymphocyte % 04/22/2024 11.9 (L)  19.6 - 45.3 % Final    Monocyte % 04/22/2024 7.8  5.0 - 12.0 % Final    Eosinophil % 04/22/2024 1.0  0.3 - 6.2 % Final    Basophil % 04/22/2024 0.3  0.0 - 1.5 % Final    Immature Grans % 04/22/2024 0.4  0.0 - 0.5 % Final    Neutrophils, Absolute 04/22/2024 5.52  1.70 - 7.00 10*3/mm3 Final    Lymphocytes, Absolute 04/22/2024 0.84  0.70 - 3.10 10*3/mm3 Final    Monocytes, Absolute 04/22/2024 0.55  0.10 - 0.90 10*3/mm3 Final    Eosinophils, Absolute 04/22/2024 0.07  0.00 - 0.40 10*3/mm3 Final    Basophils, Absolute 04/22/2024 0.02  0.00 - 0.20 10*3/mm3 Final    Immature Grans, Absolute 04/22/2024 0.03  0.00 - 0.05 10*3/mm3 Final    nRBC 04/22/2024 0.0  0.0 - 0.2 /100 WBC Final   Lab on 04/18/2024   Component Date Value Ref Range Status    Reference Lab Report 04/18/2024    Final                    Value:[facility]  [address] [place] [address]  Phone: [phone] or [phone]  Fax: [phone]  Antonio Kramer,  M.D., Medical Director    PATIENT NAME                                 LABORATORY NO.  [id number]   ROSE MARY CHANG                              [id number]  [id number]  [facility]                          AGE                SEX     SSN            CLIENT REF #  [id number]       1945   F       [id number]    [id number]  [address] [place] [address]                               REQUESTING M.D.       ATTENDING M.D..        COPY TO..  CAITLYN TRUJILLO MARIA PARUL    DATE COLLECTED        DATE RECEIVED          DATE REPORTED  2024    DIAGNOSIS:  PERIPHERAL SMEAR  Normocytic, normochromic anemia.  No significant increase in schistocytes  population.  Thrombocytopenia with no platelet clumping.  Normal WBC count and differential. Granulocytes show normal morphology                           and  no circulating blasts identified.      CLINICAL HISTORY:  Anemia, unspecified type, thrombocytopenia    CLINICAL LABORATORY DATA  WBC        4.81 x10/3/-L    RDW         13.7%  HGB        10.4 g/dl        PLT         119 x10/3/-L  HCT        32.3%            LYMPHS      20.2%  MCV        86.1fL           NEUTS       69.6%  MONO        7.9%  EOS         1.7%  BASO        0.4%    PERIPHERAL SMEAR MICROSCOPIC DESCRIPTION:  [place] stained smears are reviewed microscopically. See diagnosis for details.    Professional interpretation rendered by Paulette Barnhart M.D., MPH at [facility], [address] [place] [address].    GROSS DESCRIPTION:  RECEIVED 1 STAINED AND 1 UNSTAINED SLIDES - MG 2024    REVIEWED, DIAGNOSED AND ELECTRONICALLY  SIGNED BY:    Paulette Barnhart M.D., MPH  CPT CODES:        78325      Protime 2024 13.9  12.1 - 14.7 Seconds Final    INR 2024 1.02  0.90 - 1.10 Final    PTT 2024 28.9  26.5 - 34.5 seconds Final    HIV DUO 2024 Non-Reactive  Non-Reactive Final    Hepatitis B Surface Ag 2024 Non-Reactive   Non-Reactive Final    Hep A IgM 04/18/2024 Non-Reactive  Non-Reactive Final    Hep B C IgM 04/18/2024 Non-Reactive  Non-Reactive Final    Hepatitis C Ab 04/18/2024 Non-Reactive  Non-Reactive Final    MANDY Direct 04/18/2024 Negative  Negative Final    Rheumatoid Factor Quantitative 04/18/2024 <10.0  0.0 - 14.0 IU/mL Final    WBC 04/18/2024 4.81  3.40 - 10.80 10*3/mm3 Final    RBC 04/18/2024 3.75 (L)  3.77 - 5.28 10*6/mm3 Final    Hemoglobin 04/18/2024 10.4 (L)  12.0 - 15.9 g/dL Final    Hematocrit 04/18/2024 32.3 (L)  34.0 - 46.6 % Final    MCV 04/18/2024 86.1  79.0 - 97.0 fL Final    MCH 04/18/2024 27.7  26.6 - 33.0 pg Final    MCHC 04/18/2024 32.2  31.5 - 35.7 g/dL Final    RDW 04/18/2024 13.7  12.3 - 15.4 % Final    RDW-SD 04/18/2024 42.6  37.0 - 54.0 fl Final    MPV 04/18/2024 10.1  6.0 - 12.0 fL Final    Platelets 04/18/2024 119 (L)  140 - 450 10*3/mm3 Final    Neutrophil % 04/18/2024 69.6  42.7 - 76.0 % Final    Lymphocyte % 04/18/2024 20.2  19.6 - 45.3 % Final    Monocyte % 04/18/2024 7.9  5.0 - 12.0 % Final    Eosinophil % 04/18/2024 1.7  0.3 - 6.2 % Final    Basophil % 04/18/2024 0.4  0.0 - 1.5 % Final    Immature Grans % 04/18/2024 0.2  0.0 - 0.5 % Final    Neutrophils, Absolute 04/18/2024 3.35  1.70 - 7.00 10*3/mm3 Final    Lymphocytes, Absolute 04/18/2024 0.97  0.70 - 3.10 10*3/mm3 Final    Monocytes, Absolute 04/18/2024 0.38  0.10 - 0.90 10*3/mm3 Final    Eosinophils, Absolute 04/18/2024 0.08  0.00 - 0.40 10*3/mm3 Final    Basophils, Absolute 04/18/2024 0.02  0.00 - 0.20 10*3/mm3 Final    Immature Grans, Absolute 04/18/2024 0.01  0.00 - 0.05 10*3/mm3 Final    nRBC 04/18/2024 0.0  0.0 - 0.2 /100 WBC Final   Consult on 04/18/2024   Component Date Value Ref Range Status    Ferritin 04/18/2024 97.01  13.00 - 150.00 ng/mL Final    Iron 04/18/2024 59  37 - 145 mcg/dL Final    Iron Saturation (TSAT) 04/18/2024 15 (L)  20 - 50 % Final    Transferrin 04/18/2024 257  200 - 360 mg/dL Final    TIBC 04/18/2024 383   298 - 536 mcg/dL Final    Vitamin B-12 04/18/2024 1,602 (H)  211 - 946 pg/mL Final    Folate 04/18/2024 16.30  4.78 - 24.20 ng/mL Final    Reticulocyte % 04/18/2024 0.93  0.70 - 1.90 % Final    Reticulocyte Absolute 04/18/2024 0.0349  0.0200 - 0.1300 10*6/mm3 Final    LDH 04/18/2024 173  135 - 214 U/L Final    Free Light Chain, [place] 04/18/2024 51.9 (H)  3.3 - 19.4 mg/L Final    Free Lambda Light Chains 04/18/2024 28.3 (H)  5.7 - 26.3 mg/L Final    Kappa/Lambda Ratio 04/18/2024 1.83 (H)  0.26 - 1.65 Final    Sed Rate 04/18/2024 30  0 - 30 mm/hr Final    C-Reactive Protein 04/18/2024 <0.30  0.00 - 0.50 mg/dL Final    Methylmalonic Acid 04/18/2024 355  0 - 378 nmol/L Final    TSH 04/18/2024 0.966  0.270 - 4.200 uIU/mL Final    Transferrin Receptor 04/18/2024 16.1  12.2 - 27.3 nmol/L Final    Haptoglobin 04/18/2024 74  30 - 200 mg/dL Final    IgG 04/18/2024 1234  586 - 1602 mg/dL Final    IgA 04/18/2024 134  64 - 422 mg/dL Final    IgM 04/18/2024 119  26 - 217 mg/dL Final    Total Protein 04/18/2024 7.5  6.0 - 8.5 g/dL Final    Albumin 04/18/2024 4.3  2.9 - 4.4 g/dL Final    Alpha-1-Globulin 04/18/2024 0.2  0.0 - 0.4 g/dL Final    Alpha-2-Globulin 04/18/2024 0.7  0.4 - 1.0 g/dL Final    Beta Globulin 04/18/2024 0.9  0.7 - 1.3 g/dL Final    Gamma Globulin 04/18/2024 1.2  0.4 - 1.8 g/dL Final    M-Trey 04/18/2024 Not Observed  Not Observed g/dL Final    Globulin 04/18/2024 3.2  2.2 - 3.9 g/dL Final    A/G Ratio 04/18/2024 1.4  0.7 - 1.7 Final    Immunofixation Reflex, Serum 04/18/2024 Comment   Final    No monoclonality detected.    Please note 04/18/2024 Comment   Final    Protein electrophoresis scan will follow via computer, mail, or   delivery.    Glucose 04/18/2024 112 (H)  65 - 99 mg/dL Final    BUN 04/18/2024 28 (H)  8 - 23 mg/dL Final    Creatinine 04/18/2024 1.58 (H)  0.57 - 1.00 mg/dL Final    Sodium 04/18/2024 144  136 - 145 mmol/L Final    Potassium 04/18/2024 3.8  3.5 - 5.2 mmol/L Final    Chloride  04/18/2024 104  98 - 107 mmol/L Final    CO2 04/18/2024 26.4  22.0 - 29.0 mmol/L Final    Calcium 04/18/2024 9.5  8.6 - 10.5 mg/dL Final    Total Protein 04/18/2024 7.8  6.0 - 8.5 g/dL Final    Albumin 04/18/2024 5.0  3.5 - 5.2 g/dL Final    ALT (SGPT) 04/18/2024 12  1 - 33 U/L Final    AST (SGOT) 04/18/2024 25  1 - 32 U/L Final    Alkaline Phosphatase 04/18/2024 76  39 - 117 U/L Final    Total Bilirubin 04/18/2024 0.3  0.0 - 1.2 mg/dL Final    Globulin 04/18/2024 2.8  gm/dL Final    A/G Ratio 04/18/2024 1.8  g/dL Final    BUN/Creatinine Ratio 04/18/2024 17.7  7.0 - 25.0 Final    Anion Gap 04/18/2024 13.6  5.0 - 15.0 mmol/L Final    eGFR 04/18/2024 33.6 (L)  >60.0 mL/min/1.73 Final   Admission on 04/16/2024, Discharged on 04/17/2024   Component Date Value Ref Range Status    QT Interval 04/16/2024 434  ms Final    QTC Interval 04/16/2024 451  ms Final    Glucose 04/16/2024 109 (H)  65 - 99 mg/dL Final    BUN 04/16/2024 26 (H)  8 - 23 mg/dL Final    Creatinine 04/16/2024 1.56 (H)  0.57 - 1.00 mg/dL Final    Sodium 04/16/2024 143  136 - 145 mmol/L Final    Potassium 04/16/2024 4.3  3.5 - 5.2 mmol/L Final    Chloride 04/16/2024 106  98 - 107 mmol/L Final    CO2 04/16/2024 26.0  22.0 - 29.0 mmol/L Final    Calcium 04/16/2024 9.3  8.6 - 10.5 mg/dL Final    Total Protein 04/16/2024 7.2  6.0 - 8.5 g/dL Final    Albumin 04/16/2024 4.6  3.5 - 5.2 g/dL Final    ALT (SGPT) 04/16/2024 13  1 - 33 U/L Final    AST (SGOT) 04/16/2024 24  1 - 32 U/L Final    Alkaline Phosphatase 04/16/2024 74  39 - 117 U/L Final    Total Bilirubin 04/16/2024 0.2  0.0 - 1.2 mg/dL Final    Globulin 04/16/2024 2.6  gm/dL Final    A/G Ratio 04/16/2024 1.8  g/dL Final    BUN/Creatinine Ratio 04/16/2024 16.7  7.0 - 25.0 Final    Anion Gap 04/16/2024 11.0  5.0 - 15.0 mmol/L Final    eGFR 04/16/2024 34.1 (L)  >60.0 mL/min/1.73 Final    HS Troponin T 04/16/2024 35 (H)  <14 ng/L Final    Extra Tube 04/16/2024 Hold for add-ons.   Final    Auto resulted.     Extra Tube 04/16/2024 hold for add-on   Final    Auto resulted    Extra Tube 04/16/2024 Hold for add-ons.   Final    Auto resulted.    Extra Tube 04/16/2024 Hold for add-ons.   Final    Auto resulted    WBC 04/16/2024 4.57  3.40 - 10.80 10*3/mm3 Final    RBC 04/16/2024 3.63 (L)  3.77 - 5.28 10*6/mm3 Final    Hemoglobin 04/16/2024 10.0 (L)  12.0 - 15.9 g/dL Final    Hematocrit 04/16/2024 31.0 (L)  34.0 - 46.6 % Final    MCV 04/16/2024 85.4  79.0 - 97.0 fL Final    MCH 04/16/2024 27.5  26.6 - 33.0 pg Final    MCHC 04/16/2024 32.3  31.5 - 35.7 g/dL Final    RDW 04/16/2024 13.6  12.3 - 15.4 % Final    RDW-SD 04/16/2024 42.6  37.0 - 54.0 fl Final    MPV 04/16/2024 10.9  6.0 - 12.0 fL Final    Platelets 04/16/2024 120 (L)  140 - 450 10*3/mm3 Final    Neutrophil % 04/16/2024 62.8  42.7 - 76.0 % Final    Lymphocyte % 04/16/2024 25.6  19.6 - 45.3 % Final    Monocyte % 04/16/2024 9.0  5.0 - 12.0 % Final    Eosinophil % 04/16/2024 1.8  0.3 - 6.2 % Final    Basophil % 04/16/2024 0.4  0.0 - 1.5 % Final    Immature Grans % 04/16/2024 0.4  0.0 - 0.5 % Final    Neutrophils, Absolute 04/16/2024 2.87  1.70 - 7.00 10*3/mm3 Final    Lymphocytes, Absolute 04/16/2024 1.17  0.70 - 3.10 10*3/mm3 Final    Monocytes, Absolute 04/16/2024 0.41  0.10 - 0.90 10*3/mm3 Final    Eosinophils, Absolute 04/16/2024 0.08  0.00 - 0.40 10*3/mm3 Final    Basophils, Absolute 04/16/2024 0.02  0.00 - 0.20 10*3/mm3 Final    Immature Grans, Absolute 04/16/2024 0.02  0.00 - 0.05 10*3/mm3 Final    nRBC 04/16/2024 0.0  0.0 - 0.2 /100 WBC Final    HS Troponin T 04/16/2024 33 (H)  <14 ng/L Final    Troponin T Delta 04/16/2024 -2  >=-4 - <+4 ng/L Final    QT Interval 04/16/2024 460  ms Final    QTC Interval 04/16/2024 478  ms Final   Lab Requisition on 04/05/2024   Component Date Value Ref Range Status    Color, UA 04/05/2024 Yellow  Yellow, Straw Final    Appearance, UA 04/05/2024 Clear  Clear Final    pH, UA 04/05/2024 6.0  5.0 - 8.0 Final    Specific Gravity, UA  04/05/2024 1.024  1.005 - 1.030 Final    Glucose, UA 04/05/2024 Negative  Negative Final    Ketones, UA 04/05/2024 Trace (A)  Negative Final    Bilirubin, UA 04/05/2024 Negative  Negative Final    Blood, UA 04/05/2024 Negative  Negative Final    Protein, UA 04/05/2024 30 mg/dL (1+) (A)  Negative Final    Leuk Esterase, UA 04/05/2024 Moderate (2+) (A)  Negative Final    Nitrite, UA 04/05/2024 Negative  Negative Final    Urobilinogen, UA 04/05/2024 0.2 E.U./dL  0.2 - 1.0 E.U./dL Final    WBC 04/05/2024 3.93  3.40 - 10.80 10*3/mm3 Final    RBC 04/05/2024 3.46 (L)  3.77 - 5.28 10*6/mm3 Final    Hemoglobin 04/05/2024 9.4 (L)  12.0 - 15.9 g/dL Final    Hematocrit 04/05/2024 29.4 (L)  34.0 - 46.6 % Final    MCV 04/05/2024 85.0  79.0 - 97.0 fL Final    MCH 04/05/2024 27.2  26.6 - 33.0 pg Final    MCHC 04/05/2024 32.0  31.5 - 35.7 g/dL Final    RDW 04/05/2024 13.5  12.3 - 15.4 % Final    RDW-SD 04/05/2024 42.0  37.0 - 54.0 fl Final    MPV 04/05/2024 11.4  6.0 - 12.0 fL Final    Platelets 04/05/2024 99 (L)  140 - 450 10*3/mm3 Final    Neutrophil % 04/05/2024 65.9  42.7 - 76.0 % Final    Lymphocyte % 04/05/2024 23.9  19.6 - 45.3 % Final    Monocyte % 04/05/2024 7.1  5.0 - 12.0 % Final    Eosinophil % 04/05/2024 2.0  0.3 - 6.2 % Final    Basophil % 04/05/2024 0.8  0.0 - 1.5 % Final    Immature Grans % 04/05/2024 0.3  0.0 - 0.5 % Final    Neutrophils, Absolute 04/05/2024 2.59  1.70 - 7.00 10*3/mm3 Final    Lymphocytes, Absolute 04/05/2024 0.94  0.70 - 3.10 10*3/mm3 Final    Monocytes, Absolute 04/05/2024 0.28  0.10 - 0.90 10*3/mm3 Final    Eosinophils, Absolute 04/05/2024 0.08  0.00 - 0.40 10*3/mm3 Final    Basophils, Absolute 04/05/2024 0.03  0.00 - 0.20 10*3/mm3 Final    Immature Grans, Absolute 04/05/2024 0.01  0.00 - 0.05 10*3/mm3 Final    nRBC 04/05/2024 0.0  0.0 - 0.2 /100 WBC Final    RBC, UA 04/05/2024 0-2  None Seen, 0-2 /HPF Final    WBC, UA 04/05/2024 11-20 (A)  None Seen, 0-2 /HPF Final    Bacteria, UA 04/05/2024  None Seen  None Seen /HPF Final    Squamous Epithelial Cells, UA 04/05/2024 3-6 (A)  None Seen, 0-2 /HPF Final    Hyaline Casts, UA 04/05/2024 None Seen  None Seen /LPF Final    Methodology 04/05/2024 Automated Microscopy   Final    Urine Culture 04/05/2024 No growth   Final   Hospital Outpatient Visit on 03/25/2024   Component Date Value Ref Range Status    LVIDd 03/25/2024 5.4  cm Final    LVIDs 03/25/2024 4.3  cm Final    IVSd 03/25/2024 1.12  cm Final    LVPWd 03/25/2024 0.94  cm Final    FS 03/25/2024 20.4  % Final    IVS/LVPW 03/25/2024 1.19  cm Final    ESV(cubed) 03/25/2024 78.4  ml Final    LV Sys Vol (BSA corrected) 03/25/2024 30.2  cm2 Final    EDV(cubed) 03/25/2024 155.7  ml Final    LV Velasquez Vol (BSA corrected) 03/25/2024 68.2  cm2 Final    LV mass(C)d 03/25/2024 213.7  grams Final    EDV(MOD-sp4) 03/25/2024 121.6  ml Final    ESV(MOD-sp4) 03/25/2024 53.8  ml Final    SV(MOD-sp4) 03/25/2024 67.8  ml Final    SVi(MOD-SP4) 03/25/2024 38.0  ml/m2 Final    EF(MOD-sp4) 03/25/2024 55.8  % Final    LA dimension (2D)  03/25/2024 4.9  cm Final    Ao pk juvencio 03/25/2024 188.0  cm/sec Final    Ao max PG 03/25/2024 14.1  mmHg Final    Ao mean PG 03/25/2024 8.5  mmHg Final    Ao V2 VTI 03/25/2024 51.0  cm Final    PA acc time 03/25/2024 0.09  sec Final    ACS 03/25/2024 1.71  cm Final    EF(MOD-bp) 03/25/2024 58.0  % Final   Lab Requisition on 02/29/2024   Component Date Value Ref Range Status    ADENOVIRUS, PCR 02/29/2024 Not Detected  Not Detected Final    Coronavirus 229E 02/29/2024 Not Detected  Not Detected Final    Coronavirus HKU1 02/29/2024 Not Detected  Not Detected Final    Coronavirus NL63 02/29/2024 Not Detected  Not Detected Final    Coronavirus OC43 02/29/2024 Not Detected  Not Detected Final    COVID19 02/29/2024 Not Detected  Not Detected - Ref. Range Final    Human Metapneumovirus 02/29/2024 Not Detected  Not Detected Final    Human Rhinovirus/Enterovirus 02/29/2024 Not Detected  Not Detected Final     Influenza A PCR 02/29/2024 Not Detected  Not Detected Final    Influenza B PCR 02/29/2024 Not Detected  Not Detected Final    Parainfluenza Virus 1 02/29/2024 Not Detected  Not Detected Final    Parainfluenza Virus 2 02/29/2024 Not Detected  Not Detected Final    Parainfluenza Virus 3 02/29/2024 Not Detected  Not Detected Final    Parainfluenza Virus 4 02/29/2024 Not Detected  Not Detected Final    RSV, PCR 02/29/2024 Not Detected  Not Detected Final    Bordetella pertussis pcr 02/29/2024 Not Detected  Not Detected Final    Bordetella parapertussis PCR 02/29/2024 Not Detected  Not Detected Final    Chlamydophila pneumoniae PCR 02/29/2024 Not Detected  Not Detected Final    Mycoplasma pneumo by PCR 02/29/2024 Not Detected  Not Detected Final        Procedure: None  Procedures     I have reviewed and agree with the above PMH, PSH, FMH, social history, medications, allergies, and labs.     Assessment/Plan:   Problem List Items Addressed This Visit       Chronic kidney disease    Bilateral renal cysts - Primary       Health Maintenance:   Health Maintenance Due   Topic Date Due    DXA SCAN  Never done    BMI FOLLOWUP  Never done    Pneumococcal Vaccine 65+ (1 of 2 - PCV) Never done    TDAP/TD VACCINES (1 - Tdap) Never done    ZOSTER VACCINE (1 of 2) Never done    RSV Vaccine - Adults (1 - 1-dose 60+ series) Never done    ANNUAL WELLNESS VISIT  Never done    COVID-19 Vaccine (3 - 2023-24 season) 09/01/2023        Smoking Counseling: Never smoked or use smokeless tobacco    Urine Incontinence: Patient reports that she is not currently experiencing any symptoms of urinary incontinence.    Patient was given instructions and counseling regarding her condition or for health maintenance advice. Please see specific information pulled into the AVS if appropriate.    Patient Education:   Renal mass/cyst -patient's most recent renal ultrasound in January was unremarkable.  She is also had a CT scan of the abdomen pelvis that  showed no concerns of any solid renal masses.  At this time would recommend to continue with observation.  Patient is scheduled to undergo a abdominal ultrasound this coming Monday as well as a CT of the cervical spine.  We will closely watch the patient's abdominal ultrasound monitor for any changes and concerns of a right renal mass.  Otherwise we will continue with observation.  Will place her back in roughly 6 months post ultrasound for repeat ultrasound.  Stage IIIb chronic kidney disease -patient's chronic kidney disease remained stable at this time recommend to continue with good glycemic control as well as good hypertensive control.  Advised patient to drink roughly 2 L of water per day and monitor salt intake.  She verbalized understanding.    Visit Diagnoses:    ICD-10-CM ICD-9-CM   1. Bilateral renal cysts  N28.1 753.10   2. Stage 3b chronic kidney disease  N18.32 585.3       Meds Ordered During Visit:  No orders of the defined types were placed in this encounter.      Follow Up Appointment: 6 months  No follow-ups on file.      This document has been electronically signed by Gonzalo Andrade PA-C   April 26, 2024 17:40 EDT    Part of this note may be an electronic transcription/translation of spoken language to printed text using the Dragon Dictation System.

## 2024-04-29 ENCOUNTER — HOSPITAL ENCOUNTER (OUTPATIENT)
Facility: HOSPITAL | Age: 79
Discharge: HOME | End: 2024-04-29
Payer: MEDICARE

## 2024-04-29 DIAGNOSIS — M54.2 NECK PAIN: ICD-10-CM

## 2024-04-29 DIAGNOSIS — D64.9 ANEMIA, UNSPECIFIED TYPE: ICD-10-CM

## 2024-04-29 DIAGNOSIS — D69.6 THROMBOCYTOPENIA: ICD-10-CM

## 2024-04-29 PROCEDURE — 76700 US EXAM ABDOM COMPLETE: CPT

## 2024-04-29 PROCEDURE — 72125 CT NECK SPINE W/O DYE: CPT

## 2024-04-29 PROCEDURE — 76700 US EXAM ABDOM COMPLETE: CPT | Performed by: RADIOLOGY

## 2024-04-29 PROCEDURE — 72125 CT NECK SPINE W/O DYE: CPT | Performed by: RADIOLOGY

## 2024-05-02 ENCOUNTER — TELEPHONE (OUTPATIENT)
Dept: CARDIOLOGY | Facility: CLINIC | Age: 79
End: 2024-05-02

## 2024-05-02 NOTE — TELEPHONE ENCOUNTER
Caller: [facility]    Relationship to patient: Provider    Best call back number: [phone]    Chief complaint: INTERMITTENT CP-HTN    Type of visit: FU    Requested date: NEXT AVAILABLE

## 2024-05-07 ENCOUNTER — APPOINTMENT (OUTPATIENT)
Dept: GENERAL RADIOLOGY | Facility: HOSPITAL | Age: 79
End: 2024-05-07
Payer: MEDICARE

## 2024-05-07 ENCOUNTER — HOSPITAL ENCOUNTER (EMERGENCY)
Facility: HOSPITAL | Age: 79
Discharge: HOME | End: 2024-05-07
Attending: STUDENT IN AN ORGANIZED HEALTH CARE EDUCATION/TRAINING PROGRAM
Payer: MEDICARE

## 2024-05-07 VITALS
SYSTOLIC BLOOD PRESSURE: 191 MMHG | RESPIRATION RATE: 15 BRPM | DIASTOLIC BLOOD PRESSURE: 86 MMHG | BODY MASS INDEX: 25.16 KG/M2 | WEIGHT: 151.01 LBS | HEIGHT: 65 IN | HEART RATE: 65 BPM | TEMPERATURE: 98.2 F | OXYGEN SATURATION: 96 %

## 2024-05-07 DIAGNOSIS — T78.40XA ALLERGIC REACTION, INITIAL ENCOUNTER: Primary | ICD-10-CM

## 2024-05-07 PROCEDURE — 96375 TX/PRO/DX INJ NEW DRUG ADDON: CPT

## 2024-05-07 PROCEDURE — 71045 X-RAY EXAM CHEST 1 VIEW: CPT | Performed by: RADIOLOGY

## 2024-05-07 PROCEDURE — 99283 EMERGENCY DEPT VISIT LOW MDM: CPT

## 2024-05-07 PROCEDURE — 71045 X-RAY EXAM CHEST 1 VIEW: CPT

## 2024-05-07 PROCEDURE — 96374 THER/PROPH/DIAG INJ IV PUSH: CPT

## 2024-05-07 PROCEDURE — 25010000002 METHYLPREDNISOLONE PER 125 MG: Performed by: NURSE PRACTITIONER

## 2024-05-07 PROCEDURE — 25010000002 DIPHENHYDRAMINE PER 50 MG: Performed by: NURSE PRACTITIONER

## 2024-05-07 RX ORDER — FAMOTIDINE 10 MG/ML
20 INJECTION, SOLUTION INTRAVENOUS ONCE
Status: COMPLETED | OUTPATIENT
Start: 2024-05-07 | End: 2024-05-07

## 2024-05-07 RX ORDER — DIPHENHYDRAMINE HYDROCHLORIDE 50 MG/ML
25 INJECTION INTRAMUSCULAR; INTRAVENOUS ONCE
Status: COMPLETED | OUTPATIENT
Start: 2024-05-07 | End: 2024-05-07

## 2024-05-07 RX ORDER — METHYLPREDNISOLONE 4 MG/1
TABLET ORAL
Qty: 1 EACH | Refills: 0 | Status: SHIPPED | OUTPATIENT
Start: 2024-05-07

## 2024-05-07 RX ORDER — METHYLPREDNISOLONE SODIUM SUCCINATE 125 MG/2ML
125 INJECTION, POWDER, LYOPHILIZED, FOR SOLUTION INTRAMUSCULAR; INTRAVENOUS ONCE
Status: COMPLETED | OUTPATIENT
Start: 2024-05-07 | End: 2024-05-07

## 2024-05-07 RX ORDER — CLONIDINE HYDROCHLORIDE 0.1 MG/1
0.1 TABLET ORAL ONCE
Status: COMPLETED | OUTPATIENT
Start: 2024-05-07 | End: 2024-05-07

## 2024-05-07 RX ORDER — SODIUM CHLORIDE 0.9 % (FLUSH) 0.9 %
10 SYRINGE (ML) INJECTION AS NEEDED
Status: DISCONTINUED | OUTPATIENT
Start: 2024-05-07 | End: 2024-05-08 | Stop reason: HOSPADM

## 2024-05-07 RX ADMIN — DIPHENHYDRAMINE HYDROCHLORIDE 25 MG: 50 INJECTION, SOLUTION INTRAMUSCULAR; INTRAVENOUS at 19:54

## 2024-05-07 RX ADMIN — CLONIDINE HYDROCHLORIDE 0.1 MG: 0.1 TABLET ORAL at 22:04

## 2024-05-07 RX ADMIN — METHYLPREDNISOLONE SODIUM SUCCINATE 125 MG: 125 INJECTION, POWDER, FOR SOLUTION INTRAMUSCULAR; INTRAVENOUS at 19:54

## 2024-05-07 RX ADMIN — FAMOTIDINE 20 MG: 10 INJECTION, SOLUTION INTRAVENOUS at 19:54

## 2024-05-07 NOTE — ED PROVIDER NOTES
Subjective   History of Present Illness  Patient is a 79-year-old female presents today from the nursing home for an allergic reaction.  Patient reports that she had developed a rash yesterday states that they gave her Benadryl and it did not improve so they did not send patient to the ER.  Patient reports that upon awaking this morning she had strawberries and states that few hours later she also developed a rash.  Spoke with family and advised the patient developed rash after eating strawberries yesterday as well.        Review of Systems   Constitutional: Negative.    HENT: Negative.     Eyes: Negative.    Respiratory: Negative.     Cardiovascular: Negative.    Gastrointestinal: Negative.    Endocrine: Negative.    Genitourinary: Negative.    Musculoskeletal: Negative.    Skin: Negative.    Allergic/Immunologic: Negative.    Neurological: Negative.    Hematological: Negative.    Psychiatric/Behavioral: Negative.         Past Medical History:   Diagnosis Date    Anxiety     Bradycardia     s/p pacemaker placement    CKD (chronic kidney disease)     baseline renal function unknown    COPD (chronic obstructive pulmonary disease)     Dementia     GERD (gastroesophageal reflux disease)     Hyperlipidemia     Hypertension     Iron deficiency anemia     Thrombocytopenia        Allergies   Allergen Reactions    Evolocumab Itching     itching    Statins Hives    N[place]e Rash       Past Surgical History:   Procedure Laterality Date    BREAST BIOPSY      benign, yrs ago    CARDIAC PACEMAKER PLACEMENT      CATARACT EXTRACTION      CERVICAL FUSION      CHOLECYSTECTOMY      HIATAL HERNIA REPAIR      HYSTERECTOMY      REPLACEMENT TOTAL KNEE Right        Family History   Problem Relation Age of Onset    Stroke Mother     Stroke Father     Breast cancer Sister        Social History     Socioeconomic History    Marital status:    Tobacco Use    Smoking status: Never    Smokeless tobacco: Never    Tobacco comments:      Second hand smoke exposure ()   Vaping Use    Vaping status: Never Used   Substance and Sexual Activity    Alcohol use: Never    Drug use: Never    Sexual activity: Defer           Objective   Physical Exam  Vitals and nursing note reviewed.   Constitutional:       Appearance: She is well-developed.   HENT:      Head: Normocephalic.      Right Ear: External ear normal.      Left Ear: External ear normal.   Eyes:      Conjunctiva/sclera: Conjunctivae normal.      Pupils: Pupils are equal, round, and reactive to light.   Cardiovascular:      Rate and Rhythm: Normal rate and regular rhythm.      Heart sounds: Normal heart sounds.   Pulmonary:      Effort: Pulmonary effort is normal.      Breath sounds: Normal breath sounds.   Abdominal:      General: Bowel sounds are normal.      Palpations: Abdomen is soft.   Musculoskeletal:         General: Normal range of motion.      Cervical back: Normal range of motion and neck supple.   Skin:     General: Skin is warm and dry.      Capillary Refill: Capillary refill takes less than 2 seconds.      Findings: Rash (urticaria on trunk and extremities) present.   Neurological:      Mental Status: She is alert and oriented to person, place, and time.   Psychiatric:         Behavior: Behavior normal.         Thought Content: Thought content normal.         Procedures           ED Course                                             Medical Decision Making  Problems Addressed:  Allergic reaction, initial encounter: complicated acute illness or injury    Amount and/or Complexity of Data Reviewed  Radiology: ordered.    Risk  Prescription drug management.        Final diagnoses:   Allergic reaction, initial encounter       ED Disposition  ED Disposition       ED Disposition   Discharge    Condition   Stable    Comment   --               Bruna Smith MD  [facility] [address] [facility] [address]  [phone]    Schedule an appointment as soon as possible for a visit   For further  evaluation         Medication List        New Prescriptions      methylPREDNISolone 4 MG dose pack  Commonly known as: MEDROL  Take as directed on package instructions.            Changed      doxazosin 4 MG tablet  Commonly known as: CARDURA  Take 1 tablet by mouth Every Night.  What changed: how much to take               Where to Get Your Medications        You can get these medications from any pharmacy    Bring a paper prescription for each of these medications  methylPREDNISolone 4 MG dose pack            Richie Christiansen, APRN  05/23/24 [id number]

## 2024-05-09 ENCOUNTER — OFFICE VISIT (OUTPATIENT)
Dept: CARDIOLOGY | Facility: CLINIC | Age: 79
End: 2024-05-09
Payer: MEDICARE

## 2024-05-09 VITALS
BODY MASS INDEX: 25.49 KG/M2 | HEIGHT: 65 IN | HEART RATE: 65 BPM | DIASTOLIC BLOOD PRESSURE: 64 MMHG | OXYGEN SATURATION: 97 % | SYSTOLIC BLOOD PRESSURE: 134 MMHG | WEIGHT: 153 LBS

## 2024-05-09 DIAGNOSIS — I10 ESSENTIAL HYPERTENSION: Primary | Chronic | ICD-10-CM

## 2024-05-09 DIAGNOSIS — Z95.0 ARTIFICIAL CARDIAC PACEMAKER: Chronic | ICD-10-CM

## 2024-05-09 PROCEDURE — 3078F DIAST BP <80 MM HG: CPT | Performed by: NURSE PRACTITIONER

## 2024-05-09 PROCEDURE — 1160F RVW MEDS BY RX/DR IN RCRD: CPT | Performed by: NURSE PRACTITIONER

## 2024-05-09 PROCEDURE — 1159F MED LIST DOCD IN RCRD: CPT | Performed by: NURSE PRACTITIONER

## 2024-05-09 PROCEDURE — 3075F SYST BP GE 130 - 139MM HG: CPT | Performed by: NURSE PRACTITIONER

## 2024-05-09 PROCEDURE — 99214 OFFICE O/P EST MOD 30 MIN: CPT | Performed by: NURSE PRACTITIONER

## 2024-05-09 RX ORDER — POLYETHYLENE GLYCOL 3350 17 G/17G
17 POWDER, FOR SOLUTION ORAL DAILY
COMMUNITY

## 2024-05-09 RX ORDER — AMOXICILLIN 250 MG
1 CAPSULE ORAL DAILY
COMMUNITY

## 2024-05-09 RX ORDER — LIDOCAINE 5 G/100G
1 CREAM RECTAL; TOPICAL 3 TIMES DAILY PRN
COMMUNITY

## 2024-05-09 RX ORDER — PREDNISONE 20 MG/1
20 TABLET ORAL DAILY
COMMUNITY

## 2024-05-09 RX ORDER — CYCLOSPORINE 0.5 MG/ML
1 EMULSION OPHTHALMIC 2 TIMES DAILY
COMMUNITY

## 2024-05-09 RX ORDER — PREDNISONE 10 MG/1
10 TABLET ORAL DAILY
COMMUNITY

## 2024-05-09 RX ORDER — CARVEDILOL 6.25 MG/1
6.25 TABLET ORAL 2 TIMES DAILY
Qty: 60 TABLET | Refills: 11 | Status: SHIPPED | OUTPATIENT
Start: 2024-05-09

## 2024-05-09 RX ORDER — CHOLECALCIFEROL (VITAMIN D3) 125 MCG
5 CAPSULE ORAL
COMMUNITY

## 2024-05-09 RX ORDER — CALCIUM CARBONATE 500(1250)
TABLET ORAL 2 TIMES DAILY
COMMUNITY

## 2024-05-09 RX ORDER — LIDOCAINE 4 G/G
1 PATCH TOPICAL EVERY 24 HOURS
COMMUNITY

## 2024-05-09 RX ORDER — DIPHENHYDRAMINE HCL 25 MG
25 CAPSULE ORAL AS NEEDED
COMMUNITY

## 2024-05-09 RX ORDER — LIDOCAINE 50 MG/G
1 PATCH TOPICAL EVERY 24 HOURS
COMMUNITY

## 2024-05-09 RX ORDER — TRIAMCINOLONE ACETONIDE 1 MG/G
1 CREAM TOPICAL 2 TIMES DAILY
COMMUNITY

## 2024-05-09 RX ORDER — FLUTICASONE PROPIONATE 50 MCG
2 SPRAY, SUSPENSION (ML) NASAL DAILY
COMMUNITY

## 2024-05-14 ENCOUNTER — OFFICE VISIT (OUTPATIENT)
Dept: ONCOLOGY | Facility: CLINIC | Age: 79
End: 2024-05-14
Payer: MEDICARE

## 2024-05-14 ENCOUNTER — LAB (OUTPATIENT)
Dept: ONCOLOGY | Facility: CLINIC | Age: 79
End: 2024-05-14
Payer: MEDICARE

## 2024-05-14 VITALS
BODY MASS INDEX: 25.16 KG/M2 | DIASTOLIC BLOOD PRESSURE: 59 MMHG | SYSTOLIC BLOOD PRESSURE: 135 MMHG | HEART RATE: 65 BPM | WEIGHT: 151 LBS | RESPIRATION RATE: 20 BRPM | TEMPERATURE: 98 F | HEIGHT: 65 IN | OXYGEN SATURATION: 97 %

## 2024-05-14 DIAGNOSIS — D64.9 ANEMIA, UNSPECIFIED TYPE: Primary | ICD-10-CM

## 2024-05-14 DIAGNOSIS — D50.9 IRON DEFICIENCY ANEMIA, UNSPECIFIED IRON DEFICIENCY ANEMIA TYPE: ICD-10-CM

## 2024-05-14 DIAGNOSIS — R16.1 SPLENOMEGALY: ICD-10-CM

## 2024-05-14 DIAGNOSIS — D69.6 THROMBOCYTOPENIA: ICD-10-CM

## 2024-05-14 DIAGNOSIS — D63.8 ANEMIA OF CHRONIC DISEASE: ICD-10-CM

## 2024-05-14 DIAGNOSIS — G45.9 TIA (TRANSIENT ISCHEMIC ATTACK): Primary | ICD-10-CM

## 2024-05-14 LAB
BASOPHILS # BLD AUTO: 0.04 10*3/MM3 (ref 0–0.2)
BASOPHILS NFR BLD AUTO: 0.5 % (ref 0–1.5)
DEPRECATED RDW RBC AUTO: 44.3 FL (ref 37–54)
EOSINOPHIL # BLD AUTO: 0.1 10*3/MM3 (ref 0–0.4)
EOSINOPHIL NFR BLD AUTO: 1.2 % (ref 0.3–6.2)
ERYTHROCYTE [DISTWIDTH] IN BLOOD BY AUTOMATED COUNT: 13.7 % (ref 12.3–15.4)
HCT VFR BLD AUTO: 32.3 % (ref 34–46.6)
HGB BLD-MCNC: 10 G/DL (ref 12–15.9)
IMM GRANULOCYTES # BLD AUTO: 0.03 10*3/MM3 (ref 0–0.05)
IMM GRANULOCYTES NFR BLD AUTO: 0.4 % (ref 0–0.5)
LYMPHOCYTES # BLD AUTO: 0.97 10*3/MM3 (ref 0.7–3.1)
LYMPHOCYTES NFR BLD AUTO: 11.6 % (ref 19.6–45.3)
MCH RBC QN AUTO: 27.2 PG (ref 26.6–33)
MCHC RBC AUTO-ENTMCNC: 31 G/DL (ref 31.5–35.7)
MCV RBC AUTO: 88 FL (ref 79–97)
MONOCYTES # BLD AUTO: 0.76 10*3/MM3 (ref 0.1–0.9)
MONOCYTES NFR BLD AUTO: 9.1 % (ref 5–12)
NEUTROPHILS NFR BLD AUTO: 6.49 10*3/MM3 (ref 1.7–7)
NEUTROPHILS NFR BLD AUTO: 77.2 % (ref 42.7–76)
NRBC BLD AUTO-RTO: 0 /100 WBC (ref 0–0.2)
PLATELET # BLD AUTO: 128 10*3/MM3 (ref 140–450)
PMV BLD AUTO: 10.5 FL (ref 6–12)
RBC # BLD AUTO: 3.67 10*6/MM3 (ref 3.77–5.28)
WBC NRBC COR # BLD AUTO: 8.39 10*3/MM3 (ref 3.4–10.8)

## 2024-05-14 PROCEDURE — 85025 COMPLETE CBC W/AUTO DIFF WBC: CPT | Performed by: INTERNAL MEDICINE

## 2024-05-14 PROCEDURE — 3078F DIAST BP <80 MM HG: CPT | Performed by: INTERNAL MEDICINE

## 2024-05-14 PROCEDURE — 1126F AMNT PAIN NOTED NONE PRSNT: CPT | Performed by: INTERNAL MEDICINE

## 2024-05-14 PROCEDURE — 99213 OFFICE O/P EST LOW 20 MIN: CPT | Performed by: INTERNAL MEDICINE

## 2024-05-14 PROCEDURE — 3075F SYST BP GE 130 - 139MM HG: CPT | Performed by: INTERNAL MEDICINE

## 2024-05-14 NOTE — PROGRESS NOTES
Venipuncture Blood Specimen Collection  Venipuncture performed in right arm by Cris Craven MA with good hemostasis. Patient tolerated the procedure well without complications.   05/14/24   Cris Craven MA

## 2024-05-14 NOTE — PROGRESS NOTES
Subjective     Date: 5/14/2024    Referring Provider  No ref. provider found    Chief Complaint  Bicytopenia (anemia and thrombocytopenia)    Subjective          Susan Us is a 79 y.o. female who presents today to [facility] for follow up.     HPI:   79 y.o. female with past medical history of hypertension, hyperlipidemia, dementia, chronic kidney disease, PPM s/p bradycardia who presents for consultation regarding bicytopenia (normocytic anemia and thrombocytopenia) from nursing home.    She presents today with her daughter, Leslie, who assists in history. She was seen by a hematologist, Dr. Luna in [place], [place] several years ago. Did not go through a bone marrow biopsy. Had U/S of the abdomen performed. She was placed on oral iron, which she is currently on. No other intervention was done.     Ms. Us denies any new symptoms including fever, chills, night sweats, unintentional weight loss, or fatigue. Denies any recent lumps/bumps. Underwent a colonoscopy 3-4 years ago by someone in [place], unable to recall whom. Was found to have polyps, no bleeding was noted. Currently denies any bleeding.    CBC from 4/16 show normal WBC and differential, Hgb 10, hct 31, Plt 120K.     Denies smoking, alcohol  use, drug use. Worked as a , , and . Family history significant for sister with breast cancer requiring bilateral mastectomy.     Interval History 05/14/2024   Labs from initial consultation show hemoglobin persistently low at 10.4, MCV 86, platelets 119.  Normal white blood count.  Peripheral smear with normocytic, normochromic anemia.  No increase in schistocytes.  Thrombocytopenia with no platelet clumping.  Granulocytes show normal morphology and no circulating blasts.  HIV antibody is nonreactive.  Negative for hepatitis studies.  Negative for rheumatoid factor and lupus screen. Iron studies showed mild iron deficiency with  ferritin 97 ng/mL (patient is on oral ferrous sulfate). Replete B12/folate. No signs of hemolysis as normal LDH/haptoglobin. SPEP with no M spike, elevated free kappa light chain to 52, elevated free lambda light chain 28 with normal kappa/lambda ratio 1.83.  Ultrasound of the abdomen shows splenomegaly with spleen measuring 13.5 cm.  Liver is unremarkable.    These results were reviewed with Leslie and Ms. Us today. I don't believe her underlying bicytopenia is due to MDS or AML as she is asymptomatic with reasons for anemia such as iron deficiency and anemia of chronic disease and splenomegaly causing thrombocytopenia. IF she were to have bleeding or weight loss, fever, chills, night sweats etc, or drop in her H/H and platelets without a cause, I would consider bone marrow biopsy.     Objective     Objective     Allergy:   Allergies   Allergen Reactions    Evolocumab Itching     itching    Statins Hives    Strawberry Rash        Current Medications:   Current Outpatient Medications   Medication Sig Dispense Refill    Acetaminophen (CHLORASEPTIC SORE THROAT PO) Take  by mouth As Needed.      albuterol sulfate  (90 Base) MCG/ACT inhaler       ARIPiprazole (ABILIFY) 2 MG tablet Take 1 tablet by mouth Daily.      aspirin 81 MG EC tablet Take 1 tablet by mouth Daily.      Bempedoic Acid-Ezetimibe (Nexlizet) 180-10 MG tablet Take 1 tablet by mouth Every Night.      calcium carbonate, oyster shell, 500 MG tablet tablet Take  by mouth 2 (Two) Times a Day.      Calcium Carbonate-Vitamin D (calcium 500 mg vitamin D 5 mcg, 200 UT,) 500-5 MG-MCG tablet per tablet Take 1 tablet by mouth 2 (Two) Times a Day. (Patient not taking: Reported on 5/9/2024)      carvedilol (COREG) 6.25 MG tablet Take 1 tablet by mouth 2 (Two) Times a Day. 60 tablet 11    cloNIDine (CATAPRES) 0.1 MG tablet Take 1 tablet by mouth.      cloNIDine (CATAPRES-TTS) 0.1 MG/24HR patch Place 1 patch on the skin as directed by provider 1 (One) Time  Per Week.      cycloSPORINE (RESTASIS) 0.05 % ophthalmic emulsion 1 drop 2 (Two) Times a Day.      Diclofenac Sodium (VOLTAREN) 1 % gel gel Apply 2 g topically to the appropriate area as directed 4 (Four) Times a Day As Needed (joint pain). Knees, neck and shoulders      diphenhydrAMINE (BENADRYL) 25 mg capsule Take 1 capsule by mouth As Needed for Itching.      docusate sodium (COLACE) 250 MG capsule Take 1 capsule by mouth Daily As Needed for Constipation.      donepezil (ARICEPT) 10 MG tablet Take 1 tablet by mouth Every Night.      doxazosin (CARDURA) 4 MG tablet Take 1 tablet by mouth Every Night. (Patient taking differently: Take 2 tablets by mouth Every Night.) 30 tablet 1    ferrous sulfate 325 (65 FE) MG tablet Take 1 tablet by mouth Every Other Day. Patient instructed to take every other day      fluticasone (FLONASE) 50 MCG/ACT nasal spray 2 sprays into the nostril(s) as directed by provider Daily.      gabapentin (NEURONTIN) 300 MG capsule Take 1 capsule by mouth 2 (Two) Times a Day. 6 capsule 0    hydrALAZINE (APRESOLINE) 25 MG tablet Take 1 tablet by mouth 3 (Three) Times a Day.      isosorbide mononitrate (IMDUR) 60 MG 24 hr tablet Take 1 tablet by mouth Daily.      Lactobacillus (ACIDOPHILUS/PECTIN PO) Take  by mouth.      lidocaine (LIDODERM) 5 % Place 1 patch on the skin as directed by provider Daily. Remove & Discard patch within 12 hours or as directed by MD      Lidocaine 4 % Place 1 patch on the skin as directed by provider Daily. Remove & Discard patch within 12 hours or as directed by MD      Lidocaine, Anorectal, (Hemorrhoidal Relief) 5 % cream cream Apply 1 g topically to the appropriate area as directed 3 (Three) Times a Day As Needed.      LORazepam (ATIVAN) 0.5 MG tablet Take 1 tablet by mouth 2 (Two) Times a Day As Needed for Anxiety. 3 tablet 0    melatonin 5 MG tablet tablet Take 1 tablet by mouth.      methylPREDNISolone (MEDROL) 4 MG dose pack Take as directed on package  instructions. (Patient not taking: Reported on 5/9/2024) 1 each 0    modafinil (PROVIGIL) 200 MG tablet Take 1 tablet by mouth Daily. 3 tablet 0    Multiple Vitamins-Minerals (ICAPS AREDS 2 PO) Take 1 tablet by mouth 2 (Two) Times a Day.      ondansetron (ZOFRAN) 4 MG tablet Take 1 tablet by mouth Every 8 (Eight) Hours As Needed for Nausea or Vomiting.      pantoprazole (PROTONIX) 40 MG EC tablet Take 1 tablet by mouth Every Night.      polyethylene glycol (MiraLax) 17 GM/SCOOP powder Take 17 g by mouth Daily.      potassium chloride 10 MEQ CR tablet Take 1 tablet by mouth 3 times a day.      predniSONE (DELTASONE) 10 MG tablet Take 1 tablet by mouth Daily.      predniSONE (DELTASONE) 20 MG tablet Take 1 tablet by mouth Daily.      sennosides-docusate (senna-docusate sodium) 8.6-50 MG per tablet Take 1 tablet by mouth Daily.      sertraline (ZOLOFT) 100 MG tablet Take 2 tablets by mouth Every Night.      torsemide (DEMADEX) 100 MG tablet Take 0.5 tablets by mouth Daily. (Patient not taking: Reported on 5/9/2024)      traMADol (ULTRAM) 50 MG tablet       Trelegy Ellipta 200-62.5-25 MCG/ACT inhaler       triamcinolone (KENALOG) 0.1 % cream Apply 1 Application topically to the appropriate area as directed 2 (Two) Times a Day.      vitamin B-12 (CYANOCOBALAMIN) 1000 MCG tablet Take 1 tablet by mouth Daily.       No current facility-administered medications for this visit.       Past Medical History:  Past Medical History:   Diagnosis Date    Anxiety     Bradycardia     s/p pacemaker placement    CKD (chronic kidney disease)     baseline renal function unknown    COPD (chronic obstructive pulmonary disease)     Dementia     GERD (gastroesophageal reflux disease)     Hyperlipidemia     Hypertension     Iron deficiency anemia     Thrombocytopenia        Past Surgical History:  Past Surgical History:   Procedure Laterality Date    BREAST BIOPSY      benign, yrs ago    CARDIAC PACEMAKER PLACEMENT      CATARACT EXTRACTION    "   CERVICAL FUSION      CHOLECYSTECTOMY      HIATAL HERNIA REPAIR      HYSTERECTOMY      REPLACEMENT TOTAL KNEE Right        Social History:  Social History     Socioeconomic History    Marital status:    Tobacco Use    Smoking status: Never    Smokeless tobacco: Never    Tobacco comments:     Second hand smoke exposure ()   Vaping Use    Vaping status: Never Used   Substance and Sexual Activity    Alcohol use: Never    Drug use: Never    Sexual activity: Defer         Family History:  Family History   Problem Relation Age of Onset    Stroke Mother     Stroke Father     Breast cancer Sister        Review of Systems:  Review of Systems   All other systems reviewed and are negative.      Vital Signs:   /59   Pulse 65   Temp 98 °F (36.7 °C) (Temporal)   Resp 20   Ht 165.1 cm (65\")   Wt 68.5 kg (151 lb)   SpO2 97%   BMI 25.13 kg/m²      Physical Exam:  Physical Exam  Vitals reviewed.   Constitutional:       General: She is not in acute distress.     Appearance: Normal appearance. She is not ill-appearing.      Comments: In a wheelchair   HENT:      Head: Normocephalic and atraumatic.      Mouth/Throat:      Mouth: Mucous membranes are moist.      Pharynx: Oropharynx is clear.   Eyes:      Conjunctiva/sclera: Conjunctivae normal.      Pupils: Pupils are equal, round, and reactive to light.   Cardiovascular:      Rate and Rhythm: Normal rate and regular rhythm.      Heart sounds: No murmur heard.  Pulmonary:      Effort: Pulmonary effort is normal. No respiratory distress.      Breath sounds: Normal breath sounds. No wheezing.   Abdominal:      General: Abdomen is flat. Bowel sounds are normal. There is no distension.      Palpations: Abdomen is soft. There is no mass.      Tenderness: There is no abdominal tenderness. There is no guarding.   Musculoskeletal:         General: No swelling. Normal range of motion.      Cervical back: Normal range of motion.   Lymphadenopathy:      Cervical: No " "cervical adenopathy.   Skin:     General: Skin is warm and dry.   Neurological:      General: No focal deficit present.      Mental Status: She is alert and oriented to person, place, and time. Mental status is at baseline.   Psychiatric:         Mood and Affect: Mood normal.         PHQ-9 Score  PHQ-9 Total Score:       Pain Score  Vitals:    05/14/24 1240   BP: 135/59   Pulse: 65   Resp: 20   Temp: 98 °F (36.7 °C)   TempSrc: Temporal   SpO2: 97%   Weight: 68.5 kg (151 lb)   Height: 165.1 cm (65\")   PainSc: 0-No pain         ECOG score: 0           PAINSCOREQUALITYMETRIC:   Vitals:    05/14/24 1240   PainSc: 0-No pain            Lab Review  Lab Results   Component Value Date    WBC 8.39 05/14/2024    HGB 10.0 (L) 05/14/2024    HCT 32.3 (L) 05/14/2024    MCV 88.0 05/14/2024    RDW 13.7 05/14/2024     (L) 05/14/2024    NEUTRORELPCT 77.2 (H) 05/14/2024    LYMPHORELPCT 11.6 (L) 05/14/2024    MONORELPCT 9.1 05/14/2024    EOSRELPCT 1.2 05/14/2024    BASORELPCT 0.5 05/14/2024    NEUTROABS 6.49 05/14/2024    LYMPHSABS 0.97 05/14/2024     Lab Results   Component Value Date     04/18/2024    K 3.8 04/18/2024    CO2 26.4 04/18/2024     04/18/2024    BUN 28 (H) 04/18/2024    CREATININE 1.58 (H) 04/18/2024    GLUCOSE 112 (H) 04/18/2024    CALCIUM 9.5 04/18/2024    ALKPHOS 76 04/18/2024    AST 25 04/18/2024    ALT 12 04/18/2024    BILITOT 0.3 04/18/2024    ALBUMIN 4.3 04/18/2024    ALBUMIN 5.0 04/18/2024    PROTEINTOT 7.8 04/18/2024    MG 2.0 09/27/2023               Work up 4/18/2024:    Component      Latest Ref Rng 4/18/2024   Iron      37 - 145 mcg/dL 59    Iron Saturation (TSAT)      20 - 50 % 15 (L)    Transferrin      200 - 360 mg/dL 257    TIBC      298 - 536 mcg/dL 383    Hepatitis B Surface Ag      Non-Reactive  Non-Reactive    Hep A IgM      Non-Reactive  Non-Reactive    Hep B Core IgM      Non-Reactive  Non-Reactive    Hepatitis C Ab      Non-Reactive  Non-Reactive    Reticulocyte %      0.70 - " 1.90 % 0.93    Reticulocyte Absolute      0.0200 - 0.1300 10*6/mm3 0.0349    Protime      12.1 - 14.7 Seconds 13.9    INR      0.90 - 1.10  1.02    Ferritin      13.00 - 150.00 ng/mL 97.01    Vitamin B-12      211 - 946 pg/mL 1,602 (H)    Folate      4.78 - 24.20 ng/mL 16.30    LDH      135 - 214 U/L 173    Sed Rate      0 - 30 mm/hr 30    C-Reactive Protein      0.00 - 0.50 mg/dL <0.30    Methylmalonic Acid      0 - 378 nmol/L 355    TSH Baseline      0.270 - 4.200 uIU/mL 0.966    Transferrin Receptor      12.2 - 27.3 nmol/L 16.1    Haptoglobin      30 - 200 mg/dL 74    PTT      26.5 - 34.5 seconds 28.9    HIV DUO      Non-Reactive  Non-Reactive    MANDY Direct      Negative  Negative    Rheumatoid Factor Quantitative      0.0 - 14.0 IU/mL <10.0       Legend:  (L) Low  (H) High      PERIPHERAL SMEAR, [facility] (04/18/2024 11:53)     DIAGNOSIS:  PERIPHERAL SMEAR  Normocytic, normochromic anemia.  No significant increase in schistocytes  population.  Thrombocytopenia with no platelet clumping.  Normal WBC count and differential. Granulocytes show normal morphology and  no circulating blasts identified.       VINAYAK + PE (04/18/2024 11:53)           Immunoglobulin Free LT Chains Blood (04/18/2024 11:53)           Radiology Results    US Abdomen Complete (04/29/2024 13:02)   FINDINGS: .  The visualized pancreas is unremarkable. The liver is unremarkable. The  patient is status post cholecystectomy.  The common duct measures 3 mm.  The right kidney measures 8.1 cm in length and is normal in echogenicity  without hydronephrosis. The left kidney measures 9.3 cm in length and is  normal in echogenicity without hydronephrosis.  Spleen is enlarged  measuring 13.5 cm.  The aorta is normal in caliber. The vena cava is  unremarkable.     IMPRESSION:  Splenomegaly.    CT Abdomen Pelvis Without Contrast (09/27/2023 23:02)   FINDINGS:   Included portions of the lung bases are normal.  The liver and spleen are normal.  There is no  biliary dilation.  The  gallbladder has been removed.  The pancreas is normal.  The adrenal glands are normal.  Kidneys are normal.  There is no hydronephrosis or renal or ureteral  calculi.  The bowel loops are normal in course and caliber.  There is  diverticulosis of the descending and sigmoid colon.  There is no pelvic mass or free fluid or lymphadenopathy.  Degenerative changes involve the lumbar spine.     IMPRESSION:  No CT evidence of an acute intra-abdominal or intrapelvic process.      Assessment / Plan         Assessment and Plan   Susan Us is a 79 y.o.  presents for     Normocytic Anemia  Iron deficiency   Anemia of chronic disease  Thrombocytopenia   Splenomegaly   -Patient reportedly has had anemia and thrombocytopenia for many years, in our system dating back ~9 months; baseline Hgb 9.1-10, Hct 28-31, normal MCV, platelets 80-120K  -Labs from initial consultation show hemoglobin persistently low at 10.4, MCV 86, platelets 119.  Normal white blood count.  Peripheral smear with normocytic, normochromic anemia.  No increase in schistocytes.  Thrombocytopenia with no platelet clumping.  Granulocytes show normal morphology and no circulating blasts.  HIV antibody is nonreactive.  Negative for hepatitis studies.  Negative for rheumatoid factor and lupus screen. Iron studies showed mild iron deficiency with ferritin 97 ng/mL (patient is on oral ferrous sulfate). Replete B12/folate. No signs of hemolysis as normal LDH/haptoglobin. SPEP with no M spike, elevated free kappa light chain to 52, elevated free lambda light chain 28 with normal kappa/lambda ratio 1.83.  Ultrasound of the abdomen shows splenomegaly with spleen measuring 13.5 cm.  Liver is unremarkable.  -These results were reviewed with Leslie and MsDena Abeba today. I don't believe her underlying bicytopenia is due to MDS or AML as she is asymptomatic with reasons for anemia such as iron deficiency and anemia of chronic disease and  splenomegaly causing thrombocytopenia. IF she were to have bleeding or weight loss, fever, chills, night sweats etc, or drop in her H/H and platelets without a cause, I would consider bone marrow biopsy.   -Continue oral ferrous sulfate 325 mg daily with vitamin C to aid in absorption     Discussed possible differential diagnoses, testing, treatment, recommended non-pharmacological interventions, risks, warning signs to monitor for that would indicate need for follow-up in clinic or ER. If no improvement with these regimens or you have new or worsening symptoms follow-up. Patient verbalizes understanding and agreement with plan of care. Denies further needs or concerns.     Patient was given instructions and counseling regarding her condition and for health maintenance advised.       All questions were answered to her satisfaction.        ACO / SHERRI/Other  Quality measures  -  Susan Us did not receive 2023 flu vaccine.  This was recommended.  -  Susan Us reports a pain score of 0.  Given her pain assessment as noted, treatment options were discussed and the following options were decided upon as a follow-up plan to address the patient's pain: continuation of current treatment plan for pain.  -  Current outpatient and discharge medications have been reconciled for the patient.  Reviewed by: Sylwia Hua MD        Meds ordered during this visit  No orders of the defined types were placed in this encounter.      Visit Diagnoses    ICD-10-CM ICD-9-CM   1. Anemia, unspecified type  D64.9 285.9   2. Thrombocytopenia  D69.6 287.5   3. Splenomegaly  R16.1 789.2   4. Iron deficiency anemia, unspecified iron deficiency anemia type  D50.9 280.9   5. Anemia of chronic disease  D63.8 285.29         Follow Up   In 3 months: check cbc, iron studies, ferritin      This document has been electronically signed by Sylwia Hua MD   May 14, 2024 13:03 EDT    Dictated Utilizing Dragon Dictation: Part of this note may  be an electronic transcription/translation of spoken language to printed text using the Dragon Dictation System.

## 2024-06-26 ENCOUNTER — LAB REQUISITION (OUTPATIENT)
Dept: LAB | Facility: HOSPITAL | Age: 79
End: 2024-06-26
Payer: MEDICARE

## 2024-06-26 DIAGNOSIS — R05.1 ACUTE COUGH: ICD-10-CM

## 2024-06-26 LAB
B PARAPERT DNA SPEC QL NAA+PROBE: NOT DETECTED
B PERT DNA SPEC QL NAA+PROBE: NOT DETECTED
C PNEUM DNA NPH QL NAA+NON-PROBE: NOT DETECTED
FLUAV SUBTYP SPEC NAA+PROBE: NOT DETECTED
FLUBV RNA ISLT QL NAA+PROBE: NOT DETECTED
HADV DNA SPEC NAA+PROBE: NOT DETECTED
HCOV 229E RNA SPEC QL NAA+PROBE: NOT DETECTED
HCOV HKU1 RNA SPEC QL NAA+PROBE: NOT DETECTED
HCOV NL63 RNA SPEC QL NAA+PROBE: NOT DETECTED
HCOV OC43 RNA SPEC QL NAA+PROBE: NOT DETECTED
HMPV RNA NPH QL NAA+NON-PROBE: NOT DETECTED
HPIV1 RNA ISLT QL NAA+PROBE: NOT DETECTED
HPIV2 RNA SPEC QL NAA+PROBE: NOT DETECTED
HPIV3 RNA NPH QL NAA+PROBE: DETECTED
HPIV4 P GENE NPH QL NAA+PROBE: NOT DETECTED
M PNEUMO IGG SER IA-ACNC: NOT DETECTED
RHINOVIRUS RNA SPEC NAA+PROBE: NOT DETECTED
RSV RNA NPH QL NAA+NON-PROBE: NOT DETECTED
SARS-COV-2 RNA NPH QL NAA+NON-PROBE: NOT DETECTED

## 2024-06-26 PROCEDURE — 0202U NFCT DS 22 TRGT SARS-COV-2: CPT | Performed by: INTERNAL MEDICINE

## 2024-07-08 ENCOUNTER — OFFICE VISIT (OUTPATIENT)
Dept: CARDIOLOGY | Facility: CLINIC | Age: 79
End: 2024-07-08
Payer: MEDICARE

## 2024-07-08 VITALS
WEIGHT: 158.8 LBS | HEART RATE: 65 BPM | BODY MASS INDEX: 26.46 KG/M2 | HEIGHT: 65 IN | DIASTOLIC BLOOD PRESSURE: 70 MMHG | SYSTOLIC BLOOD PRESSURE: 128 MMHG | OXYGEN SATURATION: 95 %

## 2024-07-08 DIAGNOSIS — I10 ESSENTIAL HYPERTENSION: Primary | Chronic | ICD-10-CM

## 2024-07-08 PROCEDURE — 3074F SYST BP LT 130 MM HG: CPT | Performed by: NURSE PRACTITIONER

## 2024-07-08 PROCEDURE — 1160F RVW MEDS BY RX/DR IN RCRD: CPT | Performed by: NURSE PRACTITIONER

## 2024-07-08 PROCEDURE — 3078F DIAST BP <80 MM HG: CPT | Performed by: NURSE PRACTITIONER

## 2024-07-08 PROCEDURE — 1159F MED LIST DOCD IN RCRD: CPT | Performed by: NURSE PRACTITIONER

## 2024-07-08 PROCEDURE — 99213 OFFICE O/P EST LOW 20 MIN: CPT | Performed by: NURSE PRACTITIONER

## 2024-07-08 RX ORDER — BEMPEDOIC ACID AND EZETIMIBE 180; 10 MG/1; MG/1
TABLET, FILM COATED ORAL
COMMUNITY

## 2024-07-08 RX ORDER — HYDRALAZINE HYDROCHLORIDE 50 MG/1
50 TABLET, FILM COATED ORAL 3 TIMES DAILY
Start: 2024-07-08

## 2024-07-08 NOTE — PROGRESS NOTES
"Chief Complaint  Follow-up (Intermittent CP some discomfort in chest.) and Med Management (Tolerating all current medications with no side effects.)    Subjective          Susan Us presents to [facility] for follow up.    History of Present Illness  Ms. Us presents for routine follow-up of hypertension,    Her last visit to clinic was 05/09/2024 with TRACE Huerta.  At that time carvedilol was added and her pacemaker was interrogated.  Her blood pressure is better controlled today.  She has not had any troublesome side effects.  Tobacco Use: Low Risk  (7/14/2024)    Patient History     Smoking Tobacco Use: Never     Smokeless Tobacco Use: Never     Passive Exposure: Not on file       Objective     Vital Signs:   /70 (BP Location: Left arm, Patient Position: Sitting, Cuff Size: Adult)   Pulse 65   Ht 165.1 cm (65\")   Wt 72 kg (158 lb 12.8 oz)   SpO2 95%   BMI 26.43 kg/m²       Physical Exam  Vitals and nursing note reviewed. Exam conducted with a chaperone present (Accompanied by her daughter).   Constitutional:       Appearance: Normal appearance. She is well-developed.   Cardiovascular:      Rate and Rhythm: Normal rate and regular rhythm.      Heart sounds: Murmur heard.      No friction rub. No gallop.   Pulmonary:      Effort: Pulmonary effort is normal. No respiratory distress.      Breath sounds: Normal breath sounds. No wheezing or rales.   Skin:     General: Skin is warm and dry.   Neurological:      Mental Status: She is alert and oriented to person, place, and time.   Psychiatric:         Mood and Affect: Mood normal.         Behavior: Behavior normal.          Result Review :   The following data was reviewed by: TRACE Valiente on 07/08/2024:  Common labs          4/18/2024    11:53 4/22/2024    13:54 5/14/2024    12:37   Common Labs   Glucose 112      BUN 28      Creatinine 1.58      Sodium 144      Potassium 3.8      Chloride 104    "   Calcium 9.5      Total Protein 7.5      Albumin 4.3     5.0      Total Bilirubin 0.3      Alkaline Phosphatase 76      AST (SGOT) 25      ALT (SGPT) 12      WBC 4.81  7.03  8.39    Hemoglobin 10.4  8.9  10.0    Hematocrit 32.3  28.5  32.3    Platelets 119  107  128      Lipid Panel          8/2/2023    05:45   Lipid Panel   Total Cholesterol 143    Triglycerides 241    HDL Cholesterol 31    VLDL Cholesterol 40    LDL Cholesterol  72    LDL/HDL Ratio 2.06      Data reviewed : Cardiology studies as detailed below      Last Cardiac Cath      Last Stress test  Results for orders placed during the hospital encounter of 08/01/23    Stress Test With Myocardial Perfusion One Day    Interpretation Summary    Left ventricular ejection fraction is normal (Calculated EF = 63%).    Myocardial perfusion imaging indicates a normal myocardial perfusion study with no evidence of ischemia.    TID 1.03.    Findings consistent with a normal ECG stress test.       Last Echo  Results for orders placed during the hospital encounter of 03/25/24    Adult Transthoracic Echo Complete W/ Cont if Necessary Per Protocol    Interpretation Summary    Left ventricular systolic function is normal. Calculated left ventricular EF = 58% Left ventricular ejection fraction appears to be 56 - 60%.    Left ventricular diastolic function was normal.    The left atrial cavity is moderately dilated.    Moderate aortic valve regurgitation is present.    Moderate mitral valve regurgitation is present.             Current Outpatient Medications   Medication Sig Dispense Refill    Acetaminophen (CHLORASEPTIC SORE THROAT PO) Take  by mouth As Needed.      albuterol sulfate  (90 Base) MCG/ACT inhaler       ARIPiprazole (ABILIFY) 2 MG tablet Take 1 tablet by mouth Daily.      aspirin 81 MG EC tablet Take 1 tablet by mouth Daily.      Bempedoic Acid-Ezetimibe (Nexlizet) 180-10 MG tablet Take 1 tablet by mouth Every Night.      Bempedoic Acid-Ezetimibe  (Nexlizet) 180-10 MG tablet Take  by mouth.      calcium carbonate, oyster shell, 500 MG tablet tablet Take  by mouth 2 (Two) Times a Day.      Calcium Carbonate-Vitamin D (calcium 500 mg vitamin D 5 mcg, 200 UT,) 500-5 MG-MCG tablet per tablet Take 1 tablet by mouth 2 (Two) Times a Day.      carvedilol (COREG) 6.25 MG tablet Take 1 tablet by mouth 2 (Two) Times a Day. 60 tablet 11    cloNIDine (CATAPRES) 0.1 MG tablet Take 1 tablet by mouth.      cloNIDine (CATAPRES-TTS) 0.1 MG/24HR patch Place 1 patch on the skin as directed by provider 1 (One) Time Per Week.      cycloSPORINE (RESTASIS) 0.05 % ophthalmic emulsion 1 drop 2 (Two) Times a Day.      Diclofenac Sodium (VOLTAREN) 1 % gel gel Apply 2 g topically to the appropriate area as directed 4 (Four) Times a Day As Needed (joint pain). Knees, neck and shoulders      diphenhydrAMINE (BENADRYL) 25 mg capsule Take 1 capsule by mouth As Needed for Itching.      docusate sodium (COLACE) 250 MG capsule Take 1 capsule by mouth Daily As Needed for Constipation.      donepezil (ARICEPT) 10 MG tablet Take 1 tablet by mouth Every Night.      doxazosin (CARDURA) 4 MG tablet Take 1 tablet by mouth Every Night. (Patient taking differently: Take 2 tablets by mouth Every Night.) 30 tablet 1    ferrous sulfate 325 (65 FE) MG tablet Take 1 tablet by mouth Every Other Day. Patient instructed to take every other day      fluticasone (FLONASE) 50 MCG/ACT nasal spray 2 sprays into the nostril(s) as directed by provider Daily.      gabapentin (NEURONTIN) 300 MG capsule Take 1 capsule by mouth 2 (Two) Times a Day. 6 capsule 0    hydrALAZINE (APRESOLINE) 50 MG tablet Take 1 tablet by mouth 3 (Three) Times a Day.      isosorbide mononitrate (IMDUR) 60 MG 24 hr tablet Take 1 tablet by mouth Daily.      lidocaine (LIDODERM) 5 % Place 1 patch on the skin as directed by provider Daily. Remove & Discard patch within 12 hours or as directed by MD      Lidocaine 4 % Place 1 patch on the skin as  directed by provider Daily. Remove & Discard patch within 12 hours or as directed by MD      Lidocaine, Anorectal, (Hemorrhoidal Relief) 5 % cream cream Apply 1 g topically to the appropriate area as directed 3 (Three) Times a Day As Needed.      LORazepam (ATIVAN) 0.5 MG tablet Take 1 tablet by mouth 2 (Two) Times a Day As Needed for Anxiety. 3 tablet 0    melatonin 5 MG tablet tablet Take 1 tablet by mouth.      modafinil (PROVIGIL) 200 MG tablet Take 1 tablet by mouth Daily. 3 tablet 0    Multiple Vitamins-Minerals (ICAPS AREDS 2 PO) Take 1 tablet by mouth 2 (Two) Times a Day.      ondansetron (ZOFRAN) 4 MG tablet Take 1 tablet by mouth Every 8 (Eight) Hours As Needed for Nausea or Vomiting.      pantoprazole (PROTONIX) 40 MG EC tablet Take 1 tablet by mouth Every Night.      polyethylene glycol (MiraLax) 17 GM/SCOOP powder Take 17 g by mouth Daily.      potassium chloride 10 MEQ CR tablet Take 1 tablet by mouth 3 times a day.      sennosides-docusate (senna-docusate sodium) 8.6-50 MG per tablet Take 1 tablet by mouth Daily.      sertraline (ZOLOFT) 100 MG tablet Take 2 tablets by mouth Every Night.      torsemide (DEMADEX) 100 MG tablet Take 0.5 tablets by mouth Daily.      traMADol (ULTRAM) 50 MG tablet As Needed.      Trelegy Ellipta 200-62.5-25 MCG/ACT inhaler       triamcinolone (KENALOG) 0.1 % cream Apply 1 Application topically to the appropriate area as directed 2 (Two) Times a Day.      vitamin B-12 (CYANOCOBALAMIN) 1000 MCG tablet Take 1 tablet by mouth Daily.      Lactobacillus (ACIDOPHILUS/PECTIN PO) Take  by mouth. (Patient not taking: Reported on 7/8/2024)      methylPREDNISolone (MEDROL) 4 MG dose pack Take as directed on package instructions. (Patient not taking: Reported on 5/9/2024) 1 each 0    predniSONE (DELTASONE) 10 MG tablet Take 1 tablet by mouth Daily. (Patient not taking: Reported on 7/8/2024)      predniSONE (DELTASONE) 20 MG tablet Take 1 tablet by mouth Daily. (Patient not taking:  Reported on 7/8/2024)       No current facility-administered medications for this visit.            Assessment and Plan    Problem List Items Addressed This Visit       Essential hypertension - Primary (Chronic)    Relevant Medications    hydrALAZINE (APRESOLINE) 50 MG tablet     Diagnoses and all orders for this visit:    1. Essential hypertension (Primary)  -     hydrALAZINE (APRESOLINE) 50 MG tablet; Take 1 tablet by mouth 3 (Three) Times a Day.      Blood pressure at goal on current medications.  Follow-up in 6 months.      Follow Up     No follow-ups on file.    Patient was given instructions and counseling regarding her condition or for health maintenance advice. Please see specific information pulled into the AVS if appropriate.         Electronically signed by TRACE Valiente, 07/14/24, 7:43 PM EDT.    Dictated Utilizing Dragon Dictation: Part of this note may be an electronic transcription/translation of spoken language to printed text using the Dragon Dictation System   albuterol 90 mcg/inh inhalation aerosol: 2 puff(s) inhaled every 6 hours, As needed, Shortness of Breath and/or Wheezing MDD:1  apixaban 5 mg oral tablet: 1 tab(s) orally 2 times a day    budesonide-formoterol 80 mcg-4.5 mcg/inh inhalation aerosol: 2 puff(s) inhaled 2 times a day  folic acid 1 mg oral tablet: 1 tab(s) orally once a day  furosemide 80 mg oral tablet: 1 tab(s) orally 2 times a day  metoprolol tartrate 75 mg oral tablet: 1 tab(s) orally 2 times a day  predniSONE 5 mg oral tablet: 3 tab(s) orally once a day  Tresiba 100 units/mL subcutaneous solution: 8 unit(s) subcutaneous once a day (at bedtime)

## 2024-07-12 ENCOUNTER — LAB REQUISITION (OUTPATIENT)
Dept: LAB | Facility: HOSPITAL | Age: 79
End: 2024-07-12
Payer: MEDICARE

## 2024-07-12 DIAGNOSIS — R05.9 COUGH, UNSPECIFIED: ICD-10-CM

## 2024-07-12 PROCEDURE — 0202U NFCT DS 22 TRGT SARS-COV-2: CPT | Performed by: INTERNAL MEDICINE

## 2024-07-29 ENCOUNTER — HOSPITAL ENCOUNTER (EMERGENCY)
Facility: HOSPITAL | Age: 79
Discharge: HOME | End: 2024-07-29
Payer: MEDICARE

## 2024-07-29 ENCOUNTER — APPOINTMENT (OUTPATIENT)
Dept: GENERAL RADIOLOGY | Facility: HOSPITAL | Age: 79
End: 2024-07-29
Payer: MEDICARE

## 2024-07-29 VITALS
DIASTOLIC BLOOD PRESSURE: 89 MMHG | OXYGEN SATURATION: 97 % | BODY MASS INDEX: 26.45 KG/M2 | TEMPERATURE: 97.7 F | RESPIRATION RATE: 16 BRPM | HEART RATE: 65 BPM | HEIGHT: 65 IN | WEIGHT: 158.73 LBS | SYSTOLIC BLOOD PRESSURE: 188 MMHG

## 2024-07-29 DIAGNOSIS — R07.89 COSTOCHONDRAL CHEST PAIN: ICD-10-CM

## 2024-07-29 DIAGNOSIS — R07.89 NON-CARDIAC CHEST PAIN: Primary | ICD-10-CM

## 2024-07-29 DIAGNOSIS — I1A.0 RESISTANT HYPERTENSION: ICD-10-CM

## 2024-07-29 LAB
ALBUMIN SERPL-MCNC: 4.4 G/DL (ref 3.5–5.2)
ALBUMIN/GLOB SERPL: 1.6 G/DL
ALP SERPL-CCNC: 64 U/L (ref 39–117)
ALT SERPL W P-5'-P-CCNC: 13 U/L (ref 1–33)
ANION GAP SERPL CALCULATED.3IONS-SCNC: 8.8 MMOL/L (ref 5–15)
AST SERPL-CCNC: 25 U/L (ref 1–32)
BASOPHILS # BLD AUTO: 0.03 10*3/MM3 (ref 0–0.2)
BASOPHILS NFR BLD AUTO: 0.7 % (ref 0–1.5)
BILIRUB SERPL-MCNC: 0.2 MG/DL (ref 0–1.2)
BUN SERPL-MCNC: 24 MG/DL (ref 8–23)
BUN/CREAT SERPL: 16.2 (ref 7–25)
CALCIUM SPEC-SCNC: 9.5 MG/DL (ref 8.6–10.5)
CHLORIDE SERPL-SCNC: 106 MMOL/L (ref 98–107)
CO2 SERPL-SCNC: 27.2 MMOL/L (ref 22–29)
CREAT SERPL-MCNC: 1.48 MG/DL (ref 0.57–1)
DEPRECATED RDW RBC AUTO: 43.4 FL (ref 37–54)
EGFRCR SERPLBLD CKD-EPI 2021: 36.1 ML/MIN/1.73
EOSINOPHIL # BLD AUTO: 0.09 10*3/MM3 (ref 0–0.4)
EOSINOPHIL NFR BLD AUTO: 2.2 % (ref 0.3–6.2)
ERYTHROCYTE [DISTWIDTH] IN BLOOD BY AUTOMATED COUNT: 13.5 % (ref 12.3–15.4)
GEN 5 2HR TROPONIN T REFLEX: 31 NG/L
GLOBULIN UR ELPH-MCNC: 2.8 GM/DL
GLUCOSE SERPL-MCNC: 120 MG/DL (ref 65–99)
HCT VFR BLD AUTO: 29.3 % (ref 34–46.6)
HGB BLD-MCNC: 9.3 G/DL (ref 12–15.9)
HOLD SPECIMEN: NORMAL
HOLD SPECIMEN: NORMAL
IMM GRANULOCYTES # BLD AUTO: 0.01 10*3/MM3 (ref 0–0.05)
IMM GRANULOCYTES NFR BLD AUTO: 0.2 % (ref 0–0.5)
LYMPHOCYTES # BLD AUTO: 1.2 10*3/MM3 (ref 0.7–3.1)
LYMPHOCYTES NFR BLD AUTO: 29.6 % (ref 19.6–45.3)
MCH RBC QN AUTO: 27.7 PG (ref 26.6–33)
MCHC RBC AUTO-ENTMCNC: 31.7 G/DL (ref 31.5–35.7)
MCV RBC AUTO: 87.2 FL (ref 79–97)
MONOCYTES # BLD AUTO: 0.42 10*3/MM3 (ref 0.1–0.9)
MONOCYTES NFR BLD AUTO: 10.4 % (ref 5–12)
NEUTROPHILS NFR BLD AUTO: 2.3 10*3/MM3 (ref 1.7–7)
NEUTROPHILS NFR BLD AUTO: 56.9 % (ref 42.7–76)
NRBC BLD AUTO-RTO: 0 /100 WBC (ref 0–0.2)
PLATELET # BLD AUTO: 104 10*3/MM3 (ref 140–450)
PMV BLD AUTO: 10.6 FL (ref 6–12)
POTASSIUM SERPL-SCNC: 4.5 MMOL/L (ref 3.5–5.2)
PROT SERPL-MCNC: 7.2 G/DL (ref 6–8.5)
RBC # BLD AUTO: 3.36 10*6/MM3 (ref 3.77–5.28)
SODIUM SERPL-SCNC: 142 MMOL/L (ref 136–145)
TROPONIN T DELTA: -1 NG/L
TROPONIN T SERPL HS-MCNC: 32 NG/L
WBC NRBC COR # BLD AUTO: 4.05 10*3/MM3 (ref 3.4–10.8)
WHOLE BLOOD HOLD COAG: NORMAL
WHOLE BLOOD HOLD SPECIMEN: NORMAL

## 2024-07-29 PROCEDURE — 71045 X-RAY EXAM CHEST 1 VIEW: CPT | Performed by: RADIOLOGY

## 2024-07-29 PROCEDURE — 85025 COMPLETE CBC W/AUTO DIFF WBC: CPT

## 2024-07-29 PROCEDURE — 99284 EMERGENCY DEPT VISIT MOD MDM: CPT

## 2024-07-29 PROCEDURE — 80053 COMPREHEN METABOLIC PANEL: CPT

## 2024-07-29 PROCEDURE — 84484 ASSAY OF TROPONIN QUANT: CPT

## 2024-07-29 PROCEDURE — 93010 ELECTROCARDIOGRAM REPORT: CPT | Performed by: INTERNAL MEDICINE

## 2024-07-29 PROCEDURE — 36415 COLL VENOUS BLD VENIPUNCTURE: CPT

## 2024-07-29 PROCEDURE — 93005 ELECTROCARDIOGRAM TRACING: CPT

## 2024-07-29 PROCEDURE — 71045 X-RAY EXAM CHEST 1 VIEW: CPT

## 2024-07-29 RX ORDER — ASPIRIN 81 MG/1
324 TABLET, CHEWABLE ORAL ONCE
Status: COMPLETED | OUTPATIENT
Start: 2024-07-29 | End: 2024-07-29

## 2024-07-29 RX ORDER — HYDRALAZINE HYDROCHLORIDE 25 MG/1
50 TABLET, FILM COATED ORAL ONCE
Status: COMPLETED | OUTPATIENT
Start: 2024-07-29 | End: 2024-07-29

## 2024-07-29 RX ORDER — ACETAMINOPHEN 500 MG
1000 TABLET ORAL ONCE
Status: COMPLETED | OUTPATIENT
Start: 2024-07-29 | End: 2024-07-29

## 2024-07-29 RX ORDER — SODIUM CHLORIDE 0.9 % (FLUSH) 0.9 %
10 SYRINGE (ML) INJECTION AS NEEDED
Status: DISCONTINUED | OUTPATIENT
Start: 2024-07-29 | End: 2024-07-29 | Stop reason: HOSPADM

## 2024-07-29 RX ADMIN — ACETAMINOPHEN 1000 MG: 500 TABLET ORAL at 16:11

## 2024-07-29 RX ADMIN — HYDRALAZINE HYDROCHLORIDE 50 MG: 25 TABLET ORAL at 16:11

## 2024-07-29 RX ADMIN — ASPIRIN 324 MG: 81 TABLET, CHEWABLE ORAL at 15:44

## 2024-07-29 NOTE — ED PROVIDER NOTES
Subjective   History of Present Illness  79-year-old female with history of pacemaker, COPD, CKD coming in with chest pain complaint.  The chest pain complaint stems back over a year however the last few months it has been getting worse.  Patient's chest pain is reproducible when you press on her chest.  She denies any associated shortness of breath.  Out of abundance of caution they just want to be evaluated to make sure is not her heart.      Review of Systems   Constitutional: Negative.  Negative for fever.   HENT: Negative.     Respiratory: Negative.  Negative for shortness of breath.    Cardiovascular:  Positive for chest pain.   Gastrointestinal: Negative.  Negative for abdominal pain.   Endocrine: Negative.    Genitourinary: Negative.  Negative for dysuria.   Skin: Negative.    Neurological: Negative.    Psychiatric/Behavioral: Negative.     All other systems reviewed and are negative.      Past Medical History:   Diagnosis Date    Anxiety     Bradycardia     s/p pacemaker placement    CKD (chronic kidney disease)     baseline renal function unknown    COPD (chronic obstructive pulmonary disease)     Dementia     GERD (gastroesophageal reflux disease)     Hyperlipidemia     Hypertension     Iron deficiency anemia     Thrombocytopenia        Allergies   Allergen Reactions    Evolocumab Itching     itching    Statins Hives    Avant Rash       Past Surgical History:   Procedure Laterality Date    BREAST BIOPSY      benign, yrs ago    CARDIAC PACEMAKER PLACEMENT      CATARACT EXTRACTION      CERVICAL FUSION      CHOLECYSTECTOMY      HIATAL HERNIA REPAIR      HYSTERECTOMY      REPLACEMENT TOTAL KNEE Right        Family History   Problem Relation Age of Onset    Stroke Mother     Stroke Father     Breast cancer Sister        Social History     Socioeconomic History    Marital status:    Tobacco Use    Smoking status: Never    Smokeless tobacco: Never    Tobacco comments:     Second hand smoke exposure  ()   Vaping Use    Vaping status: Never Used   Substance and Sexual Activity    Alcohol use: Never    Drug use: Never    Sexual activity: Defer           Objective   Physical Exam  Vitals and nursing note reviewed.   Constitutional:       General: She is not in acute distress.     Appearance: She is well-developed. She is not diaphoretic.   HENT:      Head: Normocephalic and atraumatic.      Right Ear: External ear normal.      Left Ear: External ear normal.      Nose: Nose normal.   Eyes:      Conjunctiva/sclera: Conjunctivae normal.      Pupils: Pupils are equal, round, and reactive to light.   Neck:      Vascular: No JVD.      Trachea: No tracheal deviation.   Cardiovascular:      Rate and Rhythm: Normal rate and regular rhythm.      Heart sounds: Normal heart sounds. No murmur heard.  Pulmonary:      Effort: Pulmonary effort is normal. No respiratory distress.      Breath sounds: Normal breath sounds. No wheezing.   Chest:      Chest wall: Tenderness present.      Comments: There is worsening chest pain on palpation of the anterior chest wall on the left side.  Abdominal:      General: Bowel sounds are normal.      Palpations: Abdomen is soft.      Tenderness: There is no abdominal tenderness.   Musculoskeletal:         General: No deformity. Normal range of motion.      Cervical back: Normal range of motion and neck supple.   Skin:     General: Skin is warm and dry.      Coloration: Skin is not pale.      Findings: No erythema or rash.   Neurological:      Mental Status: She is alert and oriented to person, place, and time.      Cranial Nerves: No cranial nerve deficit.   Psychiatric:         Behavior: Behavior normal.         Thought Content: Thought content normal.         Procedures           ED Course  ED Course as of 07/29/24 1804   Mon Jul 29, 2024   1512 EKG interpretation: Atrial paced rhythm 65 bpm QTc 476 no ST elevations or depressions.    Electronically signed by Jose Yuan DO,  07/29/24, 3:13 PM EDT.   [GF]   1758 BP: 180/90 [GF]      ED Course User Index  [GF] Jose Yuan, DO                                             Medical Decision Making  79-year-old female with an extensive medical history presenting with chest pain that is been lasting few months.  Troponins are at baseline.  EKG was unremarkable.  Physical exam findings consistent with costochondritis.  Educated patient and family on costochondritis at home remedies and over-the-counter treatments including acetaminophen and heating pad.     ED precautions given if chest pain is ever to acutely worsen please call 911 immediately.`    Problems Addressed:  Costochondral chest pain: complicated acute illness or injury  Non-cardiac chest pain: complicated acute illness or injury  Resistant hypertension: complicated acute illness or injury    Amount and/or Complexity of Data Reviewed  Labs: ordered.  Radiology: ordered.  ECG/medicine tests: ordered.    Risk  OTC drugs.  Prescription drug management.      Results for orders placed or performed during the hospital encounter of 07/29/24   High Sensitivity Troponin T    Specimen: Blood   Result Value Ref Range    HS Troponin T 32 (H) <14 ng/L   Comprehensive Metabolic Panel    Specimen: Blood   Result Value Ref Range    Glucose 120 (H) 65 - 99 mg/dL    BUN 24 (H) 8 - 23 mg/dL    Creatinine 1.48 (H) 0.57 - 1.00 mg/dL    Sodium 142 136 - 145 mmol/L    Potassium 4.5 3.5 - 5.2 mmol/L    Chloride 106 98 - 107 mmol/L    CO2 27.2 22.0 - 29.0 mmol/L    Calcium 9.5 8.6 - 10.5 mg/dL    Total Protein 7.2 6.0 - 8.5 g/dL    Albumin 4.4 3.5 - 5.2 g/dL    ALT (SGPT) 13 1 - 33 U/L    AST (SGOT) 25 1 - 32 U/L    Alkaline Phosphatase 64 39 - 117 U/L    Total Bilirubin 0.2 0.0 - 1.2 mg/dL    Globulin 2.8 gm/dL    A/G Ratio 1.6 g/dL    BUN/Creatinine Ratio 16.2 7.0 - 25.0    Anion Gap 8.8 5.0 - 15.0 mmol/L    eGFR 36.1 (L) >60.0 mL/min/1.73   CBC Auto Differential    Specimen: Blood   Result Value Ref  Range    WBC 4.05 3.40 - 10.80 10*3/mm3    RBC 3.36 (L) 3.77 - 5.28 10*6/mm3    Hemoglobin 9.3 (L) 12.0 - 15.9 g/dL    Hematocrit 29.3 (L) 34.0 - 46.6 %    MCV 87.2 79.0 - 97.0 fL    MCH 27.7 26.6 - 33.0 pg    MCHC 31.7 31.5 - 35.7 g/dL    RDW 13.5 12.3 - 15.4 %    RDW-SD 43.4 37.0 - 54.0 fl    MPV 10.6 6.0 - 12.0 fL    Platelets 104 (L) 140 - 450 10*3/mm3    Neutrophil % 56.9 42.7 - 76.0 %    Lymphocyte % 29.6 19.6 - 45.3 %    Monocyte % 10.4 5.0 - 12.0 %    Eosinophil % 2.2 0.3 - 6.2 %    Basophil % 0.7 0.0 - 1.5 %    Immature Grans % 0.2 0.0 - 0.5 %    Neutrophils, Absolute 2.30 1.70 - 7.00 10*3/mm3    Lymphocytes, Absolute 1.20 0.70 - 3.10 10*3/mm3    Monocytes, Absolute 0.42 0.10 - 0.90 10*3/mm3    Eosinophils, Absolute 0.09 0.00 - 0.40 10*3/mm3    Basophils, Absolute 0.03 0.00 - 0.20 10*3/mm3    Immature Grans, Absolute 0.01 0.00 - 0.05 10*3/mm3    nRBC 0.0 0.0 - 0.2 /100 WBC   High Sensitivity Troponin T 2Hr    Specimen: Arm, Left; Blood   Result Value Ref Range    HS Troponin T 31 (H) <14 ng/L    Troponin T Delta -1 >=-4 - <+4 ng/L   Green Top (Gel)   Result Value Ref Range    Extra Tube Hold for add-ons.    Lavender Top   Result Value Ref Range    Extra Tube hold for add-on    Gold Top - SST   Result Value Ref Range    Extra Tube Hold for add-ons.    Light Blue Top   Result Value Ref Range    Extra Tube Hold for add-ons.      XR Chest 1 View    Result Date: 7/29/2024  No radiographic evidence of acute cardiac or pulmonary disease.    This report was finalized on 7/29/2024 4:22 PM by Dr. Nghia Sorenson MD.         Final diagnoses:   Non-cardiac chest pain   Costochondral chest pain   Resistant hypertension       ED Disposition  ED Disposition       ED Disposition   Discharge    Condition   Stable    Comment   --               Bruna Smith MD  [address] [place] [id number]  [phone]    Schedule an appointment as soon as possible for a visit in 3 days      [facility]  DEPARTMENT  [address] [facility] [address]  [phone]  Go to   If symptoms worsen    Bruna Smith MD  [address]  [facility] [address]  [phone]               Medication List        Changed      doxazosin 4 MG tablet  Commonly known as: CARDURA  Take 1 tablet by mouth Every Night.  What changed: how much to take                 Jose Yuan, DO  07/29/24 6400

## 2024-07-29 NOTE — ED NOTES
Called [facility] for transport  back to the [facility]   they have paged it out  waiting on EMS arrival RN made aware

## 2024-07-29 NOTE — DISCHARGE INSTRUCTIONS
I believe you have costochondritis.  This is an inflammation of your rib cage that causes chest pain.  Your workup today was negative for cardiac cause of your chest pain.   However things can always change ,if your symptoms acutely worsen please call 911 and return to the ED.    Your blood pressure is very elevated, please continue to take your blood pressure medications as you have prescribed.  I recommend recording it at home and following with your primary care or cardiologist to make adjustments as necessary..

## 2024-07-29 NOTE — ED NOTES
Called [facility] ems to verify transport back to [facility], they accepted. ETA undetermined

## 2024-07-29 NOTE — ED NOTES
Canceled ride from [facility] ems back to [facility] per pt and family request.

## 2024-07-30 LAB
QT INTERVAL: 458 MS
QTC INTERVAL: 476 MS

## 2024-08-14 ENCOUNTER — LAB (OUTPATIENT)
Dept: ONCOLOGY | Facility: CLINIC | Age: 79
End: 2024-08-14
Payer: MEDICARE

## 2024-08-14 ENCOUNTER — OFFICE VISIT (OUTPATIENT)
Dept: ONCOLOGY | Facility: CLINIC | Age: 79
End: 2024-08-14
Payer: MEDICARE

## 2024-08-14 VITALS
HEIGHT: 65 IN | SYSTOLIC BLOOD PRESSURE: 124 MMHG | HEART RATE: 70 BPM | TEMPERATURE: 98 F | OXYGEN SATURATION: 95 % | RESPIRATION RATE: 20 BRPM | WEIGHT: 153.8 LBS | DIASTOLIC BLOOD PRESSURE: 66 MMHG | BODY MASS INDEX: 25.62 KG/M2

## 2024-08-14 DIAGNOSIS — D50.9 IRON DEFICIENCY ANEMIA, UNSPECIFIED IRON DEFICIENCY ANEMIA TYPE: ICD-10-CM

## 2024-08-14 DIAGNOSIS — D69.6 THROMBOCYTOPENIA: ICD-10-CM

## 2024-08-14 DIAGNOSIS — D64.9 ANEMIA, UNSPECIFIED TYPE: Primary | ICD-10-CM

## 2024-08-14 DIAGNOSIS — D64.9 ANEMIA, UNSPECIFIED TYPE: ICD-10-CM

## 2024-08-14 DIAGNOSIS — R16.1 SPLENOMEGALY: ICD-10-CM

## 2024-08-14 DIAGNOSIS — D63.8 ANEMIA OF CHRONIC DISEASE: ICD-10-CM

## 2024-08-14 LAB
BASOPHILS # BLD AUTO: 0.03 10*3/MM3 (ref 0–0.2)
BASOPHILS NFR BLD AUTO: 0.9 % (ref 0–1.5)
DEPRECATED RDW RBC AUTO: 42.6 FL (ref 37–54)
EOSINOPHIL # BLD AUTO: 0.04 10*3/MM3 (ref 0–0.4)
EOSINOPHIL NFR BLD AUTO: 1.2 % (ref 0.3–6.2)
ERYTHROCYTE [DISTWIDTH] IN BLOOD BY AUTOMATED COUNT: 13.2 % (ref 12.3–15.4)
FERRITIN SERPL-MCNC: 173.9 NG/ML (ref 13–150)
HCT VFR BLD AUTO: 28.2 % (ref 34–46.6)
HGB BLD-MCNC: 8.9 G/DL (ref 12–15.9)
IMM GRANULOCYTES # BLD AUTO: 0.02 10*3/MM3 (ref 0–0.05)
IMM GRANULOCYTES NFR BLD AUTO: 0.6 % (ref 0–0.5)
IRON 24H UR-MRATE: 19 MCG/DL (ref 37–145)
IRON SATN MFR SERPL: 6 % (ref 20–50)
LYMPHOCYTES # BLD AUTO: 0.84 10*3/MM3 (ref 0.7–3.1)
LYMPHOCYTES NFR BLD AUTO: 25.8 % (ref 19.6–45.3)
MCH RBC QN AUTO: 27.6 PG (ref 26.6–33)
MCHC RBC AUTO-ENTMCNC: 31.6 G/DL (ref 31.5–35.7)
MCV RBC AUTO: 87.3 FL (ref 79–97)
MONOCYTES # BLD AUTO: 0.55 10*3/MM3 (ref 0.1–0.9)
MONOCYTES NFR BLD AUTO: 16.9 % (ref 5–12)
NEUTROPHILS NFR BLD AUTO: 1.77 10*3/MM3 (ref 1.7–7)
NEUTROPHILS NFR BLD AUTO: 54.6 % (ref 42.7–76)
NRBC BLD AUTO-RTO: 0 /100 WBC (ref 0–0.2)
PLATELET # BLD AUTO: 101 10*3/MM3 (ref 140–450)
PMV BLD AUTO: 10.3 FL (ref 6–12)
RBC # BLD AUTO: 3.23 10*6/MM3 (ref 3.77–5.28)
TIBC SERPL-MCNC: 308 MCG/DL (ref 298–536)
TRANSFERRIN SERPL-MCNC: 207 MG/DL (ref 200–360)
WBC NRBC COR # BLD AUTO: 3.25 10*3/MM3 (ref 3.4–10.8)

## 2024-08-14 PROCEDURE — 3074F SYST BP LT 130 MM HG: CPT | Performed by: INTERNAL MEDICINE

## 2024-08-14 PROCEDURE — 85025 COMPLETE CBC W/AUTO DIFF WBC: CPT | Performed by: INTERNAL MEDICINE

## 2024-08-14 PROCEDURE — 84466 ASSAY OF TRANSFERRIN: CPT | Performed by: INTERNAL MEDICINE

## 2024-08-14 PROCEDURE — 82728 ASSAY OF FERRITIN: CPT | Performed by: INTERNAL MEDICINE

## 2024-08-14 PROCEDURE — G2211 COMPLEX E/M VISIT ADD ON: HCPCS | Performed by: INTERNAL MEDICINE

## 2024-08-14 PROCEDURE — 83540 ASSAY OF IRON: CPT | Performed by: INTERNAL MEDICINE

## 2024-08-14 PROCEDURE — 3078F DIAST BP <80 MM HG: CPT | Performed by: INTERNAL MEDICINE

## 2024-08-14 PROCEDURE — 99213 OFFICE O/P EST LOW 20 MIN: CPT | Performed by: INTERNAL MEDICINE

## 2024-08-14 PROCEDURE — 1125F AMNT PAIN NOTED PAIN PRSNT: CPT | Performed by: INTERNAL MEDICINE

## 2024-08-14 NOTE — PROGRESS NOTES
Venipuncture Blood Specimen Collection  Venipuncture performed in right arm by Cris Cevallos MA with good hemostasis. Patient tolerated the procedure well without complications.   08/14/24   Cris Cevallos MA

## 2024-08-14 NOTE — PROGRESS NOTES
Subjective     Date: 8/14/2024    Referring Provider  No ref. provider found    Chief Complaint  Bicytopenia (anemia and thrombocytopenia)    Subjective          Susan Us is a 79 y.o. female who presents today to [facility] for follow up.     HPI:   79 y.o. female with past medical history of hypertension, hyperlipidemia, dementia, chronic kidney disease, PPM s/p bradycardia who presents for consultation regarding bicytopenia (normocytic anemia and thrombocytopenia) from nursing home.    She presents today with her daughter, Leslie, who assists in history. She was seen by a hematologist, Dr. Luna in [place], [place] several years ago. Did not go through a bone marrow biopsy. Had U/S of the abdomen performed. She was placed on oral iron, which she is currently on. No other intervention was done.     Ms. Us denies any new symptoms including fever, chills, night sweats, unintentional weight loss, or fatigue. Denies any recent lumps/bumps. Underwent a colonoscopy 3-4 years ago by someone in [place], unable to recall whom. Was found to have polyps, no bleeding was noted. Currently denies any bleeding.    CBC from 4/16 show normal WBC and differential, Hgb 10, hct 31, Plt 120K.     Denies smoking, alcohol  use, drug use. Worked as a , , and . Family history significant for sister with breast cancer requiring bilateral mastectomy.     Interval History 05/14/2024   Labs from initial consultation show hemoglobin persistently low at 10.4, MCV 86, platelets 119.  Normal white blood count.  Peripheral smear with normocytic, normochromic anemia.  No increase in schistocytes.  Thrombocytopenia with no platelet clumping.  Granulocytes show normal morphology and no circulating blasts.  HIV antibody is nonreactive.  Negative for hepatitis studies.  Negative for rheumatoid factor and lupus screen. Iron studies showed mild iron deficiency with  ferritin 97 ng/mL (patient is on oral ferrous sulfate). Replete B12/folate. No signs of hemolysis as normal LDH/haptoglobin. SPEP with no M spike, elevated free kappa light chain to 52, elevated free lambda light chain 28 with normal kappa/lambda ratio 1.83.  Ultrasound of the abdomen shows splenomegaly with spleen measuring 13.5 cm.  Liver is unremarkable.    These results were reviewed with Leslie and Ms. Us today. I don't believe her underlying bicytopenia is due to MDS or AML as she is asymptomatic with reasons for anemia such as iron deficiency and anemia of chronic disease and splenomegaly causing thrombocytopenia. IF she were to have bleeding or weight loss, fever, chills, night sweats etc, or drop in her H/H and platelets without a cause, I would consider bone marrow biopsy.     Interval History 08/14/2024   Ms. Us presents today for follow up, accompanied by her daughter. She presented to [facility] ED 7/29 due to L sided chest pain, was diagnosed with costochondritis. Continues to take oral vitamin C/iron.   CBC: 3.25 (ANC 1.7), Hgb 8.9, Hct 28, Plts 101. Discussed monitoring CBC closer, if down trending counts, would recommend BMB.    Objective     Objective     Allergy:   Allergies   Allergen Reactions    Evolocumab Itching     itching    Statins Hives    Strawberry Rash        Current Medications:   Current Outpatient Medications   Medication Sig Dispense Refill    Acetaminophen (CHLORASEPTIC SORE THROAT PO) Take  by mouth As Needed.      albuterol sulfate  (90 Base) MCG/ACT inhaler       ARIPiprazole (ABILIFY) 2 MG tablet Take 1 tablet by mouth Daily.      aspirin 81 MG EC tablet Take 1 tablet by mouth Daily.      Bempedoic Acid-Ezetimibe (Nexlizet) 180-10 MG tablet Take 1 tablet by mouth Every Night.      Bempedoic Acid-Ezetimibe (Nexlizet) 180-10 MG tablet Take  by mouth.      calcium carbonate, oyster shell, 500 MG tablet tablet Take  by mouth 2 (Two) Times a Day.      Calcium  Carbonate-Vitamin D (calcium 500 mg vitamin D 5 mcg, 200 UT,) 500-5 MG-MCG tablet per tablet Take 1 tablet by mouth 2 (Two) Times a Day.      carvedilol (COREG) 6.25 MG tablet Take 1 tablet by mouth 2 (Two) Times a Day. 60 tablet 11    cloNIDine (CATAPRES) 0.1 MG tablet Take 1 tablet by mouth.      cloNIDine (CATAPRES-TTS) 0.1 MG/24HR patch Place 1 patch on the skin as directed by provider 1 (One) Time Per Week.      cycloSPORINE (RESTASIS) 0.05 % ophthalmic emulsion 1 drop 2 (Two) Times a Day.      Diclofenac Sodium (VOLTAREN) 1 % gel gel Apply 2 g topically to the appropriate area as directed 4 (Four) Times a Day As Needed (joint pain). Knees, neck and shoulders      diphenhydrAMINE (BENADRYL) 25 mg capsule Take 1 capsule by mouth As Needed for Itching.      docusate sodium (COLACE) 250 MG capsule Take 1 capsule by mouth Daily As Needed for Constipation.      donepezil (ARICEPT) 10 MG tablet Take 1 tablet by mouth Every Night.      doxazosin (CARDURA) 4 MG tablet Take 1 tablet by mouth Every Night. (Patient taking differently: Take 2 tablets by mouth Every Night.) 30 tablet 1    ferrous sulfate 325 (65 FE) MG tablet Take 1 tablet by mouth Every Other Day. Patient instructed to take every other day      fluticasone (FLONASE) 50 MCG/ACT nasal spray 2 sprays into the nostril(s) as directed by provider Daily.      gabapentin (NEURONTIN) 300 MG capsule Take 1 capsule by mouth 2 (Two) Times a Day. 6 capsule 0    hydrALAZINE (APRESOLINE) 50 MG tablet Take 1 tablet by mouth 3 (Three) Times a Day.      isosorbide mononitrate (IMDUR) 60 MG 24 hr tablet Take 1 tablet by mouth Daily.      lidocaine (LIDODERM) 5 % Place 1 patch on the skin as directed by provider Daily. Remove & Discard patch within 12 hours or as directed by MD      Lidocaine 4 % Place 1 patch on the skin as directed by provider Daily. Remove & Discard patch within 12 hours or as directed by MD      Lidocaine, Anorectal, (Hemorrhoidal Relief) 5 % cream cream  Apply 1 g topically to the appropriate area as directed 3 (Three) Times a Day As Needed.      LORazepam (ATIVAN) 0.5 MG tablet Take 1 tablet by mouth 2 (Two) Times a Day As Needed for Anxiety. 3 tablet 0    melatonin 5 MG tablet tablet Take 1 tablet by mouth.      modafinil (PROVIGIL) 200 MG tablet Take 1 tablet by mouth Daily. 3 tablet 0    Multiple Vitamins-Minerals (ICAPS AREDS 2 PO) Take 1 tablet by mouth 2 (Two) Times a Day.      ondansetron (ZOFRAN) 4 MG tablet Take 1 tablet by mouth Every 8 (Eight) Hours As Needed for Nausea or Vomiting.      pantoprazole (PROTONIX) 40 MG EC tablet Take 1 tablet by mouth Every Night.      polyethylene glycol (MiraLax) 17 GM/SCOOP powder Take 17 g by mouth Daily.      potassium chloride 10 MEQ CR tablet Take 1 tablet by mouth 3 times a day.      sennosides-docusate (senna-docusate sodium) 8.6-50 MG per tablet Take 1 tablet by mouth Daily.      sertraline (ZOLOFT) 100 MG tablet Take 2 tablets by mouth Every Night.      torsemide (DEMADEX) 100 MG tablet Take 0.5 tablets by mouth Daily.      traMADol (ULTRAM) 50 MG tablet As Needed.      Trelegy Ellipta 200-62.5-25 MCG/ACT inhaler       triamcinolone (KENALOG) 0.1 % cream Apply 1 Application topically to the appropriate area as directed 2 (Two) Times a Day.      vitamin B-12 (CYANOCOBALAMIN) 1000 MCG tablet Take 1 tablet by mouth Daily.      Lactobacillus (ACIDOPHILUS/PECTIN PO) Take  by mouth. (Patient not taking: Reported on 7/8/2024)      methylPREDNISolone (MEDROL) 4 MG dose pack Take as directed on package instructions. (Patient not taking: Reported on 5/9/2024) 1 each 0    predniSONE (DELTASONE) 10 MG tablet Take 1 tablet by mouth Daily. (Patient not taking: Reported on 7/8/2024)      predniSONE (DELTASONE) 20 MG tablet Take 1 tablet by mouth Daily. (Patient not taking: Reported on 7/8/2024)       No current facility-administered medications for this visit.       Past Medical History:  Past Medical History:   Diagnosis Date  "   Anxiety     Bradycardia     s/p pacemaker placement    CKD (chronic kidney disease)     baseline renal function unknown    COPD (chronic obstructive pulmonary disease)     Dementia     GERD (gastroesophageal reflux disease)     Hyperlipidemia     Hypertension     Iron deficiency anemia     Thrombocytopenia        Past Surgical History:  Past Surgical History:   Procedure Laterality Date    BREAST BIOPSY      benign, yrs ago    CARDIAC PACEMAKER PLACEMENT      CATARACT EXTRACTION      CERVICAL FUSION      CHOLECYSTECTOMY      HIATAL HERNIA REPAIR      HYSTERECTOMY      REPLACEMENT TOTAL KNEE Right        Social History:  Social History     Socioeconomic History    Marital status:    Tobacco Use    Smoking status: Never    Smokeless tobacco: Never    Tobacco comments:     Second hand smoke exposure ()   Vaping Use    Vaping status: Never Used   Substance and Sexual Activity    Alcohol use: Never    Drug use: Never    Sexual activity: Defer         Family History:  Family History   Problem Relation Age of Onset    Stroke Mother     Stroke Father     Breast cancer Sister        Review of Systems:  Review of Systems   All other systems reviewed and are negative.      Vital Signs:   /66   Pulse 70   Temp 98 °F (36.7 °C) (Temporal)   Resp 20   Ht 165.1 cm (65\")   Wt 69.8 kg (153 lb 12.8 oz)   SpO2 95%   BMI 25.59 kg/m²      Physical Exam:  Physical Exam  Vitals reviewed.   Constitutional:       General: She is not in acute distress.     Appearance: Normal appearance. She is not ill-appearing.      Comments: In a wheelchair   HENT:      Head: Normocephalic and atraumatic.      Mouth/Throat:      Mouth: Mucous membranes are moist.      Pharynx: Oropharynx is clear.   Eyes:      Conjunctiva/sclera: Conjunctivae normal.      Pupils: Pupils are equal, round, and reactive to light.   Cardiovascular:      Rate and Rhythm: Normal rate and regular rhythm.      Heart sounds: No murmur heard.  Pulmonary: " "     Effort: Pulmonary effort is normal. No respiratory distress.      Breath sounds: Normal breath sounds. No wheezing.   Abdominal:      General: Abdomen is flat. Bowel sounds are normal. There is no distension.      Palpations: Abdomen is soft. There is no mass.      Tenderness: There is no abdominal tenderness. There is no guarding.   Musculoskeletal:         General: No swelling. Normal range of motion.      Cervical back: Normal range of motion.   Lymphadenopathy:      Cervical: No cervical adenopathy.   Skin:     General: Skin is warm and dry.   Neurological:      General: No focal deficit present.      Mental Status: She is alert and oriented to person, place, and time. Mental status is at baseline.   Psychiatric:         Mood and Affect: Mood normal.         PHQ-9 Score  PHQ-9 Total Score:       Pain Score  Vitals:    08/14/24 1340   BP: 124/66   Pulse: 70   Resp: 20   Temp: 98 °F (36.7 °C)   TempSrc: Temporal   SpO2: 95%   Weight: 69.8 kg (153 lb 12.8 oz)   Height: 165.1 cm (65\")   PainSc: 10-Worst pain ever   PainLoc: Back           ECOG score: 0           PAINSCOREQUALITYMETRIC:   Vitals:    08/14/24 1340   PainSc: 10-Worst pain ever   PainLoc: Back              Lab Review  Lab Results   Component Value Date    WBC 3.25 (L) 08/14/2024    HGB 8.9 (L) 08/14/2024    HCT 28.2 (L) 08/14/2024    MCV 87.3 08/14/2024    RDW 13.2 08/14/2024     (L) 08/14/2024    NEUTRORELPCT 54.6 08/14/2024    LYMPHORELPCT 25.8 08/14/2024    MONORELPCT 16.9 (H) 08/14/2024    EOSRELPCT 1.2 08/14/2024    BASORELPCT 0.9 08/14/2024    NEUTROABS 1.77 08/14/2024    LYMPHSABS 0.84 08/14/2024     Lab Results   Component Value Date     07/29/2024    K 4.5 07/29/2024    CO2 27.2 07/29/2024     07/29/2024    BUN 24 (H) 07/29/2024    CREATININE 1.48 (H) 07/29/2024    GLUCOSE 120 (H) 07/29/2024    CALCIUM 9.5 07/29/2024    ALKPHOS 64 07/29/2024    AST 25 07/29/2024    ALT 13 07/29/2024    BILITOT 0.2 07/29/2024    ALBUMIN " 4.4 07/29/2024    PROTEINTOT 7.2 07/29/2024    MG 2.0 09/27/2023               Work up 4/18/2024:    Component      Latest Ref Rng 4/18/2024   Iron      37 - 145 mcg/dL 59    Iron Saturation (TSAT)      20 - 50 % 15 (L)    Transferrin      200 - 360 mg/dL 257    TIBC      298 - 536 mcg/dL 383    Hepatitis B Surface Ag      Non-Reactive  Non-Reactive    Hep A IgM      Non-Reactive  Non-Reactive    Hep B Core IgM      Non-Reactive  Non-Reactive    Hepatitis C Ab      Non-Reactive  Non-Reactive    Reticulocyte %      0.70 - 1.90 % 0.93    Reticulocyte Absolute      0.0200 - 0.1300 10*6/mm3 0.0349    Protime      12.1 - 14.7 Seconds 13.9    INR      0.90 - 1.10  1.02    Ferritin      13.00 - 150.00 ng/mL 97.01    Vitamin B-12      211 - 946 pg/mL 1,602 (H)    Folate      4.78 - 24.20 ng/mL 16.30    LDH      135 - 214 U/L 173    Sed Rate      0 - 30 mm/hr 30    C-Reactive Protein      0.00 - 0.50 mg/dL <0.30    Methylmalonic Acid      0 - 378 nmol/L 355    TSH Baseline      0.270 - 4.200 uIU/mL 0.966    Transferrin Receptor      12.2 - 27.3 nmol/L 16.1    Haptoglobin      30 - 200 mg/dL 74    PTT      26.5 - 34.5 seconds 28.9    HIV DUO      Non-Reactive  Non-Reactive    MANDY Direct      Negative  Negative    Rheumatoid Factor Quantitative      0.0 - 14.0 IU/mL <10.0       Legend:  (L) Low  (H) High      PERIPHERAL SMEAR, [facility] (04/18/2024 11:53)     DIAGNOSIS:  PERIPHERAL SMEAR  Normocytic, normochromic anemia.  No significant increase in schistocytes  population.  Thrombocytopenia with no platelet clumping.  Normal WBC count and differential. Granulocytes show normal morphology and  no circulating blasts identified.       VINAYAK + PE (04/18/2024 11:53)           Immunoglobulin Free LT Chains Blood (04/18/2024 11:53)           Radiology Results    US Abdomen Complete (04/29/2024 13:02)   FINDINGS: .  The visualized pancreas is unremarkable. The liver is unremarkable. The  patient is status post cholecystectomy.  The  common duct measures 3 mm.  The right kidney measures 8.1 cm in length and is normal in echogenicity  without hydronephrosis. The left kidney measures 9.3 cm in length and is  normal in echogenicity without hydronephrosis.  Spleen is enlarged  measuring 13.5 cm.  The aorta is normal in caliber. The vena cava is  unremarkable.     IMPRESSION:  Splenomegaly.    CT Abdomen Pelvis Without Contrast (09/27/2023 23:02)   FINDINGS:   Included portions of the lung bases are normal.  The liver and spleen are normal.  There is no biliary dilation.  The  gallbladder has been removed.  The pancreas is normal.  The adrenal glands are normal.  Kidneys are normal.  There is no hydronephrosis or renal or ureteral  calculi.  The bowel loops are normal in course and caliber.  There is  diverticulosis of the descending and sigmoid colon.  There is no pelvic mass or free fluid or lymphadenopathy.  Degenerative changes involve the lumbar spine.     IMPRESSION:  No CT evidence of an acute intra-abdominal or intrapelvic process.      Assessment / Plan         Assessment and Plan   Susan Us is a 79 y.o.  presents for     Normocytic Anemia  Iron deficiency   Anemia of chronic disease  Thrombocytopenia   Splenomegaly   -Patient reportedly has had anemia and thrombocytopenia for many years, in our system dating back ~9 months; baseline Hgb 9.1-10, Hct 28-31, normal MCV, platelets 80-120K  -Labs from initial consultation show hemoglobin persistently low at 10.4, MCV 86, platelets 119.  Normal white blood count.  Peripheral smear with normocytic, normochromic anemia.  No increase in schistocytes.  Thrombocytopenia with no platelet clumping.  Granulocytes show normal morphology and no circulating blasts.  HIV antibody is nonreactive.  Negative for hepatitis studies.  Negative for rheumatoid factor and lupus screen. Iron studies showed mild iron deficiency with ferritin 97 ng/mL (patient is on oral ferrous sulfate). Replete B12/folate. No  signs of hemolysis as normal LDH/haptoglobin. SPEP with no M spike, elevated free kappa light chain to 52, elevated free lambda light chain 28 with normal kappa/lambda ratio 1.83.  Ultrasound of the abdomen shows splenomegaly with spleen measuring 13.5 cm.  Liver is unremarkable.  -These results were reviewed with Leslie and Ms. Us today. I don't believe her underlying bicytopenia is due to MDS or AML as she is asymptomatic with reasons for anemia such as iron deficiency and anemia of chronic disease and splenomegaly causing thrombocytopenia.  -Continue oral ferrous sulfate 325 mg daily with vitamin C to aid in absorption   -continue to monitor closely, if down trending counts, would recommend bone marrow biopsy    Discussed possible differential diagnoses, testing, treatment, recommended non-pharmacological interventions, risks, warning signs to monitor for that would indicate need for follow-up in clinic or ER. If no improvement with these regimens or you have new or worsening symptoms follow-up. Patient verbalizes understanding and agreement with plan of care. Denies further needs or concerns.     Patient was given instructions and counseling regarding her condition and for health maintenance advised.       All questions were answered to her satisfaction.        ACO / SHERRI/Other  Quality measures  -  Susan Us did not receive 2023 flu vaccine.  This was recommended.  -  Susan Us reports a pain score of 10.  Given her pain assessment as noted, treatment options were discussed and the following options were decided upon as a follow-up plan to address the patient's pain: continuation of current treatment plan for pain.  -  Current outpatient and discharge medications have been reconciled for the patient.  Reviewed by: Sylwia Hua MD        Meds ordered during this visit  No orders of the defined types were placed in this encounter.      Visit Diagnoses    ICD-10-CM ICD-9-CM   1. Anemia,  unspecified type  D64.9 285.9   2. Thrombocytopenia  D69.6 287.5   3. Splenomegaly  R16.1 789.2   4. Iron deficiency anemia, unspecified iron deficiency anemia type  D50.9 280.9           Follow Up   In 6 weeks with repeat CBC, iron studies, B12, peripheral smear      This document has been electronically signed by Sylwia Hua MD   August 14, 2024 17:50 EDT    Dictated Utilizing Dragon Dictation: Part of this note may be an electronic transcription/translation of spoken language to printed text using the Dragon Dictation System.

## 2024-08-15 DIAGNOSIS — D64.9 ANEMIA, UNSPECIFIED TYPE: Primary | ICD-10-CM

## 2024-08-15 DIAGNOSIS — D50.9 IRON DEFICIENCY ANEMIA, UNSPECIFIED IRON DEFICIENCY ANEMIA TYPE: ICD-10-CM

## 2024-08-15 DIAGNOSIS — D69.6 THROMBOCYTOPENIA: ICD-10-CM

## 2024-09-16 ENCOUNTER — TRANSCRIBE ORDERS (OUTPATIENT)
Dept: ADMINISTRATIVE | Facility: HOSPITAL | Age: 79
End: 2024-09-16
Payer: MEDICARE

## 2024-09-16 DIAGNOSIS — K21.9 CHRONIC GERD: Primary | ICD-10-CM

## 2024-09-23 ENCOUNTER — HOSPITAL ENCOUNTER (OUTPATIENT)
Dept: GENERAL RADIOLOGY | Facility: HOSPITAL | Age: 79
Discharge: HOME | End: 2024-09-23
Admitting: INTERNAL MEDICINE
Payer: MEDICARE

## 2024-09-23 DIAGNOSIS — K21.9 CHRONIC GERD: ICD-10-CM

## 2024-09-23 PROCEDURE — 74220 X-RAY XM ESOPHAGUS 1CNTRST: CPT

## 2024-09-23 PROCEDURE — 74220 X-RAY XM ESOPHAGUS 1CNTRST: CPT | Performed by: RADIOLOGY

## 2024-10-09 ENCOUNTER — OFFICE VISIT (OUTPATIENT)
Dept: SURGERY | Facility: CLINIC | Age: 79
End: 2024-10-09
Payer: MEDICARE

## 2024-10-09 VITALS
BODY MASS INDEX: 25.83 KG/M2 | WEIGHT: 155 LBS | HEIGHT: 65 IN | SYSTOLIC BLOOD PRESSURE: 118 MMHG | DIASTOLIC BLOOD PRESSURE: 70 MMHG

## 2024-10-09 DIAGNOSIS — K44.9 HIATAL HERNIA: Primary | ICD-10-CM

## 2024-10-09 DIAGNOSIS — R11.11 VOMITING WITHOUT NAUSEA, UNSPECIFIED VOMITING TYPE: ICD-10-CM

## 2024-10-09 RX ORDER — BUSPIRONE HYDROCHLORIDE 5 MG/1
5 TABLET ORAL 3 TIMES DAILY
COMMUNITY

## 2024-10-09 NOTE — H&P (VIEW-ONLY)
Subjective   Susan Us is a 79 y.o. female who presents today for Initial Evaluation    Chief Complaint:    Chief Complaint   Patient presents with    Hiatal Hernia        History of Present Illness:    History of Present Illness Susan is a 79-year-old female who presents for evaluation for a hiatal hernia.  She had an esophagram that revealed a large hiatal hernia as well as a delayed passage of the barium tablet and recommended direct visualization.  Patient and family member who is present at bedside reports that when she eats she frequently vomits.  She states just about everything she eats is coming back up.  She denies any chest or abdominal pain at this time.  Does report a previous hiatal hernia repair approximately 20+ years ago.    The following portions of the patient's history were reviewed and updated as appropriate: allergies, current medications, past family history, past medical history, past social history, past surgical history and problem list.    Past Medical History:  Past Medical History:   Diagnosis Date    Anxiety     Bradycardia     s/p pacemaker placement    CKD (chronic kidney disease)     baseline renal function unknown    COPD (chronic obstructive pulmonary disease)     Dementia     GERD (gastroesophageal reflux disease)     Hyperlipidemia     Hypertension     Iron deficiency anemia     Thrombocytopenia        Social History:  Social History     Socioeconomic History    Marital status:    Tobacco Use    Smoking status: Never     Passive exposure: Never    Smokeless tobacco: Never    Tobacco comments:     Second hand smoke exposure ()   Vaping Use    Vaping status: Never Used   Substance and Sexual Activity    Alcohol use: Never    Drug use: Never    Sexual activity: Defer       Family History:  Family History   Problem Relation Age of Onset    Stroke Mother     Stroke Father     Breast cancer Sister        Past Surgical History:  Past Surgical History:   Procedure  Laterality Date    BREAST BIOPSY      benign, yrs ago    CARDIAC PACEMAKER PLACEMENT      CATARACT EXTRACTION      CERVICAL FUSION      CHOLECYSTECTOMY      HIATAL HERNIA REPAIR      HYSTERECTOMY      REPLACEMENT TOTAL KNEE Right        Problem List:  Patient Active Problem List   Diagnosis    Stroke-like symptoms    TIA (transient ischemic attack)    Essential hypertension    Hyperlipidemia LDL goal <70    Artificial cardiac pacemaker    Chronic kidney disease    Frequent urinary tract infections    Bilateral renal cysts       Allergy:   Allergies   Allergen Reactions    Evolocumab Itching     itching    Statins Hives    Strawberry Rash        Current Medications:   Current Outpatient Medications   Medication Sig Dispense Refill    albuterol sulfate  (90 Base) MCG/ACT inhaler       ARIPiprazole (ABILIFY) 2 MG tablet Take 1 tablet by mouth Daily.      aspirin 81 MG EC tablet Take 1 tablet by mouth Daily.      Bempedoic Acid-Ezetimibe (Nexlizet) 180-10 MG tablet Take 1 tablet by mouth Every Night.      Bempedoic Acid-Ezetimibe (Nexlizet) 180-10 MG tablet Take  by mouth.      busPIRone (BUSPAR) 5 MG tablet Take 1 tablet by mouth 3 (Three) Times a Day.      calcium carbonate, oyster shell, 500 MG tablet tablet Take  by mouth 2 (Two) Times a Day.      Calcium Carbonate-Vitamin D (calcium 500 mg vitamin D 5 mcg, 200 UT,) 500-5 MG-MCG tablet per tablet Take 1 tablet by mouth 2 (Two) Times a Day.      carvedilol (COREG) 6.25 MG tablet Take 1 tablet by mouth 2 (Two) Times a Day. 60 tablet 11    cloNIDine (CATAPRES) 0.1 MG tablet Take 1 tablet by mouth.      cloNIDine (CATAPRES-TTS) 0.1 MG/24HR patch Place 1 patch on the skin as directed by provider 1 (One) Time Per Week.      Diclofenac Sodium (VOLTAREN) 1 % gel gel Apply 2 g topically to the appropriate area as directed 4 (Four) Times a Day As Needed (joint pain). Knees, neck and shoulders      doxazosin (CARDURA) 4 MG tablet Take 1 tablet by mouth Every Night.  (Patient taking differently: Take 2 tablets by mouth Every Night.) 30 tablet 1    fluticasone (FLONASE) 50 MCG/ACT nasal spray Administer 2 sprays into the nostril(s) as directed by provider Daily.      gabapentin (NEURONTIN) 300 MG capsule Take 1 capsule by mouth 2 (Two) Times a Day. 6 capsule 0    hydrALAZINE (APRESOLINE) 50 MG tablet Take 1 tablet by mouth 3 (Three) Times a Day.      isosorbide mononitrate (IMDUR) 60 MG 24 hr tablet Take 1 tablet by mouth Daily.      Lidocaine 4 % Place 1 patch on the skin as directed by provider Daily. Remove & Discard patch within 12 hours or as directed by MD      LORazepam (ATIVAN) 0.5 MG tablet Take 1 tablet by mouth 2 (Two) Times a Day As Needed for Anxiety. 3 tablet 0    melatonin 5 MG tablet tablet Take 1 tablet by mouth.      ondansetron (ZOFRAN) 4 MG tablet Take 1 tablet by mouth Every 8 (Eight) Hours As Needed for Nausea or Vomiting.      pantoprazole (PROTONIX) 40 MG EC tablet Take 1 tablet by mouth Every Night.      potassium chloride 10 MEQ CR tablet Take 1 tablet by mouth 3 times a day.      sennosides-docusate (senna-docusate sodium) 8.6-50 MG per tablet Take 1 tablet by mouth Daily.      sertraline (ZOLOFT) 100 MG tablet Take 2 tablets by mouth Every Night.      traMADol (ULTRAM) 50 MG tablet As Needed.      Trelegy Ellipta 200-62.5-25 MCG/ACT inhaler       triamcinolone (KENALOG) 0.1 % cream Apply 1 Application topically to the appropriate area as directed 2 (Two) Times a Day.      vitamin B-12 (CYANOCOBALAMIN) 1000 MCG tablet Take 1 tablet by mouth Daily.      methylPREDNISolone (MEDROL) 4 MG dose pack Take as directed on package instructions. 1 each 0    modafinil (PROVIGIL) 200 MG tablet Take 1 tablet by mouth Daily. 3 tablet 0    Multiple Vitamins-Minerals (ICAPS AREDS 2 PO) Take 1 tablet by mouth 2 (Two) Times a Day.      polyethylene glycol (MiraLax) 17 GM/SCOOP powder Take 17 g by mouth Daily.      predniSONE (DELTASONE) 10 MG tablet Take 1 tablet by  "mouth Daily.      predniSONE (DELTASONE) 20 MG tablet Take 1 tablet by mouth Daily.      torsemide (DEMADEX) 100 MG tablet Take 0.5 tablets by mouth Daily.       No current facility-administered medications for this visit.       Review of Systems:    Review of Systems   Gastrointestinal:  Positive for vomiting.         Physical Exam:   Physical Exam  Constitutional:       Appearance: Normal appearance.   HENT:      Head: Normocephalic and atraumatic.      Right Ear: External ear normal.      Left Ear: External ear normal.   Eyes:      Conjunctiva/sclera: Conjunctivae normal.   Pulmonary:      Effort: Pulmonary effort is normal.   Abdominal:      General: Abdomen is flat.   Musculoskeletal:         General: Normal range of motion.      Cervical back: Normal range of motion and neck supple.   Skin:     General: Skin is warm and dry.      Capillary Refill: Capillary refill takes less than 2 seconds.   Neurological:      General: No focal deficit present.      Mental Status: She is alert and oriented to person, place, and time.   Psychiatric:         Mood and Affect: Mood normal.         Behavior: Behavior normal.         Vitals:  Blood pressure 118/70, height 165.1 cm (65\"), weight 70.3 kg (155 lb).   Body mass index is 25.79 kg/m².     Imaging:   FL ESOPHAGRAM SINGLE CONTRAST  Narrative: EXAMINATION: FL ESOPHAGRAM SINGLE CONTRAST-      CLINICAL INDICATION: k21.9; K21.4-Sevfep-uhkxhqbzii reflux disease  without esophagitis        COMPARISON: None available     Total fluoroscopy time 1.8 minutes.     Fluoroscopy was provided. Patient was given thin barium and a 13 mm  barium tablet     FINDINGS:  There is a hiatal hernia and narrowing at the distal esophagus with  delayed passage of the 13 mm barium tablet     Tertiary contractions are seen in the distal third of the esophagus     No ulceration or web.     Impression: Hiatal hernia and delayed passage of 13 mm barium tablet through the  distal esophagus. Recommend " direct visualization        This report was finalized on 9/23/2024 11:32 AM by Dr. Nghia Sorenson MD.           Assessment & Plan   Diagnoses and all orders for this visit:    1. Hiatal hernia (Primary)  -     Case Request; Standing  -     Case Request    2. Vomiting without nausea, unspecified vomiting type  -     Case Request; Standing  -     Case Request    Other orders  -     Follow Anesthesia Guidelines / Protocol; Future  -     Follow Anesthesia Guidelines / Protocol; Standing  -     Verify / Perform Chlorhexidine Skin Prep; Standing  -     Obtain Informed Consent; Future  -     Provide NPO Instructions to Patient; Future  -     Chlorhexidine Skin Prep; Future  -     Place Sequential Compression Device; Standing  -     Maintain Sequential Compression Device; Standing    Susan is a 79-year-old female who presents for evaluation for hiatal hernia and frequent vomiting.  She will undergo an EGD with Dr. Van.  Verbalized understanding of prep instructions and procedure and wishes to proceed.    Visit Diagnoses:    ICD-10-CM ICD-9-CM   1. Hiatal hernia  K44.9 553.3   2. Vomiting without nausea, unspecified vomiting type  R11.11 787.03         MEDS ORDERED DURING VISIT:  No orders of the defined types were placed in this encounter.      Return for Follow-up postop.             This document has been electronically signed by TRACE Vela  October 9, 2024 14:23 EDT    Please note that portions of this note were completed with a voice recognition program.

## 2024-10-09 NOTE — PROGRESS NOTES
Subjective   Susan Us is a 79 y.o. female who presents today for Initial Evaluation    Chief Complaint:    Chief Complaint   Patient presents with    Hiatal Hernia        History of Present Illness:    History of Present Illness Susan is a 79-year-old female who presents for evaluation for a hiatal hernia.  She had an esophagram that revealed a large hiatal hernia as well as a delayed passage of the barium tablet and recommended direct visualization.  Patient and family member who is present at bedside reports that when she eats she frequently vomits.  She states just about everything she eats is coming back up.  She denies any chest or abdominal pain at this time.  Does report a previous hiatal hernia repair approximately 20+ years ago.    The following portions of the patient's history were reviewed and updated as appropriate: allergies, current medications, past family history, past medical history, past social history, past surgical history and problem list.    Past Medical History:  Past Medical History:   Diagnosis Date    Anxiety     Bradycardia     s/p pacemaker placement    CKD (chronic kidney disease)     baseline renal function unknown    COPD (chronic obstructive pulmonary disease)     Dementia     GERD (gastroesophageal reflux disease)     Hyperlipidemia     Hypertension     Iron deficiency anemia     Thrombocytopenia        Social History:  Social History     Socioeconomic History    Marital status:    Tobacco Use    Smoking status: Never     Passive exposure: Never    Smokeless tobacco: Never    Tobacco comments:     Second hand smoke exposure ()   Vaping Use    Vaping status: Never Used   Substance and Sexual Activity    Alcohol use: Never    Drug use: Never    Sexual activity: Defer       Family History:  Family History   Problem Relation Age of Onset    Stroke Mother     Stroke Father     Breast cancer Sister        Past Surgical History:  Past Surgical History:   Procedure  Laterality Date    BREAST BIOPSY      benign, yrs ago    CARDIAC PACEMAKER PLACEMENT      CATARACT EXTRACTION      CERVICAL FUSION      CHOLECYSTECTOMY      HIATAL HERNIA REPAIR      HYSTERECTOMY      REPLACEMENT TOTAL KNEE Right        Problem List:  Patient Active Problem List   Diagnosis    Stroke-like symptoms    TIA (transient ischemic attack)    Essential hypertension    Hyperlipidemia LDL goal <70    Artificial cardiac pacemaker    Chronic kidney disease    Frequent urinary tract infections    Bilateral renal cysts       Allergy:   Allergies   Allergen Reactions    Evolocumab Itching     itching    Statins Hives    Strawberry Rash        Current Medications:   Current Outpatient Medications   Medication Sig Dispense Refill    albuterol sulfate  (90 Base) MCG/ACT inhaler       ARIPiprazole (ABILIFY) 2 MG tablet Take 1 tablet by mouth Daily.      aspirin 81 MG EC tablet Take 1 tablet by mouth Daily.      Bempedoic Acid-Ezetimibe (Nexlizet) 180-10 MG tablet Take 1 tablet by mouth Every Night.      Bempedoic Acid-Ezetimibe (Nexlizet) 180-10 MG tablet Take  by mouth.      busPIRone (BUSPAR) 5 MG tablet Take 1 tablet by mouth 3 (Three) Times a Day.      calcium carbonate, oyster shell, 500 MG tablet tablet Take  by mouth 2 (Two) Times a Day.      Calcium Carbonate-Vitamin D (calcium 500 mg vitamin D 5 mcg, 200 UT,) 500-5 MG-MCG tablet per tablet Take 1 tablet by mouth 2 (Two) Times a Day.      carvedilol (COREG) 6.25 MG tablet Take 1 tablet by mouth 2 (Two) Times a Day. 60 tablet 11    cloNIDine (CATAPRES) 0.1 MG tablet Take 1 tablet by mouth.      cloNIDine (CATAPRES-TTS) 0.1 MG/24HR patch Place 1 patch on the skin as directed by provider 1 (One) Time Per Week.      Diclofenac Sodium (VOLTAREN) 1 % gel gel Apply 2 g topically to the appropriate area as directed 4 (Four) Times a Day As Needed (joint pain). Knees, neck and shoulders      doxazosin (CARDURA) 4 MG tablet Take 1 tablet by mouth Every Night.  (Patient taking differently: Take 2 tablets by mouth Every Night.) 30 tablet 1    fluticasone (FLONASE) 50 MCG/ACT nasal spray Administer 2 sprays into the nostril(s) as directed by provider Daily.      gabapentin (NEURONTIN) 300 MG capsule Take 1 capsule by mouth 2 (Two) Times a Day. 6 capsule 0    hydrALAZINE (APRESOLINE) 50 MG tablet Take 1 tablet by mouth 3 (Three) Times a Day.      isosorbide mononitrate (IMDUR) 60 MG 24 hr tablet Take 1 tablet by mouth Daily.      Lidocaine 4 % Place 1 patch on the skin as directed by provider Daily. Remove & Discard patch within 12 hours or as directed by MD      LORazepam (ATIVAN) 0.5 MG tablet Take 1 tablet by mouth 2 (Two) Times a Day As Needed for Anxiety. 3 tablet 0    melatonin 5 MG tablet tablet Take 1 tablet by mouth.      ondansetron (ZOFRAN) 4 MG tablet Take 1 tablet by mouth Every 8 (Eight) Hours As Needed for Nausea or Vomiting.      pantoprazole (PROTONIX) 40 MG EC tablet Take 1 tablet by mouth Every Night.      potassium chloride 10 MEQ CR tablet Take 1 tablet by mouth 3 times a day.      sennosides-docusate (senna-docusate sodium) 8.6-50 MG per tablet Take 1 tablet by mouth Daily.      sertraline (ZOLOFT) 100 MG tablet Take 2 tablets by mouth Every Night.      traMADol (ULTRAM) 50 MG tablet As Needed.      Trelegy Ellipta 200-62.5-25 MCG/ACT inhaler       triamcinolone (KENALOG) 0.1 % cream Apply 1 Application topically to the appropriate area as directed 2 (Two) Times a Day.      vitamin B-12 (CYANOCOBALAMIN) 1000 MCG tablet Take 1 tablet by mouth Daily.      methylPREDNISolone (MEDROL) 4 MG dose pack Take as directed on package instructions. 1 each 0    modafinil (PROVIGIL) 200 MG tablet Take 1 tablet by mouth Daily. 3 tablet 0    Multiple Vitamins-Minerals (ICAPS AREDS 2 PO) Take 1 tablet by mouth 2 (Two) Times a Day.      polyethylene glycol (MiraLax) 17 GM/SCOOP powder Take 17 g by mouth Daily.      predniSONE (DELTASONE) 10 MG tablet Take 1 tablet by  "mouth Daily.      predniSONE (DELTASONE) 20 MG tablet Take 1 tablet by mouth Daily.      torsemide (DEMADEX) 100 MG tablet Take 0.5 tablets by mouth Daily.       No current facility-administered medications for this visit.       Review of Systems:    Review of Systems   Gastrointestinal:  Positive for vomiting.         Physical Exam:   Physical Exam  Constitutional:       Appearance: Normal appearance.   HENT:      Head: Normocephalic and atraumatic.      Right Ear: External ear normal.      Left Ear: External ear normal.   Eyes:      Conjunctiva/sclera: Conjunctivae normal.   Pulmonary:      Effort: Pulmonary effort is normal.   Abdominal:      General: Abdomen is flat.   Musculoskeletal:         General: Normal range of motion.      Cervical back: Normal range of motion and neck supple.   Skin:     General: Skin is warm and dry.      Capillary Refill: Capillary refill takes less than 2 seconds.   Neurological:      General: No focal deficit present.      Mental Status: She is alert and oriented to person, place, and time.   Psychiatric:         Mood and Affect: Mood normal.         Behavior: Behavior normal.         Vitals:  Blood pressure 118/70, height 165.1 cm (65\"), weight 70.3 kg (155 lb).   Body mass index is 25.79 kg/m².     Imaging:   FL ESOPHAGRAM SINGLE CONTRAST  Narrative: EXAMINATION: FL ESOPHAGRAM SINGLE CONTRAST-      CLINICAL INDICATION: k21.9; K21.4-Itduwi-fvfibsxeby reflux disease  without esophagitis        COMPARISON: None available     Total fluoroscopy time 1.8 minutes.     Fluoroscopy was provided. Patient was given thin barium and a 13 mm  barium tablet     FINDINGS:  There is a hiatal hernia and narrowing at the distal esophagus with  delayed passage of the 13 mm barium tablet     Tertiary contractions are seen in the distal third of the esophagus     No ulceration or web.     Impression: Hiatal hernia and delayed passage of 13 mm barium tablet through the  distal esophagus. Recommend " direct visualization        This report was finalized on 9/23/2024 11:32 AM by Dr. Nghia Sorenson MD.           Assessment & Plan   Diagnoses and all orders for this visit:    1. Hiatal hernia (Primary)  -     Case Request; Standing  -     Case Request    2. Vomiting without nausea, unspecified vomiting type  -     Case Request; Standing  -     Case Request    Other orders  -     Follow Anesthesia Guidelines / Protocol; Future  -     Follow Anesthesia Guidelines / Protocol; Standing  -     Verify / Perform Chlorhexidine Skin Prep; Standing  -     Obtain Informed Consent; Future  -     Provide NPO Instructions to Patient; Future  -     Chlorhexidine Skin Prep; Future  -     Place Sequential Compression Device; Standing  -     Maintain Sequential Compression Device; Standing    Susan is a 79-year-old female who presents for evaluation for hiatal hernia and frequent vomiting.  She will undergo an EGD with Dr. Van.  Verbalized understanding of prep instructions and procedure and wishes to proceed.    Visit Diagnoses:    ICD-10-CM ICD-9-CM   1. Hiatal hernia  K44.9 553.3   2. Vomiting without nausea, unspecified vomiting type  R11.11 787.03         MEDS ORDERED DURING VISIT:  No orders of the defined types were placed in this encounter.      Return for Follow-up postop.             This document has been electronically signed by TRACE Vela  October 9, 2024 14:23 EDT    Please note that portions of this note were completed with a voice recognition program.

## 2024-10-11 ENCOUNTER — TELEPHONE (OUTPATIENT)
Dept: SURGERY | Facility: CLINIC | Age: 79
End: 2024-10-11
Payer: MEDICARE

## 2024-10-11 PROBLEM — K44.9 HIATAL HERNIA: Status: ACTIVE | Noted: 2024-10-09

## 2024-10-11 PROBLEM — R11.11 VOMITING WITHOUT NAUSEA: Status: ACTIVE | Noted: 2024-10-09

## 2024-10-11 NOTE — TELEPHONE ENCOUNTER
You are scheduled for an EGD with Dr. Van on 10/18/24 at 9:30am. Arrive at outpatient surgery on the ground floor of the hospital, opposite end of the ER location. Do not eat/drink anything after midnight the night prior to procedure. You must have a  with you on day of surgery. If you have any questions please call the office at [phone].        Daughter is aware and will inform nursing home.

## 2024-10-18 ENCOUNTER — ANESTHESIA EVENT (OUTPATIENT)
Dept: PERIOP | Facility: HOSPITAL | Age: 79
End: 2024-10-18
Payer: MEDICARE

## 2024-10-18 ENCOUNTER — HOSPITAL ENCOUNTER (OUTPATIENT)
Facility: HOSPITAL | Age: 79
Setting detail: HOSPITAL OUTPATIENT SURGERY
Discharge: HOME | End: 2024-10-18
Attending: SURGERY | Admitting: SURGERY
Payer: MEDICARE

## 2024-10-18 ENCOUNTER — ANESTHESIA (OUTPATIENT)
Dept: PERIOP | Facility: HOSPITAL | Age: 79
End: 2024-10-18
Payer: MEDICARE

## 2024-10-18 VITALS
BODY MASS INDEX: 25.83 KG/M2 | OXYGEN SATURATION: 95 % | TEMPERATURE: 97.7 F | HEIGHT: 65 IN | HEART RATE: 65 BPM | WEIGHT: 155 LBS | RESPIRATION RATE: 16 BRPM | DIASTOLIC BLOOD PRESSURE: 75 MMHG | SYSTOLIC BLOOD PRESSURE: 164 MMHG

## 2024-10-18 DIAGNOSIS — R11.11 VOMITING WITHOUT NAUSEA, UNSPECIFIED VOMITING TYPE: ICD-10-CM

## 2024-10-18 DIAGNOSIS — K44.9 HIATAL HERNIA: ICD-10-CM

## 2024-10-18 PROCEDURE — 25010000002 PROPOFOL 200 MG/20ML EMULSION: Performed by: NURSE ANESTHETIST, CERTIFIED REGISTERED

## 2024-10-18 PROCEDURE — 25010000002 LIDOCAINE PF 2% 2 % SOLUTION: Performed by: NURSE ANESTHETIST, CERTIFIED REGISTERED

## 2024-10-18 PROCEDURE — 25010000002 HYDRALAZINE PER 20 MG: Performed by: ANESTHESIOLOGY

## 2024-10-18 PROCEDURE — 0 LABETALOL 5 MG/ML SOLUTION: Performed by: NURSE ANESTHETIST, CERTIFIED REGISTERED

## 2024-10-18 RX ORDER — SODIUM CHLORIDE 0.9 % (FLUSH) 0.9 %
10 SYRINGE (ML) INJECTION AS NEEDED
Status: DISCONTINUED | OUTPATIENT
Start: 2024-10-18 | End: 2024-10-18 | Stop reason: HOSPADM

## 2024-10-18 RX ORDER — SODIUM CHLORIDE 0.9 % (FLUSH) 0.9 %
10 SYRINGE (ML) INJECTION EVERY 12 HOURS SCHEDULED
Status: DISCONTINUED | OUTPATIENT
Start: 2024-10-18 | End: 2024-10-18 | Stop reason: HOSPADM

## 2024-10-18 RX ORDER — METOPROLOL TARTRATE 1 MG/ML
INJECTION, SOLUTION INTRAVENOUS AS NEEDED
Status: DISCONTINUED | OUTPATIENT
Start: 2024-10-18 | End: 2024-10-18 | Stop reason: SURG

## 2024-10-18 RX ORDER — FENTANYL CITRATE 50 UG/ML
50 INJECTION, SOLUTION INTRAMUSCULAR; INTRAVENOUS
Status: DISCONTINUED | OUTPATIENT
Start: 2024-10-18 | End: 2024-10-18 | Stop reason: HOSPADM

## 2024-10-18 RX ORDER — ONDANSETRON 2 MG/ML
4 INJECTION INTRAMUSCULAR; INTRAVENOUS AS NEEDED
Status: DISCONTINUED | OUTPATIENT
Start: 2024-10-18 | End: 2024-10-18 | Stop reason: HOSPADM

## 2024-10-18 RX ORDER — SODIUM CHLORIDE 9 MG/ML
40 INJECTION, SOLUTION INTRAVENOUS AS NEEDED
Status: DISCONTINUED | OUTPATIENT
Start: 2024-10-18 | End: 2024-10-18 | Stop reason: HOSPADM

## 2024-10-18 RX ORDER — OXYCODONE AND ACETAMINOPHEN 5; 325 MG/1; MG/1
1 TABLET ORAL ONCE AS NEEDED
Status: DISCONTINUED | OUTPATIENT
Start: 2024-10-18 | End: 2024-10-18 | Stop reason: HOSPADM

## 2024-10-18 RX ORDER — HYDRALAZINE HYDROCHLORIDE 20 MG/ML
10 INJECTION INTRAMUSCULAR; INTRAVENOUS ONCE
Status: COMPLETED | OUTPATIENT
Start: 2024-10-18 | End: 2024-10-18

## 2024-10-18 RX ORDER — MODAFINIL 100 MG/1
200 TABLET ORAL DAILY
COMMUNITY

## 2024-10-18 RX ORDER — POLYETHYLENE GLYCOL 3350
POWDER (GRAM) MISCELLANEOUS
COMMUNITY

## 2024-10-18 RX ORDER — LABETALOL HYDROCHLORIDE 5 MG/ML
INJECTION, SOLUTION INTRAVENOUS AS NEEDED
Status: DISCONTINUED | OUTPATIENT
Start: 2024-10-18 | End: 2024-10-18 | Stop reason: SURG

## 2024-10-18 RX ORDER — PROPOFOL 10 MG/ML
INJECTION, EMULSION INTRAVENOUS AS NEEDED
Status: DISCONTINUED | OUTPATIENT
Start: 2024-10-18 | End: 2024-10-18 | Stop reason: SURG

## 2024-10-18 RX ORDER — DONEPEZIL HYDROCHLORIDE 10 MG/1
10 TABLET, FILM COATED ORAL NIGHTLY
COMMUNITY

## 2024-10-18 RX ORDER — LIDOCAINE HYDROCHLORIDE 20 MG/ML
INJECTION, SOLUTION EPIDURAL; INFILTRATION; INTRACAUDAL; PERINEURAL AS NEEDED
Status: DISCONTINUED | OUTPATIENT
Start: 2024-10-18 | End: 2024-10-18 | Stop reason: SURG

## 2024-10-18 RX ORDER — IPRATROPIUM BROMIDE AND ALBUTEROL SULFATE 2.5; .5 MG/3ML; MG/3ML
3 SOLUTION RESPIRATORY (INHALATION) ONCE AS NEEDED
Status: DISCONTINUED | OUTPATIENT
Start: 2024-10-18 | End: 2024-10-18 | Stop reason: HOSPADM

## 2024-10-18 RX ORDER — ACETAMINOPHEN 500 MG
500 TABLET ORAL EVERY 6 HOURS PRN
COMMUNITY

## 2024-10-18 RX ORDER — SODIUM CHLORIDE, SODIUM LACTATE, POTASSIUM CHLORIDE, CALCIUM CHLORIDE 600; 310; 30; 20 MG/100ML; MG/100ML; MG/100ML; MG/100ML
125 INJECTION, SOLUTION INTRAVENOUS ONCE
Status: DISCONTINUED | OUTPATIENT
Start: 2024-10-18 | End: 2024-10-18 | Stop reason: HOSPADM

## 2024-10-18 RX ORDER — MEPERIDINE HYDROCHLORIDE 25 MG/ML
12.5 INJECTION INTRAMUSCULAR; INTRAVENOUS; SUBCUTANEOUS
Status: DISCONTINUED | OUTPATIENT
Start: 2024-10-18 | End: 2024-10-18 | Stop reason: HOSPADM

## 2024-10-18 RX ORDER — MIDAZOLAM HYDROCHLORIDE 1 MG/ML
0.5 INJECTION INTRAMUSCULAR; INTRAVENOUS
Status: DISCONTINUED | OUTPATIENT
Start: 2024-10-18 | End: 2024-10-18 | Stop reason: HOSPADM

## 2024-10-18 RX ADMIN — Medication 10 MG: at 10:40

## 2024-10-18 RX ADMIN — METOPROLOL TARTRATE 5 MG: 1 INJECTION, SOLUTION INTRAVENOUS at 10:50

## 2024-10-18 RX ADMIN — PROPOFOL 60 MG: 10 INJECTION, EMULSION INTRAVENOUS at 10:24

## 2024-10-18 RX ADMIN — LIDOCAINE HYDROCHLORIDE 100 MG: 20 INJECTION, SOLUTION EPIDURAL; INFILTRATION; INTRACAUDAL; PERINEURAL at 10:24

## 2024-10-18 RX ADMIN — HYDRALAZINE HYDROCHLORIDE 10 MG: 20 INJECTION INTRAMUSCULAR; INTRAVENOUS at 11:10

## 2024-10-18 RX ADMIN — Medication 10 MG: at 10:45

## 2024-10-18 NOTE — ANESTHESIA POSTPROCEDURE EVALUATION
Patient: Susan Us    Procedure Summary       Date: 10/18/24 Room / Location:  [facility]  /  [facility]    Anesthesia Start: 1022 Anesthesia Stop: 1034    Procedure: ESOPHAGOGASTRODUODENOSCOPY WITH ANESTHESIA (Esophagus) Diagnosis:       Hiatal hernia      Vomiting without nausea, unspecified vomiting type      (Hiatal hernia [K44.9])      (Vomiting without nausea, unspecified vomiting type [R11.11])    Surgeons: Francis Van MD Provider: Renato Villarreal MD    Anesthesia Type: general ASA Status: 3            Anesthesia Type: general    Vitals  Vitals Value Taken Time   /75 10/18/24 1125   Temp 97.6 10/18/24  1129   Pulse 65 10/18/24 1129   Resp 16 10/18/24 1035   SpO2 96 % 10/18/24 1129   Vitals shown include unfiled device data.        Post Anesthesia Care and Evaluation    Patient location during evaluation: PACU  Patient participation: complete - patient participated  Level of consciousness: awake  Pain score: 0  Pain management: satisfactory to patient    Airway patency: patent  Anesthetic complications: No anesthetic complications  PONV Status: none  Cardiovascular status: hemodynamically stable  Respiratory status: nasal cannula  Hydration status: acceptable

## 2024-10-18 NOTE — ANESTHESIA PREPROCEDURE EVALUATION
Anesthesia Evaluation     no history of anesthetic complications:   NPO Solid Status: > 8 hours  NPO Liquid Status: > 8 hours           Airway   Mallampati: I  TM distance: >3 FB  Neck ROM: full  No difficulty expected  Dental - normal exam   (+) upper dentures and lower dentures    Pulmonary - normal exam   (+) COPD,  Cardiovascular - normal exam    (+) pacemaker, hypertension, hyperlipidemia      Neuro/Psych  (+) TIA, psychiatric history, dementia  GI/Hepatic/Renal/Endo    (+) hiatal hernia, GERD, renal disease-    Musculoskeletal     Abdominal  - normal exam    Bowel sounds: normal.   Substance History      OB/GYN          Other   arthritis,                 Anesthesia Plan    ASA 3     general     intravenous induction     Anesthetic plan, risks, benefits, and alternatives have been provided, discussed and informed consent has been obtained with: patient.    CODE STATUS:

## 2024-10-21 LAB — REF LAB TEST METHOD: NORMAL

## 2024-10-28 ENCOUNTER — TELEPHONE (OUTPATIENT)
Dept: ONCOLOGY | Facility: CLINIC | Age: 79
End: 2024-10-28
Payer: MEDICARE

## 2024-10-28 NOTE — TELEPHONE ENCOUNTER
Caller: SCOTT HINDS    Relationship: Emergency Contact    Best call back number: [phone]    What was the call regarding: [facility] CALLED REGARDING [facility] FOR 10/31. SHE SAYS [facility] JUST HAD AN UPPER ENDOSCOPY ON 10/18. THEY JUST PUT HER BACK ON HER IRON PILLS ON 10/25. SHE IS NEEDING TO KNOW IF THE LAB FOR THURSDAY NEEDS TO BE MOVED OUT.

## 2024-10-28 NOTE — TELEPHONE ENCOUNTER
RN discussed with MD who stated labs are still necessary.     RN relayed this to the patient's daughter who voiced understanding. RN encouraged her to call with any further questions or concerns in the meantime.

## 2024-10-29 ENCOUNTER — OFFICE VISIT (OUTPATIENT)
Dept: SURGERY | Facility: CLINIC | Age: 79
End: 2024-10-29
Payer: MEDICARE

## 2024-10-29 VITALS — BODY MASS INDEX: 25.83 KG/M2 | HEIGHT: 65 IN | WEIGHT: 155 LBS

## 2024-10-29 DIAGNOSIS — K44.9 HIATAL HERNIA: Primary | ICD-10-CM

## 2024-10-29 PROCEDURE — 1159F MED LIST DOCD IN RCRD: CPT | Performed by: SURGERY

## 2024-10-29 PROCEDURE — 1160F RVW MEDS BY RX/DR IN RCRD: CPT | Performed by: SURGERY

## 2024-10-29 PROCEDURE — 99212 OFFICE O/P EST SF 10 MIN: CPT | Performed by: SURGERY

## 2024-10-29 NOTE — PROGRESS NOTES
Subjective   Susan Us is a 79 y.o. female  is here today for follow-up.         Susan Us is a 79 y.o. female here for follow-up after EGD.  She was having some emesis of undigested food and has all moderate size hiatal hernia with reflux changes.  She is on PPI therapy and her symptoms have improved with some dietary modification but she still having some vomiting at times.  Biopsies negative for malignancy or H. pylori.  History of Present Illness         Physical Exam  In wheelchair, alert and oriented, stable  Physical Exam              Assessment     Diagnoses and all orders for this visit:    1. Hiatal hernia (Primary)      Susan Us is a 79 y.o. female with dysphagia likely secondary to moderate-sized hiatal hernia and reflux.  PPI therapy will be continued and she will continue lifestyle and dietary modification with smaller more frequent meals and excessive chewing.  Patient will follow-up in 2 weeks.  If no improvement may add motility agents to help with passage of food.  Assessment & Plan          This document has been electronically signed by Francis Van MD   October 29, 2024 13:32 EDT

## 2024-10-30 NOTE — PROGRESS NOTES
Subjective     Date: 10/31/2024    Referring Provider  No ref. provider found    Chief Complaint  Bicytopenia (anemia and thrombocytopenia)    Subjective          Susan Us is a 79 y.o. female who presents today to [facility] for follow up.     HPI:   79 y.o. female with past medical history of hypertension, hyperlipidemia, dementia, chronic kidney disease, PPM s/p bradycardia who presents for consultation regarding bicytopenia (normocytic anemia and thrombocytopenia) from nursing home.    She presents today with her daughter, Leslie, who assists in history. She was seen by a hematologist, Dr. Luna in [place], [place] several years ago. Did not go through a bone marrow biopsy. Had U/S of the abdomen performed. She was placed on oral iron, which she is currently on. No other intervention was done.     Ms. Us denies any new symptoms including fever, chills, night sweats, unintentional weight loss, or fatigue. Denies any recent lumps/bumps. Underwent a colonoscopy 3-4 years ago by someone in [place], unable to recall whom. Was found to have polyps, no bleeding was noted. Currently denies any bleeding.    CBC from 4/16 show normal WBC and differential, Hgb 10, hct 31, Plt 120K.     Denies smoking, alcohol  use, drug use. Worked as a , , and . Family history significant for sister with breast cancer requiring bilateral mastectomy.     Interval History 05/14/2024   Labs from initial consultation show hemoglobin persistently low at 10.4, MCV 86, platelets 119.  Normal white blood count.  Peripheral smear with normocytic, normochromic anemia.  No increase in schistocytes.  Thrombocytopenia with no platelet clumping.  Granulocytes show normal morphology and no circulating blasts.  HIV antibody is nonreactive.  Negative for hepatitis studies.  Negative for rheumatoid factor and lupus screen. Iron studies showed mild iron deficiency with  ferritin 97 ng/mL (patient is on oral ferrous sulfate). Replete B12/folate. No signs of hemolysis as normal LDH/haptoglobin. SPEP with no M spike, elevated free kappa light chain to 52, elevated free lambda light chain 28 with normal kappa/lambda ratio 1.83.  Ultrasound of the abdomen shows splenomegaly with spleen measuring 13.5 cm.  Liver is unremarkable.    These results were reviewed with Leslie and Ms. Us today. I don't believe her underlying bicytopenia is due to MDS or AML as she is asymptomatic with reasons for anemia such as iron deficiency and anemia of chronic disease and splenomegaly causing thrombocytopenia. IF she were to have bleeding or weight loss, fever, chills, night sweats etc, or drop in her H/H and platelets without a cause, I would consider bone marrow biopsy.     Interval History 08/14/2024   Ms. Us presents today for follow up, accompanied by her daughter. She presented to [facility] [place] ED 7/29 due to L sided chest pain, was diagnosed with costochondritis. Continues to take oral vitamin C/iron.   CBC: 3.25 (ANC 1.7), Hgb 8.9, Hct 28, Plts 101. Discussed monitoring CBC closer, if down trending counts, would recommend BMB.    Interval History 10/31/2024   Ms. Us presents for follow up, accompanied by Leslie. She underwent EGD 10/18-  noted to have large hiatal hernia and esophagitis. Path with benign gastric antral type mucosa with scant chronic inflammation and focal minimal intestinal metaplasia, no h. Pylori.     She was taken off iron briefly for EGD, recently placed back on ~5 days ago. Denies constipation, does endorse some nausea, which she thinks may be attributed to hiatal hernia.     CBC today with normalized WBC, Hgb 9.4, Hct 30, Plts stable at 101. Cont Vitamin C/ Iron every other day.       Objective     Objective     Allergy:   Allergies   Allergen Reactions    Evolocumab Itching     itching    Statins Hives    Strawberry Rash        Current Medications:   Current  Outpatient Medications   Medication Sig Dispense Refill    acetaminophen (TYLENOL) 500 MG tablet Take 1 tablet by mouth Every 6 (Six) Hours As Needed for Mild Pain.      albuterol sulfate  (90 Base) MCG/ACT inhaler       ARIPiprazole (ABILIFY) 2 MG tablet Take 1 tablet by mouth Daily.      aspirin 81 MG EC tablet Take 1 tablet by mouth Daily.      busPIRone (BUSPAR) 5 MG tablet Take 1 tablet by mouth 3 (Three) Times a Day.      carvedilol (COREG) 6.25 MG tablet Take 1 tablet by mouth 2 (Two) Times a Day. 60 tablet 11    cloNIDine (CATAPRES) 0.1 MG tablet Take 1 tablet by mouth.      cloNIDine (CATAPRES-TTS) 0.1 MG/24HR patch Place 1 patch on the skin as directed by provider 1 (One) Time Per Week.      Diclofenac Sodium (VOLTAREN) 1 % gel gel Apply 2 g topically to the appropriate area as directed 4 (Four) Times a Day As Needed (joint pain). Knees, neck and shoulders      donepezil (ARICEPT) 10 MG tablet Take 1 tablet by mouth Every Night.      doxazosin (CARDURA) 4 MG tablet Take 1 tablet by mouth Every Night. 30 tablet 1    ferrous sulfate 324 (65 Fe) MG tablet delayed-release EC tablet Take 1 tablet by mouth Daily With Breakfast.      gabapentin (NEURONTIN) 300 MG capsule Take 1 capsule by mouth 2 (Two) Times a Day. 6 capsule 0    hydrALAZINE (APRESOLINE) 50 MG tablet Take 1 tablet by mouth 3 (Three) Times a Day.      isosorbide mononitrate (IMDUR) 60 MG 24 hr tablet Take 1 tablet by mouth Daily.      Lidocaine 4 % Place 1 patch on the skin as directed by provider Daily. Remove & Discard patch within 12 hours or as directed by MD      LORazepam (ATIVAN) 0.5 MG tablet Take 1 tablet by mouth 2 (Two) Times a Day As Needed for Anxiety. 3 tablet 0    melatonin 5 MG tablet tablet Take 1 tablet by mouth.      modafinil (PROVIGIL) 100 MG tablet Take 2 tablets by mouth Daily.      ondansetron (ZOFRAN) 4 MG tablet Take 1 tablet by mouth Every 8 (Eight) Hours As Needed for Nausea or Vomiting.      pantoprazole  (PROTONIX) 40 MG EC tablet Take 1 tablet by mouth Every Night.      Polyethylene Glycol 3350 powder Use.      sennosides-docusate (senna-docusate sodium) 8.6-50 MG per tablet Take 1 tablet by mouth Daily.      sertraline (ZOLOFT) 100 MG tablet Take 2 tablets by mouth Every Night.      traMADol (ULTRAM) 50 MG tablet As Needed.      Trelegy Ellipta 200-62.5-25 MCG/ACT inhaler       Bempedoic Acid-Ezetimibe (Nexlizet) 180-10 MG tablet Take 1 tablet by mouth Every Night.      calcium carbonate, oyster shell, 500 MG tablet tablet Take  by mouth 2 (Two) Times a Day.      Calcium Carbonate-Vitamin D (calcium 500 mg vitamin D 5 mcg, 200 UT,) 500-5 MG-MCG tablet per tablet Take 1 tablet by mouth 2 (Two) Times a Day.      fluticasone (FLONASE) 50 MCG/ACT nasal spray Administer 2 sprays into the nostril(s) as directed by provider Daily. (Patient not taking: Reported on 10/31/2024)      potassium chloride 10 MEQ CR tablet Take 1 tablet by mouth 3 times a day. (Patient not taking: Reported on 10/31/2024)      triamcinolone (KENALOG) 0.1 % cream Apply 1 Application topically to the appropriate area as directed 2 (Two) Times a Day. (Patient not taking: Reported on 10/31/2024)      vitamin B-12 (CYANOCOBALAMIN) 1000 MCG tablet Take 1 tablet by mouth Daily. (Patient not taking: Reported on 10/31/2024)      vitamin C (ASCORBIC ACID) 500 MG tablet Take 1 tablet by mouth Every Other Day. 15 tablet 3     No current facility-administered medications for this visit.       Past Medical History:  Past Medical History:   Diagnosis Date    Anxiety     Arthritis     Bradycardia     s/p pacemaker placement    CKD (chronic kidney disease)     baseline renal function unknown    COPD (chronic obstructive pulmonary disease)     Dementia     Elevated cholesterol     GERD (gastroesophageal reflux disease)     Hyperlipidemia     Hypertension     Iron deficiency anemia     Pacemaker     Thrombocytopenia        Past Surgical History:  Past Surgical  "History:   Procedure Laterality Date    BREAST BIOPSY      benign, yrs ago    CARDIAC PACEMAKER PLACEMENT      CATARACT EXTRACTION      CERVICAL FUSION      CHOLECYSTECTOMY      ENDOSCOPY N/A 10/18/2024    Procedure: ESOPHAGOGASTRODUODENOSCOPY WITH ANESTHESIA;  Surgeon: Francis Van MD;  Location: [facility];  Service: Gastroenterology;  Laterality: N/A;    HIATAL HERNIA REPAIR      HYSTERECTOMY      REPLACEMENT TOTAL KNEE Right        Social History:  Social History     Socioeconomic History    Marital status:    Tobacco Use    Smoking status: Never     Passive exposure: Never    Smokeless tobacco: Never    Tobacco comments:     Second hand smoke exposure ()   Vaping Use    Vaping status: Never Used   Substance and Sexual Activity    Alcohol use: Never    Drug use: Never    Sexual activity: Defer         Family History:  Family History   Problem Relation Age of Onset    Stroke Mother     Stroke Father     Breast cancer Sister        Review of Systems:  Review of Systems   All other systems reviewed and are negative.      Vital Signs:   /71   Pulse 70   Temp 98 °F (36.7 °C) (Temporal)   Resp 20   Ht 165.1 cm (65\")   Wt 69.7 kg (153 lb 9.6 oz)   SpO2 97%   BMI 25.56 kg/m²      Physical Exam:  Physical Exam  Vitals reviewed.   Constitutional:       General: She is not in acute distress.     Appearance: Normal appearance. She is not ill-appearing.      Comments: In a wheelchair   HENT:      Head: Normocephalic and atraumatic.      Mouth/Throat:      Mouth: Mucous membranes are moist.      Pharynx: Oropharynx is clear.   Eyes:      Conjunctiva/sclera: Conjunctivae normal.      Pupils: Pupils are equal, round, and reactive to light.   Cardiovascular:      Rate and Rhythm: Normal rate and regular rhythm.      Heart sounds: No murmur heard.  Pulmonary:      Effort: Pulmonary effort is normal. No respiratory distress.      Breath sounds: Normal breath sounds. No wheezing.   Abdominal:      " "General: Abdomen is flat. Bowel sounds are normal. There is no distension.      Palpations: Abdomen is soft. There is no mass.      Tenderness: There is no abdominal tenderness. There is no guarding.   Musculoskeletal:         General: No swelling. Normal range of motion.      Cervical back: Normal range of motion.   Lymphadenopathy:      Cervical: No cervical adenopathy.   Skin:     General: Skin is warm and dry.   Neurological:      General: No focal deficit present.      Mental Status: She is alert and oriented to person, place, and time. Mental status is at baseline.   Psychiatric:         Mood and Affect: Mood normal.         PHQ-9 Score  PHQ-9 Total Score:       Pain Score  Vitals:    10/31/24 1032   BP: 141/71   Pulse: 70   Resp: 20   Temp: 98 °F (36.7 °C)   TempSrc: Temporal   SpO2: 97%   Weight: 69.7 kg (153 lb 9.6 oz)   Height: 165.1 cm (65\")   PainSc:   3   PainLoc: Shoulder             ECOG score: 0           PAINSCOREQUALITYMETRIC:   Vitals:    10/31/24 1032   PainSc:   3   PainLoc: Shoulder                Lab Review  Lab Results   Component Value Date    WBC 4.91 10/31/2024    HGB 9.4 (L) 10/31/2024    HCT 30.2 (L) 10/31/2024    MCV 88.0 10/31/2024    RDW 14.3 10/31/2024     (L) 10/31/2024    NEUTRORELPCT 67.2 10/31/2024    LYMPHORELPCT 19.8 10/31/2024    MONORELPCT 9.8 10/31/2024    EOSRELPCT 2.4 10/31/2024    BASORELPCT 0.4 10/31/2024    NEUTROABS 3.30 10/31/2024    LYMPHSABS 0.97 10/31/2024     Lab Results   Component Value Date     07/29/2024    K 4.5 07/29/2024    CO2 27.2 07/29/2024     07/29/2024    BUN 24 (H) 07/29/2024    CREATININE 1.48 (H) 07/29/2024    GLUCOSE 120 (H) 07/29/2024    CALCIUM 9.5 07/29/2024    ALKPHOS 64 07/29/2024    AST 25 07/29/2024    ALT 13 07/29/2024    BILITOT 0.2 07/29/2024    ALBUMIN 4.4 07/29/2024    PROTEINTOT 7.2 07/29/2024    MG 2.0 09/27/2023               Work up 4/18/2024:    Component      Latest Ref Rng 4/18/2024   Iron      37 - 145 mcg/dL " 59    Iron Saturation (TSAT)      20 - 50 % 15 (L)    Transferrin      200 - 360 mg/dL 257    TIBC      298 - 536 mcg/dL 383    Hepatitis B Surface Ag      Non-Reactive  Non-Reactive    Hep A IgM      Non-Reactive  Non-Reactive    Hep B Core IgM      Non-Reactive  Non-Reactive    Hepatitis C Ab      Non-Reactive  Non-Reactive    Reticulocyte %      0.70 - 1.90 % 0.93    Reticulocyte Absolute      0.0200 - 0.1300 10*6/mm3 0.0349    Protime      12.1 - 14.7 Seconds 13.9    INR      0.90 - 1.10  1.02    Ferritin      13.00 - 150.00 ng/mL 97.01    Vitamin B-12      211 - 946 pg/mL 1,602 (H)    Folate      4.78 - 24.20 ng/mL 16.30    LDH      135 - 214 U/L 173    Sed Rate      0 - 30 mm/hr 30    C-Reactive Protein      0.00 - 0.50 mg/dL <0.30    Methylmalonic Acid      0 - 378 nmol/L 355    TSH Baseline      0.270 - 4.200 uIU/mL 0.966    Transferrin Receptor      12.2 - 27.3 nmol/L 16.1    Haptoglobin      30 - 200 mg/dL 74    PTT      26.5 - 34.5 seconds 28.9    HIV DUO      Non-Reactive  Non-Reactive    MANDY Direct      Negative  Negative    Rheumatoid Factor Quantitative      0.0 - 14.0 IU/mL <10.0       Legend:  (L) Low  (H) High      PERIPHERAL SMEAR, [facility] (04/18/2024 11:53)     DIAGNOSIS:  PERIPHERAL SMEAR  Normocytic, normochromic anemia.  No significant increase in schistocytes  population.  Thrombocytopenia with no platelet clumping.  Normal WBC count and differential. Granulocytes show normal morphology and  no circulating blasts identified.       VINAYAK + PE (04/18/2024 11:53)           Immunoglobulin Free LT Chains Blood (04/18/2024 11:53)           Radiology Results    US Abdomen Complete (04/29/2024 13:02)   FINDINGS: .  The visualized pancreas is unremarkable. The liver is unremarkable. The  patient is status post cholecystectomy.  The common duct measures 3 mm.  The right kidney measures 8.1 cm in length and is normal in echogenicity  without hydronephrosis. The left kidney measures 9.3 cm in length and  is  normal in echogenicity without hydronephrosis.  Spleen is enlarged  measuring 13.5 cm.  The aorta is normal in caliber. The vena cava is  unremarkable.     IMPRESSION:  Splenomegaly.    CT Abdomen Pelvis Without Contrast (09/27/2023 23:02)   FINDINGS:   Included portions of the lung bases are normal.  The liver and spleen are normal.  There is no biliary dilation.  The  gallbladder has been removed.  The pancreas is normal.  The adrenal glands are normal.  Kidneys are normal.  There is no hydronephrosis or renal or ureteral  calculi.  The bowel loops are normal in course and caliber.  There is  diverticulosis of the descending and sigmoid colon.  There is no pelvic mass or free fluid or lymphadenopathy.  Degenerative changes involve the lumbar spine.     IMPRESSION:  No CT evidence of an acute intra-abdominal or intrapelvic process.      Pathology:    TISSUE EXAM, [facility] ([facility]ENIFER,[facility]) (10/18/2024 10:29)     DIAGNOSIS:  ANTRUM, BIOPSY:  Benign gastric antral-type mucosa with scant chronic inflammation and focal  minimal intestinal metaplasia, negative for dysplasia  No Helicobacter pylori-like organisms identified on routine histologic sections        EGD:  ENDOSCOPY - EGD (10/18/2024)       Assessment / Plan         Assessment and Plan   Susan Us is a 79 y.o.  presents for     Normocytic Anemia  Iron deficiency   Anemia of chronic disease  Thrombocytopenia   Splenomegaly   -Patient reportedly has had anemia and thrombocytopenia for many years, in our system dating back ~9 months; baseline Hgb 9.1-10, Hct 28-31, normal MCV, platelets 80-120K  -Labs from initial consultation show hemoglobin persistently low at 10.4, MCV 86, platelets 119.  Normal white blood count.  Peripheral smear with normocytic, normochromic anemia.  No increase in schistocytes.  Thrombocytopenia with no platelet clumping.  Granulocytes show normal morphology and no circulating blasts.  HIV antibody is nonreactive.  Negative for  hepatitis studies.  Negative for rheumatoid factor and lupus screen. Iron studies showed mild iron deficiency with ferritin 97 ng/mL (patient is on oral ferrous sulfate). Replete B12/folate. No signs of hemolysis as normal LDH/haptoglobin. SPEP with no M spike, elevated free kappa light chain to 52, elevated free lambda light chain 28 with normal kappa/lambda ratio 1.83.  Ultrasound of the abdomen shows splenomegaly with spleen measuring 13.5 cm.  Liver is unremarkable.  -These results were reviewed with Leslie and MsDena Abeba today. I don't believe her underlying bicytopenia is due to MDS or AML as she is asymptomatic with reasons for anemia such as iron deficiency, anemia of chronic disease, and splenomegaly causing thrombocytopenia.  -Continue oral ferrous sulfate 325 mg daily with vitamin C to aid in absorption   -continue to monitor closely, if down trending counts, would recommend bone marrow biopsy    Discussed possible differential diagnoses, testing, treatment, recommended non-pharmacological interventions, risks, warning signs to monitor for that would indicate need for follow-up in clinic or ER. If no improvement with these regimens or you have new or worsening symptoms follow-up. Patient verbalizes understanding and agreement with plan of care. Denies further needs or concerns.     Patient was given instructions and counseling regarding her condition and for health maintenance advised.       All questions were answered to her satisfaction.        ACO / SHERRI/Other  Quality measures  -  Susan Us did not receive 2024 flu vaccine.  This was recommended.  -  Susan Us reports a pain score of 3.  Given her pain assessment as noted, treatment options were discussed and the following options were decided upon as a follow-up plan to address the patient's pain: continuation of current treatment plan for pain.  -  Current outpatient and discharge medications have been reconciled for the  patient.  Reviewed by: Sylwia Hua MD        Meds ordered during this visit  New Medications Ordered This Visit   Medications    vitamin C (ASCORBIC ACID) 500 MG tablet     Sig: Take 1 tablet by mouth Every Other Day.     Dispense:  15 tablet     Refill:  3       Visit Diagnoses    ICD-10-CM ICD-9-CM   1. Anemia, unspecified type  D64.9 285.9   2. Thrombocytopenia  D69.6 287.5             Follow Up   In 3 months with repeat CBC, iron studies, B12, folate      This document has been electronically signed by Sylwia Hua MD   October 31, 2024 11:09 EDT    Dictated Utilizing Dragon Dictation: Part of this note may be an electronic transcription/translation of spoken language to printed text using the Dragon Dictation System.

## 2024-10-31 ENCOUNTER — OFFICE VISIT (OUTPATIENT)
Dept: ONCOLOGY | Facility: CLINIC | Age: 79
End: 2024-10-31
Payer: MEDICARE

## 2024-10-31 ENCOUNTER — LAB (OUTPATIENT)
Dept: ONCOLOGY | Facility: CLINIC | Age: 79
End: 2024-10-31
Payer: MEDICARE

## 2024-10-31 VITALS
WEIGHT: 153.6 LBS | DIASTOLIC BLOOD PRESSURE: 71 MMHG | BODY MASS INDEX: 25.59 KG/M2 | RESPIRATION RATE: 20 BRPM | HEART RATE: 70 BPM | TEMPERATURE: 98 F | SYSTOLIC BLOOD PRESSURE: 141 MMHG | OXYGEN SATURATION: 97 % | HEIGHT: 65 IN

## 2024-10-31 DIAGNOSIS — D64.9 ANEMIA, UNSPECIFIED TYPE: Primary | ICD-10-CM

## 2024-10-31 DIAGNOSIS — D64.9 ANEMIA, UNSPECIFIED TYPE: ICD-10-CM

## 2024-10-31 DIAGNOSIS — D69.6 THROMBOCYTOPENIA: ICD-10-CM

## 2024-10-31 DIAGNOSIS — D63.8 ANEMIA OF CHRONIC DISEASE: ICD-10-CM

## 2024-10-31 DIAGNOSIS — D50.9 IRON DEFICIENCY ANEMIA, UNSPECIFIED IRON DEFICIENCY ANEMIA TYPE: ICD-10-CM

## 2024-10-31 LAB
ALBUMIN SERPL-MCNC: 4 G/DL (ref 3.5–5.2)
ALBUMIN/GLOB SERPL: 1.3 G/DL
ALP SERPL-CCNC: 59 U/L (ref 39–117)
ALT SERPL W P-5'-P-CCNC: 9 U/L (ref 1–33)
ANION GAP SERPL CALCULATED.3IONS-SCNC: 11.6 MMOL/L (ref 5–15)
AST SERPL-CCNC: 19 U/L (ref 1–32)
BASOPHILS # BLD AUTO: 0.02 10*3/MM3 (ref 0–0.2)
BASOPHILS NFR BLD AUTO: 0.4 % (ref 0–1.5)
BILIRUB SERPL-MCNC: 0.5 MG/DL (ref 0–1.2)
BUN SERPL-MCNC: 22 MG/DL (ref 8–23)
BUN/CREAT SERPL: 16.3 (ref 7–25)
CALCIUM SPEC-SCNC: 9.1 MG/DL (ref 8.6–10.5)
CHLORIDE SERPL-SCNC: 103 MMOL/L (ref 98–107)
CO2 SERPL-SCNC: 24.4 MMOL/L (ref 22–29)
CREAT SERPL-MCNC: 1.35 MG/DL (ref 0.57–1)
DEPRECATED RDW RBC AUTO: 45.5 FL (ref 37–54)
EGFRCR SERPLBLD CKD-EPI 2021: 40.3 ML/MIN/1.73
EOSINOPHIL # BLD AUTO: 0.12 10*3/MM3 (ref 0–0.4)
EOSINOPHIL NFR BLD AUTO: 2.4 % (ref 0.3–6.2)
ERYTHROCYTE [DISTWIDTH] IN BLOOD BY AUTOMATED COUNT: 14.3 % (ref 12.3–15.4)
GLOBULIN UR ELPH-MCNC: 3.1 GM/DL
GLUCOSE SERPL-MCNC: 128 MG/DL (ref 65–99)
HCT VFR BLD AUTO: 30.2 % (ref 34–46.6)
HGB BLD-MCNC: 9.4 G/DL (ref 12–15.9)
IMM GRANULOCYTES # BLD AUTO: 0.02 10*3/MM3 (ref 0–0.05)
IMM GRANULOCYTES NFR BLD AUTO: 0.4 % (ref 0–0.5)
IRON 24H UR-MRATE: 43 MCG/DL (ref 37–145)
IRON SATN MFR SERPL: 13 % (ref 20–50)
LYMPHOCYTES # BLD AUTO: 0.97 10*3/MM3 (ref 0.7–3.1)
LYMPHOCYTES NFR BLD AUTO: 19.8 % (ref 19.6–45.3)
MCH RBC QN AUTO: 27.4 PG (ref 26.6–33)
MCHC RBC AUTO-ENTMCNC: 31.1 G/DL (ref 31.5–35.7)
MCV RBC AUTO: 88 FL (ref 79–97)
MONOCYTES # BLD AUTO: 0.48 10*3/MM3 (ref 0.1–0.9)
MONOCYTES NFR BLD AUTO: 9.8 % (ref 5–12)
NEUTROPHILS NFR BLD AUTO: 3.3 10*3/MM3 (ref 1.7–7)
NEUTROPHILS NFR BLD AUTO: 67.2 % (ref 42.7–76)
NRBC BLD AUTO-RTO: 0 /100 WBC (ref 0–0.2)
PLATELET # BLD AUTO: 101 10*3/MM3 (ref 140–450)
PMV BLD AUTO: 10.4 FL (ref 6–12)
POTASSIUM SERPL-SCNC: 3.6 MMOL/L (ref 3.5–5.2)
PROT SERPL-MCNC: 7.1 G/DL (ref 6–8.5)
RBC # BLD AUTO: 3.43 10*6/MM3 (ref 3.77–5.28)
SODIUM SERPL-SCNC: 139 MMOL/L (ref 136–145)
TIBC SERPL-MCNC: 325 MCG/DL (ref 298–536)
TRANSFERRIN SERPL-MCNC: 218 MG/DL (ref 200–360)
VIT B12 BLD-MCNC: 1131 PG/ML (ref 211–946)
WBC NRBC COR # BLD AUTO: 4.91 10*3/MM3 (ref 3.4–10.8)

## 2024-10-31 PROCEDURE — 84630 ASSAY OF ZINC: CPT | Performed by: INTERNAL MEDICINE

## 2024-10-31 PROCEDURE — 82525 ASSAY OF COPPER: CPT | Performed by: INTERNAL MEDICINE

## 2024-10-31 PROCEDURE — 82607 VITAMIN B-12: CPT | Performed by: INTERNAL MEDICINE

## 2024-10-31 PROCEDURE — 84466 ASSAY OF TRANSFERRIN: CPT | Performed by: INTERNAL MEDICINE

## 2024-10-31 PROCEDURE — 83540 ASSAY OF IRON: CPT | Performed by: INTERNAL MEDICINE

## 2024-10-31 PROCEDURE — 85025 COMPLETE CBC W/AUTO DIFF WBC: CPT | Performed by: INTERNAL MEDICINE

## 2024-10-31 PROCEDURE — 80053 COMPREHEN METABOLIC PANEL: CPT | Performed by: INTERNAL MEDICINE

## 2024-10-31 RX ORDER — ASCORBIC ACID 500 MG
500 TABLET ORAL EVERY OTHER DAY
Qty: 15 TABLET | Refills: 3 | Status: SHIPPED | OUTPATIENT
Start: 2024-10-31

## 2024-10-31 RX ORDER — FERROUS SULFATE 324(65)MG
324 TABLET, DELAYED RELEASE (ENTERIC COATED) ORAL
COMMUNITY

## 2024-10-31 NOTE — PROGRESS NOTES
Venipuncture Blood Specimen Collection  Venipuncture performed in right arm by Cris Cevallos MA with good hemostasis. Patient tolerated the procedure well without complications.   10/31/24   Cris Cevallos MA

## 2024-11-01 LAB — REF LAB TEST METHOD: NORMAL

## 2024-11-05 LAB
COPPER SERPL-MCNC: 88 UG/DL (ref 80–158)
ZINC SERPL-MCNC: 58 UG/DL (ref 44–115)

## 2024-11-08 ENCOUNTER — OFFICE VISIT (OUTPATIENT)
Dept: UROLOGY | Facility: CLINIC | Age: 79
End: 2024-11-08
Payer: MEDICARE

## 2024-11-08 VITALS
WEIGHT: 152.4 LBS | HEIGHT: 65 IN | HEART RATE: 65 BPM | BODY MASS INDEX: 25.39 KG/M2 | SYSTOLIC BLOOD PRESSURE: 194 MMHG | DIASTOLIC BLOOD PRESSURE: 86 MMHG

## 2024-11-08 DIAGNOSIS — N18.32 STAGE 3B CHRONIC KIDNEY DISEASE: Primary | ICD-10-CM

## 2024-11-08 DIAGNOSIS — N28.1 BILATERAL RENAL CYSTS: ICD-10-CM

## 2024-11-08 NOTE — PROGRESS NOTES
"Chief Complaint:    Chief Complaint   Patient presents with   • Bilateral renal cysts       Vital Signs:   BP (!) 194/86 (BP Location: Left arm, Patient Position: Sitting, Cuff Size: Adult)   Pulse 65   Ht 165.1 cm (65\")   Wt 69.1 kg (152 lb 6.4 oz)   BMI 25.36 kg/m²   Body mass index is 25.36 kg/m².      HPI:  Susan Us is a 79 y.o. female who presents today for follow up    History of Present Illness  Ms. Us returns to the clinic today for follow-up for possible renal mass, bilateral cyst, and difficulty with urination.  She was last seen in office in April 2024 and had a complete abdominal ultrasound and CT scan of the lumbar spine that showed no significant abnormalities of the right or left kidneys.  There was no nephrolithiasis or renal masses noted.  She did have some very small cysts.  She has been on doxazosin with improvement in incomplete emptying and difficulty with urination.  She denies any gross hematuria, fever or chills.  She is doing well.  Most recent lab work did show an improvement in her creatinine to 1.35 and GFR to 40.      Past Medical History:  Past Medical History:   Diagnosis Date   • Anxiety    • Arthritis    • Bradycardia     s/p pacemaker placement   • CKD (chronic kidney disease)     baseline renal function unknown   • COPD (chronic obstructive pulmonary disease)    • Dementia    • Elevated cholesterol    • GERD (gastroesophageal reflux disease)    • Hyperlipidemia    • Hypertension    • Iron deficiency anemia    • Pacemaker    • Thrombocytopenia        Current Meds:  Current Outpatient Medications   Medication Sig Dispense Refill   • acetaminophen (TYLENOL) 500 MG tablet Take 1 tablet by mouth Every 6 (Six) Hours As Needed for Mild Pain.     • albuterol sulfate  (90 Base) MCG/ACT inhaler      • ARIPiprazole (ABILIFY) 2 MG tablet Take 1 tablet by mouth Daily.     • aspirin 81 MG EC tablet Take 1 tablet by mouth Daily.     • carvedilol (COREG) 6.25 MG tablet Take 1 " tablet by mouth 2 (Two) Times a Day. 60 tablet 11   • cloNIDine (CATAPRES) 0.1 MG tablet Take 1 tablet by mouth.     • cloNIDine (CATAPRES-TTS) 0.1 MG/24HR patch Place 1 patch on the skin as directed by provider 1 (One) Time Per Week.     • Diclofenac Sodium (VOLTAREN) 1 % gel gel Apply 2 g topically to the appropriate area as directed 4 (Four) Times a Day As Needed (joint pain). Knees, neck and shoulders     • doxazosin (CARDURA) 4 MG tablet Take 1 tablet by mouth Every Night. 30 tablet 1   • ferrous sulfate 324 (65 Fe) MG tablet delayed-release EC tablet Take 1 tablet by mouth Daily With Breakfast.     • gabapentin (NEURONTIN) 300 MG capsule Take 1 capsule by mouth 2 (Two) Times a Day. 6 capsule 0   • hydrALAZINE (APRESOLINE) 50 MG tablet Take 1 tablet by mouth 3 (Three) Times a Day.     • isosorbide mononitrate (IMDUR) 60 MG 24 hr tablet Take 1 tablet by mouth Daily.     • melatonin 5 MG tablet tablet Take 1 tablet by mouth.     • modafinil (PROVIGIL) 100 MG tablet Take 2 tablets by mouth Daily.     • pantoprazole (PROTONIX) 40 MG EC tablet Take 1 tablet by mouth Every Night.     • Polyethylene Glycol 3350 powder Use.     • sennosides-docusate (senna-docusate sodium) 8.6-50 MG per tablet Take 1 tablet by mouth Daily.     • sertraline (ZOLOFT) 100 MG tablet Take 2 tablets by mouth Every Night.     • traMADol (ULTRAM) 50 MG tablet As Needed.     • Trelegy Ellipta 200-62.5-25 MCG/ACT inhaler      • Bempedoic Acid-Ezetimibe (Nexlizet) 180-10 MG tablet Take 1 tablet by mouth Every Night. (Patient not taking: Reported on 11/8/2024)     • busPIRone (BUSPAR) 5 MG tablet Take 1 tablet by mouth 3 (Three) Times a Day. (Patient not taking: Reported on 11/8/2024)     • calcium carbonate, oyster shell, 500 MG tablet tablet Take  by mouth 2 (Two) Times a Day.     • Calcium Carbonate-Vitamin D (calcium 500 mg vitamin D 5 mcg, 200 UT,) 500-5 MG-MCG tablet per tablet Take 1 tablet by mouth 2 (Two) Times a Day. (Patient not taking:  Reported on 11/8/2024)     • donepezil (ARICEPT) 10 MG tablet Take 1 tablet by mouth Every Night. (Patient not taking: Reported on 11/8/2024)     • fluticasone (FLONASE) 50 MCG/ACT nasal spray Administer 2 sprays into the nostril(s) as directed by provider Daily. (Patient not taking: Reported on 11/8/2024)     • Lidocaine 4 % Place 1 patch on the skin as directed by provider Daily. Remove & Discard patch within 12 hours or as directed by MD     • LORazepam (ATIVAN) 0.5 MG tablet Take 1 tablet by mouth 2 (Two) Times a Day As Needed for Anxiety. (Patient not taking: Reported on 11/8/2024) 3 tablet 0   • ondansetron (ZOFRAN) 4 MG tablet Take 1 tablet by mouth Every 8 (Eight) Hours As Needed for Nausea or Vomiting. (Patient not taking: Reported on 11/8/2024)     • potassium chloride 10 MEQ CR tablet Take 1 tablet by mouth 3 times a day. (Patient not taking: Reported on 10/31/2024)     • triamcinolone (KENALOG) 0.1 % cream Apply 1 Application topically to the appropriate area as directed 2 (Two) Times a Day. (Patient not taking: Reported on 11/8/2024)     • vitamin B-12 (CYANOCOBALAMIN) 1000 MCG tablet Take 1 tablet by mouth Daily. (Patient not taking: Reported on 10/31/2024)     • vitamin C (ASCORBIC ACID) 500 MG tablet Take 1 tablet by mouth Every Other Day. (Patient not taking: Reported on 11/8/2024) 15 tablet 3     No current facility-administered medications for this visit.        Allergies:   Allergies   Allergen Reactions   • Evolocumab Itching     itching   • Statins Hives   • Tabiona Rash        Past Surgical History:  Past Surgical History:   Procedure Laterality Date   • BREAST BIOPSY      benign, yrs ago   • CARDIAC PACEMAKER PLACEMENT     • CATARACT EXTRACTION     • CERVICAL FUSION     • CHOLECYSTECTOMY     • ENDOSCOPY N/A 10/18/2024    Procedure: ESOPHAGOGASTRODUODENOSCOPY WITH ANESTHESIA;  Surgeon: Francis Van MD;  Location: [facility];  Service: Gastroenterology;  Laterality: N/A;   • HIATAL  HERNIA REPAIR     • HYSTERECTOMY     • REPLACEMENT TOTAL KNEE Right        Social History:  Social History     Socioeconomic History   • Marital status:    Tobacco Use   • Smoking status: Never     Passive exposure: Never   • Smokeless tobacco: Never   • Tobacco comments:     Second hand smoke exposure ()   Vaping Use   • Vaping status: Never Used   Substance and Sexual Activity   • Alcohol use: Never   • Drug use: Never   • Sexual activity: Defer       Family History:  Family History   Problem Relation Age of Onset   • Stroke Mother    • Stroke Father    • Breast cancer Sister        Review of Systems:  Review of Systems   Constitutional:  Negative for chills, fatigue, fever and unexpected weight change.   Respiratory:  Negative for cough, chest tightness, shortness of breath and wheezing.    Cardiovascular:  Negative for chest pain and leg swelling.   Gastrointestinal:  Negative for abdominal pain, constipation, diarrhea, nausea and vomiting.   Genitourinary:  Negative for difficulty urinating, dysuria, frequency, hematuria, pelvic pain and urgency.   Musculoskeletal:  Positive for back pain, joint swelling and neck pain.   Skin:  Negative for rash.   Neurological:  Positive for dizziness and headaches.   Psychiatric/Behavioral:  Positive for confusion. Negative for suicidal ideas.        Physical Exam:  Physical Exam  Constitutional:       General: She is not in acute distress.     Appearance: Normal appearance.   HENT:      Head: Normocephalic and atraumatic.      Nose: Nose normal.      Mouth/Throat:      Mouth: Mucous membranes are moist.   Eyes:      Conjunctiva/sclera: Conjunctivae normal.   Cardiovascular:      Rate and Rhythm: Normal rate and regular rhythm.      Pulses: Normal pulses.      Heart sounds: Normal heart sounds.   Pulmonary:      Effort: Pulmonary effort is normal.      Breath sounds: Normal breath sounds.   Abdominal:      General: Bowel sounds are normal.      Palpations:  Abdomen is soft.   Musculoskeletal:         General: Normal range of motion.      Cervical back: Normal range of motion.   Skin:     General: Skin is warm.   Neurological:      General: No focal deficit present.      Mental Status: She is alert and oriented to person, place, and time.   Psychiatric:         Mood and Affect: Mood normal.         Behavior: Behavior normal.         Thought Content: Thought content normal.         Judgment: Judgment normal.           Recent Image (CT and/or KUB):   CT Abdomen and Pelvis: No results found for this or any previous visit.     CT Stone Protocol: No results found for this or any previous visit.     KUB: No results found for this or any previous visit.       Labs:  Brief Urine Lab Results  (Last result in the past 365 days)        Color   Clarity   Blood   Leuk Est   Nitrite   Protein   CREAT   Urine HCG        24 1529 Yellow   Clear   Negative   Moderate (2+)   Negative   30 mg/dL (1+)                 Lab on 10/31/2024   Component Date Value Ref Range Status   • Iron 10/31/2024 43  37 - 145 mcg/dL Final   • Iron Saturation (TSAT) 10/31/2024 13 (L)  20 - 50 % Final   • Transferrin 10/31/2024 218  200 - 360 mg/dL Final   • TIBC 10/31/2024 325  298 - 536 mcg/dL Final   • Vitamin B-12 10/31/2024 1,131 (H)  211 - 946 pg/mL Final   • Reference Lab Report 10/31/2024    Final                    Value:[facility]  [address] [place] [address]  Phone: [phone] or [phone]  Fax: [phone]  Antonio Kramer M.D., Medical Director    PATIENT NAME                                 LABORATORY NO.  [id number]   ROSE MARY CHANG                              [id number]  [id number]  [facility]                          AGE                SEX     SSN            CLIENT REF #  [id number]       1945   F       [id number]    [id number]  [address] [place] [address]                               REQUESTING JOURDAN CISNEROS M.D..         COPY TO..  JOSE TRUJILLO    DATE COLLECTED        DATE RECEIVED          DATE REPORTED  10/31/2024            10/31/2024             11/01/2024    DIAGNOSIS:  PERIPHERAL SMEAR  Thrombocytopenia with no nuclear clumping.  Normocytic anemia with occasional elliptocytes.  No increase in schistocytes  population.  Normal WBC count and differential. Granulocytes show normal morphology and  no circulating blasts                           identified.    CLINICAL HISTORY:  Anemia, thrombocytopenia    CLINICAL LABORATORY DATA  WBC        4.91 x10/3/-L    RDW         14.3%  HGB        9.4 g/dl         PLT         101 x10/3/-L  HCT        30.2%            LYMPHS      19.8%  MCV        88 fL            NEUTS       67.2%  MONO        9.8%  EOS         2.4%  BASO        0.4%    PERIPHERAL SMEAR MICROSCOPIC DESCRIPTION:  Bonilla stained smears are reviewed microscopically. See diagnosis for details.    Professional interpretation rendered by Paulette Barnhart M.D., MPH at [facility],  [place], [address] [place] [address].    GROSS DESCRIPTION:  RECEIVED 2 SLIDES FOR REVIEW- LM 10/31/24    REVIEWED, DIAGNOSED AND ELECTRONICALLY  SIGNED BY:    Paulette Barnhart M.D., MPH  CPT CODES:        15484     • WBC 10/31/2024 4.91  3.40 - 10.80 10*3/mm3 Final   • RBC 10/31/2024 3.43 (L)  3.77 - 5.28 10*6/mm3 Final   • Hemoglobin 10/31/2024 9.4 (L)  12.0 - 15.9 g/dL Final   • Hematocrit 10/31/2024 30.2 (L)  34.0 - 46.6 % Final   • MCV 10/31/2024 88.0  79.0 - 97.0 fL Final   • MCH 10/31/2024 27.4  26.6 - 33.0 pg Final   • MCHC 10/31/2024 31.1 (L)  31.5 - 35.7 g/dL Final   • RDW 10/31/2024 14.3  12.3 - 15.4 % Final   • RDW-SD 10/31/2024 45.5  37.0 - 54.0 fl Final   • MPV 10/31/2024 10.4  6.0 - 12.0 fL Final   • Platelets 10/31/2024 101 (L)  140 - 450 10*3/mm3 Final   • Neutrophil % 10/31/2024 67.2  42.7 - 76.0 % Final   • Lymphocyte % 10/31/2024 19.8  19.6 - 45.3 % Final   • Monocyte % 10/31/2024 9.8  5.0 - 12.0 % Final   • Eosinophil %  10/31/2024 2.4  0.3 - 6.2 % Final   • Basophil % 10/31/2024 0.4  0.0 - 1.5 % Final   • Immature Grans % 10/31/2024 0.4  0.0 - 0.5 % Final   • Neutrophils, Absolute 10/31/2024 3.30  1.70 - 7.00 10*3/mm3 Final   • Lymphocytes, Absolute 10/31/2024 0.97  0.70 - 3.10 10*3/mm3 Final   • Monocytes, Absolute 10/31/2024 0.48  0.10 - 0.90 10*3/mm3 Final   • Eosinophils, Absolute 10/31/2024 0.12  0.00 - 0.40 10*3/mm3 Final   • Basophils, Absolute 10/31/2024 0.02  0.00 - 0.20 10*3/mm3 Final   • Immature Grans, Absolute 10/31/2024 0.02  0.00 - 0.05 10*3/mm3 Final   • nRBC 10/31/2024 0.0  0.0 - 0.2 /100 WBC Final   • Copper 10/31/2024 88  80 - 158 ug/dL Final                                    Detection Limit = 5   • Zinc 10/31/2024 58  44 - 115 ug/dL Final                                    Detection Limit = 5   • Glucose 10/31/2024 128 (H)  65 - 99 mg/dL Final   • BUN 10/31/2024 22  8 - 23 mg/dL Final   • Creatinine 10/31/2024 1.35 (H)  0.57 - 1.00 mg/dL Final   • Sodium 10/31/2024 139  136 - 145 mmol/L Final   • Potassium 10/31/2024 3.6  3.5 - 5.2 mmol/L Final   • Chloride 10/31/2024 103  98 - 107 mmol/L Final   • CO2 10/31/2024 24.4  22.0 - 29.0 mmol/L Final   • Calcium 10/31/2024 9.1  8.6 - 10.5 mg/dL Final   • Total Protein 10/31/2024 7.1  6.0 - 8.5 g/dL Final   • Albumin 10/31/2024 4.0  3.5 - 5.2 g/dL Final   • ALT (SGPT) 10/31/2024 9  1 - 33 U/L Final   • AST (SGOT) 10/31/2024 19  1 - 32 U/L Final   • Alkaline Phosphatase 10/31/2024 59  39 - 117 U/L Final   • Total Bilirubin 10/31/2024 0.5  0.0 - 1.2 mg/dL Final   • Globulin 10/31/2024 3.1  gm/dL Final   • A/G Ratio 10/31/2024 1.3  g/dL Final   • BUN/Creatinine Ratio 10/31/2024 16.3  7.0 - 25.0 Final   • Anion Gap 10/31/2024 11.6  5.0 - 15.0 mmol/L Final   • eGFR 10/31/2024 40.3 (L)  >60.0 mL/min/1.73 Final   Admission on 10/18/2024, Discharged on 10/18/2024   Component Date Value Ref Range Status   • Reference Lab Report 10/18/2024    Final                     "Value:[facility]  [address] [place] [address]  Phone: [phone] or [phone]  Fax: [phone]  Antonio Kramer M.D., Medical Director    PATIENT NAME                                     LABORATORY NO.  786   ROSE MARY CHANG                                  [id number]  [id number]                                 AGE                    SEX   SSN              CLIENT REF #  [facility]                      79        1945      F     [id number]      [id number]    [address] [place] [address]                             REQUESTING M.D.           ATTEND[place] [address]                           SINDI CONTRERAS  DATE COLLECTED            DATE RECEIVED          DATE REPORTED  10/18/2024                10/18/2024             10/21/2024    DIAGNOSIS:  ANTRUM, BIOPSY:  Benign gastric antral-type mucosa with scant chronic inflammation and focal  minimal intestinal metaplasia, negative for dysplasia  No Helicobacter pylori-like                           organisms identified on routine histologic sections    CAM    CLINICAL HISTORY:  Hiatal hernial, vomiting without nausea, unspecified vomiting type    SPECIMENS RECEIVED:  ANTRUM, BIOPSY    MICROSCOPIC DESCRIPTION:  Tissue blocks are prepared and slides are examined microscopically. See  diagnosis for details.    Professional interpretation rendered by Yudelka Chopra M.D., F.C.A.P. at  [facility], [address] [place] [address].    GROSS DESCRIPTION:  Labeled \"gastric antrum\" consisting of 2 pieces of tan soft tissue measuring 0.5 x  0.3 x 0.2 cm in aggregate.  Submitted entirely in 1 cassette.  BAW    REVIEWED, DIAGNOSED AND ELECTRONICALLY  SIGNED BY:    Yudelka Chopra M.D., F.C.A.P.  CPT CODES:  [id number]     Lab on 2024   Component Date Value Ref Range Status   • Iron 2024 19 (L)  37 - 145 mcg/dL Final   • Iron Saturation (TSAT) 2024 6 (L)  20 - 50 % Final   • Transferrin " 08/14/2024 207  200 - 360 mg/dL Final   • TIBC 08/14/2024 308  298 - 536 mcg/dL Final   • Ferritin 08/14/2024 173.90 (H)  13.00 - 150.00 ng/mL Final   • WBC 08/14/2024 3.25 (L)  3.40 - 10.80 10*3/mm3 Final   • RBC 08/14/2024 3.23 (L)  3.77 - 5.28 10*6/mm3 Final   • Hemoglobin 08/14/2024 8.9 (L)  12.0 - 15.9 g/dL Final   • Hematocrit 08/14/2024 28.2 (L)  34.0 - 46.6 % Final   • MCV 08/14/2024 87.3  79.0 - 97.0 fL Final   • MCH 08/14/2024 27.6  26.6 - 33.0 pg Final   • MCHC 08/14/2024 31.6  31.5 - 35.7 g/dL Final   • RDW 08/14/2024 13.2  12.3 - 15.4 % Final   • RDW-SD 08/14/2024 42.6  37.0 - 54.0 fl Final   • MPV 08/14/2024 10.3  6.0 - 12.0 fL Final   • Platelets 08/14/2024 101 (L)  140 - 450 10*3/mm3 Final   • Neutrophil % 08/14/2024 54.6  42.7 - 76.0 % Final   • Lymphocyte % 08/14/2024 25.8  19.6 - 45.3 % Final   • Monocyte % 08/14/2024 16.9 (H)  5.0 - 12.0 % Final   • Eosinophil % 08/14/2024 1.2  0.3 - 6.2 % Final   • Basophil % 08/14/2024 0.9  0.0 - 1.5 % Final   • Immature Grans % 08/14/2024 0.6 (H)  0.0 - 0.5 % Final   • Neutrophils, Absolute 08/14/2024 1.77  1.70 - 7.00 10*3/mm3 Final   • Lymphocytes, Absolute 08/14/2024 0.84  0.70 - 3.10 10*3/mm3 Final   • Monocytes, Absolute 08/14/2024 0.55  0.10 - 0.90 10*3/mm3 Final   • Eosinophils, Absolute 08/14/2024 0.04  0.00 - 0.40 10*3/mm3 Final   • Basophils, Absolute 08/14/2024 0.03  0.00 - 0.20 10*3/mm3 Final   • Immature Grans, Absolute 08/14/2024 0.02  0.00 - 0.05 10*3/mm3 Final   • nRBC 08/14/2024 0.0  0.0 - 0.2 /100 WBC Final        Procedure: None  Procedures     I have reviewed and agree with the above PMH, PSH, FMH, social history, medications, allergies, and labs.     Assessment/Plan:   Problem List Items Addressed This Visit       Chronic kidney disease - Primary    Relevant Orders    US Renal Bilateral    Bilateral renal cysts    Relevant Orders    US Renal Bilateral       Health Maintenance:   Health Maintenance Due   Topic Date Due   • DXA SCAN  Never  done   • Pneumococcal Vaccine 65+ (1 of 2 - PCV) Never done   • TDAP/TD VACCINES (1 - Tdap) Never done   • ZOSTER VACCINE (1 of 2) Never done   • RSV Vaccine - Adults (1 - 1-dose 75+ series) Never done   • ANNUAL WELLNESS VISIT  Never done   • INFLUENZA VACCINE  Never done   • LIPID PANEL  08/02/2024   • COVID-19 Vaccine (3 - 2024-25 season) 09/01/2024        Smoking Counseling: Never smoked.  Never used smokeless tobacco    Urine Incontinence: Patient reports that she is not currently experiencing any symptoms of urinary incontinence.    Patient was given instructions and counseling regarding her condition or for health maintenance advice. Please see specific information pulled into the AVS if appropriate.    Patient Education:   Chronic kidney disease stage IIIb -patient's kidney function has improved slightly remained stable at this time.  Would recommend to continue with observation.  Did discuss the need to increase water intake 2 L/day advised her good glycemic control as well as controlled hypertension can prevent worsening of kidney function.  She verbalized understanding.  Bilateral renal cysts/mass -patient has had a CT scan the lumbar spine as well as renal ultrasounds that showed no concerns of any mass.  Given patient's age and asymptomatic with no gross hematuria we will continue forward with observation.  I will place her back in 6 months for 1 more repeat ultrasound at that time we will clear her from urological standpoint.  She verbalized understanding.  Difficulty urinating -patient has been doing well on doxazosin and will continue with medications.    Visit Diagnoses:    ICD-10-CM ICD-9-CM   1. Stage 3b chronic kidney disease  N18.32 585.3   2. Bilateral renal cysts  N28.1 753.10       Meds Ordered During Visit:  No orders of the defined types were placed in this encounter.      Follow Up Appointment: 6 months  No follow-ups on file.      This document has been electronically signed by Gonzalo MÉNDEZ  DAVID Andrade   November 8, 2024 14:56 EST    Part of this note may be an electronic transcription/translation of spoken language to printed text using the Dragon Dictation System.

## 2024-11-11 ENCOUNTER — OFFICE VISIT (OUTPATIENT)
Dept: CARDIOLOGY | Facility: CLINIC | Age: 79
End: 2024-11-11
Payer: MEDICARE

## 2024-11-11 VITALS
OXYGEN SATURATION: 96 % | HEIGHT: 65 IN | SYSTOLIC BLOOD PRESSURE: 137 MMHG | BODY MASS INDEX: 25.99 KG/M2 | DIASTOLIC BLOOD PRESSURE: 73 MMHG | HEART RATE: 65 BPM | WEIGHT: 156 LBS

## 2024-11-11 DIAGNOSIS — Z95.0 ARTIFICIAL CARDIAC PACEMAKER: Chronic | ICD-10-CM

## 2024-11-11 DIAGNOSIS — E78.5 HYPERLIPIDEMIA LDL GOAL <70: Chronic | ICD-10-CM

## 2024-11-11 DIAGNOSIS — I10 ESSENTIAL HYPERTENSION: Primary | Chronic | ICD-10-CM

## 2024-11-11 PROBLEM — I34.0 NONRHEUMATIC MITRAL VALVE REGURGITATION: Status: ACTIVE | Noted: 2024-11-11

## 2024-11-11 PROBLEM — I35.1 NONRHEUMATIC AORTIC VALVE INSUFFICIENCY: Chronic | Status: ACTIVE | Noted: 2024-11-11

## 2024-11-11 PROBLEM — I34.0 NONRHEUMATIC MITRAL VALVE REGURGITATION: Chronic | Status: ACTIVE | Noted: 2024-11-11

## 2024-11-11 PROBLEM — I35.1 NONRHEUMATIC AORTIC VALVE INSUFFICIENCY: Status: ACTIVE | Noted: 2024-11-11

## 2024-11-11 PROCEDURE — 99214 OFFICE O/P EST MOD 30 MIN: CPT | Performed by: NURSE PRACTITIONER

## 2024-11-11 PROCEDURE — 3075F SYST BP GE 130 - 139MM HG: CPT | Performed by: NURSE PRACTITIONER

## 2024-11-11 PROCEDURE — 3078F DIAST BP <80 MM HG: CPT | Performed by: NURSE PRACTITIONER

## 2024-11-11 PROCEDURE — 1160F RVW MEDS BY RX/DR IN RCRD: CPT | Performed by: NURSE PRACTITIONER

## 2024-11-11 PROCEDURE — 1159F MED LIST DOCD IN RCRD: CPT | Performed by: NURSE PRACTITIONER

## 2024-11-11 PROCEDURE — G2211 COMPLEX E/M VISIT ADD ON: HCPCS | Performed by: NURSE PRACTITIONER

## 2024-11-11 RX ORDER — VIT E ACET/GLY/DIMETH/WATER
LOTION (ML) TOPICAL AS NEEDED
COMMUNITY

## 2024-11-11 NOTE — PROGRESS NOTES
"Chief Complaint  Follow-up (Patient states she has been fairly well )    Subjective          Susan Us presents to [facility] for follow up.    History of Present Illness  Ms. Us presents for routine follow-up of hypertension and pacemaker management.  Her last visit to clinic was 07/08/2024 with me.  History of Present Illness    She reports feeling well since her last visit, with no chest pain. However, she does experience occasional heart fluttering. She was previously diagnosed with costochondritis, which has been causing her some discomfort, although the severity has lessened in recent months. She also mentions that she had some lab work done at her hematologist's office two weeks ago.       Tobacco Use: Low Risk  (11/11/2024)    Patient History     Smoking Tobacco Use: Never     Smokeless Tobacco Use: Never     Passive Exposure: Never       Objective     Vital Signs:   /73   Pulse 65   Ht 165.1 cm (65\")   Wt 70.8 kg (156 lb)   SpO2 96%   BMI 25.96 kg/m²       Physical Exam  Vitals and nursing note reviewed. Exam conducted with a chaperone present (Accompanied by her daughter).   Constitutional:       Appearance: Normal appearance. She is well-developed.   Cardiovascular:      Rate and Rhythm: Normal rate and regular rhythm.      Heart sounds: Murmur heard.      No friction rub. No gallop.   Pulmonary:      Effort: Pulmonary effort is normal. No respiratory distress.      Breath sounds: Normal breath sounds. No wheezing or rales.   Skin:     General: Skin is warm and dry.   Neurological:      Mental Status: She is alert and oriented to person, place, and time.   Psychiatric:         Mood and Affect: Mood normal.         Behavior: Behavior normal.             Result Review :   The following data was reviewed by: TRACE Valiente on 11/11/2024:  Common labs          7/29/2024    15:14 8/14/2024    13:38 10/31/2024    10:29 10/31/2024    10:45   Common Labs "   Glucose 120    128    BUN 24    22    Creatinine 1.48    1.35    Sodium 142    139    Potassium 4.5    3.6    Chloride 106    103    Calcium 9.5    9.1    Albumin 4.4    4.0    Total Bilirubin 0.2    0.5    Alkaline Phosphatase 64    59    AST (SGOT) 25    19    ALT (SGPT) 13    9    WBC 4.05  3.25  4.91     Hemoglobin 9.3  8.9  9.4     Hematocrit 29.3  28.2  30.2     Platelets 104  101  101       Labs have been requested from [facility]  Data reviewed : Cardiology studies as detailed below      Last Cardiac Cath      Last Stress test  Results for orders placed during the hospital encounter of 08/01/23    Stress Test With Myocardial Perfusion One Day    Interpretation Summary    Left ventricular ejection fraction is normal (Calculated EF = 63%).    Myocardial perfusion imaging indicates a normal myocardial perfusion study with no evidence of ischemia.    TID 1.03.    Findings consistent with a normal ECG stress test.       Last Echo  Results for orders placed during the hospital encounter of 03/25/24    Adult Transthoracic Echo Complete W/ Cont if Necessary Per Protocol    Interpretation Summary    Left ventricular systolic function is normal. Calculated left ventricular EF = 58% Left ventricular ejection fraction appears to be 56 - 60%.    Left ventricular diastolic function was normal.    The left atrial cavity is moderately dilated.    Moderate aortic valve regurgitation is present.    Moderate mitral valve regurgitation is present.             Current Outpatient Medications   Medication Sig Dispense Refill    acetaminophen (TYLENOL) 500 MG tablet Take 1 tablet by mouth Every 6 (Six) Hours As Needed for Mild Pain.      aspirin 81 MG EC tablet Take 1 tablet by mouth Daily.      Bempedoic Acid-Ezetimibe (Nexlizet) 180-10 MG tablet Take 1 tablet by mouth Every Night.      calcium carbonate, oyster shell, 500 MG tablet tablet Take  by mouth 2 (Two) Times a Day.      carvedilol (COREG) 6.25 MG tablet  Take 1 tablet by mouth 2 (Two) Times a Day. 60 tablet 11    cetaphil (CETAPHIL) lotion Apply  topically to the appropriate area as directed As Needed for Dry Skin.      cloNIDine (CATAPRES) 0.1 MG tablet Take 1 tablet by mouth.      cloNIDine (CATAPRES-TTS) 0.1 MG/24HR patch Place 1 patch on the skin as directed by provider 1 (One) Time Per Week.      Diclofenac Sodium (VOLTAREN) 1 % gel gel Apply 2 g topically to the appropriate area as directed 4 (Four) Times a Day As Needed (joint pain). Knees, neck and shoulders      donepezil (ARICEPT) 10 MG tablet Take 1 tablet by mouth Every Night.      doxazosin (CARDURA) 4 MG tablet Take 1 tablet by mouth Every Night. 30 tablet 1    ferrous sulfate 324 (65 Fe) MG tablet delayed-release EC tablet Take 1 tablet by mouth Daily With Breakfast.      fluticasone (FLONASE) 50 MCG/ACT nasal spray Administer 2 sprays into the nostril(s) as directed by provider Daily.      gabapentin (NEURONTIN) 300 MG capsule Take 1 capsule by mouth 2 (Two) Times a Day. 6 capsule 0    hydrALAZINE (APRESOLINE) 50 MG tablet Take 1 tablet by mouth 3 (Three) Times a Day.      isosorbide mononitrate (IMDUR) 60 MG 24 hr tablet Take 1 tablet by mouth Daily.      Lidocaine 4 % Place 1 patch on the skin as directed by provider Daily. Remove & Discard patch within 12 hours or as directed by MD      LORazepam (ATIVAN) 0.5 MG tablet Take 1 tablet by mouth 2 (Two) Times a Day As Needed for Anxiety. 3 tablet 0    melatonin 5 MG tablet tablet Take 1 tablet by mouth.      modafinil (PROVIGIL) 100 MG tablet Take 2 tablets by mouth Daily.      ondansetron (ZOFRAN) 4 MG tablet Take 1 tablet by mouth Every 8 (Eight) Hours As Needed for Nausea or Vomiting.      pantoprazole (PROTONIX) 40 MG EC tablet Take 1 tablet by mouth Every Night.      potassium chloride 10 MEQ CR tablet Take 1 tablet by mouth 3 times a day.      sennosides-docusate (senna-docusate sodium) 8.6-50 MG per tablet Take 1 tablet by mouth Daily.       sertraline (ZOLOFT) 100 MG tablet Take 2 tablets by mouth Every Night.      traMADol (ULTRAM) 50 MG tablet As Needed.      Trelegy Ellipta 200-62.5-25 MCG/ACT inhaler       tuberculin 5 UNIT/0.1ML injection Inject 0.1 mL into the appropriate area of the skin as directed by provider 1 (One) Time.      vitamin C (ASCORBIC ACID) 500 MG tablet Take 1 tablet by mouth Every Other Day. 15 tablet 3     No current facility-administered medications for this visit.            Assessment and Plan    Problem List Items Addressed This Visit       Essential hypertension - Primary (Chronic)    Overview     Maintain systolic blood pressure less than 130 and diastolic blood pressure less than 85         Current Assessment & Plan     Hypertension is stable and controlled  Continue current treatment regimen.  Ambulatory blood pressure monitoring.  Elevated in clinic due to whitecoat hypertension  Blood pressure will be reassessed in 6 months.         Artificial cardiac pacemaker (Chronic)    Overview     05/09/2024-in person interrogation-3-year battery life         Current Assessment & Plan     Interrogation planned for December 11, 2024          Diagnoses and all orders for this visit:    1. Essential hypertension (Primary)  Assessment & Plan:  Hypertension is stable and controlled  Continue current treatment regimen.  Ambulatory blood pressure monitoring.  Elevated in clinic due to whitecoat hypertension  Blood pressure will be reassessed in 6 months.      2. Artificial cardiac pacemaker  Assessment & Plan:  Interrogation planned for December 11, 2024        Assessment & Plan  1. Hypertension.  Her blood pressure has returned to the desired range. Continue current blood pressure medication regimen.    2. Pacemaker check.  A pacemaker check is due in December 2024.     3. Costochondritis.  She reports some pain from costochondritis, but it has not been as bad in the last few months. No new treatment is required at this time.    4.  Hypertriglyceridemia.  Her LDL cholesterol was satisfactory at 72, but her triglycerides were slightly elevated in August 2023.  Get labs from skilled nursing facility    Follow-up  Return in December 2024 for a pacemaker check.       I have assumed longitudinal care for complex medical condition(s) that require long-term management involving monitoring and adjustments to medications.  Goals of care, history of important events, and historical results can be found with each problem.  Episodic plan of care can be found in the order section.      Follow Up     Return in about 7 months (around 6/11/2025).    Patient was given instructions and counseling regarding her condition or for health maintenance advice. Please see specific information pulled into the AVS if appropriate.       Electronically signed by TRACE Valiente, 11/11/24, 3:47 PM EST.    Patient or patient representative verbalized consent for the use of Ambient Listening during the visit with  TRACE Valiente for chart documentation. 11/11/2024  15:47 EST     Dictated Utilizing Dragon Dictation: Part of this note may be an electronic transcription/translation of spoken language to printed text using the Dragon Dictation System

## 2024-11-11 NOTE — ASSESSMENT & PLAN NOTE
Lipid abnormalities are  due to be reevaluated    Plan:  Continue same medication/s without change.   and changes to plan based on results.        Patient Treatment Goals:   LDL goal is less than 70    Followup in 6 months.

## 2024-11-11 NOTE — ASSESSMENT & PLAN NOTE
Hypertension is stable and controlled  Continue current treatment regimen.  Ambulatory blood pressure monitoring.  Elevated in clinic due to whitecoat hypertension  Blood pressure will be reassessed in 6 months.

## 2024-11-13 ENCOUNTER — OFFICE VISIT (OUTPATIENT)
Dept: SURGERY | Facility: CLINIC | Age: 79
End: 2024-11-13
Payer: MEDICARE

## 2024-11-13 VITALS — BODY MASS INDEX: 25.99 KG/M2 | HEIGHT: 65 IN | WEIGHT: 156 LBS

## 2024-11-13 DIAGNOSIS — R11.11 VOMITING WITHOUT NAUSEA, UNSPECIFIED VOMITING TYPE: Primary | ICD-10-CM

## 2024-11-13 DIAGNOSIS — K44.9 HIATAL HERNIA: ICD-10-CM

## 2024-11-13 PROCEDURE — 1159F MED LIST DOCD IN RCRD: CPT | Performed by: SURGERY

## 2024-11-13 PROCEDURE — 99212 OFFICE O/P EST SF 10 MIN: CPT | Performed by: SURGERY

## 2024-11-13 PROCEDURE — 1160F RVW MEDS BY RX/DR IN RCRD: CPT | Performed by: SURGERY

## 2024-11-13 NOTE — PROGRESS NOTES
Subjective   Susan Us is a 79 y.o. female  is here today for follow-up.         Susan Us is a 79 y.o. female here for follow-up after EGD.  She was having some emesis of undigested food and has all moderate size hiatal hernia with reflux changes.  She is on PPI therapy and her symptoms have improved with some dietary modification but she still having some vomiting at times.  Biopsies negative for malignancy or H. pylori.  History of Present Illness         Physical Exam  In wheelchair, alert and oriented, stable  Physical Exam              Assessment     Diagnoses and all orders for this visit:    1. Vomiting without nausea, unspecified vomiting type (Primary)    2. Hiatal hernia      Susan Us is a 79 y.o. female with dysphagia likely secondary to moderate-sized hiatal hernia and reflux.  PPI therapy will be continued and she will continue lifestyle and dietary modification with smaller more frequent meals and excessive chewing.  Patient will follow-up as needed.  If no improvement may add motility agents to help with passage of food.  Assessment & Plan          This document has been electronically signed by Francis Van MD   November 13, 2024 11:54 EST

## 2024-12-06 ENCOUNTER — LAB REQUISITION (OUTPATIENT)
Dept: LAB | Facility: HOSPITAL | Age: 79
End: 2024-12-06
Payer: MEDICARE

## 2024-12-06 DIAGNOSIS — R05.9 COUGH, UNSPECIFIED: ICD-10-CM

## 2024-12-06 DIAGNOSIS — R09.81 NASAL CONGESTION: ICD-10-CM

## 2024-12-06 DIAGNOSIS — R06.02 SHORTNESS OF BREATH: ICD-10-CM

## 2024-12-06 PROCEDURE — 0202U NFCT DS 22 TRGT SARS-COV-2: CPT | Performed by: INTERNAL MEDICINE

## 2025-01-30 ENCOUNTER — LAB (OUTPATIENT)
Dept: ONCOLOGY | Facility: CLINIC | Age: 80
End: 2025-01-30
Payer: MEDICARE

## 2025-01-30 ENCOUNTER — OFFICE VISIT (OUTPATIENT)
Dept: ONCOLOGY | Facility: CLINIC | Age: 80
End: 2025-01-30
Payer: MEDICARE

## 2025-01-30 VITALS
SYSTOLIC BLOOD PRESSURE: 137 MMHG | WEIGHT: 155 LBS | TEMPERATURE: 97.1 F | RESPIRATION RATE: 18 BRPM | HEIGHT: 65 IN | DIASTOLIC BLOOD PRESSURE: 69 MMHG | HEART RATE: 67 BPM | OXYGEN SATURATION: 96 % | BODY MASS INDEX: 25.83 KG/M2

## 2025-01-30 DIAGNOSIS — D64.9 ANEMIA, UNSPECIFIED TYPE: Primary | ICD-10-CM

## 2025-01-30 DIAGNOSIS — D69.6 THROMBOCYTOPENIA: ICD-10-CM

## 2025-01-30 DIAGNOSIS — R16.1 SPLENOMEGALY: ICD-10-CM

## 2025-01-30 DIAGNOSIS — D63.8 ANEMIA OF CHRONIC DISEASE: ICD-10-CM

## 2025-01-30 DIAGNOSIS — D50.9 IRON DEFICIENCY ANEMIA, UNSPECIFIED IRON DEFICIENCY ANEMIA TYPE: ICD-10-CM

## 2025-01-30 DIAGNOSIS — D64.9 ANEMIA, UNSPECIFIED TYPE: ICD-10-CM

## 2025-01-30 LAB
BASOPHILS # BLD AUTO: 0.04 10*3/MM3 (ref 0–0.2)
BASOPHILS NFR BLD AUTO: 0.8 % (ref 0–1.5)
DEPRECATED RDW RBC AUTO: 44.3 FL (ref 37–54)
EOSINOPHIL # BLD AUTO: 0.13 10*3/MM3 (ref 0–0.4)
EOSINOPHIL NFR BLD AUTO: 2.7 % (ref 0.3–6.2)
ERYTHROCYTE [DISTWIDTH] IN BLOOD BY AUTOMATED COUNT: 13 % (ref 12.3–15.4)
FERRITIN SERPL-MCNC: 100.5 NG/ML (ref 13–150)
FOLATE SERPL-MCNC: 8.31 NG/ML (ref 4.78–24.2)
HCT VFR BLD AUTO: 31.2 % (ref 34–46.6)
HGB BLD-MCNC: 9.4 G/DL (ref 12–15.9)
IMM GRANULOCYTES # BLD AUTO: 0.02 10*3/MM3 (ref 0–0.05)
IMM GRANULOCYTES NFR BLD AUTO: 0.4 % (ref 0–0.5)
IRON 24H UR-MRATE: 44 MCG/DL (ref 37–145)
IRON SATN MFR SERPL: 13 % (ref 20–50)
LYMPHOCYTES # BLD AUTO: 1.05 10*3/MM3 (ref 0.7–3.1)
LYMPHOCYTES NFR BLD AUTO: 22.1 % (ref 19.6–45.3)
MCH RBC QN AUTO: 28.4 PG (ref 26.6–33)
MCHC RBC AUTO-ENTMCNC: 30.1 G/DL (ref 31.5–35.7)
MCV RBC AUTO: 94.3 FL (ref 79–97)
MONOCYTES # BLD AUTO: 0.48 10*3/MM3 (ref 0.1–0.9)
MONOCYTES NFR BLD AUTO: 10.1 % (ref 5–12)
NEUTROPHILS NFR BLD AUTO: 3.04 10*3/MM3 (ref 1.7–7)
NEUTROPHILS NFR BLD AUTO: 63.9 % (ref 42.7–76)
NRBC BLD AUTO-RTO: 0 /100 WBC (ref 0–0.2)
PLATELET # BLD AUTO: 120 10*3/MM3 (ref 140–450)
PMV BLD AUTO: 10.5 FL (ref 6–12)
RBC # BLD AUTO: 3.31 10*6/MM3 (ref 3.77–5.28)
TIBC SERPL-MCNC: 329 MCG/DL (ref 298–536)
TRANSFERRIN SERPL-MCNC: 221 MG/DL (ref 200–360)
VIT B12 BLD-MCNC: 908 PG/ML (ref 211–946)
WBC NRBC COR # BLD AUTO: 4.76 10*3/MM3 (ref 3.4–10.8)

## 2025-01-30 PROCEDURE — 84466 ASSAY OF TRANSFERRIN: CPT | Performed by: INTERNAL MEDICINE

## 2025-01-30 PROCEDURE — 85025 COMPLETE CBC W/AUTO DIFF WBC: CPT | Performed by: INTERNAL MEDICINE

## 2025-01-30 PROCEDURE — 82607 VITAMIN B-12: CPT | Performed by: INTERNAL MEDICINE

## 2025-01-30 PROCEDURE — 82728 ASSAY OF FERRITIN: CPT | Performed by: INTERNAL MEDICINE

## 2025-01-30 PROCEDURE — 82746 ASSAY OF FOLIC ACID SERUM: CPT | Performed by: INTERNAL MEDICINE

## 2025-01-30 PROCEDURE — 83540 ASSAY OF IRON: CPT | Performed by: INTERNAL MEDICINE

## 2025-01-30 RX ORDER — ARIPIPRAZOLE 2 MG/1
1 TABLET ORAL DAILY
COMMUNITY

## 2025-01-30 NOTE — PROGRESS NOTES
Venipuncture Blood Specimen Collection  Venipuncture performed in left arm by Liana Martines MA with good hemostasis. Patient tolerated the procedure well without complications.   01/30/25   Liana Martines MA

## 2025-01-30 NOTE — PROGRESS NOTES
Subjective     Date: 1/30/2025    Referring Provider  No ref. provider found    Chief Complaint  Bicytopenia (anemia and thrombocytopenia)    Subjective          Susan Us is a 79 y.o. female who presents today to [facility] for follow up.     HPI:   79 y.o. female with past medical history of hypertension, hyperlipidemia, dementia, chronic kidney disease, PPM s/p bradycardia who presents for consultation regarding bicytopenia (normocytic anemia and thrombocytopenia) from nursing home.    She presents today with her daughter, Leslie, who assists in history. She was seen by a hematologist, Dr. Luna in [place], [place] several years ago. Did not go through a bone marrow biopsy. Had U/S of the abdomen performed. She was placed on oral iron, which she is currently on. No other intervention was done.     Ms. Us denies any new symptoms including fever, chills, night sweats, unintentional weight loss, or fatigue. Denies any recent lumps/bumps. Underwent a colonoscopy 3-4 years ago by someone in [place], unable to recall whom. Was found to have polyps, no bleeding was noted. Currently denies any bleeding.    CBC from 4/16 show normal WBC and differential, Hgb 10, hct 31, Plt 120K.     Denies smoking, alcohol  use, drug use. Worked as a , , and . Family history significant for sister with breast cancer requiring bilateral mastectomy.     Interval History 05/14/2024   Labs from initial consultation show hemoglobin persistently low at 10.4, MCV 86, platelets 119.  Normal white blood count.  Peripheral smear with normocytic, normochromic anemia.  No increase in schistocytes.  Thrombocytopenia with no platelet clumping.  Granulocytes show normal morphology and no circulating blasts.  HIV antibody is nonreactive.  Negative for hepatitis studies.  Negative for rheumatoid factor and lupus screen. Iron studies showed mild iron deficiency with  ferritin 97 ng/mL (patient is on oral ferrous sulfate). Replete B12/folate. No signs of hemolysis as normal LDH/haptoglobin. SPEP with no M spike, elevated free kappa light chain to 52, elevated free lambda light chain 28 with normal kappa/lambda ratio 1.83.  Ultrasound of the abdomen shows splenomegaly with spleen measuring 13.5 cm.  Liver is unremarkable.    These results were reviewed with Leslie and Ms. Us today. I don't believe her underlying bicytopenia is due to MDS or AML as she is asymptomatic with reasons for anemia such as iron deficiency and anemia of chronic disease and splenomegaly causing thrombocytopenia. IF she were to have bleeding or weight loss, fever, chills, night sweats etc, or drop in her H/H and platelets without a cause, I would consider bone marrow biopsy.     Interval History 08/14/2024   Ms. Us presents today for follow up, accompanied by her daughter. She presented to [facility] ED 7/29 due to L sided chest pain, was diagnosed with costochondritis. Continues to take oral vitamin C/iron.   CBC: 3.25 (ANC 1.7), Hgb 8.9, Hct 28, Plts 101. Discussed monitoring CBC closer, if down trending counts, would recommend BMB.    Interval History 10/31/2024   Ms. Us presents for follow up, accompanied by Leslie. She underwent EGD 10/18-  noted to have large hiatal hernia and esophagitis. Path with benign gastric antral type mucosa with scant chronic inflammation and focal minimal intestinal metaplasia, no h. Pylori.     She was taken off iron briefly for EGD, recently placed back on ~5 days ago. Denies constipation, does endorse some nausea, which she thinks may be attributed to hiatal hernia.     CBC today with normalized WBC, Hgb 9.4, Hct 30, Plts stable at 101. Cont Vitamin C/ Iron every other day.     Interval History 01/30/2025   Ms. Us presents for follow up, accompanied by Leslie. She has had several UTI's since our last visit. Denies any bleeding including GIB or hematuria.  Energy is stable. Taking in oral iron/vitamin C daily (according to nursing home records), denies GI disturbance.  CBC with stable H/H, Hgb 9.4/31.2, platelets with slight improvement, 120K today. We discussed pursuing a bone marrow biopsy as no improvement with iron supplements, she declined at this time.         Objective     Objective     Allergy:   Allergies   Allergen Reactions    Evolocumab Itching     itching    Statins Hives    Strawberry Rash        Current Medications:   Current Outpatient Medications   Medication Sig Dispense Refill    acetaminophen (TYLENOL) 500 MG tablet Take 1 tablet by mouth Every 6 (Six) Hours As Needed for Mild Pain.      aspirin 81 MG EC tablet Take 1 tablet by mouth Daily.      Bempedoic Acid-Ezetimibe (Nexlizet) 180-10 MG tablet Take 1 tablet by mouth Every Night.      calcium carbonate, oyster shell, 500 MG tablet tablet Take  by mouth 2 (Two) Times a Day.      carvedilol (COREG) 6.25 MG tablet Take 1 tablet by mouth 2 (Two) Times a Day. 60 tablet 11    cetaphil (CETAPHIL) lotion Apply  topically to the appropriate area as directed As Needed for Dry Skin.      cloNIDine (CATAPRES) 0.1 MG tablet Take 1 tablet by mouth.      cloNIDine (CATAPRES-TTS) 0.1 MG/24HR patch Place 1 patch on the skin as directed by provider 1 (One) Time Per Week.      Diclofenac Sodium (VOLTAREN) 1 % gel gel Apply 2 g topically to the appropriate area as directed 4 (Four) Times a Day As Needed (joint pain). Knees, neck and shoulders      donepezil (ARICEPT) 10 MG tablet Take 1 tablet by mouth Every Night.      doxazosin (CARDURA) 4 MG tablet Take 1 tablet by mouth Every Night. 30 tablet 1    ferrous sulfate 324 (65 Fe) MG tablet delayed-release EC tablet Take 1 tablet by mouth Daily With Breakfast.      fluticasone (FLONASE) 50 MCG/ACT nasal spray Administer 2 sprays into the nostril(s) as directed by provider Daily.      gabapentin (NEURONTIN) 300 MG capsule Take 1 capsule by mouth 2 (Two) Times a Day. 6  capsule 0    hydrALAZINE (APRESOLINE) 50 MG tablet Take 1 tablet by mouth 3 (Three) Times a Day.      isosorbide mononitrate (IMDUR) 60 MG 24 hr tablet Take 1 tablet by mouth Daily.      Lidocaine 4 % Place 1 patch on the skin as directed by provider Daily. Remove & Discard patch within 12 hours or as directed by MD      LORazepam (ATIVAN) 0.5 MG tablet Take 1 tablet by mouth 2 (Two) Times a Day As Needed for Anxiety. 3 tablet 0    melatonin 5 MG tablet tablet Take 1 tablet by mouth.      modafinil (PROVIGIL) 100 MG tablet Take 2 tablets by mouth Daily.      ondansetron (ZOFRAN) 4 MG tablet Take 1 tablet by mouth Every 8 (Eight) Hours As Needed for Nausea or Vomiting.      pantoprazole (PROTONIX) 40 MG EC tablet Take 1 tablet by mouth Every Night.      potassium chloride 10 MEQ CR tablet Take 1 tablet by mouth 3 times a day.      sennosides-docusate (senna-docusate sodium) 8.6-50 MG per tablet Take 1 tablet by mouth Daily.      sertraline (ZOLOFT) 100 MG tablet Take 2 tablets by mouth Every Night.      traMADol (ULTRAM) 50 MG tablet As Needed.      Trelegy Ellipta 200-62.5-25 MCG/ACT inhaler       tuberculin 5 UNIT/0.1ML injection Inject 0.1 mL into the appropriate area of the skin as directed by provider 1 (One) Time.      vitamin C (ASCORBIC ACID) 500 MG tablet Take 1 tablet by mouth Every Other Day. 15 tablet 3    Abilify 2 MG tablet Take 1 tablet by mouth Daily.       No current facility-administered medications for this visit.       Past Medical History:  Past Medical History:   Diagnosis Date    Anxiety     Arthritis     Bradycardia     s/p pacemaker placement    CKD (chronic kidney disease)     baseline renal function unknown    COPD (chronic obstructive pulmonary disease)     Dementia     Elevated cholesterol     GERD (gastroesophageal reflux disease)     Hyperlipidemia     Hypertension     Iron deficiency anemia     Pacemaker     Thrombocytopenia        Past Surgical History:  Past Surgical History:  "  Procedure Laterality Date    BREAST BIOPSY      benign, yrs ago    CARDIAC PACEMAKER PLACEMENT      CATARACT EXTRACTION      CERVICAL FUSION      CHOLECYSTECTOMY      ENDOSCOPY N/A 10/18/2024    Procedure: ESOPHAGOGASTRODUODENOSCOPY WITH ANESTHESIA;  Surgeon: Francis Van MD;  Location: [facility];  Service: Gastroenterology;  Laterality: N/A;    HIATAL HERNIA REPAIR      HYSTERECTOMY      REPLACEMENT TOTAL KNEE Right        Social History:  Social History     Socioeconomic History    Marital status:    Tobacco Use    Smoking status: Never     Passive exposure: Never    Smokeless tobacco: Never    Tobacco comments:     Second hand smoke exposure ()   Vaping Use    Vaping status: Never Used   Substance and Sexual Activity    Alcohol use: Never    Drug use: Never    Sexual activity: Defer         Family History:  Family History   Problem Relation Age of Onset    Stroke Mother     Stroke Father     Breast cancer Sister        Review of Systems:  Review of Systems   All other systems reviewed and are negative.      Vital Signs:   /69   Pulse 67   Temp 97.1 °F (36.2 °C) (Temporal)   Resp 18   Ht 165.1 cm (65\")   Wt 70.3 kg (155 lb)   SpO2 96%   BMI 25.79 kg/m²      Physical Exam:  Physical Exam  Vitals reviewed.   Constitutional:       General: She is not in acute distress.     Appearance: Normal appearance. She is not ill-appearing.      Comments: Ambulating with a walker   HENT:      Head: Normocephalic and atraumatic.      Mouth/Throat:      Mouth: Mucous membranes are moist.      Pharynx: Oropharynx is clear.   Eyes:      Conjunctiva/sclera: Conjunctivae normal.      Pupils: Pupils are equal, round, and reactive to light.   Cardiovascular:      Rate and Rhythm: Normal rate and regular rhythm.      Heart sounds: No murmur heard.  Pulmonary:      Effort: Pulmonary effort is normal. No respiratory distress.      Breath sounds: Normal breath sounds. No wheezing.   Abdominal:      " "General: Abdomen is flat. Bowel sounds are normal. There is no distension.      Palpations: Abdomen is soft. There is no mass.      Tenderness: There is no abdominal tenderness. There is no guarding.   Musculoskeletal:         General: No swelling. Normal range of motion.      Cervical back: Normal range of motion.   Lymphadenopathy:      Cervical: No cervical adenopathy.   Skin:     General: Skin is warm and dry.   Neurological:      General: No focal deficit present.      Mental Status: She is alert and oriented to person, place, and time. Mental status is at baseline.   Psychiatric:         Mood and Affect: Mood normal.         PHQ-9 Score  PHQ-9 Total Score:       Pain Score  Vitals:    01/30/25 1116   BP: 137/69   Pulse: 67   Resp: 18   Temp: 97.1 °F (36.2 °C)   TempSrc: Temporal   SpO2: 96%   Weight: 70.3 kg (155 lb)   Height: 165.1 cm (65\")   PainSc: 0-No pain               ECOG score: 0           PAINSCOREQUALITYMETRIC:   Vitals:    01/30/25 1116   PainSc: 0-No pain                  Lab Review  Lab Results   Component Value Date    WBC 4.76 01/30/2025    HGB 9.4 (L) 01/30/2025    HCT 31.2 (L) 01/30/2025    MCV 94.3 01/30/2025    RDW 13.0 01/30/2025     (L) 01/30/2025    NEUTRORELPCT 63.9 01/30/2025    LYMPHORELPCT 22.1 01/30/2025    MONORELPCT 10.1 01/30/2025    EOSRELPCT 2.7 01/30/2025    BASORELPCT 0.8 01/30/2025    NEUTROABS 3.04 01/30/2025    LYMPHSABS 1.05 01/30/2025     Lab Results   Component Value Date     10/31/2024    K 3.6 10/31/2024    CO2 24.4 10/31/2024     10/31/2024    BUN 22 10/31/2024    CREATININE 1.35 (H) 10/31/2024    GLUCOSE 128 (H) 10/31/2024    CALCIUM 9.1 10/31/2024    ALKPHOS 59 10/31/2024    AST 19 10/31/2024    ALT 9 10/31/2024    BILITOT 0.5 10/31/2024    ALBUMIN 4.0 10/31/2024    PROTEINTOT 7.1 10/31/2024    MG 2.0 09/27/2023               Work up 4/18/2024:    Component      Latest Ref Rng 4/18/2024   Iron      37 - 145 mcg/dL 59    Iron Saturation (TSAT)     "  20 - 50 % 15 (L)    Transferrin      200 - 360 mg/dL 257    TIBC      298 - 536 mcg/dL 383    Hepatitis B Surface Ag      Non-Reactive  Non-Reactive    Hep A IgM      Non-Reactive  Non-Reactive    Hep B Core IgM      Non-Reactive  Non-Reactive    Hepatitis C Ab      Non-Reactive  Non-Reactive    Reticulocyte %      0.70 - 1.90 % 0.93    Reticulocyte Absolute      0.0200 - 0.1300 10*6/mm3 0.0349    Protime      12.1 - 14.7 Seconds 13.9    INR      0.90 - 1.10  1.02    Ferritin      13.00 - 150.00 ng/mL 97.01    Vitamin B-12      211 - 946 pg/mL 1,602 (H)    Folate      4.78 - 24.20 ng/mL 16.30    LDH      135 - 214 U/L 173    Sed Rate      0 - 30 mm/hr 30    C-Reactive Protein      0.00 - 0.50 mg/dL <0.30    Methylmalonic Acid      0 - 378 nmol/L 355    TSH Baseline      0.270 - 4.200 uIU/mL 0.966    Transferrin Receptor      12.2 - 27.3 nmol/L 16.1    Haptoglobin      30 - 200 mg/dL 74    PTT      26.5 - 34.5 seconds 28.9    HIV DUO      Non-Reactive  Non-Reactive    MANDY Direct      Negative  Negative    Rheumatoid Factor Quantitative      0.0 - 14.0 IU/mL <10.0       Legend:  (L) Low  (H) High      PERIPHERAL SMEAR, [facility] (04/18/2024 11:53)     DIAGNOSIS:  PERIPHERAL SMEAR  Normocytic, normochromic anemia.  No significant increase in schistocytes  population.  Thrombocytopenia with no platelet clumping.  Normal WBC count and differential. Granulocytes show normal morphology and  no circulating blasts identified.       VINAYAK + PE (04/18/2024 11:53)           Immunoglobulin Free LT Chains Blood (04/18/2024 11:53)           Radiology Results    US Abdomen Complete (04/29/2024 13:02)   FINDINGS: .  The visualized pancreas is unremarkable. The liver is unremarkable. The  patient is status post cholecystectomy.  The common duct measures 3 mm.  The right kidney measures 8.1 cm in length and is normal in echogenicity  without hydronephrosis. The left kidney measures 9.3 cm in length and is  normal in echogenicity without  hydronephrosis.  Spleen is enlarged  measuring 13.5 cm.  The aorta is normal in caliber. The vena cava is  unremarkable.     IMPRESSION:  Splenomegaly.    CT Abdomen Pelvis Without Contrast (09/27/2023 23:02)   FINDINGS:   Included portions of the lung bases are normal.  The liver and spleen are normal.  There is no biliary dilation.  The  gallbladder has been removed.  The pancreas is normal.  The adrenal glands are normal.  Kidneys are normal.  There is no hydronephrosis or renal or ureteral  calculi.  The bowel loops are normal in course and caliber.  There is  diverticulosis of the descending and sigmoid colon.  There is no pelvic mass or free fluid or lymphadenopathy.  Degenerative changes involve the lumbar spine.     IMPRESSION:  No CT evidence of an acute intra-abdominal or intrapelvic process.      Pathology:    TISSUE EXAM, [facility] (JENIFER,COR,MAD) (10/18/2024 10:29)     DIAGNOSIS:  ANTRUM, BIOPSY:  Benign gastric antral-type mucosa with scant chronic inflammation and focal  minimal intestinal metaplasia, negative for dysplasia  No Helicobacter pylori-like organisms identified on routine histologic sections        EGD:  ENDOSCOPY - EGD (10/18/2024)       Assessment / Plan         Assessment and Plan   Susan Us is a 79 y.o.  presents for     Normocytic Anemia  Iron deficiency   Anemia of chronic disease  Thrombocytopenia   Splenomegaly   -Patient reportedly has had anemia and thrombocytopenia for many years, in our system dating back ~9 months; baseline Hgb 9.1-10, Hct 28-31, normal MCV, platelets 80-120K  -Labs from initial consultation show hemoglobin persistently low at 10.4, MCV 86, platelets 119.  Normal white blood count.  Peripheral smear with normocytic, normochromic anemia.  No increase in schistocytes.  Thrombocytopenia with no platelet clumping.  Granulocytes show normal morphology and no circulating blasts.  HIV antibody is nonreactive.  Negative for hepatitis studies.  Negative for  rheumatoid factor and lupus screen. Iron studies showed mild iron deficiency with ferritin 97 ng/mL (patient is on oral ferrous sulfate). Replete B12/folate. No signs of hemolysis as normal LDH/haptoglobin. SPEP with no M spike, elevated free kappa light chain to 52, elevated free lambda light chain 28 with normal kappa/lambda ratio 1.83.  Ultrasound of the abdomen shows splenomegaly with spleen measuring 13.5 cm.  Liver is unremarkable.  -She has been taking in oral ferous sulfate/ascorbic acid with very little improvement in her hemoglobin and hematocrit. Her platelets have remained stable.   -We discussed pursuing a bone marrow biopsy for further investigation, patient declined, would like to continue supportive therapy.   -Continue oral ferrous sulfate 325 mg daily with vitamin C every other day      Discussed possible differential diagnoses, testing, treatment, recommended non-pharmacological interventions, risks, warning signs to monitor for that would indicate need for follow-up in clinic or ER. If no improvement with these regimens or you have new or worsening symptoms follow-up. Patient verbalizes understanding and agreement with plan of care. Denies further needs or concerns.     Patient was given instructions and counseling regarding her condition and for health maintenance advised.       All questions were answered to her satisfaction.        ACO / SHERRI/Other  Quality measures  -  Susan Us did not receive 2024 flu vaccine.  This was recommended.  -  Susan Us reports a pain score of 0.  Given her pain assessment as noted, treatment options were discussed and the following options were decided upon as a follow-up plan to address the patient's pain: continuation of current treatment plan for pain.  -  Current outpatient and discharge medications have been reconciled for the patient.  Reviewed by: Sylwia Hua MD        Meds ordered during this visit  No orders of the defined types were placed  in this encounter.      Visit Diagnoses    ICD-10-CM ICD-9-CM   1. Anemia, unspecified type  D64.9 285.9   2. Thrombocytopenia  D69.6 287.5   3. Splenomegaly  R16.1 789.2   4. Iron deficiency anemia, unspecified iron deficiency anemia type  D50.9 280.9               Follow Up   In 3 months with repeat CBC, iron studies, ferritin, B12, folate      This document has been electronically signed by Sylwia Hua MD   January 30, 2025 11:44 EST    Dictated Utilizing Dragon Dictation: Part of this note may be an electronic transcription/translation of spoken language to printed text using the Dragon Dictation System.

## 2025-02-10 ENCOUNTER — LAB REQUISITION (OUTPATIENT)
Dept: LAB | Facility: HOSPITAL | Age: 80
End: 2025-02-10
Payer: MEDICARE

## 2025-02-10 DIAGNOSIS — R09.81 NASAL CONGESTION: ICD-10-CM

## 2025-02-10 DIAGNOSIS — R05.9 COUGH, UNSPECIFIED: ICD-10-CM

## 2025-02-10 PROCEDURE — 0202U NFCT DS 22 TRGT SARS-COV-2: CPT | Performed by: INTERNAL MEDICINE

## 2025-02-11 LAB
B PARAPERT DNA SPEC QL NAA+PROBE: NOT DETECTED
B PERT DNA SPEC QL NAA+PROBE: NOT DETECTED
C PNEUM DNA NPH QL NAA+NON-PROBE: NOT DETECTED
FLUAV SUBTYP SPEC NAA+PROBE: NOT DETECTED
FLUBV RNA ISLT QL NAA+PROBE: NOT DETECTED
HADV DNA SPEC NAA+PROBE: NOT DETECTED
HCOV 229E RNA SPEC QL NAA+PROBE: NOT DETECTED
HCOV HKU1 RNA SPEC QL NAA+PROBE: NOT DETECTED
HCOV NL63 RNA SPEC QL NAA+PROBE: NOT DETECTED
HCOV OC43 RNA SPEC QL NAA+PROBE: NOT DETECTED
HMPV RNA NPH QL NAA+NON-PROBE: NOT DETECTED
HPIV1 RNA ISLT QL NAA+PROBE: NOT DETECTED
HPIV2 RNA SPEC QL NAA+PROBE: NOT DETECTED
HPIV3 RNA NPH QL NAA+PROBE: NOT DETECTED
HPIV4 P GENE NPH QL NAA+PROBE: NOT DETECTED
M PNEUMO IGG SER IA-ACNC: NOT DETECTED
RHINOVIRUS RNA SPEC NAA+PROBE: NOT DETECTED
RSV RNA NPH QL NAA+NON-PROBE: NOT DETECTED
SARS-COV-2 RNA RESP QL NAA+PROBE: NOT DETECTED

## 2025-02-14 ENCOUNTER — TELEPHONE (OUTPATIENT)
Dept: CARDIOLOGY | Facility: CLINIC | Age: 80
End: 2025-02-14

## 2025-02-14 NOTE — TELEPHONE ENCOUNTER
Caller: [facility] AND [facility]    Relationship: Other    Best call back number:[phone]    What is the best time to reach you: ANY    Who are you requesting to speak with (clinical staff, provider,  specific staff member): CLINICAL       What was the call regarding: THEY ARE WONDERING IF THEY CAN DO THE DEVICE CHECK INSTEAD OF HAVING TO COME TO THE OFFICE PLEASE ADVISE.

## 2025-02-17 NOTE — TELEPHONE ENCOUNTER
I spoke to Benigno and told him of the difficulty moving patient and he states they rarely go to nursing facilities but that it can be done. He states he will get it scheduled probably for next week and bring the report to you. Nursing home is aware of situation.

## 2025-02-17 NOTE — TELEPHONE ENCOUNTER
They're going to have the rep come to her facility?  That's fine.  We just need an in person interrogation instead of remote

## 2025-02-25 ENCOUNTER — OFFICE VISIT (OUTPATIENT)
Age: 80
End: 2025-02-25
Payer: MEDICARE

## 2025-02-25 VITALS — BODY MASS INDEX: 25.83 KG/M2 | WEIGHT: 155 LBS | HEIGHT: 65 IN

## 2025-02-25 DIAGNOSIS — H61.91 SKIN LESION OF RIGHT EAR: Primary | ICD-10-CM

## 2025-02-25 DIAGNOSIS — H61.91 SKIN LESION OF RIGHT EAR: ICD-10-CM

## 2025-02-25 NOTE — PROGRESS NOTES
Subjective   Susan Us is a 79 y.o. female is being seen for consultation today at the request of Bruna Smith MD    Susan Us is a 79 y.o. female with a raised irregularly bordered skin lesion of the left ear.  Lesion measured approximately 6 mm and a slightly raised on the helix of the ear.    History of Present Illness      Past Medical History:   Diagnosis Date    Anxiety     Arthritis     Bradycardia     s/p pacemaker placement    CKD (chronic kidney disease)     baseline renal function unknown    COPD (chronic obstructive pulmonary disease)     Dementia     Elevated cholesterol     GERD (gastroesophageal reflux disease)     Hyperlipidemia     Hypertension     Iron deficiency anemia     Pacemaker     Thrombocytopenia        Family History   Problem Relation Age of Onset    Stroke Mother     Stroke Father     Breast cancer Sister        Social History     Socioeconomic History    Marital status:    Tobacco Use    Smoking status: Never     Passive exposure: Never    Smokeless tobacco: Never    Tobacco comments:     Second hand smoke exposure ()   Vaping Use    Vaping status: Never Used   Substance and Sexual Activity    Alcohol use: Never    Drug use: Never    Sexual activity: Defer       Past Surgical History:   Procedure Laterality Date    BREAST BIOPSY      benign, yrs ago    CARDIAC PACEMAKER PLACEMENT      CATARACT EXTRACTION      CERVICAL FUSION      CHOLECYSTECTOMY      ENDOSCOPY N/A 10/18/2024    Procedure: ESOPHAGOGASTRODUODENOSCOPY WITH ANESTHESIA;  Surgeon: Francis Van MD;  Location: [facility];  Service: Gastroenterology;  Laterality: N/A;    HIATAL HERNIA REPAIR      HYSTERECTOMY      REPLACEMENT TOTAL KNEE Right        Review of Systems   Constitutional:  Negative for activity change, appetite change, chills and fever.   HENT:  Negative for sore throat and trouble swallowing.    Eyes:  Negative for visual disturbance.   Respiratory:  Negative for cough and  "shortness of breath.    Cardiovascular:  Negative for chest pain and palpitations.   Gastrointestinal:  Negative for abdominal distention, abdominal pain, blood in stool, constipation, diarrhea, nausea and vomiting.   Endocrine: Negative for cold intolerance and heat intolerance.   Genitourinary:  Negative for dysuria.   Musculoskeletal:  Negative for joint swelling.   Skin:  Negative for color change, rash and wound.   Allergic/Immunologic: Negative for immunocompromised state.   Neurological:  Negative for dizziness, seizures, weakness and headaches.   Hematological:  Negative for adenopathy. Does not bruise/bleed easily.   Psychiatric/Behavioral:  Negative for agitation and confusion.        Results        Ht 165.1 cm (65\")   Wt 70.3 kg (155 lb)   BMI 25.79 kg/m²   Objective   Physical Exam  Constitutional:       Appearance: She is well-developed.   HENT:      Head: Normocephalic and atraumatic.      Ears:     Eyes:      Conjunctiva/sclera: Conjunctivae normal.      Pupils: Pupils are equal, round, and reactive to light.   Neck:      Thyroid: No thyromegaly.      Vascular: No JVD.      Trachea: No tracheal deviation.   Cardiovascular:      Rate and Rhythm: Normal rate and regular rhythm.      Heart sounds: No murmur heard.     No friction rub. No gallop.   Pulmonary:      Effort: Pulmonary effort is normal.      Breath sounds: Normal breath sounds.   Abdominal:      General: There is no distension.      Palpations: Abdomen is soft. There is no hepatomegaly or splenomegaly.      Tenderness: There is no abdominal tenderness.      Hernia: No hernia is present.   Musculoskeletal:         General: No deformity. Normal range of motion.      Cervical back: Neck supple.   Skin:     General: Skin is warm and dry.   Neurological:      Mental Status: She is alert and oriented to person, place, and time.       Physical Exam    Shave biopsy right ear    Right ear prepped and draped medial sterile fashion.  Timeout procedure " performed.  After injection of local anesthetic an 11 blade scalpel was used perform a shave biopsy of the skin lesion.  Hemostasis was achieved with direct pressure and a bandage was applied.    Estimated blood loss 5 mL    Specimens: Skin lesion shave biopsy right ear    Complications: None  Assessment & Plan            Assessment   There are no diagnoses linked to this encounter.    Assessment & Plan      Susan Us is a 79 y.o. female with skin lesion of the right ear treated with shave biopsy to determine etiology.  Follow-up in 1 week to discuss pathology.                 This document has been electronically signed by Francis Van MD   February 25, 2025 15:15 EST

## 2025-02-26 ENCOUNTER — LAB REQUISITION (OUTPATIENT)
Dept: LAB | Facility: HOSPITAL | Age: 80
End: 2025-02-26
Payer: MEDICARE

## 2025-02-26 DIAGNOSIS — R05.9 COUGH, UNSPECIFIED: ICD-10-CM

## 2025-02-26 LAB
B PARAPERT DNA SPEC QL NAA+PROBE: NOT DETECTED
B PERT DNA SPEC QL NAA+PROBE: NOT DETECTED
C PNEUM DNA NPH QL NAA+NON-PROBE: NOT DETECTED
FLUAV SUBTYP SPEC NAA+PROBE: NOT DETECTED
FLUBV RNA ISLT QL NAA+PROBE: NOT DETECTED
HADV DNA SPEC NAA+PROBE: NOT DETECTED
HCOV 229E RNA SPEC QL NAA+PROBE: NOT DETECTED
HCOV HKU1 RNA SPEC QL NAA+PROBE: NOT DETECTED
HCOV NL63 RNA SPEC QL NAA+PROBE: NOT DETECTED
HCOV OC43 RNA SPEC QL NAA+PROBE: DETECTED
HMPV RNA NPH QL NAA+NON-PROBE: NOT DETECTED
HPIV1 RNA ISLT QL NAA+PROBE: NOT DETECTED
HPIV2 RNA SPEC QL NAA+PROBE: NOT DETECTED
HPIV3 RNA NPH QL NAA+PROBE: NOT DETECTED
HPIV4 P GENE NPH QL NAA+PROBE: NOT DETECTED
M PNEUMO IGG SER IA-ACNC: NOT DETECTED
RHINOVIRUS RNA SPEC NAA+PROBE: NOT DETECTED
RSV RNA NPH QL NAA+NON-PROBE: NOT DETECTED
SARS-COV-2 RNA RESP QL NAA+PROBE: NOT DETECTED

## 2025-02-26 PROCEDURE — 0202U NFCT DS 22 TRGT SARS-COV-2: CPT | Performed by: INTERNAL MEDICINE

## 2025-02-27 LAB — REF LAB TEST METHOD: NORMAL

## 2025-03-04 ENCOUNTER — OFFICE VISIT (OUTPATIENT)
Age: 80
End: 2025-03-04
Payer: MEDICARE

## 2025-03-04 VITALS — BODY MASS INDEX: 25.83 KG/M2 | HEIGHT: 65 IN | WEIGHT: 155 LBS

## 2025-03-04 DIAGNOSIS — C44.212 BASAL CELL CARCINOMA (BCC) OF SKIN OF RIGHT EAR: Primary | ICD-10-CM

## 2025-03-04 PROCEDURE — 99213 OFFICE O/P EST LOW 20 MIN: CPT | Performed by: SURGERY

## 2025-03-04 PROCEDURE — 1160F RVW MEDS BY RX/DR IN RCRD: CPT | Performed by: SURGERY

## 2025-03-04 PROCEDURE — 1159F MED LIST DOCD IN RCRD: CPT | Performed by: SURGERY

## 2025-03-04 NOTE — PROGRESS NOTES
Subjective   Susan Us is a 79 y.o. female is being seen for consultation today at the request of Bruna Smith MD    uSsan Us is a 79 y.o. female with a raised irregularly bordered skin lesion of the left ear.  Lesion measured approximately 6 mm and a slightly raised on the helix of the ear.  Recent biopsy shows basal cell carcinoma.    History of Present Illness      Past Medical History:   Diagnosis Date    Anxiety     Arthritis     Bradycardia     s/p pacemaker placement    CKD (chronic kidney disease)     baseline renal function unknown    COPD (chronic obstructive pulmonary disease)     Dementia     Elevated cholesterol     GERD (gastroesophageal reflux disease)     Hyperlipidemia     Hypertension     Iron deficiency anemia     Pacemaker     Thrombocytopenia        Family History   Problem Relation Age of Onset    Stroke Mother     Stroke Father     Breast cancer Sister        Social History     Socioeconomic History    Marital status:    Tobacco Use    Smoking status: Never     Passive exposure: Never    Smokeless tobacco: Never    Tobacco comments:     Second hand smoke exposure ()   Vaping Use    Vaping status: Never Used   Substance and Sexual Activity    Alcohol use: Never    Drug use: Never    Sexual activity: Defer       Past Surgical History:   Procedure Laterality Date    BREAST BIOPSY      benign, yrs ago    CARDIAC PACEMAKER PLACEMENT      CATARACT EXTRACTION      CERVICAL FUSION      CHOLECYSTECTOMY      ENDOSCOPY N/A 10/18/2024    Procedure: ESOPHAGOGASTRODUODENOSCOPY WITH ANESTHESIA;  Surgeon: Francis Van MD;  Location: [facility];  Service: Gastroenterology;  Laterality: N/A;    HIATAL HERNIA REPAIR      HYSTERECTOMY      REPLACEMENT TOTAL KNEE Right        Review of Systems   Constitutional:  Negative for activity change, appetite change, chills and fever.   HENT:  Negative for sore throat and trouble swallowing.    Eyes:  Negative for visual disturbance.  "  Respiratory:  Negative for cough and shortness of breath.    Cardiovascular:  Negative for chest pain and palpitations.   Gastrointestinal:  Negative for abdominal distention, abdominal pain, blood in stool, constipation, diarrhea, nausea and vomiting.   Endocrine: Negative for cold intolerance and heat intolerance.   Genitourinary:  Negative for dysuria.   Musculoskeletal:  Negative for joint swelling.   Skin:  Negative for color change, rash and wound.   Allergic/Immunologic: Negative for immunocompromised state.   Neurological:  Negative for dizziness, seizures, weakness and headaches.   Hematological:  Negative for adenopathy. Does not bruise/bleed easily.   Psychiatric/Behavioral:  Negative for agitation and confusion.        Results        Ht 165.1 cm (65\")   Wt 70.3 kg (155 lb)   BMI 25.79 kg/m²   Objective   Physical Exam  Constitutional:       Appearance: She is well-developed.   HENT:      Head: Normocephalic and atraumatic.      Ears:     Eyes:      Conjunctiva/sclera: Conjunctivae normal.      Pupils: Pupils are equal, round, and reactive to light.   Neck:      Thyroid: No thyromegaly.      Vascular: No JVD.      Trachea: No tracheal deviation.   Cardiovascular:      Rate and Rhythm: Normal rate and regular rhythm.      Heart sounds: No murmur heard.     No friction rub. No gallop.   Pulmonary:      Effort: Pulmonary effort is normal.      Breath sounds: Normal breath sounds.   Abdominal:      General: There is no distension.      Palpations: Abdomen is soft. There is no hepatomegaly or splenomegaly.      Tenderness: There is no abdominal tenderness.      Hernia: No hernia is present.   Musculoskeletal:         General: No deformity. Normal range of motion.      Cervical back: Neck supple.   Skin:     General: Skin is warm and dry.   Neurological:      Mental Status: She is alert and oriented to person, place, and time.       Physical Exam      Assessment & Plan            Assessment   Diagnoses and " all orders for this visit:    1. Basal cell carcinoma (BCC) of skin of right ear (Primary)  -     Case Request; Standing  -     ceFAZolin 2000 mg IVPB in 100 mL NS (VTB)  -     Case Request    Other orders  -     Follow Anesthesia Guidelines / Protocol; Future  -     Follow Anesthesia Guidelines / Protocol; Standing  -     Verify / Perform Chlorhexidine Skin Prep; Standing  -     Provide NPO Instructions to Patient; Future  -     Chlorhexidine Skin Prep; Future  -     Place Sequential Compression Device; Standing  -     Maintain Sequential Compression Device; Standing        Assessment & Plan      Susan Us is a 79 y.o. female with skin lesion of the right ear with shave biopsy showing basal cell carcinoma.  Patient undergo excision in the operating room for margins.                 This document has been electronically signed by Francis Van MD   March 4, 2025 14:42 EST

## 2025-03-07 ENCOUNTER — LAB REQUISITION (OUTPATIENT)
Dept: LAB | Facility: HOSPITAL | Age: 80
End: 2025-03-07
Payer: MEDICARE

## 2025-03-07 DIAGNOSIS — N39.0 URINARY TRACT INFECTION, SITE NOT SPECIFIED: ICD-10-CM

## 2025-03-07 DIAGNOSIS — R05.9 COUGH, UNSPECIFIED: ICD-10-CM

## 2025-03-07 LAB
B PARAPERT DNA SPEC QL NAA+PROBE: NOT DETECTED
B PERT DNA SPEC QL NAA+PROBE: NOT DETECTED
BACTERIA UR QL AUTO: NORMAL /HPF
BILIRUB UR QL STRIP: NEGATIVE
C PNEUM DNA NPH QL NAA+NON-PROBE: NOT DETECTED
CLARITY UR: CLEAR
COLOR UR: ABNORMAL
FLUAV H3 RNA NPH QL NAA+PROBE: DETECTED
FLUBV RNA ISLT QL NAA+PROBE: NOT DETECTED
GLUCOSE UR STRIP-MCNC: NEGATIVE MG/DL
HADV DNA SPEC NAA+PROBE: NOT DETECTED
HCOV 229E RNA SPEC QL NAA+PROBE: NOT DETECTED
HCOV HKU1 RNA SPEC QL NAA+PROBE: NOT DETECTED
HCOV NL63 RNA SPEC QL NAA+PROBE: NOT DETECTED
HCOV OC43 RNA SPEC QL NAA+PROBE: NOT DETECTED
HGB UR QL STRIP.AUTO: NEGATIVE
HMPV RNA NPH QL NAA+NON-PROBE: NOT DETECTED
HPIV1 RNA ISLT QL NAA+PROBE: NOT DETECTED
HPIV2 RNA SPEC QL NAA+PROBE: NOT DETECTED
HPIV3 RNA NPH QL NAA+PROBE: NOT DETECTED
HPIV4 P GENE NPH QL NAA+PROBE: NOT DETECTED
HYALINE CASTS UR QL AUTO: NORMAL /LPF
KETONES UR QL STRIP: ABNORMAL
LEUKOCYTE ESTERASE UR QL STRIP.AUTO: NEGATIVE
M PNEUMO IGG SER IA-ACNC: NOT DETECTED
NITRITE UR QL STRIP: NEGATIVE
PH UR STRIP.AUTO: 5.5 [PH] (ref 5–8)
PROT UR QL STRIP: ABNORMAL
RBC # UR STRIP: NORMAL /HPF
REF LAB TEST METHOD: NORMAL
RHINOVIRUS RNA SPEC NAA+PROBE: NOT DETECTED
RSV RNA NPH QL NAA+NON-PROBE: NOT DETECTED
SARS-COV-2 RNA RESP QL NAA+PROBE: NOT DETECTED
SP GR UR STRIP: 1.02 (ref 1–1.03)
SQUAMOUS #/AREA URNS HPF: NORMAL /HPF
UROBILINOGEN UR QL STRIP: ABNORMAL
WBC # UR STRIP: NORMAL /HPF

## 2025-03-07 PROCEDURE — 81001 URINALYSIS AUTO W/SCOPE: CPT | Performed by: INTERNAL MEDICINE

## 2025-03-07 PROCEDURE — 0202U NFCT DS 22 TRGT SARS-COV-2: CPT | Performed by: INTERNAL MEDICINE

## 2025-03-13 ENCOUNTER — HOSPITAL ENCOUNTER (OUTPATIENT)
Facility: HOSPITAL | Age: 80
Discharge: HOME | End: 2025-03-13
Payer: MEDICARE

## 2025-03-13 DIAGNOSIS — Z12.31 VISIT FOR SCREENING MAMMOGRAM: ICD-10-CM

## 2025-03-25 ENCOUNTER — DOCUMENTATION (OUTPATIENT)
Dept: SURGERY | Facility: CLINIC | Age: 80
End: 2025-03-25
Payer: MEDICARE

## 2025-03-25 NOTE — PROGRESS NOTES
Daughter is aware of patients surgery date, arrival time, PAT date and arrival time. NPO after midnight the night prior to surgery.

## 2025-03-31 NOTE — DISCHARGE INSTRUCTIONS
4/4/25 ARRIVAL TIME PER DR OFFICE    TAKE the following medications the morning of surgery:  All heart or blood pressure medications    HOLD all diabetic medications the morning of surgery as ordered by physician.    Please discontinue all blood thinners and anticoagulants (except aspirin) prior to surgery as per your surgeon and cardiologist instructions.  Aspirin may be continued up to the day prior to surgery.     CHLORHEXIDINE CLOTHS GIVEN WITH INSTRUCTIONS AND FORM TO RETURN TO HOSPITAL, IF APPLICABLE.    General Instructions:  Do not eat or drink after midnight: includes water, mints, or gum. You may brush your teeth.  Dental appliances that are removable must be taken out day of surgery.  Do not smoke, chew tobacco, or drink alcohol.  Bring medications in original bottles, any inhalers and if applicable your C-PAP/BI-PAP machine.  Bring any papers given to you in the doctor's office.  Wear clean comfortable clothes and socks.  Do not wear contact lenses or make-up. Bring a case for your glasses if applicable.  Bring crutches or walker if applicable.  Leave all other valuables and jewelry at home.    If you were given a blood bank ID arm band remember to bring it with you the day of surgery.    Preventing a Surgical Site Infection:  Shower the night before surgery (unless instructed other wise) using a fresh bar of anti-bacterial soap (such as Dial) and clean washcloth. Dry with a clean towel and dress in clean clothing.  For 2 to 3 days before surgery, avoid shaving with a razor near where you will have surgery because the razor can irritate skin and make it easier to develop an infection. Ask your surgeon if you will be receiving antibiotics prior to surgery.  Make sure you, your family, and all healthcare providers clear their hands with soap and water or an alcohol-based hand  before caring for you or your wound.  If at all possible, quit smoking as many days before surgery as you can.    Day of  surgery:  Upon arrival, a Pre-op nurse and Anesthesiologist will review your health history, obtain vital signs, and answer questions you may have. The only belongings needed at this time will be your home medications and if applicable your C-PAP/BI-PAP machine. If you are staying overnight your family can leave the rest of your belongings in the car and bring them to your room later. A Pre-op nurse will start an IV and you may receive medication in preparation for surgery, including something to help you relax. Your family will be able to see you in the Pre-op area. While you are in surgery your family should notify the waiting room  if they leave the waiting room area and provide a contact phone number.    Please be aware that surgery does come with discomfort. We want to make every effort to control your discomfort so please discuss any uncontrolled symptoms with your nurse. Your doctor will most likely have prescribed pain medications.    If you are going home after surgery you will receive individualized written care instructions before being discharged. A responsible adult must drive you to and from the hospital on the day of surgery and stay with you for 24 hours.    If you are staying overnight following surgery, you will be transported to your hospital room following the recovery period.     If you have any questions please call Pre-Admission Testing at [phone] Ext [phone] Monday-Friday 8:00-3:00  Deductibles and co-payments are collected on the day of service. Please be prepared to pay the required co-pay, deductible or deposit on the day of service as defined by your plan.    A RESPONSIBLE PERSON MUST REMAIN IN THE WAITING ROOM DURING YOUR PROCEDURE AND A RESPONSIBLE  MUST BE AVAILABLE UPON YOUR DISCHARGE.

## 2025-04-02 ENCOUNTER — PRE-ADMISSION TESTING (OUTPATIENT)
Dept: PREADMISSION TESTING | Facility: HOSPITAL | Age: 80
End: 2025-04-02
Payer: MEDICARE

## 2025-04-02 ENCOUNTER — TELEPHONE (OUTPATIENT)
Dept: SURGERY | Facility: CLINIC | Age: 80
End: 2025-04-02
Payer: MEDICARE

## 2025-04-02 LAB
ANION GAP SERPL CALCULATED.3IONS-SCNC: 11.4 MMOL/L (ref 5–15)
BUN SERPL-MCNC: 24 MG/DL (ref 8–23)
BUN/CREAT SERPL: 16.2 (ref 7–25)
CALCIUM SPEC-SCNC: 9.1 MG/DL (ref 8.6–10.5)
CHLORIDE SERPL-SCNC: 103 MMOL/L (ref 98–107)
CO2 SERPL-SCNC: 21.6 MMOL/L (ref 22–29)
CREAT SERPL-MCNC: 1.48 MG/DL (ref 0.57–1)
DEPRECATED RDW RBC AUTO: 43.8 FL (ref 37–54)
EGFRCR SERPLBLD CKD-EPI 2021: 36.1 ML/MIN/1.73
ERYTHROCYTE [DISTWIDTH] IN BLOOD BY AUTOMATED COUNT: 13.7 % (ref 12.3–15.4)
GLUCOSE SERPL-MCNC: 128 MG/DL (ref 65–99)
HCT VFR BLD AUTO: 28.9 % (ref 34–46.6)
HGB BLD-MCNC: 9.1 G/DL (ref 12–15.9)
MCH RBC QN AUTO: 27.3 PG (ref 26.6–33)
MCHC RBC AUTO-ENTMCNC: 31.5 G/DL (ref 31.5–35.7)
MCV RBC AUTO: 86.8 FL (ref 79–97)
PLATELET # BLD AUTO: 111 10*3/MM3 (ref 140–450)
PMV BLD AUTO: 11.5 FL (ref 6–12)
POTASSIUM SERPL-SCNC: 3.9 MMOL/L (ref 3.5–5.2)
RBC # BLD AUTO: 3.33 10*6/MM3 (ref 3.77–5.28)
SODIUM SERPL-SCNC: 136 MMOL/L (ref 136–145)
WBC NRBC COR # BLD AUTO: 5.49 10*3/MM3 (ref 3.4–10.8)

## 2025-04-02 PROCEDURE — 80048 BASIC METABOLIC PNL TOTAL CA: CPT

## 2025-04-02 PROCEDURE — 36415 COLL VENOUS BLD VENIPUNCTURE: CPT

## 2025-04-02 PROCEDURE — 85027 COMPLETE CBC AUTOMATED: CPT

## 2025-04-02 PROCEDURE — 93005 ELECTROCARDIOGRAM TRACING: CPT

## 2025-04-03 LAB
QT INTERVAL: 440 MS
QTC INTERVAL: 464 MS

## 2025-04-04 ENCOUNTER — ANESTHESIA EVENT (OUTPATIENT)
Dept: PERIOP | Facility: HOSPITAL | Age: 80
End: 2025-04-04
Payer: MEDICARE

## 2025-04-04 ENCOUNTER — ANESTHESIA (OUTPATIENT)
Dept: PERIOP | Facility: HOSPITAL | Age: 80
End: 2025-04-04
Payer: MEDICARE

## 2025-04-04 ENCOUNTER — HOSPITAL ENCOUNTER (OUTPATIENT)
Facility: HOSPITAL | Age: 80
Setting detail: HOSPITAL OUTPATIENT SURGERY
Discharge: HOME | End: 2025-04-04
Attending: SURGERY | Admitting: SURGERY
Payer: MEDICARE

## 2025-04-04 VITALS
SYSTOLIC BLOOD PRESSURE: 168 MMHG | HEIGHT: 65 IN | BODY MASS INDEX: 26.66 KG/M2 | WEIGHT: 160 LBS | RESPIRATION RATE: 18 BRPM | DIASTOLIC BLOOD PRESSURE: 94 MMHG | OXYGEN SATURATION: 98 % | TEMPERATURE: 98 F | HEART RATE: 72 BPM

## 2025-04-04 DIAGNOSIS — C44.212 BASAL CELL CARCINOMA (BCC) OF SKIN OF RIGHT EAR: ICD-10-CM

## 2025-04-04 PROCEDURE — 25810000003 LACTATED RINGERS PER 1000 ML: Performed by: NURSE ANESTHETIST, CERTIFIED REGISTERED

## 2025-04-04 PROCEDURE — 11641 EXC F/E/E/N/L MAL+MRG 0.6-1: CPT | Performed by: SURGERY

## 2025-04-04 PROCEDURE — 25010000002 LIDOCAINE 1% - EPINEPHRINE 1:100000 1 %-1:100000 SOLUTION: Performed by: SURGERY

## 2025-04-04 PROCEDURE — S0260 H&P FOR SURGERY: HCPCS | Performed by: SURGERY

## 2025-04-04 PROCEDURE — 25010000002 FAMOTIDINE (PF) 20 MG/2ML SOLUTION: Performed by: NURSE ANESTHETIST, CERTIFIED REGISTERED

## 2025-04-04 PROCEDURE — 25010000002 PROPOFOL 200 MG/20ML EMULSION: Performed by: NURSE ANESTHETIST, CERTIFIED REGISTERED

## 2025-04-04 PROCEDURE — 25010000002 ONDANSETRON PER 1 MG: Performed by: NURSE ANESTHETIST, CERTIFIED REGISTERED

## 2025-04-04 PROCEDURE — 25010000002 CEFAZOLIN PER 500 MG: Performed by: SURGERY

## 2025-04-04 PROCEDURE — 25810000003 LACTATED RINGERS PER 1000 ML: Performed by: ANESTHESIOLOGY

## 2025-04-04 RX ORDER — LIDOCAINE HYDROCHLORIDE AND EPINEPHRINE 10; 10 MG/ML; UG/ML
INJECTION, SOLUTION INFILTRATION; PERINEURAL AS NEEDED
Status: DISCONTINUED | OUTPATIENT
Start: 2025-04-04 | End: 2025-04-04 | Stop reason: HOSPADM

## 2025-04-04 RX ORDER — SODIUM CHLORIDE, SODIUM LACTATE, POTASSIUM CHLORIDE, CALCIUM CHLORIDE 600; 310; 30; 20 MG/100ML; MG/100ML; MG/100ML; MG/100ML
INJECTION, SOLUTION INTRAVENOUS CONTINUOUS PRN
Status: DISCONTINUED | OUTPATIENT
Start: 2025-04-04 | End: 2025-04-04 | Stop reason: SURG

## 2025-04-04 RX ORDER — FAMOTIDINE 10 MG/ML
INJECTION, SOLUTION INTRAVENOUS AS NEEDED
Status: DISCONTINUED | OUTPATIENT
Start: 2025-04-04 | End: 2025-04-04 | Stop reason: SURG

## 2025-04-04 RX ORDER — BACITRACIN ZINC 500 [USP'U]/G
OINTMENT TOPICAL AS NEEDED
Status: DISCONTINUED | OUTPATIENT
Start: 2025-04-04 | End: 2025-04-04 | Stop reason: HOSPADM

## 2025-04-04 RX ORDER — FENTANYL CITRATE 50 UG/ML
25 INJECTION, SOLUTION INTRAMUSCULAR; INTRAVENOUS
Status: DISCONTINUED | OUTPATIENT
Start: 2025-04-04 | End: 2025-04-04 | Stop reason: HOSPADM

## 2025-04-04 RX ORDER — SODIUM CHLORIDE, SODIUM LACTATE, POTASSIUM CHLORIDE, CALCIUM CHLORIDE 600; 310; 30; 20 MG/100ML; MG/100ML; MG/100ML; MG/100ML
125 INJECTION, SOLUTION INTRAVENOUS ONCE
Status: COMPLETED | OUTPATIENT
Start: 2025-04-04 | End: 2025-04-04

## 2025-04-04 RX ORDER — SODIUM CHLORIDE, SODIUM LACTATE, POTASSIUM CHLORIDE, CALCIUM CHLORIDE 600; 310; 30; 20 MG/100ML; MG/100ML; MG/100ML; MG/100ML
100 INJECTION, SOLUTION INTRAVENOUS ONCE AS NEEDED
Status: DISCONTINUED | OUTPATIENT
Start: 2025-04-04 | End: 2025-04-04 | Stop reason: HOSPADM

## 2025-04-04 RX ORDER — ONDANSETRON 2 MG/ML
INJECTION INTRAMUSCULAR; INTRAVENOUS AS NEEDED
Status: DISCONTINUED | OUTPATIENT
Start: 2025-04-04 | End: 2025-04-04 | Stop reason: SURG

## 2025-04-04 RX ORDER — ONDANSETRON 2 MG/ML
4 INJECTION INTRAMUSCULAR; INTRAVENOUS AS NEEDED
Status: DISCONTINUED | OUTPATIENT
Start: 2025-04-04 | End: 2025-04-04 | Stop reason: HOSPADM

## 2025-04-04 RX ORDER — ACETAMINOPHEN 325 MG/1
650 TABLET ORAL EVERY 4 HOURS PRN
Qty: 30 TABLET | Refills: 0 | Status: SHIPPED | OUTPATIENT
Start: 2025-04-04

## 2025-04-04 RX ORDER — OXYCODONE AND ACETAMINOPHEN 5; 325 MG/1; MG/1
1 TABLET ORAL ONCE AS NEEDED
Status: DISCONTINUED | OUTPATIENT
Start: 2025-04-04 | End: 2025-04-04 | Stop reason: HOSPADM

## 2025-04-04 RX ORDER — PROPOFOL 10 MG/ML
INJECTION, EMULSION INTRAVENOUS AS NEEDED
Status: DISCONTINUED | OUTPATIENT
Start: 2025-04-04 | End: 2025-04-04 | Stop reason: SURG

## 2025-04-04 RX ORDER — IBUPROFEN 600 MG/1
600 TABLET, FILM COATED ORAL EVERY 6 HOURS PRN
Qty: 30 TABLET | Refills: 0 | Status: SHIPPED | OUTPATIENT
Start: 2025-04-04

## 2025-04-04 RX ORDER — IPRATROPIUM BROMIDE AND ALBUTEROL SULFATE 2.5; .5 MG/3ML; MG/3ML
3 SOLUTION RESPIRATORY (INHALATION) ONCE AS NEEDED
Status: DISCONTINUED | OUTPATIENT
Start: 2025-04-04 | End: 2025-04-04 | Stop reason: HOSPADM

## 2025-04-04 RX ORDER — SODIUM CHLORIDE 9 MG/ML
40 INJECTION, SOLUTION INTRAVENOUS AS NEEDED
Status: DISCONTINUED | OUTPATIENT
Start: 2025-04-04 | End: 2025-04-04 | Stop reason: HOSPADM

## 2025-04-04 RX ORDER — SODIUM CHLORIDE 0.9 % (FLUSH) 0.9 %
10 SYRINGE (ML) INJECTION AS NEEDED
Status: DISCONTINUED | OUTPATIENT
Start: 2025-04-04 | End: 2025-04-04 | Stop reason: HOSPADM

## 2025-04-04 RX ORDER — SODIUM CHLORIDE 0.9 % (FLUSH) 0.9 %
10 SYRINGE (ML) INJECTION EVERY 12 HOURS SCHEDULED
Status: DISCONTINUED | OUTPATIENT
Start: 2025-04-04 | End: 2025-04-04 | Stop reason: HOSPADM

## 2025-04-04 RX ADMIN — ONDANSETRON 4 MG: 2 INJECTION INTRAMUSCULAR; INTRAVENOUS at 09:48

## 2025-04-04 RX ADMIN — CEFAZOLIN 2000 MG: 2 INJECTION, POWDER, FOR SOLUTION INTRAMUSCULAR; INTRAVENOUS at 09:48

## 2025-04-04 RX ADMIN — FAMOTIDINE 20 MG: 10 INJECTION, SOLUTION INTRAVENOUS at 09:48

## 2025-04-04 RX ADMIN — SODIUM CHLORIDE, POTASSIUM CHLORIDE, SODIUM LACTATE AND CALCIUM CHLORIDE: 600; 310; 30; 20 INJECTION, SOLUTION INTRAVENOUS at 09:48

## 2025-04-04 RX ADMIN — SODIUM CHLORIDE, POTASSIUM CHLORIDE, SODIUM LACTATE AND CALCIUM CHLORIDE 125 ML/HR: 600; 310; 30; 20 INJECTION, SOLUTION INTRAVENOUS at 09:48

## 2025-04-04 RX ADMIN — PROPOFOL 80 MG: 10 INJECTION, EMULSION INTRAVENOUS at 09:53

## 2025-04-04 NOTE — H&P
Susan Us is a 79 y.o. female is being seen for consultation today at the request of Bruna Smith MD     Susan Us is a 79 y.o. female with a raised irregularly bordered skin lesion of the left ear.  Lesion measured approximately 6 mm and a slightly raised on the helix of the ear.  Recent biopsy shows basal cell carcinoma.     History of Present Illness        Medical History        Past Medical History:   Diagnosis Date    Anxiety      Arthritis      Bradycardia       s/p pacemaker placement    CKD (chronic kidney disease)       baseline renal function unknown    COPD (chronic obstructive pulmonary disease)      Dementia      Elevated cholesterol      GERD (gastroesophageal reflux disease)      Hyperlipidemia      Hypertension      Iron deficiency anemia      Pacemaker      Thrombocytopenia                    Family History   Problem Relation Age of Onset    Stroke Mother      Stroke Father      Breast cancer Sister           Social History   Social History            Socioeconomic History    Marital status:    Tobacco Use    Smoking status: Never       Passive exposure: Never    Smokeless tobacco: Never    Tobacco comments:       Second hand smoke exposure ()   Vaping Use    Vaping status: Never Used   Substance and Sexual Activity    Alcohol use: Never    Drug use: Never    Sexual activity: Defer            Surgical History         Past Surgical History:   Procedure Laterality Date    BREAST BIOPSY         benign, yrs ago    CARDIAC PACEMAKER PLACEMENT        CATARACT EXTRACTION        CERVICAL FUSION        CHOLECYSTECTOMY        ENDOSCOPY N/A 10/18/2024     Procedure: ESOPHAGOGASTRODUODENOSCOPY WITH ANESTHESIA;  Surgeon: Francis Van MD;  Location: [facility];  Service: Gastroenterology;  Laterality: N/A;    HIATAL HERNIA REPAIR        HYSTERECTOMY        REPLACEMENT TOTAL KNEE Right              Review of Systems   Constitutional:  Negative for activity change,  "appetite change, chills and fever.   HENT:  Negative for sore throat and trouble swallowing.    Eyes:  Negative for visual disturbance.   Respiratory:  Negative for cough and shortness of breath.    Cardiovascular:  Negative for chest pain and palpitations.   Gastrointestinal:  Negative for abdominal distention, abdominal pain, blood in stool, constipation, diarrhea, nausea and vomiting.   Endocrine: Negative for cold intolerance and heat intolerance.   Genitourinary:  Negative for dysuria.   Musculoskeletal:  Negative for joint swelling.   Skin:  Negative for color change, rash and wound.   Allergic/Immunologic: Negative for immunocompromised state.   Neurological:  Negative for dizziness, seizures, weakness and headaches.   Hematological:  Negative for adenopathy. Does not bruise/bleed easily.   Psychiatric/Behavioral:  Negative for agitation and confusion.          Results           Ht 165.1 cm (65\")   Wt 70.3 kg (155 lb)   BMI 25.79 kg/m²   Objective  Physical Exam  Constitutional:       Appearance: She is well-developed.   HENT:      Head: Normocephalic and atraumatic.      Ears:     Eyes:      Conjunctiva/sclera: Conjunctivae normal.      Pupils: Pupils are equal, round, and reactive to light.   Neck:      Thyroid: No thyromegaly.      Vascular: No JVD.      Trachea: No tracheal deviation.   Cardiovascular:      Rate and Rhythm: Normal rate and regular rhythm.      Heart sounds: No murmur heard.     No friction rub. No gallop.   Pulmonary:      Effort: Pulmonary effort is normal.      Breath sounds: Normal breath sounds.   Abdominal:      General: There is no distension.      Palpations: Abdomen is soft. There is no hepatomegaly or splenomegaly.      Tenderness: There is no abdominal tenderness.      Hernia: No hernia is present.   Musculoskeletal:         General: No deformity. Normal range of motion.      Cervical back: Neck supple.   Skin:     General: Skin is warm and dry.   Neurological:      Mental " Status: She is alert and oriented to person, place, and time.         Physical Exam        Assessment & Plan                 Assessment  Diagnoses and all orders for this visit:     1. Basal cell carcinoma (BCC) of skin of right ear (Primary)  -     Case Request; Standing  -     ceFAZolin 2000 mg IVPB in 100 mL NS (VTB)  -     Case Request     Other orders  -     Follow Anesthesia Guidelines / Protocol; Future  -     Follow Anesthesia Guidelines / Protocol; Standing  -     Verify / Perform Chlorhexidine Skin Prep; Standing  -     Provide NPO Instructions to Patient; Future  -     Chlorhexidine Skin Prep; Future  -     Place Sequential Compression Device; Standing  -     Maintain Sequential Compression Device; Standing           Assessment & Plan        Susan Us is a 79 y.o. female with skin lesion of the right ear with shave biopsy showing basal cell carcinoma.  Patient undergo excision in the operating room for margins.                      This document has been electronically signed by Francis Van MD   March 4, 2025 14:42 EST

## 2025-04-04 NOTE — ANESTHESIA PROCEDURE NOTES
Airway  Reason: elective    Date/Time: 4/4/2025 9:54 AM  Airway not difficult    General Information and Staff    Patient location during procedure: OR  CRNA/CAA: Jaspreet Hidalgo CRNA    Indications and Patient Condition    Preoxygenated: yes  MILS not maintained throughout    Mask difficulty assessment: 0 - not attempted    Final Airway Details    Final airway type: supraglottic airway      Successful airway: unique  Size: 4   Number of attempts at approach: 1  Assessment: lips, teeth, and gum same as pre-op and atraumatic intubation    Additional Comments  Atraumatic LMA placement, dentition unchanged.

## 2025-04-04 NOTE — ANESTHESIA POSTPROCEDURE EVALUATION
Patient: Susan Us    Procedure Summary       Date: 04/04/25 Room / Location: [facility] OR  /  [facility] OR    Anesthesia Start: 0948 Anesthesia Stop: 1020    Procedure: SKIN LESION EXCISION  RIGHT EAR (Right) Diagnosis:       Basal cell carcinoma (BCC) of skin of right ear      (Basal cell carcinoma (BCC) of skin of right ear [C44.212])    Surgeons: Francis Van MD Provider: Navneet Rodriguez MD    Anesthesia Type: general ASA Status: 3            Anesthesia Type: general    Vitals  Vitals Value Taken Time   /94 04/04/25 10:46   Temp 97 °F (36.1 °C) 04/04/25 10:21   Pulse 68 04/04/25 10:46   Resp 18 04/04/25 10:46   SpO2 97 % 04/04/25 10:46           Post Anesthesia Care and Evaluation    Patient location during evaluation: PACU  Patient participation: complete - patient participated  Level of consciousness: awake  Pain score: 0  Pain management: satisfactory to patient    Airway patency: patent  Anesthetic complications: No anesthetic complications  PONV Status: none  Cardiovascular status: hemodynamically stable  Respiratory status: nasal cannula  Hydration status: acceptable

## 2025-04-04 NOTE — ANESTHESIA PREPROCEDURE EVALUATION
Anesthesia Evaluation     Patient summary reviewed and Nursing notes reviewed   no history of anesthetic complications:   NPO Solid Status: > 8 hours  NPO Liquid Status: > 8 hours           Airway   Mallampati: I  TM distance: >3 FB  Neck ROM: full  No difficulty expected  Dental - normal exam   (+) upper dentures and lower dentures    Pulmonary - normal exam    breath sounds clear to auscultation  (+) COPD,  Cardiovascular - normal exam  Exercise tolerance: poor (<4 METS)    ECG reviewed  Rhythm: regular  Rate: normal    (+) pacemaker, hypertension, valvular problems/murmurs, hyperlipidemia      Neuro/Psych  (+) TIA, psychiatric history, dementia  GI/Hepatic/Renal/Endo    (+) hiatal hernia, GERD, renal disease-    Musculoskeletal     Abdominal  - normal exam    Abdomen: soft.   Substance History - negative use     OB/GYN negative ob/gyn ROS         Other   arthritis,   history of cancer active    ROS/Med Hx Other: 3/25/24    ·  Left ventricular systolic function is normal. Calculated left ventricular EF = 58% Left ventricular ejection fraction appears to be 56 - 60%.  ·  Left ventricular diastolic function was normal.  ·  The left atrial cavity is moderately dilated.  ·  Moderate aortic valve regurgitation is present.  ·  Moderate mitral valve regurgitation is present.                   Anesthesia Plan    ASA 3     general and MAC     (General vs MAC anesthesia)  intravenous induction     Anesthetic plan, risks, benefits, and alternatives have been provided, discussed and informed consent has been obtained with: patient.  Pre-procedure education provided  Use of blood products discussed with  Consented to blood products.    Plan discussed with CRNA.    CODE STATUS:

## 2025-04-04 NOTE — OP NOTE
SKIN LESION EXCISION  Procedure Note    Susan Us  4/4/2025    Pre-op Diagnosis:   Basal cell carcinoma (BCC) of skin of right ear [C44.212]    Post-op Diagnosis:     Post-Op Diagnosis Codes:     * Basal cell carcinoma (BCC) of skin of right ear [C44.212]    Procedure(s):  SKIN LESION EXCISION  RIGHT EAR    Surgeon(s):  Francis Van MD    Anesthesia: Choice    Staff:   Circulator: Ping Donaldson RN  Scrub Person: Ronald Olguin  Assistant: Mary Shepard    Findings: Right ear skin lesion excision.  5 mm basal cell carcinoma of the right ear excised with 2 mm margins.          Operative Procedure: Patient was taken operating room placed upon the operating table.  After induction of MAC anesthesia the right ear was prepped and draped in usual sterile fashion.  Timeout procedure was performed.  A triangular incision full-thickness of the helix of the ear where the lesion was made with 2 mm margins.  Following full-thickness removal of the lesion the wound was made hemostatic with cautery.  The cartilage was reapproximated with interrupted chromic suture and the skin was approximated in a continuous running nylon suture.  Patient tolerated the procedure well.    Estimated Blood Loss: 5 mL    Specimens:   Right ear skin lesion           Drains: None    Grafts/Implants: None    Complications: None           Francis Van MD     Date: 4/4/2025  Time: 10:22 EDT

## 2025-04-09 LAB — REF LAB TEST METHOD: NORMAL

## 2025-04-22 ENCOUNTER — OFFICE VISIT (OUTPATIENT)
Dept: SURGERY | Facility: CLINIC | Age: 80
End: 2025-04-22
Payer: MEDICARE

## 2025-04-22 VITALS — BODY MASS INDEX: 26.66 KG/M2 | WEIGHT: 160 LBS | HEIGHT: 65 IN

## 2025-04-22 DIAGNOSIS — C44.212 BASAL CELL CARCINOMA (BCC) OF SKIN OF RIGHT EAR: ICD-10-CM

## 2025-04-22 DIAGNOSIS — Z86.0100 HISTORY OF COLON POLYPS: Primary | ICD-10-CM

## 2025-04-22 PROCEDURE — 1159F MED LIST DOCD IN RCRD: CPT | Performed by: SURGERY

## 2025-04-22 PROCEDURE — 1160F RVW MEDS BY RX/DR IN RCRD: CPT | Performed by: SURGERY

## 2025-04-22 PROCEDURE — 99213 OFFICE O/P EST LOW 20 MIN: CPT | Performed by: SURGERY

## 2025-04-22 RX ORDER — SODIUM, POTASSIUM,MAG SULFATES 17.5-3.13G
1 SOLUTION, RECONSTITUTED, ORAL ORAL EVERY 12 HOURS
Qty: 354 ML | Refills: 0 | Status: SHIPPED | OUTPATIENT
Start: 2025-04-22

## 2025-04-22 NOTE — H&P (VIEW-ONLY)
Subjective   Susan Us is a 79 y.o. female is being seen for consultation today at the request of Bruna Smith MD    Susan Us is a 79 y.o. female status post excision of skin cancer from the right ear.  No residual malignancy was noted.  Her incision has healed well.  Patient is due for surveillance colonoscopy.  No blood per rectum.  No unintentional weight loss.  Last colonoscopy had multiple polyps removed.  History of Present Illness      Past Medical History:   Diagnosis Date    Anxiety     Arthritis     Bradycardia     s/p pacemaker placement    CKD (chronic kidney disease)     baseline renal function unknown    COPD (chronic obstructive pulmonary disease)     Dementia     Difficulty walking     Elevated cholesterol     GERD (gastroesophageal reflux disease)     Hyperlipidemia     Hypertension     Iron deficiency anemia     Memory loss     Pacemaker     Thrombocytopenia     Urinary tract infection        Family History   Problem Relation Age of Onset    Stroke Mother     Hypertension Mother     Stroke Father     Breast cancer Sister     Dementia Brother        Social History     Socioeconomic History    Marital status:    Tobacco Use    Smoking status: Never     Passive exposure: Never    Smokeless tobacco: Never    Tobacco comments:     Second hand smoke exposure ()   Vaping Use    Vaping status: Never Used   Substance and Sexual Activity    Alcohol use: Never    Drug use: Never    Sexual activity: Defer       Past Surgical History:   Procedure Laterality Date    BLADDER SURGERY  2002    Bladder tuck    BREAST BIOPSY      benign, yrs ago    CARDIAC PACEMAKER PLACEMENT      CATARACT EXTRACTION      CERVICAL FUSION      CHOLECYSTECTOMY      ENDOSCOPY N/A 10/18/2024    Procedure: ESOPHAGOGASTRODUODENOSCOPY WITH ANESTHESIA;  Surgeon: Francis Van MD;  Location: [facility];  Service: Gastroenterology;  Laterality: N/A;    HIATAL HERNIA REPAIR      HYSTERECTOMY       "REPLACEMENT TOTAL KNEE Right     SKIN LESION EXCISION Right 04/04/2025    Procedure: SKIN LESION EXCISION  RIGHT EAR;  Surgeon: Francis Van MD;  Location: [facility];  Service: General;  Laterality: Right;       Review of Systems   Constitutional:  Negative for activity change, appetite change, chills and fever.   HENT:  Negative for sore throat and trouble swallowing.    Eyes:  Negative for visual disturbance.   Respiratory:  Negative for cough and shortness of breath.    Cardiovascular:  Negative for chest pain and palpitations.   Gastrointestinal:  Negative for abdominal distention, abdominal pain, blood in stool, constipation, diarrhea, nausea and vomiting.   Endocrine: Negative for cold intolerance and heat intolerance.   Genitourinary:  Negative for dysuria.   Musculoskeletal:  Negative for joint swelling.   Skin:  Negative for color change, rash and wound.   Allergic/Immunologic: Negative for immunocompromised state.   Neurological:  Negative for dizziness, seizures, weakness and headaches.   Hematological:  Negative for adenopathy. Does not bruise/bleed easily.   Psychiatric/Behavioral:  Negative for agitation and confusion.        Results        Ht 165.1 cm (65\")   Wt 72.6 kg (160 lb)   BMI 26.63 kg/m²   Objective   Physical Exam  Constitutional:       Appearance: She is well-developed.   HENT:      Head: Normocephalic and atraumatic.   Eyes:      Conjunctiva/sclera: Conjunctivae normal.      Pupils: Pupils are equal, round, and reactive to light.   Neck:      Thyroid: No thyromegaly.      Vascular: No JVD.      Trachea: No tracheal deviation.   Cardiovascular:      Rate and Rhythm: Normal rate and regular rhythm.      Heart sounds: No murmur heard.     No friction rub. No gallop.   Pulmonary:      Effort: Pulmonary effort is normal.      Breath sounds: Normal breath sounds.   Abdominal:      General: There is no distension.      Palpations: Abdomen is soft. There is no hepatomegaly or " splenomegaly.      Tenderness: There is no abdominal tenderness.      Hernia: No hernia is present.   Musculoskeletal:         General: No deformity. Normal range of motion.      Cervical back: Neck supple.   Skin:     General: Skin is warm and dry.      Comments: Right ear surgical site well-healed   Neurological:      Mental Status: She is alert and oriented to person, place, and time.       Physical Exam      Assessment & Plan            Assessment   Diagnoses and all orders for this visit:    1. History of colon polyps (Primary)  -     Case Request; Standing  -     Case Request    2. Basal cell carcinoma (BCC) of skin of right ear    Other orders  -     Follow Anesthesia Guidelines / Protocol; Future  -     Follow Anesthesia Guidelines / Protocol; Standing  -     Verify / Perform Chlorhexidine Skin Prep; Standing  -     Provide NPO Instructions to Patient; Future  -     Chlorhexidine Skin Prep; Future  -     Place Sequential Compression Device; Standing  -     Maintain Sequential Compression Device; Standing  -     sodium-potassium-magnesium sulfates (Suprep Bowel Prep Kit) 17.5-3.13-1.6 GM/177ML solution oral solution; Take 1 bottle by mouth Every 12 (Twelve) Hours.  Dispense: 354 mL; Refill: 0        Assessment & Plan      Susan Us is a 79 y.o. female with right ear skin cancer status post excision with no evidence of residual malignancy and margins are negative.  Patient is due for surveillance colonoscopy due to history of polyps and this has been tentatively scheduled.  Patient understands the risks and benefits of the procedure and will proceed.                 This document has been electronically signed by Francis Van MD   April 22, 2025 14:45 EDT    Patient or patient representative verbalized consent for the use of Ambient Listening during the visit with  Francis Van MD for chart documentation. 4/22/2025  14:46 EDT

## 2025-04-22 NOTE — PROGRESS NOTES
Subjective   Susan Us is a 79 y.o. female is being seen for consultation today at the request of Bruna Smith MD    Susan Us is a 79 y.o. female status post excision of skin cancer from the right ear.  No residual malignancy was noted.  Her incision has healed well.  Patient is due for surveillance colonoscopy.  No blood per rectum.  No unintentional weight loss.  Last colonoscopy had multiple polyps removed.  History of Present Illness      Past Medical History:   Diagnosis Date    Anxiety     Arthritis     Bradycardia     s/p pacemaker placement    CKD (chronic kidney disease)     baseline renal function unknown    COPD (chronic obstructive pulmonary disease)     Dementia     Difficulty walking     Elevated cholesterol     GERD (gastroesophageal reflux disease)     Hyperlipidemia     Hypertension     Iron deficiency anemia     Memory loss     Pacemaker     Thrombocytopenia     Urinary tract infection        Family History   Problem Relation Age of Onset    Stroke Mother     Hypertension Mother     Stroke Father     Breast cancer Sister     Dementia Brother        Social History     Socioeconomic History    Marital status:    Tobacco Use    Smoking status: Never     Passive exposure: Never    Smokeless tobacco: Never    Tobacco comments:     Second hand smoke exposure ()   Vaping Use    Vaping status: Never Used   Substance and Sexual Activity    Alcohol use: Never    Drug use: Never    Sexual activity: Defer       Past Surgical History:   Procedure Laterality Date    BLADDER SURGERY  2002    Bladder tuck    BREAST BIOPSY      benign, yrs ago    CARDIAC PACEMAKER PLACEMENT      CATARACT EXTRACTION      CERVICAL FUSION      CHOLECYSTECTOMY      ENDOSCOPY N/A 10/18/2024    Procedure: ESOPHAGOGASTRODUODENOSCOPY WITH ANESTHESIA;  Surgeon: Francis Van MD;  Location: [facility];  Service: Gastroenterology;  Laterality: N/A;    HIATAL HERNIA REPAIR      HYSTERECTOMY       "REPLACEMENT TOTAL KNEE Right     SKIN LESION EXCISION Right 04/04/2025    Procedure: SKIN LESION EXCISION  RIGHT EAR;  Surgeon: Francis Van MD;  Location: [facility];  Service: General;  Laterality: Right;       Review of Systems   Constitutional:  Negative for activity change, appetite change, chills and fever.   HENT:  Negative for sore throat and trouble swallowing.    Eyes:  Negative for visual disturbance.   Respiratory:  Negative for cough and shortness of breath.    Cardiovascular:  Negative for chest pain and palpitations.   Gastrointestinal:  Negative for abdominal distention, abdominal pain, blood in stool, constipation, diarrhea, nausea and vomiting.   Endocrine: Negative for cold intolerance and heat intolerance.   Genitourinary:  Negative for dysuria.   Musculoskeletal:  Negative for joint swelling.   Skin:  Negative for color change, rash and wound.   Allergic/Immunologic: Negative for immunocompromised state.   Neurological:  Negative for dizziness, seizures, weakness and headaches.   Hematological:  Negative for adenopathy. Does not bruise/bleed easily.   Psychiatric/Behavioral:  Negative for agitation and confusion.        Results        Ht 165.1 cm (65\")   Wt 72.6 kg (160 lb)   BMI 26.63 kg/m²   Objective   Physical Exam  Constitutional:       Appearance: She is well-developed.   HENT:      Head: Normocephalic and atraumatic.   Eyes:      Conjunctiva/sclera: Conjunctivae normal.      Pupils: Pupils are equal, round, and reactive to light.   Neck:      Thyroid: No thyromegaly.      Vascular: No JVD.      Trachea: No tracheal deviation.   Cardiovascular:      Rate and Rhythm: Normal rate and regular rhythm.      Heart sounds: No murmur heard.     No friction rub. No gallop.   Pulmonary:      Effort: Pulmonary effort is normal.      Breath sounds: Normal breath sounds.   Abdominal:      General: There is no distension.      Palpations: Abdomen is soft. There is no hepatomegaly or " splenomegaly.      Tenderness: There is no abdominal tenderness.      Hernia: No hernia is present.   Musculoskeletal:         General: No deformity. Normal range of motion.      Cervical back: Neck supple.   Skin:     General: Skin is warm and dry.      Comments: Right ear surgical site well-healed   Neurological:      Mental Status: She is alert and oriented to person, place, and time.       Physical Exam      Assessment & Plan            Assessment   Diagnoses and all orders for this visit:    1. History of colon polyps (Primary)  -     Case Request; Standing  -     Case Request    2. Basal cell carcinoma (BCC) of skin of right ear    Other orders  -     Follow Anesthesia Guidelines / Protocol; Future  -     Follow Anesthesia Guidelines / Protocol; Standing  -     Verify / Perform Chlorhexidine Skin Prep; Standing  -     Provide NPO Instructions to Patient; Future  -     Chlorhexidine Skin Prep; Future  -     Place Sequential Compression Device; Standing  -     Maintain Sequential Compression Device; Standing  -     sodium-potassium-magnesium sulfates (Suprep Bowel Prep Kit) 17.5-3.13-1.6 GM/177ML solution oral solution; Take 1 bottle by mouth Every 12 (Twelve) Hours.  Dispense: 354 mL; Refill: 0        Assessment & Plan      Susan Us is a 79 y.o. female with right ear skin cancer status post excision with no evidence of residual malignancy and margins are negative.  Patient is due for surveillance colonoscopy due to history of polyps and this has been tentatively scheduled.  Patient understands the risks and benefits of the procedure and will proceed.                 This document has been electronically signed by Francis Van MD   April 22, 2025 14:45 EDT    Patient or patient representative verbalized consent for the use of Ambient Listening during the visit with  Fracnis Van MD for chart documentation. 4/22/2025  14:46 EDT

## 2025-04-29 NOTE — PROGRESS NOTES
Subjective     Date: 4/30/2025    Referring Provider  No ref. provider found    Chief Complaint  Bicytopenia (anemia and thrombocytopenia)    Subjective          Susan Us is a 80 y.o. female who presents today to [facility] for follow up.     HPI:   80 y.o. female with past medical history of hypertension, hyperlipidemia, dementia, chronic kidney disease, PPM s/p bradycardia who presents for consultation regarding bicytopenia (normocytic anemia and thrombocytopenia) from nursing home.    She presents today with her daughter, Leslie, who assists in history. She was seen by a hematologist, Dr. Luna in [place], [place] several years ago. Did not go through a bone marrow biopsy. Had U/S of the abdomen performed. She was placed on oral iron, which she is currently on. No other intervention was done.     Ms. Us denies any new symptoms including fever, chills, night sweats, unintentional weight loss, or fatigue. Denies any recent lumps/bumps. Underwent a colonoscopy 3-4 years ago by someone in [place], unable to recall whom. Was found to have polyps, no bleeding was noted. Currently denies any bleeding.    CBC from 4/16 show normal WBC and differential, Hgb 10, hct 31, Plt 120K.     Denies smoking, alcohol  use, drug use. Worked as a , , and . Family history significant for sister with breast cancer requiring bilateral mastectomy.     Interval History 05/14/2024   Labs from initial consultation show hemoglobin persistently low at 10.4, MCV 86, platelets 119.  Normal white blood count.  Peripheral smear with normocytic, normochromic anemia.  No increase in schistocytes.  Thrombocytopenia with no platelet clumping.  Granulocytes show normal morphology and no circulating blasts.  HIV antibody is nonreactive.  Negative for hepatitis studies.  Negative for rheumatoid factor and lupus screen. Iron studies showed mild iron deficiency with  ferritin 97 ng/mL (patient is on oral ferrous sulfate). Replete B12/folate. No signs of hemolysis as normal LDH/haptoglobin. SPEP with no M spike, elevated free kappa light chain to 52, elevated free lambda light chain 28 with normal kappa/lambda ratio 1.83.  Ultrasound of the abdomen shows splenomegaly with spleen measuring 13.5 cm.  Liver is unremarkable.    These results were reviewed with Leslie and Ms. Us today. I don't believe her underlying bicytopenia is due to MDS or AML as she is asymptomatic with reasons for anemia such as iron deficiency and anemia of chronic disease and splenomegaly causing thrombocytopenia. IF she were to have bleeding or weight loss, fever, chills, night sweats etc, or drop in her H/H and platelets without a cause, I would consider bone marrow biopsy.     Interval History 08/14/2024   Ms. Us presents today for follow up, accompanied by her daughter. She presented to [facility] ED 7/29 due to L sided chest pain, was diagnosed with costochondritis. Continues to take oral vitamin C/iron.   CBC: 3.25 (ANC 1.7), Hgb 8.9, Hct 28, Plts 101. Discussed monitoring CBC closer, if down trending counts, would recommend BMB.    Interval History 10/31/2024   Ms. Us presents for follow up, accompanied by Leslie. She underwent EGD 10/18-  noted to have large hiatal hernia and esophagitis. Path with benign gastric antral type mucosa with scant chronic inflammation and focal minimal intestinal metaplasia, no h. Pylori.     She was taken off iron briefly for EGD, recently placed back on ~5 days ago. Denies constipation, does endorse some nausea, which she thinks may be attributed to hiatal hernia.     CBC today with normalized WBC, Hgb 9.4, Hct 30, Plts stable at 101. Cont Vitamin C/ Iron every other day.     Interval History 01/30/2025   Ms. Us presents for follow up, accompanied by Leslie. She has had several UTI's since our last visit. Denies any bleeding including GIB or hematuria.  Energy is stable. Taking in oral iron/vitamin C daily (according to nursing home records), denies GI disturbance.  CBC with stable H/H, Hgb 9.4/31.2, platelets with slight improvement, 120K today. We discussed pursuing a bone marrow biopsy as no improvement with iron supplements, she declined at this time.     Interval History 04/30/2025   Ms. Us presents for f/u, accompanied by Leslie. Taking vitamin C and iron supplement every other day, denies any GI side effects. Has had more urinary tract infections since our last visit. Referred to urology.  CBC: 4/9.1/29.7/129        Objective     Objective     Allergy:   Allergies   Allergen Reactions    Evolocumab Itching     itching    Statins Hives    Strawberry Rash        Current Medications:   Current Outpatient Medications   Medication Sig Dispense Refill    Abilify 2 MG tablet Take 1 tablet by mouth Daily.      acetaminophen (TYLENOL) 325 MG tablet Take 2 tablets by mouth Every 4 (Four) Hours As Needed for Mild Pain. 30 tablet 0    acetaminophen (TYLENOL) 500 MG tablet Take 1 tablet by mouth Every 6 (Six) Hours As Needed for Mild Pain.      aspirin 81 MG EC tablet Take 1 tablet by mouth Daily.      Bempedoic Acid-Ezetimibe (Nexlizet) 180-10 MG tablet Take 1 tablet by mouth Every Night.      calcium carbonate, oyster shell, 500 MG tablet tablet Take  by mouth 2 (Two) Times a Day.      carvedilol (COREG) 6.25 MG tablet Take 1 tablet by mouth 2 (Two) Times a Day. 60 tablet 11    cetaphil (CETAPHIL) lotion Apply  topically to the appropriate area as directed As Needed for Dry Skin.      cloNIDine (CATAPRES) 0.1 MG tablet Take 1 tablet by mouth.      cloNIDine (CATAPRES-TTS) 0.1 MG/24HR patch Place 1 patch on the skin as directed by provider 1 (One) Time Per Week.      Diclofenac Sodium (VOLTAREN) 1 % gel gel Apply 2 g topically to the appropriate area as directed 4 (Four) Times a Day As Needed (joint pain). Knees, neck and shoulders      donepezil (ARICEPT) 10 MG  tablet Take 1 tablet by mouth Every Night.      doxazosin (CARDURA) 4 MG tablet Take 1 tablet by mouth Every Night. 30 tablet 1    ferrous sulfate 324 (65 Fe) MG tablet delayed-release EC tablet Take 1 tablet by mouth Daily With Breakfast.      fluticasone (FLONASE) 50 MCG/ACT nasal spray Administer 2 sprays into the nostril(s) as directed by provider Daily.      gabapentin (NEURONTIN) 300 MG capsule Take 1 capsule by mouth 2 (Two) Times a Day. 6 capsule 0    hydrALAZINE (APRESOLINE) 50 MG tablet Take 1 tablet by mouth 3 (Three) Times a Day.      ibuprofen (ADVIL,MOTRIN) 600 MG tablet Take 1 tablet by mouth Every 6 (Six) Hours As Needed for Mild Pain. 30 tablet 0    isosorbide mononitrate (IMDUR) 60 MG 24 hr tablet Take 1 tablet by mouth Daily.      Lidocaine 4 % Place 1 patch on the skin as directed by provider Daily. Remove & Discard patch within 12 hours or as directed by MD      LORazepam (ATIVAN) 0.5 MG tablet Take 1 tablet by mouth 2 (Two) Times a Day As Needed for Anxiety. 3 tablet 0    melatonin 5 MG tablet tablet Take 1 tablet by mouth.      modafinil (PROVIGIL) 100 MG tablet Take 2 tablets by mouth Daily.      ondansetron (ZOFRAN) 4 MG tablet Take 1 tablet by mouth Every 8 (Eight) Hours As Needed for Nausea or Vomiting.      pantoprazole (PROTONIX) 40 MG EC tablet Take 1 tablet by mouth Every Night.      potassium chloride 10 MEQ CR tablet Take 1 tablet by mouth 3 times a day.      sennosides-docusate (senna-docusate sodium) 8.6-50 MG per tablet Take 1 tablet by mouth Daily.      sertraline (ZOLOFT) 100 MG tablet Take 2 tablets by mouth Every Night.      sodium-potassium-magnesium sulfates (Suprep Bowel Prep Kit) 17.5-3.13-1.6 GM/177ML solution oral solution Take 1 bottle by mouth Every 12 (Twelve) Hours. 354 mL 0    traMADol (ULTRAM) 50 MG tablet As Needed.      Trelegy Ellipta 200-62.5-25 MCG/ACT inhaler       tuberculin 5 UNIT/0.1ML injection Inject 0.1 mL into the appropriate area of the skin as  directed by provider 1 (One) Time.      vitamin C (ASCORBIC ACID) 500 MG tablet Take 1 tablet by mouth Every Other Day. 15 tablet 3     No current facility-administered medications for this visit.       Past Medical History:  Past Medical History:   Diagnosis Date    Anxiety     Arthritis     Bradycardia     s/p pacemaker placement    CKD (chronic kidney disease)     baseline renal function unknown    COPD (chronic obstructive pulmonary disease)     Dementia     Difficulty walking     Elevated cholesterol     GERD (gastroesophageal reflux disease)     Hyperlipidemia     Hypertension     Iron deficiency anemia     Memory loss     Pacemaker     Thrombocytopenia     Urinary tract infection        Past Surgical History:  Past Surgical History:   Procedure Laterality Date    BLADDER SURGERY  2002    Bladder tuck    BREAST BIOPSY      benign, yrs ago    CARDIAC PACEMAKER PLACEMENT      CATARACT EXTRACTION      CERVICAL FUSION      CHOLECYSTECTOMY      ENDOSCOPY N/A 10/18/2024    Procedure: ESOPHAGOGASTRODUODENOSCOPY WITH ANESTHESIA;  Surgeon: Francis Van MD;  Location: [facility];  Service: Gastroenterology;  Laterality: N/A;    HIATAL HERNIA REPAIR      HYSTERECTOMY      REPLACEMENT TOTAL KNEE Right     SKIN LESION EXCISION Right 04/04/2025    Procedure: SKIN LESION EXCISION  RIGHT EAR;  Surgeon: Francis Van MD;  Location: [facility];  Service: General;  Laterality: Right;       Social History:  Social History     Socioeconomic History    Marital status:    Tobacco Use    Smoking status: Never     Passive exposure: Never    Smokeless tobacco: Never    Tobacco comments:     Second hand smoke exposure ()   Vaping Use    Vaping status: Never Used   Substance and Sexual Activity    Alcohol use: Never    Drug use: Never    Sexual activity: Defer         Family History:  Family History   Problem Relation Age of Onset    Stroke Mother     Hypertension Mother     Stroke Father     Breast cancer  "Sister     Dementia Brother        Review of Systems:  Review of Systems   All other systems reviewed and are negative.      Vital Signs:   /80   Pulse 74   Temp 98 °F (36.7 °C) (Temporal)   Resp 20   Ht 165.1 cm (65\")   Wt 71 kg (156 lb 9.6 oz)   SpO2 94%   BMI 26.06 kg/m²      Physical Exam:  Physical Exam  Vitals reviewed.   Constitutional:       General: She is not in acute distress.     Appearance: Normal appearance. She is not ill-appearing.      Comments: Ambulating with a walker   HENT:      Head: Normocephalic and atraumatic.      Mouth/Throat:      Mouth: Mucous membranes are moist.      Pharynx: Oropharynx is clear.   Eyes:      Conjunctiva/sclera: Conjunctivae normal.      Pupils: Pupils are equal, round, and reactive to light.   Cardiovascular:      Rate and Rhythm: Normal rate and regular rhythm.      Heart sounds: No murmur heard.  Pulmonary:      Effort: Pulmonary effort is normal. No respiratory distress.      Breath sounds: Normal breath sounds. No wheezing.   Abdominal:      General: Abdomen is flat. Bowel sounds are normal. There is no distension.      Palpations: Abdomen is soft. There is no mass.      Tenderness: There is no abdominal tenderness. There is no guarding.   Musculoskeletal:         General: No swelling. Normal range of motion.      Cervical back: Normal range of motion.   Lymphadenopathy:      Cervical: No cervical adenopathy.   Skin:     General: Skin is warm and dry.   Neurological:      General: No focal deficit present.      Mental Status: She is alert and oriented to person, place, and time. Mental status is at baseline.   Psychiatric:         Mood and Affect: Mood normal.         PHQ-9 Score  PHQ-9 Total Score:       Pain Score  Vitals:    04/30/25 1118   BP: 166/80   Pulse: 74   Resp: 20   Temp: 98 °F (36.7 °C)   TempSrc: Temporal   SpO2: 94%   Weight: 71 kg (156 lb 9.6 oz)   Height: 165.1 cm (65\")   PainSc: 6    PainLoc: Shoulder                 ECOG score: 0 "           PAINSCOREQUALITYMETRIC:   Vitals:    04/30/25 1118   PainSc: 6    PainLoc: Shoulder                    Lab Review  Lab Results   Component Value Date    WBC 4.24 04/30/2025    HGB 9.1 (L) 04/30/2025    HCT 29.7 (L) 04/30/2025    MCV 87.1 04/30/2025    RDW 13.8 04/30/2025     (L) 04/30/2025    NEUTRORELPCT 63.6 04/30/2025    LYMPHORELPCT 23.1 04/30/2025    MONORELPCT 9.4 04/30/2025    EOSRELPCT 2.8 04/30/2025    BASORELPCT 0.9 04/30/2025    NEUTROABS 2.69 04/30/2025    LYMPHSABS 0.98 04/30/2025     Lab Results   Component Value Date     04/02/2025    K 3.9 04/02/2025    CO2 21.6 (L) 04/02/2025     04/02/2025    BUN 24 (H) 04/02/2025    CREATININE 1.48 (H) 04/02/2025    GLUCOSE 128 (H) 04/02/2025    CALCIUM 9.1 04/02/2025    ALKPHOS 59 10/31/2024    AST 19 10/31/2024    ALT 9 10/31/2024    BILITOT 0.5 10/31/2024    ALBUMIN 4.0 10/31/2024    PROTEINTOT 7.1 10/31/2024    MG 2.0 09/27/2023               Work up 4/18/2024:    Component      Latest Ref Rng 4/18/2024   Iron      37 - 145 mcg/dL 59    Iron Saturation (TSAT)      20 - 50 % 15 (L)    Transferrin      200 - 360 mg/dL 257    TIBC      298 - 536 mcg/dL 383    Hepatitis B Surface Ag      Non-Reactive  Non-Reactive    Hep A IgM      Non-Reactive  Non-Reactive    Hep B Core IgM      Non-Reactive  Non-Reactive    Hepatitis C Ab      Non-Reactive  Non-Reactive    Reticulocyte %      0.70 - 1.90 % 0.93    Reticulocyte Absolute      0.0200 - 0.1300 10*6/mm3 0.0349    Protime      12.1 - 14.7 Seconds 13.9    INR      0.90 - 1.10  1.02    Ferritin      13.00 - 150.00 ng/mL 97.01    Vitamin B-12      211 - 946 pg/mL 1,602 (H)    Folate      4.78 - 24.20 ng/mL 16.30    LDH      135 - 214 U/L 173    Sed Rate      0 - 30 mm/hr 30    C-Reactive Protein      0.00 - 0.50 mg/dL <0.30    Methylmalonic Acid      0 - 378 nmol/L 355    TSH Baseline      0.270 - 4.200 uIU/mL 0.966    Transferrin Receptor      12.2 - 27.3 nmol/L 16.1    Haptoglobin      30  - 200 mg/dL 74    PTT      26.5 - 34.5 seconds 28.9    HIV DUO      Non-Reactive  Non-Reactive    MANDY Direct      Negative  Negative    Rheumatoid Factor Quantitative      0.0 - 14.0 IU/mL <10.0       Legend:  (L) Low  (H) High      PERIPHERAL SMEAR, [facility] (04/18/2024 11:53)     DIAGNOSIS:  PERIPHERAL SMEAR  Normocytic, normochromic anemia.  No significant increase in schistocytes  population.  Thrombocytopenia with no platelet clumping.  Normal WBC count and differential. Granulocytes show normal morphology and  no circulating blasts identified.       VINAYAK + PE (04/18/2024 11:53)           Immunoglobulin Free LT Chains Blood (04/18/2024 11:53)           Radiology Results    US Abdomen Complete (04/29/2024 13:02)   FINDINGS: .  The visualized pancreas is unremarkable. The liver is unremarkable. The  patient is status post cholecystectomy.  The common duct measures 3 mm.  The right kidney measures 8.1 cm in length and is normal in echogenicity  without hydronephrosis. The left kidney measures 9.3 cm in length and is  normal in echogenicity without hydronephrosis.  Spleen is enlarged  measuring 13.5 cm.  The aorta is normal in caliber. The vena cava is  unremarkable.     IMPRESSION:  Splenomegaly.    CT Abdomen Pelvis Without Contrast (09/27/2023 23:02)   FINDINGS:   Included portions of the lung bases are normal.  The liver and spleen are normal.  There is no biliary dilation.  The  gallbladder has been removed.  The pancreas is normal.  The adrenal glands are normal.  Kidneys are normal.  There is no hydronephrosis or renal or ureteral  calculi.  The bowel loops are normal in course and caliber.  There is  diverticulosis of the descending and sigmoid colon.  There is no pelvic mass or free fluid or lymphadenopathy.  Degenerative changes involve the lumbar spine.     IMPRESSION:  No CT evidence of an acute intra-abdominal or intrapelvic process.      Pathology:    TISSUE EXAM, [facility] (JENIFER,COR,[facility]) (10/18/2024  10:29)     DIAGNOSIS:  ANTRUM, BIOPSY:  Benign gastric antral-type mucosa with scant chronic inflammation and focal  minimal intestinal metaplasia, negative for dysplasia  No Helicobacter pylori-like organisms identified on routine histologic sections        EGD:  ENDOSCOPY - EGD (10/18/2024)       Assessment / Plan         Assessment and Plan   Susan Us is a 80 y.o.  presents for     Normocytic Anemia  Iron deficiency   Anemia of chronic disease  Thrombocytopenia   Splenomegaly   -Patient reportedly has had anemia and thrombocytopenia for many years, in our system dating back ~9 months; baseline Hgb 9.1-10, Hct 28-31, normal MCV, platelets 80-120K  -Labs from initial consultation show hemoglobin persistently low at 10.4, MCV 86, platelets 119.  Normal white blood count.  Peripheral smear with normocytic, normochromic anemia.  No increase in schistocytes.  Thrombocytopenia with no platelet clumping.  Granulocytes show normal morphology and no circulating blasts.  HIV antibody is nonreactive.  Negative for hepatitis studies.  Negative for rheumatoid factor and lupus screen. Iron studies showed mild iron deficiency with ferritin 97 ng/mL (patient is on oral ferrous sulfate). Replete B12/folate. No signs of hemolysis as normal LDH/haptoglobin. SPEP with no M spike, elevated free kappa light chain to 52, elevated free lambda light chain 28 with normal kappa/lambda ratio 1.83.  Ultrasound of the abdomen shows splenomegaly with spleen measuring 13.5 cm.  Liver is unremarkable.  -She has been taking in oral ferrous sulfate/ascorbic acid with very little improvement in her hemoglobin and hematocrit. Her platelets have remained stable.   -We discussed pursuing a bone marrow biopsy for further investigation, patient declined, would like to continue supportive therapy.   -Continue oral ferrous sulfate 325 mg daily with vitamin C every other day      Discussed possible differential diagnoses, testing, treatment,  recommended non-pharmacological interventions, risks, warning signs to monitor for that would indicate need for follow-up in clinic or ER. If no improvement with these regimens or you have new or worsening symptoms follow-up. Patient verbalizes understanding and agreement with plan of care. Denies further needs or concerns.     Patient was given instructions and counseling regarding her condition and for health maintenance advised.       All questions were answered to her satisfaction.        ACO / SHERRI/Other  Quality measures  -  Susan Us did not receive 2024 flu vaccine.  This was recommended.  -  Susan Us reports a pain score of 6.  Given her pain assessment as noted, treatment options were discussed and the following options were decided upon as a follow-up plan to address the patient's pain: continuation of current treatment plan for pain.  -  Current outpatient and discharge medications have been reconciled for the patient.  Reviewed by: Sylwia Hua MD        Meds ordered during this visit  No orders of the defined types were placed in this encounter.      Visit Diagnoses    ICD-10-CM ICD-9-CM   1. Anemia, unspecified type  D64.9 285.9   2. Thrombocytopenia  D69.6 287.5   3. Splenomegaly  R16.1 789.2   4. Iron deficiency anemia, unspecified iron deficiency anemia type  D50.9 280.9                 Follow Up   In 3 months with repeat CBC, iron studies, ferritin, B12, folate, STR, MMA      This document has been electronically signed by Sylwia Hua MD   April 30, 2025 11:50 EDT    Dictated Utilizing Dragon Dictation: Part of this note may be an electronic transcription/translation of spoken language to printed text using the Dragon Dictation System.

## 2025-04-30 ENCOUNTER — OFFICE VISIT (OUTPATIENT)
Dept: ONCOLOGY | Facility: CLINIC | Age: 80
End: 2025-04-30
Payer: MEDICARE

## 2025-04-30 ENCOUNTER — LAB (OUTPATIENT)
Dept: ONCOLOGY | Facility: CLINIC | Age: 80
End: 2025-04-30
Payer: MEDICARE

## 2025-04-30 VITALS
TEMPERATURE: 98 F | HEIGHT: 65 IN | BODY MASS INDEX: 26.09 KG/M2 | HEART RATE: 74 BPM | SYSTOLIC BLOOD PRESSURE: 166 MMHG | WEIGHT: 156.6 LBS | DIASTOLIC BLOOD PRESSURE: 80 MMHG | OXYGEN SATURATION: 94 % | RESPIRATION RATE: 20 BRPM

## 2025-04-30 DIAGNOSIS — D64.9 ANEMIA, UNSPECIFIED TYPE: ICD-10-CM

## 2025-04-30 DIAGNOSIS — D69.6 THROMBOCYTOPENIA: ICD-10-CM

## 2025-04-30 DIAGNOSIS — D50.9 IRON DEFICIENCY ANEMIA, UNSPECIFIED IRON DEFICIENCY ANEMIA TYPE: ICD-10-CM

## 2025-04-30 DIAGNOSIS — D64.9 ANEMIA, UNSPECIFIED TYPE: Primary | ICD-10-CM

## 2025-04-30 DIAGNOSIS — R16.1 SPLENOMEGALY: ICD-10-CM

## 2025-04-30 DIAGNOSIS — D63.8 ANEMIA OF CHRONIC DISEASE: ICD-10-CM

## 2025-04-30 LAB
BASOPHILS # BLD AUTO: 0.04 10*3/MM3 (ref 0–0.2)
BASOPHILS NFR BLD AUTO: 0.9 % (ref 0–1.5)
DEPRECATED RDW RBC AUTO: 43.7 FL (ref 37–54)
EOSINOPHIL # BLD AUTO: 0.12 10*3/MM3 (ref 0–0.4)
EOSINOPHIL NFR BLD AUTO: 2.8 % (ref 0.3–6.2)
ERYTHROCYTE [DISTWIDTH] IN BLOOD BY AUTOMATED COUNT: 13.8 % (ref 12.3–15.4)
FERRITIN SERPL-MCNC: 76.64 NG/ML (ref 13–150)
FOLATE SERPL-MCNC: 11.8 NG/ML (ref 4.78–24.2)
HCT VFR BLD AUTO: 29.7 % (ref 34–46.6)
HGB BLD-MCNC: 9.1 G/DL (ref 12–15.9)
IMM GRANULOCYTES # BLD AUTO: 0.01 10*3/MM3 (ref 0–0.05)
IMM GRANULOCYTES NFR BLD AUTO: 0.2 % (ref 0–0.5)
IRON 24H UR-MRATE: 53 MCG/DL (ref 37–145)
IRON SATN MFR SERPL: 14 % (ref 20–50)
LYMPHOCYTES # BLD AUTO: 0.98 10*3/MM3 (ref 0.7–3.1)
LYMPHOCYTES NFR BLD AUTO: 23.1 % (ref 19.6–45.3)
MCH RBC QN AUTO: 26.7 PG (ref 26.6–33)
MCHC RBC AUTO-ENTMCNC: 30.6 G/DL (ref 31.5–35.7)
MCV RBC AUTO: 87.1 FL (ref 79–97)
MONOCYTES # BLD AUTO: 0.4 10*3/MM3 (ref 0.1–0.9)
MONOCYTES NFR BLD AUTO: 9.4 % (ref 5–12)
NEUTROPHILS NFR BLD AUTO: 2.69 10*3/MM3 (ref 1.7–7)
NEUTROPHILS NFR BLD AUTO: 63.6 % (ref 42.7–76)
NRBC BLD AUTO-RTO: 0 /100 WBC (ref 0–0.2)
PLATELET # BLD AUTO: 129 10*3/MM3 (ref 140–450)
PMV BLD AUTO: 10.5 FL (ref 6–12)
RBC # BLD AUTO: 3.41 10*6/MM3 (ref 3.77–5.28)
TIBC SERPL-MCNC: 370 MCG/DL (ref 298–536)
TRANSFERRIN SERPL-MCNC: 248 MG/DL (ref 200–360)
VIT B12 BLD-MCNC: 773 PG/ML (ref 211–946)
WBC NRBC COR # BLD AUTO: 4.24 10*3/MM3 (ref 3.4–10.8)

## 2025-04-30 PROCEDURE — 82607 VITAMIN B-12: CPT | Performed by: INTERNAL MEDICINE

## 2025-04-30 PROCEDURE — 82728 ASSAY OF FERRITIN: CPT | Performed by: INTERNAL MEDICINE

## 2025-04-30 PROCEDURE — 83540 ASSAY OF IRON: CPT | Performed by: INTERNAL MEDICINE

## 2025-04-30 PROCEDURE — 84466 ASSAY OF TRANSFERRIN: CPT | Performed by: INTERNAL MEDICINE

## 2025-04-30 PROCEDURE — 85025 COMPLETE CBC W/AUTO DIFF WBC: CPT | Performed by: INTERNAL MEDICINE

## 2025-04-30 PROCEDURE — 82746 ASSAY OF FOLIC ACID SERUM: CPT | Performed by: INTERNAL MEDICINE

## 2025-04-30 NOTE — PROGRESS NOTES
Venipuncture Blood Specimen Collection  Venipuncture performed in right arm by Cris Cevallos MA with good hemostasis. Patient tolerated the procedure well without complications.   04/30/25   Cris Cevallos MA

## 2025-05-01 DIAGNOSIS — R16.1 SPLENOMEGALY: ICD-10-CM

## 2025-05-01 DIAGNOSIS — D50.9 IRON DEFICIENCY ANEMIA, UNSPECIFIED IRON DEFICIENCY ANEMIA TYPE: ICD-10-CM

## 2025-05-01 DIAGNOSIS — D63.8 ANEMIA OF CHRONIC DISEASE: ICD-10-CM

## 2025-05-01 DIAGNOSIS — D64.9 ANEMIA, UNSPECIFIED TYPE: Primary | ICD-10-CM

## 2025-05-01 DIAGNOSIS — D69.6 THROMBOCYTOPENIA: ICD-10-CM

## 2025-05-08 ENCOUNTER — OFFICE VISIT (OUTPATIENT)
Dept: UROLOGY | Facility: CLINIC | Age: 80
End: 2025-05-08
Payer: MEDICARE

## 2025-05-08 VITALS
SYSTOLIC BLOOD PRESSURE: 172 MMHG | BODY MASS INDEX: 26.36 KG/M2 | HEIGHT: 65 IN | DIASTOLIC BLOOD PRESSURE: 89 MMHG | WEIGHT: 158.2 LBS | HEART RATE: 78 BPM

## 2025-05-08 DIAGNOSIS — R35.0 FREQUENCY OF URINATION: ICD-10-CM

## 2025-05-08 DIAGNOSIS — N39.0 RECURRENT UTI: Primary | ICD-10-CM

## 2025-05-08 LAB
BILIRUB BLD-MCNC: NEGATIVE MG/DL
CLARITY, POC: CLEAR
COLOR UR: YELLOW
EXPIRATION DATE: ABNORMAL
GLUCOSE UR STRIP-MCNC: NEGATIVE MG/DL
KETONES UR QL: NEGATIVE
LEUKOCYTE EST, POC: NEGATIVE
Lab: ABNORMAL
NITRITE UR-MCNC: NEGATIVE MG/ML
PH UR: 6 [PH] (ref 5–8)
PROT UR STRIP-MCNC: ABNORMAL MG/DL
RBC # UR STRIP: NEGATIVE /UL
SP GR UR: 1.01 (ref 1–1.03)
UROBILINOGEN UR QL: NORMAL

## 2025-05-08 RX ORDER — BACILLUS COAGULANS/INULIN 1B-250 MG
1 CAPSULE ORAL DAILY
Qty: 90 CAPSULE | Refills: 3 | Status: SHIPPED | OUTPATIENT
Start: 2025-05-08

## 2025-05-08 NOTE — PROGRESS NOTES
"Chief Complaint:    Chief Complaint   Patient presents with    Bilateral renal cysts       Vital Signs:   /89 (BP Location: Left arm, Patient Position: Sitting, Cuff Size: Adult)   Pulse 78   Ht 165.1 cm (65\")   Wt 71.8 kg (158 lb 3.2 oz)   BMI 26.33 kg/m²   Body mass index is 26.33 kg/m².      HPI:  Susan Us is a 80 y.o. female who presents today for follow up    History of Present Illness  Ms. Us returns to the clinic today for evaluation of recurrent urinary tract infection.  She has been seen previously due to concerns of possible renal mass however said several ultrasounds that show renal cysts only.  She was dismissed from our care but returns today due to concerns of 3 urinary tract infections since December 2024.  She reports she had 1 in December, January, and most recently in April.  I did receive her April urine culture that showed protease.  She denies any current symptoms but states she usually only gets dizziness and altered mental status.  Urine analysis in office today shows a trace amount of protein only.  She does follow with nephrology for CKD stage IIIb.  She does continue with doxazosin 8 mg nightly to help with incomplete emptying.  She denies any changes in lower urinary tract symptoms.      Past Medical History:  Past Medical History:   Diagnosis Date    Anxiety     Arthritis     Bradycardia     s/p pacemaker placement    CKD (chronic kidney disease)     baseline renal function unknown    COPD (chronic obstructive pulmonary disease)     Dementia     Difficulty walking     Elevated cholesterol     GERD (gastroesophageal reflux disease)     Hyperlipidemia     Hypertension     Iron deficiency anemia     Memory loss     Pacemaker     Thrombocytopenia     Urinary tract infection        Current Meds:  Current Outpatient Medications   Medication Sig Dispense Refill    Abilify 2 MG tablet Take 1 tablet by mouth Daily.      acetaminophen (TYLENOL) 325 MG tablet Take 2 tablets by " mouth Every 4 (Four) Hours As Needed for Mild Pain. 30 tablet 0    acetaminophen (TYLENOL) 500 MG tablet Take 1 tablet by mouth Every 6 (Six) Hours As Needed for Mild Pain.      aspirin 81 MG EC tablet Take 1 tablet by mouth Daily.      Bempedoic Acid-Ezetimibe (Nexlizet) 180-10 MG tablet Take 1 tablet by mouth Every Night.      calcium carbonate, oyster shell, 500 MG tablet tablet Take  by mouth 2 (Two) Times a Day.      carvedilol (COREG) 6.25 MG tablet Take 1 tablet by mouth 2 (Two) Times a Day. 60 tablet 11    cetaphil (CETAPHIL) lotion Apply  topically to the appropriate area as directed As Needed for Dry Skin.      cloNIDine (CATAPRES) 0.1 MG tablet Take 1 tablet by mouth.      cloNIDine (CATAPRES-TTS) 0.1 MG/24HR patch Place 1 patch on the skin as directed by provider 1 (One) Time Per Week.      Diclofenac Sodium (VOLTAREN) 1 % gel gel Apply 2 g topically to the appropriate area as directed 4 (Four) Times a Day As Needed (joint pain). Knees, neck and shoulders      donepezil (ARICEPT) 10 MG tablet Take 1 tablet by mouth Every Night.      doxazosin (CARDURA) 4 MG tablet Take 1 tablet by mouth Every Night. 30 tablet 1    ferrous sulfate 324 (65 Fe) MG tablet delayed-release EC tablet Take 1 tablet by mouth Daily With Breakfast.      fluticasone (FLONASE) 50 MCG/ACT nasal spray Administer 2 sprays into the nostril(s) as directed by provider Daily.      gabapentin (NEURONTIN) 300 MG capsule Take 1 capsule by mouth 2 (Two) Times a Day. 6 capsule 0    hydrALAZINE (APRESOLINE) 50 MG tablet Take 1 tablet by mouth 3 (Three) Times a Day.      ibuprofen (ADVIL,MOTRIN) 600 MG tablet Take 1 tablet by mouth Every 6 (Six) Hours As Needed for Mild Pain. 30 tablet 0    isosorbide mononitrate (IMDUR) 60 MG 24 hr tablet Take 1 tablet by mouth Daily.      Lidocaine 4 % Place 1 patch on the skin as directed by provider Daily. Remove & Discard patch within 12 hours or as directed by MD      LORazepam (ATIVAN) 0.5 MG tablet Take 1  tablet by mouth 2 (Two) Times a Day As Needed for Anxiety. 3 tablet 0    melatonin 5 MG tablet tablet Take 1 tablet by mouth.      modafinil (PROVIGIL) 100 MG tablet Take 2 tablets by mouth Daily.      ondansetron (ZOFRAN) 4 MG tablet Take 1 tablet by mouth Every 8 (Eight) Hours As Needed for Nausea or Vomiting.      pantoprazole (PROTONIX) 40 MG EC tablet Take 1 tablet by mouth Every Night.      potassium chloride 10 MEQ CR tablet Take 1 tablet by mouth 3 times a day.      sennosides-docusate (senna-docusate sodium) 8.6-50 MG per tablet Take 1 tablet by mouth Daily.      sertraline (ZOLOFT) 100 MG tablet Take 2 tablets by mouth Every Night.      sodium-potassium-magnesium sulfates (Suprep Bowel Prep Kit) 17.5-3.13-1.6 GM/177ML solution oral solution Take 1 bottle by mouth Every 12 (Twelve) Hours. 354 mL 0    traMADol (ULTRAM) 50 MG tablet As Needed.      Trelegy Ellipta 200-62.5-25 MCG/ACT inhaler       tuberculin 5 UNIT/0.1ML injection Inject 0.1 mL into the appropriate area of the skin as directed by provider 1 (One) Time.      vitamin C (ASCORBIC ACID) 500 MG tablet Take 1 tablet by mouth Every Other Day. 15 tablet 3    Bacillus Coagulans-Inulin (Probiotic) 1-250 BILLION-MG capsule Take 1 capsule by mouth Daily. 90 capsule 3     No current facility-administered medications for this visit.        Allergies:   Allergies   Allergen Reactions    Evolocumab Itching     itching    Statins Hives    [place] Rash        Past Surgical History:  Past Surgical History:   Procedure Laterality Date    BLADDER SURGERY  2002    Bladder tuck    BREAST BIOPSY      benign, yrs ago    CARDIAC PACEMAKER PLACEMENT      CATARACT EXTRACTION      CERVICAL FUSION      CHOLECYSTECTOMY      ENDOSCOPY N/A 10/18/2024    Procedure: ESOPHAGOGASTRODUODENOSCOPY WITH ANESTHESIA;  Surgeon: Francis Van MD;  Location: [facility];  Service: Gastroenterology;  Laterality: N/A;    HIATAL HERNIA REPAIR      HYSTERECTOMY      REPLACEMENT  TOTAL KNEE Right     SKIN LESION EXCISION Right 04/04/2025    Procedure: SKIN LESION EXCISION  RIGHT EAR;  Surgeon: Francis Van MD;  Location: [facility];  Service: General;  Laterality: Right;       Social History:  Social History     Socioeconomic History    Marital status:    Tobacco Use    Smoking status: Never     Passive exposure: Never    Smokeless tobacco: Never    Tobacco comments:     Second hand smoke exposure ()   Vaping Use    Vaping status: Never Used   Substance and Sexual Activity    Alcohol use: Never    Drug use: Never    Sexual activity: Defer       Family History:  Family History   Problem Relation Age of Onset    Stroke Mother     Hypertension Mother     Stroke Father     Breast cancer Sister     Dementia Brother        Review of Systems:  Review of Systems   Constitutional:  Negative for chills, fatigue, fever and unexpected weight change.   Respiratory:  Negative for cough, chest tightness, shortness of breath and wheezing.    Cardiovascular:  Negative for chest pain and leg swelling.   Gastrointestinal:  Negative for abdominal pain, constipation, diarrhea, nausea and vomiting.   Genitourinary:  Positive for frequency and urgency. Negative for difficulty urinating, dysuria, hematuria and pelvic pain.   Musculoskeletal:  Positive for back pain, joint swelling and neck pain.   Skin:  Negative for rash.   Neurological:  Positive for dizziness and headaches.   Psychiatric/Behavioral:  Positive for confusion. Negative for suicidal ideas.        Physical Exam:  Physical Exam  Constitutional:       General: She is not in acute distress.     Appearance: Normal appearance.   HENT:      Head: Normocephalic and atraumatic.      Nose: Nose normal.      Mouth/Throat:      Mouth: Mucous membranes are moist.   Eyes:      Conjunctiva/sclera: Conjunctivae normal.   Cardiovascular:      Rate and Rhythm: Normal rate.      Pulses: Normal pulses.   Pulmonary:      Effort: Pulmonary effort is  normal.   Abdominal:      Palpations: Abdomen is soft.   Musculoskeletal:         General: Normal range of motion.      Cervical back: Normal range of motion.   Skin:     General: Skin is warm.   Neurological:      General: No focal deficit present.      Mental Status: She is alert and oriented to person, place, and time.   Psychiatric:         Mood and Affect: Mood normal.         Behavior: Behavior normal.         Thought Content: Thought content normal.         Judgment: Judgment normal.           Recent Image (CT and/or KUB):   CT Abdomen and Pelvis: No results found for this or any previous visit.     CT Stone Protocol: No results found for this or any previous visit.     KUB: No results found for this or any previous visit.       Labs:  Brief Urine Lab Results  (Last result in the past 365 days)        Color   Clarity   Blood   Leuk Est   Nitrite   Protein   CREAT   Urine HCG        05/08/25 1433 Yellow   Clear   Negative   Negative   Negative   Trace                 Office Visit on 05/08/2025   Component Date Value Ref Range Status    Color 05/08/2025 Yellow  Yellow, Straw, Dark Yellow, [place] Final    Clarity, UA 05/08/2025 Clear  Clear Final    Specific Gravity  05/08/2025 1.010 (A)  1.005 - 1.030 Final    pH, Urine 05/08/2025 6.0 (A)  5.0 - 8.0 Final    Leukocytes 05/08/2025 Negative  Negative Final    Nitrite, UA 05/08/2025 Negative  Negative Final    Protein, POC 05/08/2025 Trace (A)  Negative mg/dL Final    Glucose, UA 05/08/2025 Negative  Negative mg/dL Final    Ketones, UA 05/08/2025 Negative  Negative Final    Urobilinogen, UA 05/08/2025 Normal  Normal, 0.2 E.U./dL Final    Bilirubin 05/08/2025 Negative  Negative Final    Blood, UA 05/08/2025 Negative  Negative Final    Lot Number 05/08/2025 [id number]   Final    Expiration Date 05/08/2025 10/25/2025   Final   Lab on 04/30/2025   Component Date Value Ref Range Status    Iron 04/30/2025 53  37 - 145 mcg/dL Final    Iron Saturation (TSAT) 04/30/2025  14 (L)  20 - 50 % Final    Transferrin 04/30/2025 248  200 - 360 mg/dL Final    TIBC 04/30/2025 370  298 - 536 mcg/dL Final    Folate 04/30/2025 11.80  4.78 - 24.20 ng/mL Final    Ferritin 04/30/2025 76.64  13.00 - 150.00 ng/mL Final    Vitamin B-12 04/30/2025 773  211 - 946 pg/mL Final    WBC 04/30/2025 4.24  3.40 - 10.80 10*3/mm3 Final    RBC 04/30/2025 3.41 (L)  3.77 - 5.28 10*6/mm3 Final    Hemoglobin 04/30/2025 9.1 (L)  12.0 - 15.9 g/dL Final    Hematocrit 04/30/2025 29.7 (L)  34.0 - 46.6 % Final    MCV 04/30/2025 87.1  79.0 - 97.0 fL Final    MCH 04/30/2025 26.7  26.6 - 33.0 pg Final    MCHC 04/30/2025 30.6 (L)  31.5 - 35.7 g/dL Final    RDW 04/30/2025 13.8  12.3 - 15.4 % Final    RDW-SD 04/30/2025 43.7  37.0 - 54.0 fl Final    MPV 04/30/2025 10.5  6.0 - 12.0 fL Final    Platelets 04/30/2025 129 (L)  140 - 450 10*3/mm3 Final    Neutrophil % 04/30/2025 63.6  42.7 - 76.0 % Final    Lymphocyte % 04/30/2025 23.1  19.6 - 45.3 % Final    Monocyte % 04/30/2025 9.4  5.0 - 12.0 % Final    Eosinophil % 04/30/2025 2.8  0.3 - 6.2 % Final    Basophil % 04/30/2025 0.9  0.0 - 1.5 % Final    Immature Grans % 04/30/2025 0.2  0.0 - 0.5 % Final    Neutrophils, Absolute 04/30/2025 2.69  1.70 - 7.00 10*3/mm3 Final    Lymphocytes, Absolute 04/30/2025 0.98  0.70 - 3.10 10*3/mm3 Final    Monocytes, Absolute 04/30/2025 0.40  0.10 - 0.90 10*3/mm3 Final    Eosinophils, Absolute 04/30/2025 0.12  0.00 - 0.40 10*3/mm3 Final    Basophils, Absolute 04/30/2025 0.04  0.00 - 0.20 10*3/mm3 Final    Immature Grans, Absolute 04/30/2025 0.01  0.00 - 0.05 10*3/mm3 Final    nRBC 04/30/2025 0.0  0.0 - 0.2 /100 WBC Final   Admission on 04/04/2025, Discharged on 04/04/2025   Component Date Value Ref Range Status    Reference Lab Report 04/04/2025    Final                    Value:[facility]  [address] [place] [address]  Phone: [phone] or [phone]  Fax: [phone]  Antonio Kramer M.D., Medical  "Director    PATIENT NAME                           LABORATORY NO.  [id number]  ROSE MARY CHANG                        [id number]  [id number]                         AGE              SEX  SSN           CLIENT REF #  [facility]              79      1945  F    [id number]   [id number]    [address] [place] [address]                     REQUESTING M.D.     ATTENDING M.D.     COPY TO.  FREDDY PEREZ [id number] SEXTON  DATE COLLECTED      DATE RECEIVED      DATE REPORTED  2025    DIAGNOSIS:  SKIN, RIGHT EAR:  Dermal fibrosis with chronic inflammation and small ruptured follicle  No residual malignancy identified    CAM    CLINICAL HISTORY:  Basal cell carcinoma (BCC) of skin of right ear    SPECIMENS RECEIVED:  SKIN, RIGHT EAR    MICROSCOPIC DESCRIPTION:  Tissue blocks                           are prepared and slides are examined microscopically.  Deeper  sections are evaluated.  See diagnosis for details.    Professional interpretation rendered by Yudelka Chopra M.D., SUSAN.A.PDena at  [facility], [address] [place] [address], FREDDY Perez [id number].    GROSS DESCRIPTION:  Received in formalin labeled \"right ear lesion\" is a 1.4 x 1.3 x 0.6 cm unoriented  wedge of skin.  The tan-white skin displays a 0.5 x 0.2 cm gray-white,  hypopigmented area that is 0.5 cm from the nearest margin.  The margin is inked  blue, and sectioning reveals tan-yellow, grossly unremarkable cut surfaces.  No  distinct lesions are grossly identified upon sectioning, and the specimen is  submitted entirely as follows:  A1: Resection margin, en face, A2: Internal  sections. AKG    REVIEWED, DIAGNOSED AND ELECTRONICALLY  SIGNED BY:    Yudelka Chopra M.D., F.C.A.P.  CPT CODES:  20475     Pre-Admission Testing on 2025   Component Date Value Ref Range Status    Glucose 2025 128 (H)  65 - 99 mg/dL Final    BUN 2025 24 (H)  8 - 23 mg/dL Final    Creatinine 2025 1.48 (H)  0.57 - " 1.00 mg/dL Final    Sodium 04/02/2025 136  136 - 145 mmol/L Final    Potassium 04/02/2025 3.9  3.5 - 5.2 mmol/L Final    Chloride 04/02/2025 103  98 - 107 mmol/L Final    CO2 04/02/2025 21.6 (L)  22.0 - 29.0 mmol/L Final    Calcium 04/02/2025 9.1  8.6 - 10.5 mg/dL Final    BUN/Creatinine Ratio 04/02/2025 16.2  7.0 - 25.0 Final    Anion Gap 04/02/2025 11.4  5.0 - 15.0 mmol/L Final    eGFR 04/02/2025 36.1 (L)  >60.0 mL/min/1.73 Final    WBC 04/02/2025 5.49  3.40 - 10.80 10*3/mm3 Final    RBC 04/02/2025 3.33 (L)  3.77 - 5.28 10*6/mm3 Final    Hemoglobin 04/02/2025 9.1 (L)  12.0 - 15.9 g/dL Final    Hematocrit 04/02/2025 28.9 (L)  34.0 - 46.6 % Final    MCV 04/02/2025 86.8  79.0 - 97.0 fL Final    MCH 04/02/2025 27.3  26.6 - 33.0 pg Final    MCHC 04/02/2025 31.5  31.5 - 35.7 g/dL Final    RDW 04/02/2025 13.7  12.3 - 15.4 % Final    RDW-SD 04/02/2025 43.8  37.0 - 54.0 fl Final    MPV 04/02/2025 11.5  6.0 - 12.0 fL Final    Platelets 04/02/2025 111 (L)  140 - 450 10*3/mm3 Final    QT Interval 04/02/2025 440  ms Final    QTC Interval 04/02/2025 464  ms Final   Lab Requisition on 03/07/2025   Component Date Value Ref Range Status    Color, UA 03/07/2025 Dark Yellow (A)  Yellow, Straw Final    Appearance, UA 03/07/2025 Clear  Clear Final    pH, UA 03/07/2025 5.5  5.0 - 8.0 Final    Specific Gravity, UA 03/07/2025 1.021  1.005 - 1.030 Final    Glucose, UA 03/07/2025 Negative  Negative Final    Ketones, UA 03/07/2025 Trace (A)  Negative Final    Bilirubin, UA 03/07/2025 Negative  Negative Final    Blood, UA 03/07/2025 Negative  Negative Final    Protein, UA 03/07/2025 100 mg/dL (2+) (A)  Negative Final    Leuk Esterase, UA 03/07/2025 Negative  Negative Final    Nitrite, UA 03/07/2025 Negative  Negative Final    Urobilinogen, UA 03/07/2025 0.2 E.U./dL  0.2 - 1.0 E.U./dL Final    RBC, UA 03/07/2025 0-2  None Seen, 0-2 /HPF Final    WBC, UA 03/07/2025 0-2  None Seen, 0-2 /HPF Final    Bacteria, UA 03/07/2025 None Seen  None Seen  /HPF Final    Squamous Epithelial Cells, UA 03/07/2025 0-2  None Seen, 0-2 /HPF Final    Hyaline Casts, UA 03/07/2025 3-6  None Seen /LPF Final    Methodology 03/07/2025 Automated Microscopy   Final   Lab Requisition on 03/07/2025   Component Date Value Ref Range Status    ADENOVIRUS, PCR 03/07/2025 Not Detected  Not Detected Final    Coronavirus 229E 03/07/2025 Not Detected  Not Detected Final    Coronavirus HKU1 03/07/2025 Not Detected  Not Detected Final    Coronavirus NL63 03/07/2025 Not Detected  Not Detected Final    Coronavirus OC43 03/07/2025 Not Detected  Not Detected Final    COVID19 03/07/2025 Not Detected  Not Detected - Ref. Range Final    Human Metapneumovirus 03/07/2025 Not Detected  Not Detected Final    Human Rhinovirus/Enterovirus 03/07/2025 Not Detected  Not Detected Final    Influenza A H3 03/07/2025 Detected (A)  Not Detected Final    Influenza B PCR 03/07/2025 Not Detected  Not Detected Final    Parainfluenza Virus 1 03/07/2025 Not Detected  Not Detected Final    Parainfluenza Virus 2 03/07/2025 Not Detected  Not Detected Final    Parainfluenza Virus 3 03/07/2025 Not Detected  Not Detected Final    Parainfluenza Virus 4 03/07/2025 Not Detected  Not Detected Final    RSV, PCR 03/07/2025 Not Detected  Not Detected Final    Bordetella pertussis pcr 03/07/2025 Not Detected  Not Detected Final    Bordetella parapertussis PCR 03/07/2025 Not Detected  Not Detected Final    Chlamydophila pneumoniae PCR 03/07/2025 Not Detected  Not Detected Final    Mycoplasma pneumo by PCR 03/07/2025 Not Detected  Not Detected Final   Lab Requisition on 02/26/2025   Component Date Value Ref Range Status    ADENOVIRUS, PCR 02/26/2025 Not Detected  Not Detected Final    Coronavirus 229E 02/26/2025 Not Detected  Not Detected Final    Coronavirus HKU1 02/26/2025 Not Detected  Not Detected Final    Coronavirus NL63 02/26/2025 Not Detected  Not Detected Final    Coronavirus OC43 02/26/2025 Detected (A)  Not Detected Final     COVID19 2025 Not Detected  Not Detected - Ref. Range Final    Human Metapneumovirus 2025 Not Detected  Not Detected Final    Human Rhinovirus/Enterovirus 2025 Not Detected  Not Detected Final    Influenza A PCR 2025 Not Detected  Not Detected Final    Influenza B PCR 2025 Not Detected  Not Detected Final    Parainfluenza Virus 1 2025 Not Detected  Not Detected Final    Parainfluenza Virus 2 2025 Not Detected  Not Detected Final    Parainfluenza Virus 3 2025 Not Detected  Not Detected Final    Parainfluenza Virus 4 2025 Not Detected  Not Detected Final    RSV, PCR 2025 Not Detected  Not Detected Final    Bordetella pertussis pcr 2025 Not Detected  Not Detected Final    Bordetella parapertussis PCR 2025 Not Detected  Not Detected Final    Chlamydophila pneumoniae PCR 2025 Not Detected  Not Detected Final    Mycoplasma pneumo by PCR 2025 Not Detected  Not Detected Final   Orders Only on 2025   Component Date Value Ref Range Status    Reference Lab Report 2025    Final                    Value:[facility]  [address] [place] [address]  Phone: [phone] or [phone]  Fax: [phone]  Antonio Kramer M.D., Medical Director    PATIENT NAME                           LABORATORY NO.  788  ROSE MARY CHANG                        [id number]  [id number]                         AGE              SEX  N           CLIENT REF #  [facility]            79      1945  F    [id number]   [id number]    SPECIALISTS                        REQUESTING M.D.     ATTENDING M.D.     [facility].  [facility] [address], [place] [address]                   DATE COLLECTED      DATE RECEIVED      DATE REPORTED  2025    DIAGNOSIS:  SKIN, RIGHT EAR:  Basal cell carcinoma, nodular type, extending to peripheral and deep margins  "of  biopsy    CAM    CLINICAL HISTORY:  Skin lesion of right ear    SPECIMENS RECEIVED:  SKIN, RIGHT EAR    MICROSCOPIC DESCRIPTION:  Tissue blocks are                           prepared and slides are examined microscopically. See  diagnosis for details.    Professional interpretation rendered by Yudelka Chopra M.D., DELVINPDena at  [facility], [address] [place], [place] [address].    GROSS DESCRIPTION:  Labeled as \"ear R\", consisting of 1 piece of tan unoriented skin measuring 0.6 x  0.4 x 0.1 cm.  Surgical margin is inked blue, trisected and submitted entirely in 1  cassette.  SOG    REVIEWED, DIAGNOSED AND ELECTRONICALLY  SIGNED BY:    Yudelka Chopra M.D., QUOCA.P.  CPT CODES:  [id number]     Lab Requisition on 02/10/2025   Component Date Value Ref Range Status    ADENOVIRUS, PCR 02/10/2025 Not Detected  Not Detected Final    Coronavirus 229E 02/10/2025 Not Detected  Not Detected Final    Coronavirus HKU1 02/10/2025 Not Detected  Not Detected Final    Coronavirus NL63 02/10/2025 Not Detected  Not Detected Final    Coronavirus OC43 02/10/2025 Not Detected  Not Detected Final    COVID19 02/10/2025 Not Detected  Not Detected - Ref. Range Final    Human Metapneumovirus 02/10/2025 Not Detected  Not Detected Final    Human Rhinovirus/Enterovirus 02/10/2025 Not Detected  Not Detected Final    Influenza A PCR 02/10/2025 Not Detected  Not Detected Final    Influenza B PCR 02/10/2025 Not Detected  Not Detected Final    Parainfluenza Virus 1 02/10/2025 Not Detected  Not Detected Final    Parainfluenza Virus 2 02/10/2025 Not Detected  Not Detected Final    Parainfluenza Virus 3 02/10/2025 Not Detected  Not Detected Final    Parainfluenza Virus 4 02/10/2025 Not Detected  Not Detected Final    RSV, PCR 02/10/2025 Not Detected  Not Detected Final    Bordetella pertussis pcr 02/10/2025 Not Detected  Not Detected Final    Bordetella parapertussis PCR 02/10/2025 Not Detected  Not Detected Final    Chlamydophila pneumoniae PCR " 02/10/2025 Not Detected  Not Detected Final    Mycoplasma pneumo by PCR 02/10/2025 Not Detected  Not Detected Final        Procedure: None  Procedures     I have reviewed and agree with the above PMH, PSH, FMH, social history, medications, allergies, and labs.     Assessment/Plan:   Problem List Items Addressed This Visit    None  Visit Diagnoses         Recurrent UTI    -  Primary    Relevant Medications    Bacillus Coagulans-Inulin (Probiotic) 1-250 BILLION-MG capsule      Frequency of urination        Relevant Orders    POC Urinalysis Dipstick, Automated (Completed)            Health Maintenance:   Health Maintenance Due   Topic Date Due    DXA SCAN  Never done    Pneumococcal Vaccine 50+ (1 of 2 - PCV) Never done    TDAP/TD VACCINES (1 - Tdap) Never done    ZOSTER VACCINE (1 of 2) Never done    RSV Vaccine - Adults (1 - 1-dose 75+ series) Never done    ANNUAL WELLNESS VISIT  Never done    LIPID PANEL  08/02/2024    COVID-19 Vaccine (3 - 2024-25 season) 09/01/2024        Smoking Counseling: Never smoked.  Never used smokeless tobacco.    Urine Incontinence: Patient reports that she is not currently experiencing any symptoms of urinary incontinence.    Patient was given instructions and counseling regarding her condition or for health maintenance advice. Please see specific information pulled into the AVS if appropriate.    Patient Education:   Recurrent UTI - patient has had recent positive urine culture in April.  Urine analysis in office today is completely negative.  Will proceed forward with observation to start on a daily probiotic to help with growth control of normal urogenital misbah.  Did discuss the use of oral antibiotic for UTI prophylaxis but discussed the risk of this including risks of worsening kidney function and building up antibiotic resistance.  She wishes to hold off on a nightly antibiotic.  Will place her back in 3 months and advised to repeat urine culture if symptoms return or she has any  issues urinating.  They verbalized understanding.      Visit Diagnoses:    ICD-10-CM ICD-9-CM   1. Recurrent UTI  N39.0 599.0   2. Frequency of urination  R35.0 788.41     A total of 25 minutes were spent coordinating this patient’s care in clinic today; 15 minutes of which were face-to-face with the patient, reviewing medical history and counseling on the current treatment and followup plan.  All questions were answered to patient's satisfaction.    Meds Ordered During Visit:  New Medications Ordered This Visit   Medications    Bacillus Coagulans-Inulin (Probiotic) 1-250 BILLION-MG capsule     Sig: Take 1 capsule by mouth Daily.     Dispense:  90 capsule     Refill:  3       Follow Up Appointment: 3-month  No follow-ups on file.      This document has been electronically signed by Gonzalo Andrade PA-C   May 8, 2025 16:46 EDT    Part of this note may be an electronic transcription/translation of spoken language to printed text using the Dragon Dictation System.

## 2025-05-16 ENCOUNTER — ANESTHESIA EVENT (OUTPATIENT)
Dept: PERIOP | Facility: HOSPITAL | Age: 80
End: 2025-05-16
Payer: MEDICARE

## 2025-05-16 ENCOUNTER — ANESTHESIA (OUTPATIENT)
Dept: PERIOP | Facility: HOSPITAL | Age: 80
End: 2025-05-16
Payer: MEDICARE

## 2025-05-16 ENCOUNTER — HOSPITAL ENCOUNTER (OUTPATIENT)
Facility: HOSPITAL | Age: 80
Setting detail: HOSPITAL OUTPATIENT SURGERY
Discharge: HOME | End: 2025-05-16
Attending: SURGERY | Admitting: SURGERY
Payer: MEDICARE

## 2025-05-16 VITALS
BODY MASS INDEX: 26.33 KG/M2 | TEMPERATURE: 97.3 F | RESPIRATION RATE: 18 BRPM | HEART RATE: 68 BPM | SYSTOLIC BLOOD PRESSURE: 179 MMHG | OXYGEN SATURATION: 96 % | DIASTOLIC BLOOD PRESSURE: 85 MMHG | WEIGHT: 158 LBS | HEIGHT: 65 IN

## 2025-05-16 DIAGNOSIS — Z86.0100 HISTORY OF COLON POLYPS: ICD-10-CM

## 2025-05-16 PROCEDURE — 25810000003 LACTATED RINGERS PER 1000 ML: Performed by: NURSE ANESTHETIST, CERTIFIED REGISTERED

## 2025-05-16 PROCEDURE — 25010000002 LABETALOL 5 MG/ML SOLUTION: Performed by: ANESTHESIOLOGY

## 2025-05-16 PROCEDURE — 25010000002 HYDRALAZINE PER 20 MG: Performed by: SURGERY

## 2025-05-16 PROCEDURE — 25010000002 LIDOCAINE PF 2% 2 % SOLUTION: Performed by: NURSE ANESTHETIST, CERTIFIED REGISTERED

## 2025-05-16 PROCEDURE — 25010000002 PROPOFOL 200 MG/20ML EMULSION: Performed by: NURSE ANESTHETIST, CERTIFIED REGISTERED

## 2025-05-16 PROCEDURE — 25010000002 HYDRALAZINE PER 20 MG: Performed by: ANESTHESIOLOGY

## 2025-05-16 RX ORDER — ACETAMINOPHEN 325 MG/1
650 TABLET ORAL EVERY 6 HOURS PRN
Status: DISCONTINUED | OUTPATIENT
Start: 2025-05-16 | End: 2025-05-16

## 2025-05-16 RX ORDER — OXYCODONE AND ACETAMINOPHEN 5; 325 MG/1; MG/1
1 TABLET ORAL ONCE AS NEEDED
Status: DISCONTINUED | OUTPATIENT
Start: 2025-05-16 | End: 2025-05-16 | Stop reason: HOSPADM

## 2025-05-16 RX ORDER — PROPOFOL 10 MG/ML
INJECTION, EMULSION INTRAVENOUS AS NEEDED
Status: DISCONTINUED | OUTPATIENT
Start: 2025-05-16 | End: 2025-05-16 | Stop reason: SURG

## 2025-05-16 RX ORDER — IPRATROPIUM BROMIDE AND ALBUTEROL SULFATE 2.5; .5 MG/3ML; MG/3ML
3 SOLUTION RESPIRATORY (INHALATION) ONCE AS NEEDED
Status: DISCONTINUED | OUTPATIENT
Start: 2025-05-16 | End: 2025-05-16 | Stop reason: HOSPADM

## 2025-05-16 RX ORDER — FENTANYL CITRATE 50 UG/ML
50 INJECTION, SOLUTION INTRAMUSCULAR; INTRAVENOUS
Status: DISCONTINUED | OUTPATIENT
Start: 2025-05-16 | End: 2025-05-16 | Stop reason: HOSPADM

## 2025-05-16 RX ORDER — SODIUM CHLORIDE, SODIUM LACTATE, POTASSIUM CHLORIDE, CALCIUM CHLORIDE 600; 310; 30; 20 MG/100ML; MG/100ML; MG/100ML; MG/100ML
INJECTION, SOLUTION INTRAVENOUS CONTINUOUS PRN
Status: DISCONTINUED | OUTPATIENT
Start: 2025-05-16 | End: 2025-05-16 | Stop reason: SURG

## 2025-05-16 RX ORDER — HYDRALAZINE HYDROCHLORIDE 20 MG/ML
10 INJECTION INTRAMUSCULAR; INTRAVENOUS ONCE
Status: COMPLETED | OUTPATIENT
Start: 2025-05-16 | End: 2025-05-16

## 2025-05-16 RX ORDER — LABETALOL HYDROCHLORIDE 5 MG/ML
5 INJECTION, SOLUTION INTRAVENOUS ONCE
Status: COMPLETED | OUTPATIENT
Start: 2025-05-16 | End: 2025-05-16

## 2025-05-16 RX ORDER — ONDANSETRON 2 MG/ML
4 INJECTION INTRAMUSCULAR; INTRAVENOUS AS NEEDED
Status: DISCONTINUED | OUTPATIENT
Start: 2025-05-16 | End: 2025-05-16 | Stop reason: HOSPADM

## 2025-05-16 RX ORDER — LIDOCAINE HYDROCHLORIDE 20 MG/ML
INJECTION, SOLUTION EPIDURAL; INFILTRATION; INTRACAUDAL; PERINEURAL AS NEEDED
Status: DISCONTINUED | OUTPATIENT
Start: 2025-05-16 | End: 2025-05-16 | Stop reason: SURG

## 2025-05-16 RX ORDER — ACETAMINOPHEN 325 MG/1
325 TABLET ORAL EVERY 6 HOURS PRN
Status: DISCONTINUED | OUTPATIENT
Start: 2025-05-16 | End: 2025-05-16 | Stop reason: HOSPADM

## 2025-05-16 RX ORDER — MEPERIDINE HYDROCHLORIDE 25 MG/ML
12.5 INJECTION INTRAMUSCULAR; INTRAVENOUS; SUBCUTANEOUS
Status: DISCONTINUED | OUTPATIENT
Start: 2025-05-16 | End: 2025-05-16 | Stop reason: HOSPADM

## 2025-05-16 RX ORDER — SODIUM CHLORIDE, SODIUM LACTATE, POTASSIUM CHLORIDE, CALCIUM CHLORIDE 600; 310; 30; 20 MG/100ML; MG/100ML; MG/100ML; MG/100ML
100 INJECTION, SOLUTION INTRAVENOUS ONCE AS NEEDED
Status: DISCONTINUED | OUTPATIENT
Start: 2025-05-16 | End: 2025-05-16 | Stop reason: HOSPADM

## 2025-05-16 RX ADMIN — PROPOFOL 20 MG: 10 INJECTION, EMULSION INTRAVENOUS at 12:05

## 2025-05-16 RX ADMIN — HYDRALAZINE HYDROCHLORIDE 10 MG: 20 INJECTION INTRAMUSCULAR; INTRAVENOUS at 13:16

## 2025-05-16 RX ADMIN — ACETAMINOPHEN 325 MG: 325 TABLET ORAL at 13:46

## 2025-05-16 RX ADMIN — PROPOFOL 20 MG: 10 INJECTION, EMULSION INTRAVENOUS at 12:02

## 2025-05-16 RX ADMIN — LABETALOL HYDROCHLORIDE 5 MG: 5 INJECTION, SOLUTION INTRAVENOUS at 13:43

## 2025-05-16 RX ADMIN — PROPOFOL 50 MG: 10 INJECTION, EMULSION INTRAVENOUS at 11:59

## 2025-05-16 RX ADMIN — LIDOCAINE HYDROCHLORIDE 50 MG: 20 INJECTION, SOLUTION EPIDURAL; INFILTRATION; INTRACAUDAL; PERINEURAL at 11:59

## 2025-05-16 RX ADMIN — SODIUM CHLORIDE, POTASSIUM CHLORIDE, SODIUM LACTATE AND CALCIUM CHLORIDE: 600; 310; 30; 20 INJECTION, SOLUTION INTRAVENOUS at 11:55

## 2025-05-16 RX ADMIN — PROPOFOL 20 MG: 10 INJECTION, EMULSION INTRAVENOUS at 12:10

## 2025-05-16 RX ADMIN — HYDRALAZINE HYDROCHLORIDE 10 MG: 20 INJECTION INTRAMUSCULAR; INTRAVENOUS at 12:53

## 2025-05-16 NOTE — ANESTHESIA POSTPROCEDURE EVALUATION
Patient: Susan Us    Procedure Summary       Date: 05/16/25 Room / Location: [facility]  / [facility]    Anesthesia Start: 1155 Anesthesia Stop: 1214    Procedure: COLONOSCOPY Diagnosis:       History of colon polyps      (History of colon polyps [Z86.0100])    Surgeons: Francis Van MD Provider: Navneet Rodriguez MD    Anesthesia Type: general, MAC ASA Status: 3            Anesthesia Type: general, MAC    Vitals  Vitals Value Taken Time   /79 05/16/25 14:03   Temp 97.3 °F (36.3 °C) 05/16/25 12:17   Pulse 68 05/16/25 14:06   Resp 18 05/16/25 13:57   SpO2 96 % 05/16/25 14:06   Vitals shown include unfiled device data.        Post Anesthesia Care and Evaluation    Patient location during evaluation: PACU  Patient participation: complete - patient participated  Level of consciousness: responsive to verbal stimuli  Pain score: 0  Pain management: satisfactory to patient  Anesthetic complications: No anesthetic complications  PONV Status: none  Cardiovascular status: hypertensive  Respiratory status: nasal cannula  Hydration status: acceptable    Comments: Blood pressure meds given for hypertension.  Better after medication.

## 2025-05-16 NOTE — ANESTHESIA PREPROCEDURE EVALUATION
Anesthesia Evaluation     Patient summary reviewed and Nursing notes reviewed   no history of anesthetic complications:   NPO Solid Status: > 8 hours  NPO Liquid Status: > 8 hours           Airway   Mallampati: I  TM distance: >3 FB  Neck ROM: full  No difficulty expected  Dental - normal exam   (+) upper dentures and lower dentures    Pulmonary - normal exam    breath sounds clear to auscultation  (+) COPD,  Cardiovascular - normal exam  Exercise tolerance: poor (<4 METS)    ECG reviewed  Rhythm: regular  Rate: normal    (+) pacemaker pacemaker, hypertension, valvular problems/murmurs, hyperlipidemia      Neuro/Psych  (+) TIA, psychiatric history Anxiety, dementia, poor historian.  GI/Hepatic/Renal/Endo    (+) hiatal hernia, GERD, renal disease-    Musculoskeletal     Abdominal  - normal exam    Abdomen: soft.   Substance History - negative use     OB/GYN negative ob/gyn ROS         Other   arthritis,   history of cancer active    ROS/Med Hx Other: 3/25/24    ·  Left ventricular systolic function is normal. Calculated left ventricular EF = 58% Left ventricular ejection fraction appears to be 56 - 60%.  ·  Left ventricular diastolic function was normal.  ·  The left atrial cavity is moderately dilated.  ·  Moderate aortic valve regurgitation is present.  ·  Moderate mitral valve regurgitation is present.                   Anesthesia Plan    ASA 3     general and MAC     (General vs MAC anesthesia)  intravenous induction     Anesthetic plan, risks, benefits, and alternatives have been provided, discussed and informed consent has been obtained with: patient.  Pre-procedure education provided  Use of blood products discussed with  Consented to blood products.    Plan discussed with CRNA.    CODE STATUS:

## 2025-05-19 LAB — REF LAB TEST METHOD: NORMAL

## 2025-07-16 ENCOUNTER — APPOINTMENT (OUTPATIENT)
Dept: GENERAL RADIOLOGY | Facility: HOSPITAL | Age: 80
End: 2025-07-16
Payer: MEDICARE

## 2025-07-16 ENCOUNTER — HOSPITAL ENCOUNTER (EMERGENCY)
Facility: HOSPITAL | Age: 80
Discharge: HOME | End: 2025-07-17
Payer: MEDICARE

## 2025-07-16 ENCOUNTER — APPOINTMENT (OUTPATIENT)
Dept: CT IMAGING | Facility: HOSPITAL | Age: 80
End: 2025-07-16
Payer: MEDICARE
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
## 2025-07-16 DIAGNOSIS — N39.0 ACUTE UTI: ICD-10-CM

## 2025-07-16 LAB
BASOPHILS # BLD AUTO: 0.04 10*3/MM3 (ref 0–0.2)
BASOPHILS NFR BLD AUTO: 1 % (ref 0–1.5)
DEPRECATED RDW RBC AUTO: 40 FL (ref 37–54)
EOSINOPHIL # BLD AUTO: 0.13 10*3/MM3 (ref 0–0.4)
EOSINOPHIL NFR BLD AUTO: 3.1 % (ref 0.3–6.2)
ERYTHROCYTE [DISTWIDTH] IN BLOOD BY AUTOMATED COUNT: 12.9 % (ref 12.3–15.4)
HCT VFR BLD AUTO: 27.4 % (ref 34–46.6)
HGB BLD-MCNC: 9 G/DL (ref 12–15.9)
IMM GRANULOCYTES # BLD AUTO: 0.01 10*3/MM3 (ref 0–0.05)
IMM GRANULOCYTES NFR BLD AUTO: 0.2 % (ref 0–0.5)
LYMPHOCYTES # BLD AUTO: 1.41 10*3/MM3 (ref 0.7–3.1)
LYMPHOCYTES NFR BLD AUTO: 34.1 % (ref 19.6–45.3)
MCH RBC QN AUTO: 28 PG (ref 26.6–33)
MCHC RBC AUTO-ENTMCNC: 32.8 G/DL (ref 31.5–35.7)
MCV RBC AUTO: 85.1 FL (ref 79–97)
MONOCYTES # BLD AUTO: 0.49 10*3/MM3 (ref 0.1–0.9)
MONOCYTES NFR BLD AUTO: 11.9 % (ref 5–12)
NEUTROPHILS NFR BLD AUTO: 2.05 10*3/MM3 (ref 1.7–7)
NEUTROPHILS NFR BLD AUTO: 49.7 % (ref 42.7–76)
NRBC BLD AUTO-RTO: 0 /100 WBC (ref 0–0.2)
PLATELET # BLD AUTO: 119 10*3/MM3 (ref 140–450)
PMV BLD AUTO: 11 FL (ref 6–12)
RBC # BLD AUTO: 3.22 10*6/MM3 (ref 3.77–5.28)
WBC NRBC COR # BLD AUTO: 4.13 10*3/MM3 (ref 3.4–10.8)

## 2025-07-16 PROCEDURE — 99284 EMERGENCY DEPT VISIT MOD MDM: CPT

## 2025-07-16 PROCEDURE — 93005 ELECTROCARDIOGRAM TRACING: CPT | Performed by: NURSE PRACTITIONER

## 2025-07-16 PROCEDURE — 36415 COLL VENOUS BLD VENIPUNCTURE: CPT

## 2025-07-16 PROCEDURE — 85025 COMPLETE CBC W/AUTO DIFF WBC: CPT | Performed by: NURSE PRACTITIONER

## 2025-07-16 PROCEDURE — 80053 COMPREHEN METABOLIC PANEL: CPT | Performed by: NURSE PRACTITIONER

## 2025-07-16 PROCEDURE — 70450 CT HEAD/BRAIN W/O DYE: CPT

## 2025-07-16 PROCEDURE — 73562 X-RAY EXAM OF KNEE 3: CPT

## 2025-07-16 PROCEDURE — 93010 ELECTROCARDIOGRAM REPORT: CPT | Performed by: INTERNAL MEDICINE

## 2025-07-17 VITALS
HEART RATE: 71 BPM | BODY MASS INDEX: 26.34 KG/M2 | HEIGHT: 65 IN | DIASTOLIC BLOOD PRESSURE: 91 MMHG | TEMPERATURE: 97.7 F | RESPIRATION RATE: 17 BRPM | WEIGHT: 158.07 LBS | SYSTOLIC BLOOD PRESSURE: 173 MMHG | OXYGEN SATURATION: 100 %

## 2025-07-17 LAB
ALBUMIN SERPL-MCNC: 4.1 G/DL (ref 3.5–5.2)
ALBUMIN/GLOB SERPL: 1.6 G/DL
ALP SERPL-CCNC: 65 U/L (ref 39–117)
ALT SERPL W P-5'-P-CCNC: 18 U/L (ref 1–33)
ANION GAP SERPL CALCULATED.3IONS-SCNC: 11.7 MMOL/L (ref 5–15)
AST SERPL-CCNC: 30 U/L (ref 1–32)
BACTERIA UR QL AUTO: ABNORMAL /HPF
BILIRUB SERPL-MCNC: 0.2 MG/DL (ref 0–1.2)
BILIRUB UR QL STRIP: NEGATIVE
BUN SERPL-MCNC: 20.8 MG/DL (ref 8–23)
BUN/CREAT SERPL: 16.9 (ref 7–25)
CALCIUM SPEC-SCNC: 8.8 MG/DL (ref 8.6–10.5)
CHLORIDE SERPL-SCNC: 98 MMOL/L (ref 98–107)
CLARITY UR: ABNORMAL
CO2 SERPL-SCNC: 23.3 MMOL/L (ref 22–29)
COLOR UR: YELLOW
CREAT SERPL-MCNC: 1.23 MG/DL (ref 0.57–1)
EGFRCR SERPLBLD CKD-EPI 2021: 44.8 ML/MIN/1.73
GLOBULIN UR ELPH-MCNC: 2.5 GM/DL
GLUCOSE SERPL-MCNC: 102 MG/DL (ref 65–99)
GLUCOSE UR STRIP-MCNC: NEGATIVE MG/DL
HGB UR QL STRIP.AUTO: NEGATIVE
HOLD SPECIMEN: NORMAL
HYALINE CASTS UR QL AUTO: ABNORMAL /LPF
KETONES UR QL STRIP: NEGATIVE
LEUKOCYTE ESTERASE UR QL STRIP.AUTO: ABNORMAL
NITRITE UR QL STRIP: NEGATIVE
PH UR STRIP.AUTO: 7 [PH] (ref 5–8)
POTASSIUM SERPL-SCNC: 3.8 MMOL/L (ref 3.5–5.2)
PROT SERPL-MCNC: 6.6 G/DL (ref 6–8.5)
PROT UR QL STRIP: NEGATIVE
QT INTERVAL: 452 MS
QTC INTERVAL: 477 MS
RBC # UR STRIP: ABNORMAL /HPF
REF LAB TEST METHOD: ABNORMAL
SODIUM SERPL-SCNC: 133 MMOL/L (ref 136–145)
SP GR UR STRIP: 1.01 (ref 1–1.03)
SQUAMOUS #/AREA URNS HPF: ABNORMAL /HPF
UROBILINOGEN UR QL STRIP: ABNORMAL
WBC # UR STRIP: ABNORMAL /HPF
WHOLE BLOOD HOLD COAG: NORMAL
WHOLE BLOOD HOLD SPECIMEN: NORMAL

## 2025-07-17 PROCEDURE — 81001 URINALYSIS AUTO W/SCOPE: CPT

## 2025-07-17 RX ORDER — CEFDINIR 300 MG/1
300 CAPSULE ORAL 2 TIMES DAILY
Qty: 14 CAPSULE | Refills: 0 | Status: SHIPPED | OUTPATIENT
Start: 2025-07-17 | End: 2025-07-24

## 2025-07-17 RX ORDER — HYDRALAZINE HYDROCHLORIDE 25 MG/1
50 TABLET, FILM COATED ORAL ONCE
Status: COMPLETED | OUTPATIENT
Start: 2025-07-17 | End: 2025-07-17

## 2025-07-17 RX ORDER — ACETAMINOPHEN 500 MG
1000 TABLET ORAL ONCE
Status: COMPLETED | OUTPATIENT
Start: 2025-07-17 | End: 2025-07-17

## 2025-07-17 RX ADMIN — HYDRALAZINE HYDROCHLORIDE 50 MG: 25 TABLET ORAL at 01:07

## 2025-07-17 RX ADMIN — ACETAMINOPHEN 1000 MG: 500 TABLET, FILM COATED ORAL at 02:04

## 2025-07-17 NOTE — ED PROVIDER NOTES
Subjective   History of Present Illness  Patient is a 80-year-old female with past medical history significant for CKD, COPD, history of dementia, hyperlipidemia, GERD, hypertension, iron deficiency anemia, anxiety, depression.  She presents to the ED today for a fall.  She currently resides at [facility].  The fall was not witnessed.  She complains of head pain and left knee pain.  Her daughter reports that she has histories of UTI.  Denies fever.  Denies any other complaints.        Review of Systems   Constitutional: Negative.  Negative for fever.   Respiratory: Negative.     Cardiovascular: Negative.  Negative for chest pain.   Gastrointestinal: Negative.  Negative for abdominal pain.   Endocrine: Negative.    Genitourinary: Negative.  Negative for dysuria.   Skin: Negative.    Neurological:  Positive for headaches.   Psychiatric/Behavioral: Negative.     All other systems reviewed and are negative.      Past Medical History:   Diagnosis Date    Anxiety     Arthritis     Bradycardia     s/p pacemaker placement    CKD (chronic kidney disease)     baseline renal function unknown    COPD (chronic obstructive pulmonary disease)     Dementia     Difficulty walking     Elevated cholesterol     GERD (gastroesophageal reflux disease)     Hyperlipidemia     Hypertension     Iron deficiency anemia     Memory loss     Pacemaker     Thrombocytopenia     Urinary tract infection        Allergies   Allergen Reactions    Evolocumab Itching     itching    Statins Hives    [place] Rash       Past Surgical History:   Procedure Laterality Date    BLADDER SURGERY  2002    Bladder tuck    BREAST BIOPSY      benign, yrs ago    CARDIAC PACEMAKER PLACEMENT      CATARACT EXTRACTION      CERVICAL FUSION      CHOLECYSTECTOMY      COLONOSCOPY      COLONOSCOPY N/A 5/16/2025    Procedure: COLONOSCOPY;  Surgeon: Francis Van MD;  Location: [facility];  Service: Gastroenterology;  Laterality: N/A;    ENDOSCOPY N/A  10/18/2024    Procedure: ESOPHAGOGASTRODUODENOSCOPY WITH ANESTHESIA;  Surgeon: Francis Van MD;  Location:  [facility] OR;  Service: Gastroenterology;  Laterality: N/A;    HIATAL HERNIA REPAIR      HYSTERECTOMY      REPLACEMENT TOTAL KNEE Right     SKIN LESION EXCISION Right 04/04/2025    Procedure: SKIN LESION EXCISION  RIGHT EAR;  Surgeon: Francis Van MD;  Location:  [facility] OR;  Service: General;  Laterality: Right;       Family History   Problem Relation Age of Onset    Stroke Mother     Hypertension Mother     Stroke Father     Breast cancer Sister     Dementia Brother        Social History     Socioeconomic History    Marital status:    Tobacco Use    Smoking status: Never     Passive exposure: Never    Smokeless tobacco: Never    Tobacco comments:     Second hand smoke exposure ()   Vaping Use    Vaping status: Never Used   Substance and Sexual Activity    Alcohol use: Never    Drug use: Never    Sexual activity: Defer           Objective   Physical Exam  Vitals and nursing note reviewed.   Constitutional:       General: She is not in acute distress.     Appearance: She is well-developed. She is not diaphoretic.   HENT:      Head: Normocephalic and atraumatic.      Right Ear: External ear normal.      Left Ear: External ear normal.      Nose: Nose normal.   Eyes:      Conjunctiva/sclera: Conjunctivae normal.      Pupils: Pupils are equal, round, and reactive to light.   Neck:      Vascular: No JVD.      Trachea: No tracheal deviation.   Cardiovascular:      Rate and Rhythm: Normal rate and regular rhythm.      Heart sounds: Normal heart sounds. No murmur heard.  Pulmonary:      Effort: Pulmonary effort is normal. No respiratory distress.      Breath sounds: Normal breath sounds. No wheezing.   Abdominal:      General: Bowel sounds are normal.      Palpations: Abdomen is soft.      Tenderness: There is no abdominal tenderness.   Musculoskeletal:         General: No deformity. Normal  range of motion.      Cervical back: Normal range of motion and neck supple.   Skin:     General: Skin is warm and dry.      Coloration: Skin is not pale.      Findings: No erythema or rash.   Neurological:      Mental Status: She is alert and oriented to person, place, and time.      Cranial Nerves: No cranial nerve deficit.   Psychiatric:         Behavior: Behavior normal.         Thought Content: Thought content normal.         Procedures       Results for orders placed or performed during the hospital encounter of 07/16/25   Comprehensive Metabolic Panel    Collection Time: 07/16/25 11:45 PM    Specimen: Blood   Result Value Ref Range    Glucose 102 (H) 65 - 99 mg/dL    BUN 20.8 8.0 - 23.0 mg/dL    Creatinine 1.23 (H) 0.57 - 1.00 mg/dL    Sodium 133 (L) 136 - 145 mmol/L    Potassium 3.8 3.5 - 5.2 mmol/L    Chloride 98 98 - 107 mmol/L    CO2 23.3 22.0 - 29.0 mmol/L    Calcium 8.8 8.6 - 10.5 mg/dL    Total Protein 6.6 6.0 - 8.5 g/dL    Albumin 4.1 3.5 - 5.2 g/dL    ALT (SGPT) 18 1 - 33 U/L    AST (SGOT) 30 1 - 32 U/L    Alkaline Phosphatase 65 39 - 117 U/L    Total Bilirubin 0.2 0.0 - 1.2 mg/dL    Globulin 2.5 gm/dL    A/G Ratio 1.6 g/dL    BUN/Creatinine Ratio 16.9 7.0 - 25.0    Anion Gap 11.7 5.0 - 15.0 mmol/L    eGFR 44.8 (L) >60.0 mL/min/1.73   CBC Auto Differential    Collection Time: 07/16/25 11:45 PM    Specimen: Blood   Result Value Ref Range    WBC 4.13 3.40 - 10.80 10*3/mm3    RBC 3.22 (L) 3.77 - 5.28 10*6/mm3    Hemoglobin 9.0 (L) 12.0 - 15.9 g/dL    Hematocrit 27.4 (L) 34.0 - 46.6 %    MCV 85.1 79.0 - 97.0 fL    MCH 28.0 26.6 - 33.0 pg    MCHC 32.8 31.5 - 35.7 g/dL    RDW 12.9 12.3 - 15.4 %    RDW-SD 40.0 37.0 - 54.0 fl    MPV 11.0 6.0 - 12.0 fL    Platelets 119 (L) 140 - 450 10*3/mm3    Neutrophil % 49.7 42.7 - 76.0 %    Lymphocyte % 34.1 19.6 - 45.3 %    Monocyte % 11.9 5.0 - 12.0 %    Eosinophil % 3.1 0.3 - 6.2 %    Basophil % 1.0 0.0 - 1.5 %    Immature Grans % 0.2 0.0 - 0.5 %    Neutrophils,  Absolute 2.05 1.70 - 7.00 10*3/mm3    Lymphocytes, Absolute 1.41 0.70 - 3.10 10*3/mm3    Monocytes, Absolute 0.49 0.10 - 0.90 10*3/mm3    Eosinophils, Absolute 0.13 0.00 - 0.40 10*3/mm3    Basophils, Absolute 0.04 0.00 - 0.20 10*3/mm3    Immature Grans, Absolute 0.01 0.00 - 0.05 10*3/mm3    nRBC 0.0 0.0 - 0.2 /100 WBC   Green Top (Gel)    Collection Time: 07/16/25 11:45 PM   Result Value Ref Range    Extra Tube Hold for add-ons.    Lavender Top    Collection Time: 07/16/25 11:45 PM   Result Value Ref Range    Extra Tube hold for add-on    Gold Top - SST    Collection Time: 07/16/25 11:45 PM   Result Value Ref Range    Extra Tube Hold for add-ons.    Light Blue Top    Collection Time: 07/16/25 11:45 PM   Result Value Ref Range    Extra Tube Hold for add-ons.    ECG 12 Lead Other; fall    Collection Time: 07/16/25 11:45 PM   Result Value Ref Range    QT Interval 452 ms    QTC Interval 477 ms   Urinalysis With Microscopic If Indicated (No Culture) - Urine, Clean Catch    Collection Time: 07/17/25  1:09 AM    Specimen: Urine, Clean Catch   Result Value Ref Range    Color, UA Yellow Yellow, Straw    Appearance, UA Cloudy (A) Clear    pH, UA 7.0 5.0 - 8.0    Specific Gravity, UA 1.009 1.005 - 1.030    Glucose, UA Negative Negative    Ketones, UA Negative Negative    Bilirubin, UA Negative Negative    Blood, UA Negative Negative    Protein, UA Negative Negative    Leuk Esterase, UA Moderate (2+) (A) Negative    Nitrite, UA Negative Negative    Urobilinogen, UA 0.2 E.U./dL 0.2 - 1.0 E.U./dL   Urinalysis, Microscopic Only - Urine, Clean Catch    Collection Time: 07/17/25  1:09 AM    Specimen: Urine, Clean Catch   Result Value Ref Range    RBC, UA 0-2 None Seen, 0-2 /HPF    WBC, UA 11-20 (A) None Seen, 0-2 /HPF    Bacteria, UA None Seen None Seen /HPF    Squamous Epithelial Cells, UA 3-6 (A) None Seen, 0-2 /HPF    Hyaline Casts, UA None Seen None Seen /LPF    Methodology Automated Microscopy    [place] Urine Culture Tube -  Urine, Clean Catch    Collection Time: 07/17/25  1:09 AM    Specimen: Urine, Clean Catch   Result Value Ref Range    Extra Tube Hold for add-ons.       CT Head Without Contrast   Final Result       Mild generalized brain volume loss with mild chronic small vessel   ischemic type changes in the white matter.   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   No acute intracranial hemorrhage, mass, infarct, or edema.   No skull fracture.   No acute fracture or dislocation at the left knee.   Mild to moderate tricompartmental osteoarthritis with small left knee   joint effusion.       This report was finalized on 7/17/2025 12:18 AM by Shamir Malik MD.          XR Knee 3 View Left   Final Result       Mild generalized brain volume loss with mild chronic small vessel   ischemic type changes in the white matter.   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   No acute intracranial hemorrhage, mass, infarct, or edema.   No skull fracture.   No acute fracture or dislocation at the left knee.   Mild to moderate tricompartmental osteoarthritis with small left knee   joint effusion.       This report was finalized on 7/17/2025 12:18 AM by Shamir Malik MD.               ED Course  ED Course as of 07/17/25 0547   u Jul 17, 2025   0025 EKG shows an AV paced rhythm with a rate of 67, normal axis, normal intervals otherwise, no ST elevation or depression no acute ischemia is appreciated [MJ]      ED Course User Index  [MJ] Ana Arias MD                                                       Medical Decision Making  Patient is a 80-year-old female with past medical history significant for CKD, COPD, history of dementia, hyperlipidemia, GERD, hypertension, iron deficiency anemia, anxiety, depression.  She presents to the ED today for a fall.  She currently resides at  [facility].  The fall was not witnessed.  She complains of head pain and left knee pain.  Her daughter reports that she has histories of UTI.  Denies fever.  Denies any other complaints.      Problems Addressed:  Acute UTI: complicated acute illness or injury  Fall, initial encounter: complicated acute illness or injury    Amount and/or Complexity of Data Reviewed  Labs: ordered.  Radiology: ordered.  ECG/medicine tests: ordered.    Risk  OTC drugs.  Prescription drug management.        Final diagnoses:   Fall, initial encounter   Acute UTI       ED Disposition  ED Disposition       ED Disposition   Discharge    Condition   Stable    Comment   --               Bruna Smith MD  [address]  [facility] [address]  [phone]    Schedule an appointment as soon as possible for a visit in 2 days           Medication List        New Prescriptions      cefdinir 300 MG capsule  Commonly known as: OMNICEF  Take 1 capsule by mouth 2 (Two) Times a Day for 7 days.               Where to Get Your Medications        These medications were sent to [email or web address], [employer]. - [place], [place] - [address] - [phone] [place] [phone] FX  [address] [place] [address]      Phone: [phone]   cefdinir 300 MG capsule            Sofia Theodore APRN  07/17/25 [id number]       Sofia Theodore APRN  07/17/25 2402

## 2025-07-17 NOTE — ED NOTES
Patients daughter advises she will drive her back to [facility].

## 2025-08-11 ENCOUNTER — OFFICE VISIT (OUTPATIENT)
Dept: UROLOGY | Facility: CLINIC | Age: 80
End: 2025-08-11
Payer: MEDICARE

## 2025-08-11 VITALS
DIASTOLIC BLOOD PRESSURE: 84 MMHG | HEIGHT: 65 IN | HEART RATE: 66 BPM | BODY MASS INDEX: 26.16 KG/M2 | WEIGHT: 157 LBS | SYSTOLIC BLOOD PRESSURE: 202 MMHG

## 2025-08-11 DIAGNOSIS — N28.1 BILATERAL RENAL CYSTS: ICD-10-CM

## 2025-08-11 DIAGNOSIS — N39.0 RECURRENT UTI: Primary | ICD-10-CM

## 2025-08-11 PROCEDURE — 1160F RVW MEDS BY RX/DR IN RCRD: CPT

## 2025-08-11 PROCEDURE — 99214 OFFICE O/P EST MOD 30 MIN: CPT

## 2025-08-11 PROCEDURE — 1159F MED LIST DOCD IN RCRD: CPT

## 2025-08-11 PROCEDURE — 3079F DIAST BP 80-89 MM HG: CPT

## 2025-08-11 PROCEDURE — 3077F SYST BP >= 140 MM HG: CPT

## 2025-08-11 PROCEDURE — 81003 URINALYSIS AUTO W/O SCOPE: CPT

## 2025-08-11 PROCEDURE — 87086 URINE CULTURE/COLONY COUNT: CPT

## 2025-08-13 ENCOUNTER — RESULTS FOLLOW-UP (OUTPATIENT)
Dept: UROLOGY | Facility: CLINIC | Age: 80
End: 2025-08-13
Payer: MEDICARE

## 2025-08-13 LAB — BACTERIA SPEC AEROBE CULT: NO GROWTH

## 2025-08-19 ENCOUNTER — LAB (OUTPATIENT)
Dept: ONCOLOGY | Facility: CLINIC | Age: 80
End: 2025-08-19
Payer: MEDICARE

## 2025-08-19 ENCOUNTER — OFFICE VISIT (OUTPATIENT)
Dept: ONCOLOGY | Facility: CLINIC | Age: 80
End: 2025-08-19
Payer: MEDICARE

## 2025-08-19 VITALS
HEART RATE: 73 BPM | DIASTOLIC BLOOD PRESSURE: 75 MMHG | RESPIRATION RATE: 20 BRPM | TEMPERATURE: 98 F | BODY MASS INDEX: 26.66 KG/M2 | HEIGHT: 65 IN | WEIGHT: 160 LBS | OXYGEN SATURATION: 97 % | SYSTOLIC BLOOD PRESSURE: 151 MMHG

## 2025-08-19 DIAGNOSIS — D50.9 IRON DEFICIENCY ANEMIA, UNSPECIFIED IRON DEFICIENCY ANEMIA TYPE: ICD-10-CM

## 2025-08-19 DIAGNOSIS — D69.6 THROMBOCYTOPENIA: ICD-10-CM

## 2025-08-19 DIAGNOSIS — R16.1 SPLENOMEGALY: ICD-10-CM

## 2025-08-19 DIAGNOSIS — D63.8 ANEMIA OF CHRONIC DISEASE: ICD-10-CM

## 2025-08-19 DIAGNOSIS — D64.9 ANEMIA, UNSPECIFIED TYPE: Primary | ICD-10-CM

## 2025-08-19 DIAGNOSIS — D64.9 ANEMIA, UNSPECIFIED TYPE: ICD-10-CM

## 2025-08-19 LAB
BASOPHILS # BLD AUTO: 0.04 10*3/MM3 (ref 0–0.2)
BASOPHILS NFR BLD AUTO: 0.7 % (ref 0–1.5)
DEPRECATED RDW RBC AUTO: 41.1 FL (ref 37–54)
EOSINOPHIL # BLD AUTO: 0.11 10*3/MM3 (ref 0–0.4)
EOSINOPHIL NFR BLD AUTO: 2 % (ref 0.3–6.2)
ERYTHROCYTE [DISTWIDTH] IN BLOOD BY AUTOMATED COUNT: 12.9 % (ref 12.3–15.4)
FERRITIN SERPL-MCNC: 58.5 NG/ML (ref 13–150)
FOLATE SERPL-MCNC: 16.4 NG/ML (ref 4.78–24.2)
HCT VFR BLD AUTO: 29.4 % (ref 34–46.6)
HGB BLD-MCNC: 9.6 G/DL (ref 12–15.9)
IMM GRANULOCYTES # BLD AUTO: 0.03 10*3/MM3 (ref 0–0.05)
IMM GRANULOCYTES NFR BLD AUTO: 0.6 % (ref 0–0.5)
IRON 24H UR-MRATE: 105 MCG/DL (ref 37–145)
IRON SATN MFR SERPL: 29 % (ref 20–50)
LYMPHOCYTES # BLD AUTO: 0.91 10*3/MM3 (ref 0.7–3.1)
LYMPHOCYTES NFR BLD AUTO: 16.8 % (ref 19.6–45.3)
MCH RBC QN AUTO: 28.6 PG (ref 26.6–33)
MCHC RBC AUTO-ENTMCNC: 32.7 G/DL (ref 31.5–35.7)
MCV RBC AUTO: 87.5 FL (ref 79–97)
MONOCYTES # BLD AUTO: 0.51 10*3/MM3 (ref 0.1–0.9)
MONOCYTES NFR BLD AUTO: 9.4 % (ref 5–12)
NEUTROPHILS NFR BLD AUTO: 3.81 10*3/MM3 (ref 1.7–7)
NEUTROPHILS NFR BLD AUTO: 70.5 % (ref 42.7–76)
NRBC BLD AUTO-RTO: 0 /100 WBC (ref 0–0.2)
PLATELET # BLD AUTO: 139 10*3/MM3 (ref 140–450)
PMV BLD AUTO: 10.1 FL (ref 6–12)
RBC # BLD AUTO: 3.36 10*6/MM3 (ref 3.77–5.28)
TIBC SERPL-MCNC: 365 MCG/DL (ref 298–536)
TRANSFERRIN SERPL-MCNC: 245 MG/DL (ref 200–360)
VIT B12 BLD-MCNC: 695 PG/ML (ref 211–946)
WBC NRBC COR # BLD AUTO: 5.41 10*3/MM3 (ref 3.4–10.8)

## 2025-08-19 PROCEDURE — 82746 ASSAY OF FOLIC ACID SERUM: CPT | Performed by: INTERNAL MEDICINE

## 2025-08-19 PROCEDURE — 84466 ASSAY OF TRANSFERRIN: CPT | Performed by: INTERNAL MEDICINE

## 2025-08-19 PROCEDURE — 82607 VITAMIN B-12: CPT | Performed by: INTERNAL MEDICINE

## 2025-08-19 PROCEDURE — 85025 COMPLETE CBC W/AUTO DIFF WBC: CPT | Performed by: INTERNAL MEDICINE

## 2025-08-19 PROCEDURE — 82728 ASSAY OF FERRITIN: CPT | Performed by: INTERNAL MEDICINE

## 2025-08-19 PROCEDURE — 83921 ORGANIC ACID SINGLE QUANT: CPT | Performed by: INTERNAL MEDICINE

## 2025-08-19 PROCEDURE — 83540 ASSAY OF IRON: CPT | Performed by: INTERNAL MEDICINE

## 2025-08-19 PROCEDURE — 84238 ASSAY NONENDOCRINE RECEPTOR: CPT | Performed by: INTERNAL MEDICINE

## 2025-08-21 ENCOUNTER — HOSPITAL ENCOUNTER (OUTPATIENT)
Facility: HOSPITAL | Age: 80
Discharge: HOME | End: 2025-08-21
Payer: MEDICARE

## 2025-08-21 DIAGNOSIS — N28.1 BILATERAL RENAL CYSTS: ICD-10-CM

## 2025-08-21 DIAGNOSIS — N39.0 RECURRENT UTI: ICD-10-CM

## 2025-08-21 LAB — STFR SERPL-SCNC: 15.9 NMOL/L (ref 12.2–27.3)

## 2025-08-21 PROCEDURE — 74176 CT ABD & PELVIS W/O CONTRAST: CPT

## 2025-08-21 PROCEDURE — 74176 CT ABD & PELVIS W/O CONTRAST: CPT | Performed by: RADIOLOGY

## 2025-08-25 LAB — METHYLMALONATE SERPL-SCNC: 330 NMOL/L (ref 0–378)

## (undated) DEVICE — FRCP BX RADJAW4 NDL 2.8 240CM LG OG BX40

## (undated) DEVICE — DEFENDO AIR WATER SUCTION AND BIOPSY VALVE KIT FOR  OLYMPUS: Brand: DEFENDO AIR/WATER/SUCTION AND BIOPSY VALVE

## (undated) DEVICE — PENCL ES MEGADINE EZ/CLEAN BUTN W/HOLSTR 10FT

## (undated) DEVICE — Device

## (undated) DEVICE — ENDOGATOR AUXILIARY WATER JET CONNECTOR: Brand: ENDOGATOR

## (undated) DEVICE — SKIN PREP TRAY 4 COMPARTM TRAY: Brand: MEDLINE INDUSTRIES, INC.

## (undated) DEVICE — ANTIBACTERIAL UNDYED BRAIDED (POLYGLACTIN 910), SYNTHETIC ABSORBABLE SUTURE: Brand: COATED VICRYL

## (undated) DEVICE — CONN Y IRR DISP 1P/U

## (undated) DEVICE — HOLDER: Brand: DEROYAL

## (undated) DEVICE — GLV SURG PREMIERPRO MIC LTX PF SZ8 BRN

## (undated) DEVICE — THE BITE BLOCK MAXI, LATEX FREE STRAP IS USED TO PROTECT THE ENDOSCOPE INSERTION TUBE FROM BEING BITTEN BY THE PATIENT.

## (undated) DEVICE — SUT MNCRYL 4/0 PS2 18 IN

## (undated) DEVICE — ENDOGATOR TUBING FOR ENDOGATOR EGP-100 IRRIGATION PUMP,OLYMPUS OFP PUMP, OLYMPUS AFU-100 PUMP AND ERBE EIP2 PUMP: Brand: ENDOGATOR

## (undated) DEVICE — PK HD AND NK 70

## (undated) DEVICE — GLV SURG PREMIERPRO MIC LTX PF SZ7.5 BRN